# Patient Record
Sex: MALE | Race: WHITE | Employment: OTHER | URBAN - METROPOLITAN AREA
[De-identification: names, ages, dates, MRNs, and addresses within clinical notes are randomized per-mention and may not be internally consistent; named-entity substitution may affect disease eponyms.]

---

## 2017-01-16 ENCOUNTER — CLINICAL SUPPORT (OUTPATIENT)
Dept: CARDIOLOGY CLINIC | Age: 82
End: 2017-01-16

## 2017-01-16 DIAGNOSIS — Z95.0 CARDIAC PACEMAKER IN SITU: Primary | ICD-10-CM

## 2017-01-25 ENCOUNTER — OFFICE VISIT (OUTPATIENT)
Dept: CARDIOLOGY CLINIC | Age: 82
End: 2017-01-25

## 2017-01-25 VITALS
DIASTOLIC BLOOD PRESSURE: 70 MMHG | RESPIRATION RATE: 20 BRPM | OXYGEN SATURATION: 96 % | HEIGHT: 68 IN | SYSTOLIC BLOOD PRESSURE: 136 MMHG | WEIGHT: 192 LBS | HEART RATE: 64 BPM | BODY MASS INDEX: 29.1 KG/M2

## 2017-01-25 DIAGNOSIS — I48.0 PAROXYSMAL ATRIAL FIBRILLATION (HCC): Primary | ICD-10-CM

## 2017-01-25 RX ORDER — METOPROLOL TARTRATE 25 MG/1
25 TABLET, FILM COATED ORAL 2 TIMES DAILY
Qty: 30 TAB | Refills: 6 | Status: SHIPPED | OUTPATIENT
Start: 2017-01-25 | End: 2017-01-25 | Stop reason: SDUPTHER

## 2017-01-25 RX ORDER — METOPROLOL TARTRATE 25 MG/1
25 TABLET, FILM COATED ORAL 2 TIMES DAILY
Qty: 60 TAB | Refills: 6 | Status: ON HOLD | OUTPATIENT
Start: 2017-01-25 | End: 2019-01-27

## 2017-01-25 NOTE — PROGRESS NOTES
HISTORY OF PRESENTING ILLNESS      Roni Moore is a 80 y.o. male established patient with PAF, HTN, PPM, COPD, dyslipidemia, pacemaker who was noted to have 40 mode switches that appeared to be consistent with AFL or AT. Of note, he is pacemaker dependent. Previously we discontinued Plavix and Eliquis and started aspirin, and his rate response was adjusted. He recently underwent EPS, which demonstrated findings consistent with a left AT. Empiric ablation of his CTI was performed. His current pacemaker interrogation is highly consistent with the left AT that was observed during the EPS. Last visit, we adjusted rate response, which he derived some clinical benefit however this has waned over time. Atrial flutter was discovered on recent pacemaker interrogation and he returns today for discussion regarding management. The patient denies chest pain/ shortness of breath, orthopnea, PND, LE edema, palpitations, syncope, presyncope or fatigue.       ACTIVE PROBLEM LIST     Patient Active Problem List    Diagnosis Date Noted    Atrial fibrillation ([facility] [address] 02/17/2016    Encounter to establish care 08/05/2015           PAST MEDICAL HISTORY     Past Medical History   Diagnosis Date    Atrial fibrillation ([address]     Chronic obstructive pulmonary disease ([address]     Essential hypertension     Hyperlipidemia     Long term (current) use of anticoagulants     Pacemaker     Rosacea 2016           PAST SURGICAL HISTORY     Past Surgical History   Procedure Laterality Date    Hx heart catheterization      Hx coronary stent placement      Hx pacemaker            ALLERGIES     No Known Allergies       FAMILY HISTORY     Family History   Problem Relation Age of Onset    Stroke Mother     Hypertension Mother     Kidney Disease Father     negative for cardiac disease       SOCIAL HISTORY     Social History     Social History    Marital status:      Spouse name: N/A    Number of children: N/A    Years of education: N/A     Social History Main Topics    Smoking status: Never Smoker    Smokeless tobacco: Never Used    Alcohol use No    Drug use: No    Sexual activity: Not on file     Other Topics Concern    Not on file     Social History Narrative         MEDICATIONS     Current Outpatient Prescriptions   Medication Sig    gabapentin (NEURONTIN) 600 mg tablet Take 600 mg by mouth daily.  OTHER nightly. Nidazole 0.75% cream for Rosecea.  ipratropium (ATROVENT) 0.03 % nasal spray 1 Spray three (3) times daily. Uses SL for drooling    VIT A/VIT C/VIT E/ZINC/COPPER (ICAPS AREDS PO) Take two capsules daily.  aspirin delayed-release 81 mg tablet Take  by mouth daily.  tamsulosin (FLOMAX) 0.4 mg capsule Take 0.4 mg by mouth daily.  amLODIPine (NORVASC) 2.5 mg tablet Take  by mouth daily.  hydrochlorothiazide (HYDRODIURIL) 25 mg tablet Take 12.5 mg by mouth daily.  isosorbide mononitrate ER (IMDUR) 30 mg tablet Take  by mouth daily.  levothyroxine (LEVOTHROID) 50 mcg tablet Take  by mouth Daily (before breakfast).  metoprolol (LOPRESSOR) 25 mg tablet Take 12.5 mg by mouth two (2) times a day.  omeprazole (PRILOSEC) 20 mg capsule Take 20 mg by mouth daily.  simvastatin (ZOCOR) 40 mg tablet Take 40 mg by mouth every Monday, Wednesday, Friday.  nitroglycerin (NITROSTAT) 0.4 mg SL tablet by SubLINGual route every five (5) minutes as needed for Chest Pain.  Multivits with Min-FA-Lycopene (MEN'S DAILY MULTIVIT-MINERAL) 0.4-600 mg-mcg tab Take  by mouth daily. No current facility-administered medications for this visit. I have reviewed the nurses notes, vitals, problem list, allergy list, medical history, family, social history and medications. REVIEW OF SYMPTOMS      General: Pt denies excessive weight gain or loss. Pt is able to conduct ADL's  HEENT: Denies blurred vision, headaches, hearing loss, epistaxis and difficulty swallowing.   Respiratory: Denies cough, congestion, shortness of breath, GARCIA, wheezing or stridor. Cardiovascular: Denies precordial pain, palpitations, edema or PND  Gastrointestinal: Denies poor appetite, indigestion, abdominal pain or blood in stool  Genitourinary: Denies hematuria, dysuria, increased urinary frequency  Musculoskeletal: Denies joint pain or swelling from muscles or joints  Neurologic: Denies tremor, paresthesias, headache, or sensory motor disturbance  Psychiatric: Denies confusion, insomnia, depression  Integumentray: Denies rash, itching or ulcers. Hematologic: Denies easy bruising, bleeding     PHYSICAL EXAMINATION      There were no vitals filed for this visit. General: Well developed, in no acute distress. HEENT: No jaundice, oral mucosa moist, no oral ulcers  Neck: Supple, no stiffness, no lymphadenopathy, supple  Heart:  irregular Normal S1/S2 negative S3 or S4.  no murmur, gallop or rub, no jugular venous distention  Respiratory: Clear bilaterally x 4, no wheezing or rales  Abdomen:   Soft, non-tender, bowel sounds are active.   Extremities:  No edema, normal cap refill, no cyanosis. Musculoskeletal: No clubbing, no deformities  Neuro: A&Ox3, speech clear, gait stable, cooperative, no focal neurologic deficits  Skin: Skin color is normal. No rashes or lesions.  Non diaphoretic, moist.  Vascular: 2+ pulses symmetric in all extremities       DIAGNOSTIC DATA      EKG:        LABORATORY DATA      Lab Results   Component Value Date/Time    WBC 6.6 03/03/2016 09:55 AM    HGB 13.4 03/03/2016 09:55 AM    HCT 41.6 03/03/2016 09:55 AM    PLATELET 605 39/69/7142 09:55 AM    MCV 94 03/03/2016 09:55 AM      Lab Results   Component Value Date/Time    Sodium 141 03/03/2016 09:55 AM    Potassium 4.2 03/03/2016 09:55 AM    Chloride 102 03/03/2016 09:55 AM    CO2 29 03/03/2016 09:55 AM    Glucose 83 03/03/2016 09:55 AM    BUN 23 03/03/2016 09:55 AM    Creatinine 1.57 03/03/2016 09:55 AM    BUN/Creatinine ratio 15 03/03/2016 09:55 AM    GFR est AA 43 03/03/2016 09:55 AM    GFR est non-AA 37 03/03/2016 09:55 AM    Calcium 8.9 03/03/2016 09:55 AM           ASSESSMENT      1. Atrial Fibrillation  2. Atrial Tachycardia   A. Left atrial  3. Hypertension  4. PPM  5. COPD  6. Dyslipidemia  7. Pacemaker    A. Medtronic       PLAN      Given his age and that he is asymptomatic, favor a rate control approach. Will plan to increase his metoprolol to 25mg BID and start Eliquis 2.5mg for stroke prevention. FOLLOW-UP     Follow up 1 month. Thank you,  Joselin Odell MD for involving me in the care of this extraordinarily pleasant male. Please do not hesitate to contact me for further questions/concerns.      BRIAN Alejandro MD  Cardiac Electrophysiology / Cardiology    [address]  [address] [place] [address]  [phone] / [phone] Fax   [phone] / [phone] Fax

## 2017-01-25 NOTE — PROGRESS NOTES
Visit Vitals    /70 (BP 1 Location: Left arm, BP Patient Position: Sitting)    Pulse 64    Resp 20    Ht 5' 8\" (1.727 m)    Wt 192 lb (87.1 kg)    SpO2 96%    BMI 29.19 kg/m2

## 2017-03-10 ENCOUNTER — OFFICE VISIT (OUTPATIENT)
Dept: CARDIOLOGY CLINIC | Age: 82
End: 2017-03-10

## 2017-03-10 VITALS
DIASTOLIC BLOOD PRESSURE: 82 MMHG | OXYGEN SATURATION: 96 % | HEART RATE: 72 BPM | WEIGHT: 196.2 LBS | BODY MASS INDEX: 29.73 KG/M2 | RESPIRATION RATE: 18 BRPM | HEIGHT: 68 IN | SYSTOLIC BLOOD PRESSURE: 120 MMHG

## 2017-03-10 DIAGNOSIS — I48.91 ATRIAL FIBRILLATION, UNSPECIFIED TYPE (HCC): Primary | ICD-10-CM

## 2017-03-10 NOTE — PROGRESS NOTES
Visit Vitals    /82 (BP 1 Location: Left arm, BP Patient Position: Sitting)    Pulse 72    Resp 18    Ht 5' 8\" (1.727 m)    Wt 196 lb 3.2 oz (89 kg)    SpO2 96%    BMI 29.83 kg/m2

## 2017-03-10 NOTE — MR AVS SNAPSHOT
Visit Information Date & Time Provider Department Dept. Phone Encounter #  
 3/10/2017 10:20 AM Nida Bernal MD [facility] [phone] [id number] Your Appointments 4/17/2017 10:45 AM  
PACEMAKER with PACEMAKERVIKRAM  
[facility] (JANNETH KHANNA) Appt Note: med/pm/stf/b 1-16-17  
 [address] [facility] [address] [facility]  
[phone]  
  
   
 [address] [address]  
  
    
 6/21/2017 11:20 AM  
ESTABLISHED PATIENT with Nida Bernal MD  
[facility] (Rosana Yo) Appt Note: 3 mo fu  
 [address] [facility] [address] [place] [address]  
[phone]  
  
   
 [address] [address]  
  
    
 12/8/2017 11:00 AM  
ESTABLISHED PATIENT with Nida Bernal MD  
[facility] (Rosana Bartlettitz) Appt Note: 1 yr follow up  
 [address] [facility] [address] [place] [address]  
[phone] Upcoming Health Maintenance Date Due ZOSTER VACCINE AGE 60> 7/31/1982 GLAUCOMA SCREENING Q2Y 7/31/1987 Pneumococcal 65+ Low/Medium Risk (1 of 2 - PCV13) 7/31/1987 MEDICARE YEARLY EXAM 7/31/1987 INFLUENZA AGE 9 TO ADULT 8/1/2016 DTaP/Tdap/Td series (2 - Td) 11/2/2025 Allergies as of 3/10/2017  Review Complete On: 3/10/2017 By: Nida Bernal MD  
 No Known Allergies Current Immunizations  Never Reviewed Name Date Tdap 11/2/2015  9:38 AM  
  
 Not reviewed this visit You Were Diagnosed With   
  
 Codes Comments Atrial fibrillation, unspecified type ([facility] [address]    -  Primary ICD-10-CM: I48.91 
ICD-9-CM: 427.31 Vitals BP Pulse Resp Height(growth percentile) Weight(growth percentile) SpO2  
 120/82 (BP 1 Location: Left arm, BP Patient Position: Sitting) 72 18 5' 8\" (1.727 m) 196 lb 3.2 oz (89 kg) 96% BMI Smoking Status 29.83 kg/m2 Never Smoker Vitals History BMI and BSA Data Body Mass Index Body Surface Area  
 29.83 kg/m 2 2.07 m 2 Preferred Pharmacy Pharmacy Name Phone [facility] [address], . [address] [phone] Your Updated Medication List  
  
   
This list is accurate as of: 3/10/17 11:17 AM.  Always use your most recent med list. amLODIPine 2.5 mg tablet Commonly known as:  Becky Bessy Take  by mouth daily. apixaban 2.5 mg tablet Commonly known as:  Sherrell Shield Take 1 Tab by mouth two (2) times a day. aspirin delayed-release 81 mg tablet Take  by mouth daily. gabapentin 600 mg tablet Commonly known as:  NEURONTIN Take 600 mg by mouth daily. hydroCHLOROthiazide 25 mg tablet Commonly known as:  HYDRODIURIL Take 12.5 mg by mouth daily. ICAPS AREDS PO Take two capsules daily. IMDUR 30 mg tablet Generic drug:  isosorbide mononitrate ER Take  by mouth daily. ipratropium 0.03 % nasal spray Commonly known as:  ATROVENT  
1 Spray three (3) times daily. Uses SL for drooling LEVOTHROID 50 mcg tablet Generic drug:  levothyroxine Take  by mouth Daily (before breakfast). MEN'S DAILY MULTIVIT-MINERAL 0.4-600 mg-mcg Tab Generic drug:  Multivits with Min-FA-Lycopene Take  by mouth daily. metoprolol tartrate 25 mg tablet Commonly known as:  LOPRESSOR Take 1 Tab by mouth two (2) times a day. nitroglycerin 0.4 mg SL tablet Commonly known as:  NITROSTAT  
by SubLINGual route every five (5) minutes as needed for Chest Pain. omeprazole 20 mg capsule Commonly known as:  PRILOSEC Take 20 mg by mouth daily. OTHER  
nightly. Nidazole 0.75% cream for Rosecea. simvastatin 40 mg tablet Commonly known as:  ZOCOR Take 20 mg by mouth every Monday, Wednesday, Friday. tamsulosin 0.4 mg capsule Commonly known as:  FLOMAX Take 0.4 mg by mouth daily. We Performed the Following AMB POC EKG ROUTINE W/ 12 LEADS, INTER & REP [69931 CPT(R)] Introducing [facility] & [facility] SERVICES! Dear Michael Restrepo: Thank you for requesting a [employer] account. Our records indicate that you already have an active [employer] account. You can access your account anytime at [email or web address] Did you know that you can access your hospital and ER discharge instructions at any time in [employer]? You can also review all of your test results from your hospital stay or ER visit. Additional Information If you have questions, please visit the Frequently Asked Questions section of the [employer] website at [email or web address] Remember, [employer] is NOT to be used for urgent needs. For medical emergencies, dial 911. Now available from your iPhone and Android! Please provide this summary of care documentation to your next provider. Your primary care clinician is listed as Vivian Gautam. If you have any questions after today's visit, please call [phone].

## 2017-03-10 NOTE — PROGRESS NOTES
HISTORY OF PRESENTING ILLNESS      Prosper Diaz is a 80 y.o. male established patient with PAF, HTN, PPM, COPD, dyslipidemia, pacemaker who was noted to have 40 mode switches that appeared to be consistent with AFL or AT. Of note, he is pacemaker dependent. He recently underwent EPS, which demonstrated findings consistent with a left AT. Empiric ablation of his CTI was performed. His current pacemaker interrogation is highly consistent with the left AT that was observed during the EPS. Last visit we increased his metoprolol and added eliquis. He is tolerating this regimen.         ACTIVE PROBLEM LIST     Patient Active Problem List    Diagnosis Date Noted    Atrial fibrillation ([facility] [address] 02/17/2016    Encounter to establish care 08/05/2015           PAST MEDICAL HISTORY     Past Medical History:   Diagnosis Date    Atrial fibrillation ([facility] [address]     Chronic obstructive pulmonary disease ([facility] [address]     Essential hypertension     Hyperlipidemia     Long term (current) use of anticoagulants     Pacemaker     Rosacea 2016           PAST SURGICAL HISTORY     Past Surgical History:   Procedure Laterality Date    HX CORONARY STENT PLACEMENT      HX HEART CATHETERIZATION      HX PACEMAKER            ALLERGIES     No Known Allergies       FAMILY HISTORY     Family History   Problem Relation Age of Onset    Stroke Mother     Hypertension Mother     Kidney Disease Father     negative for cardiac disease       SOCIAL HISTORY     Social History     Social History    Marital status:      Spouse name: N/A    Number of children: N/A    Years of education: N/A     Social History Main Topics    Smoking status: Never Smoker    Smokeless tobacco: Never Used    Alcohol use No    Drug use: No    Sexual activity: Not Asked     Other Topics Concern    None     Social History Narrative         MEDICATIONS     Current Outpatient Prescriptions   Medication Sig    metoprolol tartrate (LOPRESSOR) 25 mg tablet Take 1 Tab by mouth two (2) times a day.  apixaban (ELIQUIS) 2.5 mg tablet Take 1 Tab by mouth two (2) times a day.  gabapentin (NEURONTIN) 600 mg tablet Take 600 mg by mouth daily.  OTHER nightly. Nidazole 0.75% cream for Rosecea.  ipratropium (ATROVENT) 0.03 % nasal spray 1 Spray three (3) times daily. Uses SL for drooling    VIT A/VIT C/VIT E/ZINC/COPPER (ICAPS AREDS PO) Take two capsules daily.  tamsulosin (FLOMAX) 0.4 mg capsule Take 0.4 mg by mouth daily.  amLODIPine (NORVASC) 2.5 mg tablet Take  by mouth daily.  hydrochlorothiazide (HYDRODIURIL) 25 mg tablet Take 12.5 mg by mouth daily.  isosorbide mononitrate ER (IMDUR) 30 mg tablet Take  by mouth daily.  levothyroxine (LEVOTHROID) 50 mcg tablet Take  by mouth Daily (before breakfast).  omeprazole (PRILOSEC) 20 mg capsule Take 20 mg by mouth daily.  simvastatin (ZOCOR) 40 mg tablet Take 20 mg by mouth every Monday, Wednesday, Friday.  nitroglycerin (NITROSTAT) 0.4 mg SL tablet by SubLINGual route every five (5) minutes as needed for Chest Pain.  Multivits with Min-FA-Lycopene (MEN'S DAILY MULTIVIT-MINERAL) 0.4-600 mg-mcg tab Take  by mouth daily.  aspirin delayed-release 81 mg tablet Take  by mouth daily. No current facility-administered medications for this visit. I have reviewed the nurses notes, vitals, problem list, allergy list, medical history, family, social history and medications. REVIEW OF SYMPTOMS      General: Pt denies excessive weight gain or loss. Pt is able to conduct ADL's  HEENT: Denies blurred vision, headaches, hearing loss, epistaxis and difficulty swallowing. Respiratory: Denies cough, congestion, shortness of breath, GARCIA, wheezing or stridor.   Cardiovascular: Denies precordial pain, palpitations, edema or PND  Gastrointestinal: Denies poor appetite, indigestion, abdominal pain or blood in stool  Genitourinary: Denies hematuria, dysuria, increased urinary frequency  Musculoskeletal: Denies joint pain or swelling from muscles or joints  Neurologic: Denies tremor, paresthesias, headache, or sensory motor disturbance  Psychiatric: Denies confusion, insomnia, depression  Integumentray: Denies rash, itching or ulcers. Hematologic: Denies easy bruising, bleeding       PHYSICAL EXAMINATION      Vitals:    03/10/17 1017   BP: 120/82   Pulse: 72   Resp: 18   SpO2: 96%   Weight: 196 lb 3.2 oz (89 kg)   Height: 5' 8\" (1.727 m)     General: Well developed, in no acute distress. HEENT: No jaundice, oral mucosa moist, no oral ulcers  Neck: Supple, no stiffness, no lymphadenopathy, supple  Heart:  Normal S1/S2 negative S3 or S4. Regular, no murmur, gallop or rub, no jugular venous distention  Respiratory: Clear bilaterally x 4, no wheezing or rales  Abdomen:   Soft, non-tender, bowel sounds are active.   Extremities:  No edema, normal cap refill, no cyanosis. Musculoskeletal: No clubbing, no deformities  Neuro: A&Ox3, speech clear, gait stable, cooperative, no focal neurologic deficits  Skin: Skin color is normal. No rashes or lesions. Non diaphoretic, moist.  Vascular: 2+ pulses symmetric in all extremities       DIAGNOSTIC DATA      EKG:        LABORATORY DATA      Lab Results   Component Value Date/Time    WBC 6.6 03/03/2016 09:55 AM    HGB 13.4 03/03/2016 09:55 AM    HCT 41.6 03/03/2016 09:55 AM    PLATELET 062 92/30/8706 09:55 AM    MCV 94 03/03/2016 09:55 AM      Lab Results   Component Value Date/Time    Sodium 141 03/03/2016 09:55 AM    Potassium 4.2 03/03/2016 09:55 AM    Chloride 102 03/03/2016 09:55 AM    CO2 29 03/03/2016 09:55 AM    Glucose 83 03/03/2016 09:55 AM    BUN 23 03/03/2016 09:55 AM    Creatinine 1.57 03/03/2016 09:55 AM    BUN/Creatinine ratio 15 03/03/2016 09:55 AM    GFR est AA 43 03/03/2016 09:55 AM    GFR est non-AA 37 03/03/2016 09:55 AM    Calcium 8.9 03/03/2016 09:55 AM           ASSESSMENT      1. Atrial Fibrillation  2. Atrial Tachycardia   A. Left atrial  3. Hypertension  4. PPM  5. COPD  6. Dyslipidemia  7. Pacemaker    A. [employer]tr[employer]       PLAN     Continue current drug regimen. FOLLOW-UP     3 months      Thank you, Vikas Auguset MD for allowing me to participate in the care of this extraordinarily pleasant male. Please do not hesitate to contact me for further questions/concerns.          Nessa Benoit MD  Cardiac Electrophysiology / Cardiology    [facility]  [address] [facility], [facility], [address] [place] [address] Sumanth Jackson  [phone] / [phone] Fax   [phone] / [phone] Fax

## 2017-04-17 ENCOUNTER — CLINICAL SUPPORT (OUTPATIENT)
Dept: CARDIOLOGY CLINIC | Age: 82
End: 2017-04-17

## 2017-04-17 DIAGNOSIS — Z95.0 CARDIAC PACEMAKER IN SITU: Primary | ICD-10-CM

## 2017-06-14 ENCOUNTER — CLINICAL SUPPORT (OUTPATIENT)
Dept: CARDIOLOGY CLINIC | Age: 82
End: 2017-06-14

## 2017-06-14 ENCOUNTER — OFFICE VISIT (OUTPATIENT)
Dept: CARDIOLOGY CLINIC | Age: 82
End: 2017-06-14

## 2017-06-14 VITALS
HEART RATE: 68 BPM | OXYGEN SATURATION: 98 % | RESPIRATION RATE: 20 BRPM | BODY MASS INDEX: 29.58 KG/M2 | DIASTOLIC BLOOD PRESSURE: 82 MMHG | HEIGHT: 68 IN | WEIGHT: 195.2 LBS | SYSTOLIC BLOOD PRESSURE: 136 MMHG

## 2017-06-14 DIAGNOSIS — Z95.0 CARDIAC PACEMAKER IN SITU: Primary | ICD-10-CM

## 2017-06-14 DIAGNOSIS — R00.0 TACHYCARDIA: Primary | ICD-10-CM

## 2017-06-14 NOTE — PROGRESS NOTES
Visit Vitals    /82 (BP 1 Location: Left arm, BP Patient Position: Sitting)    Pulse 68    Resp 20    Ht 5' 8\" (1.727 m)    Wt 195 lb 3.2 oz (88.5 kg)    SpO2 98%    BMI 29.68 kg/m2

## 2017-06-14 NOTE — PROGRESS NOTES
HISTORY OF PRESENTING ILLNESS      Mejia Jara is a 80 y.o. male established patient with PAF, HTN, PPM, COPD, dyslipidemia, pacemaker who was noted to have 40 mode switches that appeared to be consistent with AFL or AT. Of note, he is pacemaker dependent. He recently underwent EPS, which demonstrated findings consistent with a left AT. Empiric ablation of his CTI was performed. We increased his metoprolol and added eliquis. He is tolerating this regimen. No further tachycardia recurrence. ACTIVE PROBLEM LIST     Patient Active Problem List    Diagnosis Date Noted    Atrial fibrillation ([facility] 02/17/2016    Encounter to establish care 08/05/2015           PAST MEDICAL HISTORY     Past Medical History:   Diagnosis Date    Atrial fibrillation ([facility] [facility]     Chronic obstructive pulmonary disease ([facility] [address] [facility]     Essential hypertension     Hyperlipidemia     Long term (current) use of anticoagulants     Pacemaker     Rosacea 2016           PAST SURGICAL HISTORY     Past Surgical History:   Procedure Laterality Date    HX CORONARY STENT PLACEMENT      HX HEART CATHETERIZATION      HX PACEMAKER            ALLERGIES     No Known Allergies       FAMILY HISTORY     Family History   Problem Relation Age of Onset    Stroke Mother     Hypertension Mother     Kidney Disease Father     negative for cardiac disease       SOCIAL HISTORY     Social History     Social History    Marital status:      Spouse name: N/A    Number of children: N/A    Years of education: N/A     Social History Main Topics    Smoking status: Never Smoker    Smokeless tobacco: Never Used    Alcohol use No    Drug use: No    Sexual activity: Not Asked     Other Topics Concern    None     Social History Narrative         MEDICATIONS     Current Outpatient Prescriptions   Medication Sig    metoprolol tartrate (LOPRESSOR) 25 mg tablet Take 1 Tab by mouth two (2) times a day.     apixaban (ELIQUIS) 2.5 mg tablet Take 1 Tab by mouth two (2) times a day.  gabapentin (NEURONTIN) 600 mg tablet Take 600 mg by mouth daily.  OTHER nightly. Nidazole 0.75% cream for Rosecea.  ipratropium (ATROVENT) 0.03 % nasal spray 1 Spray three (3) times daily. Uses SL for drooling    VIT A/VIT C/VIT E/ZINC/COPPER (ICAPS AREDS PO) Take two capsules daily.  tamsulosin (FLOMAX) 0.4 mg capsule Take 0.4 mg by mouth daily.  hydrochlorothiazide (HYDRODIURIL) 25 mg tablet Take 12.5 mg by mouth daily.  isosorbide mononitrate ER (IMDUR) 30 mg tablet Take  by mouth daily.  levothyroxine (LEVOTHROID) 50 mcg tablet Take  by mouth Daily (before breakfast).  omeprazole (PRILOSEC) 20 mg capsule Take 20 mg by mouth daily.  simvastatin (ZOCOR) 40 mg tablet Take 20 mg by mouth every Monday, Wednesday, Friday.  nitroglycerin (NITROSTAT) 0.4 mg SL tablet by SubLINGual route every five (5) minutes as needed for Chest Pain.  Multivits with Min-FA-Lycopene (MEN'S DAILY MULTIVIT-MINERAL) 0.4-600 mg-mcg tab Take  by mouth daily. No current facility-administered medications for this visit. I have reviewed the nurses notes, vitals, problem list, allergy list, medical history, family, social history and medications. REVIEW OF SYMPTOMS      General: Pt denies excessive weight gain or loss. Pt is able to conduct ADL's  HEENT: Denies blurred vision, headaches, hearing loss, epistaxis and difficulty swallowing. Respiratory: Denies cough, congestion, shortness of breath, GARCIA, wheezing or stridor.   Cardiovascular: Denies precordial pain, palpitations, edema or PND  Gastrointestinal: Denies poor appetite, indigestion, abdominal pain or blood in stool  Genitourinary: Denies hematuria, dysuria, increased urinary frequency  Musculoskeletal: Denies joint pain or swelling from muscles or joints  Neurologic: Denies tremor, paresthesias, headache, or sensory motor disturbance  Psychiatric: Denies confusion, insomnia, depression  Integumentray: Denies rash, itching or ulcers. Hematologic: Denies easy bruising, bleeding       PHYSICAL EXAMINATION      Vitals:    06/14/17 1110   BP: 136/82   Pulse: 68   Resp: 20   SpO2: 98%   Weight: 195 lb 3.2 oz (88.5 kg)   Height: 5' 8\" (1.727 m)     General: Well developed, in no acute distress. HEENT: No jaundice, oral mucosa moist, no oral ulcers  Neck: Supple, no stiffness, no lymphadenopathy, supple  Heart:  Normal S1/S2 negative S3 or S4. Regular, no murmur, gallop or rub, no jugular venous distention  Respiratory: Clear bilaterally x 4, no wheezing or rales  Abdomen:   Soft, non-tender, bowel sounds are active.   Extremities:  No edema, normal cap refill, no cyanosis. Musculoskeletal: No clubbing, no deformities  Neuro: A&Ox3, speech clear, gait stable, cooperative, no focal neurologic deficits  Skin: Skin color is normal. No rashes or lesions. Non diaphoretic, moist.  Vascular: 2+ pulses symmetric in all extremities       DIAGNOSTIC DATA      EKG:        LABORATORY DATA      Lab Results   Component Value Date/Time    WBC 6.6 03/03/2016 09:55 AM    HGB 13.4 03/03/2016 09:55 AM    HCT 41.6 03/03/2016 09:55 AM    PLATELET 568 64/46/7509 09:55 AM    MCV 94 03/03/2016 09:55 AM      Lab Results   Component Value Date/Time    Sodium 141 03/03/2016 09:55 AM    Potassium 4.2 03/03/2016 09:55 AM    Chloride 102 03/03/2016 09:55 AM    CO2 29 03/03/2016 09:55 AM    Glucose 83 03/03/2016 09:55 AM    BUN 23 03/03/2016 09:55 AM    Creatinine 1.57 03/03/2016 09:55 AM    BUN/Creatinine ratio 15 03/03/2016 09:55 AM    GFR est AA 43 03/03/2016 09:55 AM    GFR est non-AA 37 03/03/2016 09:55 AM    Calcium 8.9 03/03/2016 09:55 AM           ASSESSMENT      1. Atrial Flutter  2. Atrial Tachycardia   A. Left atrial  3. Hypertension  4. PPM  5. COPD  6. Dyslipidemia  7. Pacemaker    A. Medtronic       PLAN     No recurrent tachycardia at this time. Continue monitoring PPM for recurrence.         FOLLOW-UP     6 months      Thank you, Susy Suárez MD for allowing me to participate in the care of this extraordinarily pleasant male. Please do not hesitate to contact me for further questions/concerns.          Shaunna Leslie MD  Cardiac Electrophysiology / Cardiology    [address]  [address], [address] [facility], [address]  [facility]    [place]  [phone] / [phone] Fax   [phone] / [phone] Fax

## 2017-08-23 ENCOUNTER — HOSPITAL ENCOUNTER (OUTPATIENT)
Dept: ULTRASOUND IMAGING | Age: 82
Discharge: HOME | End: 2017-08-23
Payer: MEDICARE

## 2017-08-23 DIAGNOSIS — I71.40 ABDOMINAL AORTIC ANEURYSM: ICD-10-CM

## 2017-08-23 PROCEDURE — 76775 US EXAM ABDO BACK WALL LIM: CPT

## 2017-09-27 ENCOUNTER — CLINICAL SUPPORT (OUTPATIENT)
Dept: CARDIOLOGY CLINIC | Age: 82
End: 2017-09-27

## 2017-09-27 DIAGNOSIS — Z95.0 CARDIAC PACEMAKER IN SITU: Primary | ICD-10-CM

## 2017-12-08 ENCOUNTER — CLINICAL SUPPORT (OUTPATIENT)
Dept: CARDIOLOGY CLINIC | Age: 82
End: 2017-12-08

## 2017-12-08 ENCOUNTER — OFFICE VISIT (OUTPATIENT)
Dept: CARDIOLOGY CLINIC | Age: 82
End: 2017-12-08

## 2017-12-08 VITALS
DIASTOLIC BLOOD PRESSURE: 78 MMHG | RESPIRATION RATE: 19 BRPM | OXYGEN SATURATION: 99 % | SYSTOLIC BLOOD PRESSURE: 128 MMHG | HEIGHT: 68 IN | WEIGHT: 196 LBS | BODY MASS INDEX: 29.7 KG/M2 | HEART RATE: 67 BPM

## 2017-12-08 DIAGNOSIS — Z95.0 CARDIAC PACEMAKER IN SITU: Primary | ICD-10-CM

## 2017-12-08 DIAGNOSIS — I48.91 ATRIAL FIBRILLATION, UNSPECIFIED TYPE (HCC): Primary | ICD-10-CM

## 2017-12-08 NOTE — PROGRESS NOTES
HISTORY OF PRESENTING ILLNESS      Patient reports recent onset of fatigue. EKG shows atrial tachycardia with ventricular pacing. He has also experienced some lower extremity edema. He was started a diuretic for this with somewhat of an improvement. ACTIVE PROBLEM LIST     Patient Active Problem List    Diagnosis Date Noted    Atrial fibrillation ([address] 02/17/2016    Encounter to establish care 08/05/2015           PAST MEDICAL HISTORY     Past Medical History:   Diagnosis Date    Atrial fibrillation ([address]     Chronic obstructive pulmonary disease ([facility] [address]     Essential hypertension     Hyperlipidemia     Long term (current) use of anticoagulants     Pacemaker     Rosacea 2016           PAST SURGICAL HISTORY     Past Surgical History:   Procedure Laterality Date    HX CORONARY STENT PLACEMENT      HX HEART CATHETERIZATION      HX PACEMAKER            ALLERGIES     No Known Allergies       FAMILY HISTORY     Family History   Problem Relation Age of Onset    Stroke Mother     Hypertension Mother     Kidney Disease Father     negative for cardiac disease       SOCIAL HISTORY     Social History     Social History    Marital status:      Spouse name: N/A    Number of children: N/A    Years of education: N/A     Social History Main Topics    Smoking status: Never Smoker    Smokeless tobacco: Never Used    Alcohol use No    Drug use: No    Sexual activity: Not Asked     Other Topics Concern    None     Social History Narrative         MEDICATIONS     Current Outpatient Prescriptions   Medication Sig    metoprolol tartrate (LOPRESSOR) 25 mg tablet Take 1 Tab by mouth two (2) times a day.  apixaban (ELIQUIS) 2.5 mg tablet Take 1 Tab by mouth two (2) times a day.  gabapentin (NEURONTIN) 600 mg tablet Take 600 mg by mouth daily.  OTHER nightly. Nidazole 0.75% cream for Rosecea.  ipratropium (ATROVENT) 0.03 % nasal spray 1 Spray three (3) times daily.  Uses  for drooling    VIT A/VIT C/VIT E/ZINC/COPPER (ICAPS AREDS PO) Take two capsules daily.  tamsulosin (FLOMAX) 0.4 mg capsule Take 0.4 mg by mouth daily.  hydrochlorothiazide (HYDRODIURIL) 25 mg tablet Take 12.5 mg by mouth daily.  isosorbide mononitrate ER (IMDUR) 30 mg tablet Take  by mouth daily.  levothyroxine (LEVOTHROID) 50 mcg tablet Take  by mouth Daily (before breakfast).  omeprazole (PRILOSEC) 20 mg capsule Take 20 mg by mouth daily.  simvastatin (ZOCOR) 40 mg tablet Take 20 mg by mouth every Monday, Wednesday, Friday.  nitroglycerin (NITROSTAT) 0.4 mg SL tablet by SubLINGual route every five (5) minutes as needed for Chest Pain.  Multivits with Min-FA-Lycopene (MEN'S DAILY MULTIVIT-MINERAL) 0.4-600 mg-mcg tab Take  by mouth daily. No current facility-administered medications for this visit. I have reviewed the nurses notes, vitals, problem list, allergy list, medical history, family, social history and medications. REVIEW OF SYMPTOMS      General: Pt denies excessive weight gain or loss. Pt is able to conduct ADL's  HEENT: Denies blurred vision, headaches, hearing loss, epistaxis and difficulty swallowing. Respiratory: Denies cough, congestion, shortness of breath, GARCIA, wheezing or stridor. Cardiovascular: Denies precordial pain, palpitations, edema or PND  Gastrointestinal: Denies poor appetite, indigestion, abdominal pain or blood in stool  Genitourinary: Denies hematuria, dysuria, increased urinary frequency  Musculoskeletal: Denies joint pain or swelling from muscles or joints  Neurologic: Denies tremor, paresthesias, headache, or sensory motor disturbance  Psychiatric: Denies confusion, insomnia, depression  Integumentray: Denies rash, itching or ulcers.   Hematologic: Denies easy bruising, bleeding       PHYSICAL EXAMINATION      Vitals:    12/08/17 1049   BP: 128/78   Pulse: 67   Resp: 19   SpO2: 99%   Weight: 196 lb (88.9 kg)   Height: 5' 8\" (1.727 m) General: Well developed, in no acute distress. HEENT: No jaundice, oral mucosa moist, no oral ulcers  Neck: Supple, no stiffness, no lymphadenopathy, supple  Heart:  Normal S1/S2 negative S3 or S4. Regular, no murmur, gallop or rub, no jugular venous distention  Respiratory: Clear bilaterally x 4, no wheezing or rales  Abdomen:   Soft, non-tender, bowel sounds are active.   Extremities:  No edema, normal cap refill, no cyanosis. Musculoskeletal: No clubbing, no deformities  Neuro: A&Ox3, speech clear, gait stable, cooperative, no focal neurologic deficits  Skin: Skin color is normal. No rashes or lesions. Non diaphoretic, moist.  Vascular: 2+ pulses symmetric in all extremities       DIAGNOSTIC DATA      EKG: Vpacing       LABORATORY DATA      Lab Results   Component Value Date/Time    WBC 6.6 03/03/2016 09:55 AM    HGB 13.4 03/03/2016 09:55 AM    HCT 41.6 03/03/2016 09:55 AM    PLATELET 101 99/45/5107 09:55 AM    MCV 94 03/03/2016 09:55 AM      Lab Results   Component Value Date/Time    Sodium 141 03/03/2016 09:55 AM    Potassium 4.2 03/03/2016 09:55 AM    Chloride 102 03/03/2016 09:55 AM    CO2 29 03/03/2016 09:55 AM    Glucose 83 03/03/2016 09:55 AM    BUN 23 03/03/2016 09:55 AM    Creatinine 1.57 03/03/2016 09:55 AM    BUN/Creatinine ratio 15 03/03/2016 09:55 AM    GFR est AA 43 03/03/2016 09:55 AM    GFR est non-AA 37 03/03/2016 09:55 AM    Calcium 8.9 03/03/2016 09:55 AM           ASSESSMENT      1. Atrial Flutter  2. Atrial Tachycardia                        A. Left atrial  3. Hypertension  4. PPM  5. COPD  6. Dyslipidemia  7. Pacemaker                         A. Medtronic       PLAN     Given he is pacemaker dependent will plan for an echocardiogram to evaluate for LV dysfunction due to RV pacing. Recommended compression socks as well. Follow-up to be based on echocardiogram results. If he does have LV dysfunction that is revealed will consider upgrade of his device to CRT.        FOLLOW-UP TBD      Thank you, Stan Lacy MD for allowing me to participate in the care of this extraordinarily pleasant male. Please do not hesitate to contact me for further questions/concerns.          Melvina Alfonso MD  Cardiac Electrophysiology / Cardiology    [address] [place] [address], [facility], [address]  [phone] / [phone] Fax   [phone] / [phone] Fax

## 2017-12-08 NOTE — PROGRESS NOTES
Visit Vitals    /78 (BP 1 Location: Right arm, BP Patient Position: Sitting)    Pulse 67    Resp 19    Ht 5' 8\" (1.727 m)    Wt 196 lb (88.9 kg)    SpO2 99%    BMI 29.8 kg/m2

## 2017-12-08 NOTE — PATIENT INSTRUCTIONS
Treatment Plan:  Echocardiogram  Transthoracic Echocardiogram: About This Test  What is it? An echocardiogram (also called an echo) uses sound waves to make an image of your heart. A device called a transducer sends sound waves that echo off your heart and back to the transducer. These echoes are turned into moving pictures of your heart that can be seen on a video screen. In a transthoracic echocardiogram (TTE), the transducer is moved across your chest or belly. A TTE is the most common type of echocardiogram.  Why is this test done? This test is done to check your heart health. It's used for many reasons. Your doctor may do an echocardiogram to:  · Check a heart murmur. · Look for the cause of shortness of breath or unexplained chest pain or pressure. · Check how well your heart is pumping blood. · Check to see how well your heart valves are working. · Look for blood clots inside your heart. What happens during the test?  · You will remove your clothes above your waist. You may be given a cloth or paper covering to use during the test.  · You will lie on your back or on your left side on a bed or table. · You may receive medicine through a vein (intravenously, or IV). The IV can be used to give you a contrast material, which helps your doctor get good views of your heart. · Small pads or patches (electrodes) will be taped to your arms and legs to record your heart rate during the test.  · A small amount of gel will be rubbed on the side of your chest to help  the sound waves. · The transducer will be pressed firmly against your chest and moved slowly back and forth. It is usually moved to different areas on your chest or belly to get specific views of your heart. · You will be asked to do several things, such as hold very still, breathe in and out very slowly, hold your breath, or lie on your left side. This test usually takes 30 to 60 minutes.   What else should you know about the test?  · You will not have any pain from an echocardiogram. You may have a brief, sharp pain if an intravenous (IV) needle is placed in a vein in your arm. · No electricity passes through your body during the test. There is no danger of getting an electrical shock. · You do not receive any radiation. What happens after the test?  · You will probably be able to go home right away. · You can go back to your usual activities right away. Follow-up care is a key part of your treatment and safety. Be sure to make and go to all appointments, and call your doctor if you are having problems. It's also a good idea to keep a list of the medicines you take. Ask your doctor when you can expect to have your test results. Where can you learn more? Go to http://lakhwinder-demarco.info/. Enter E130 in the search box to learn more about \"Transthoracic Echocardiogram: About This Test.\"  Current as of: September 21, 2016  Content Version: 11.4  © 9989-0517 [employer]. Care instructions adapted under license by [employer] (which disclaims liability or warranty for this information). If you have questions about a medical condition or this instruction, always ask your healthcare professional. [address] any warranty or liability for your use of this information.

## 2017-12-15 ENCOUNTER — CLINICAL SUPPORT (OUTPATIENT)
Dept: CARDIOLOGY CLINIC | Age: 82
End: 2017-12-15

## 2017-12-15 DIAGNOSIS — I48.91 ATRIAL FIBRILLATION, UNSPECIFIED TYPE (HCC): Primary | ICD-10-CM

## 2017-12-15 DIAGNOSIS — I47.1 ATRIAL TACHYCARDIA (HCC): ICD-10-CM

## 2018-03-09 ENCOUNTER — CLINICAL SUPPORT (OUTPATIENT)
Dept: CARDIOLOGY CLINIC | Age: 83
End: 2018-03-09

## 2018-03-09 DIAGNOSIS — Z95.0 CARDIAC PACEMAKER IN SITU: Primary | ICD-10-CM

## 2018-06-13 ENCOUNTER — CLINICAL SUPPORT (OUTPATIENT)
Dept: CARDIOLOGY CLINIC | Age: 83
End: 2018-06-13

## 2018-06-13 DIAGNOSIS — Z95.0 CARDIAC PACEMAKER IN SITU: Primary | ICD-10-CM

## 2018-07-06 ENCOUNTER — HOSPITAL ENCOUNTER (OUTPATIENT)
Dept: CT IMAGING | Age: 83
Discharge: HOME | End: 2018-07-06
Attending: INTERNAL MEDICINE
Payer: MEDICARE

## 2018-07-06 DIAGNOSIS — R93.89 ABNORMAL CXR (CHEST X-RAY): ICD-10-CM

## 2018-07-06 PROCEDURE — 71250 CT THORAX DX C-: CPT

## 2018-09-17 ENCOUNTER — CLINICAL SUPPORT (OUTPATIENT)
Dept: CARDIOLOGY CLINIC | Age: 83
End: 2018-09-17

## 2018-09-17 DIAGNOSIS — Z95.0 CARDIAC PACEMAKER IN SITU: Primary | ICD-10-CM

## 2018-12-14 ENCOUNTER — HOSPITAL ENCOUNTER (OUTPATIENT)
Dept: CT IMAGING | Age: 83
Discharge: HOME | End: 2018-12-14
Attending: PHYSICIAN ASSISTANT
Payer: MEDICARE

## 2018-12-14 DIAGNOSIS — R91.1 SOLITARY PULMONARY NODULE: ICD-10-CM

## 2018-12-14 PROCEDURE — 71250 CT THORAX DX C-: CPT

## 2018-12-19 ENCOUNTER — CLINICAL SUPPORT (OUTPATIENT)
Dept: CARDIOLOGY CLINIC | Age: 83
End: 2018-12-19

## 2018-12-19 DIAGNOSIS — Z95.0 CARDIAC PACEMAKER IN SITU: Primary | ICD-10-CM

## 2019-01-27 ENCOUNTER — APPOINTMENT (OUTPATIENT)
Dept: GENERAL RADIOLOGY | Age: 84
DRG: 871 | End: 2019-01-27
Attending: EMERGENCY MEDICINE
Payer: MEDICARE

## 2019-01-27 ENCOUNTER — HOSPITAL ENCOUNTER (INPATIENT)
Age: 84
LOS: 2 days | Discharge: HOME HEALTH | DRG: 871 | End: 2019-01-29
Attending: EMERGENCY MEDICINE | Admitting: INTERNAL MEDICINE
Payer: MEDICARE

## 2019-01-27 ENCOUNTER — APPOINTMENT (OUTPATIENT)
Dept: CT IMAGING | Age: 84
DRG: 871 | End: 2019-01-27
Attending: INTERNAL MEDICINE
Payer: MEDICARE

## 2019-01-27 DIAGNOSIS — R50.9 ACUTE FEBRILE ILLNESS: ICD-10-CM

## 2019-01-27 DIAGNOSIS — J18.9 COMMUNITY ACQUIRED PNEUMONIA OF LEFT LOWER LOBE OF LUNG: Primary | ICD-10-CM

## 2019-01-27 PROBLEM — A41.9 SEPSIS (HCC): Status: ACTIVE | Noted: 2019-01-27

## 2019-01-27 PROBLEM — N18.30 CKD (CHRONIC KIDNEY DISEASE) STAGE 3, GFR 30-59 ML/MIN (HCC): Status: ACTIVE | Noted: 2019-01-27

## 2019-01-27 PROBLEM — J44.9 CHRONIC OBSTRUCTIVE PULMONARY DISEASE (HCC): Status: ACTIVE | Noted: 2019-01-27

## 2019-01-27 PROBLEM — I10 ESSENTIAL HYPERTENSION: Status: ACTIVE | Noted: 2019-01-27

## 2019-01-27 PROBLEM — E78.5 HYPERLIPIDEMIA: Status: ACTIVE | Noted: 2019-01-27

## 2019-01-27 PROBLEM — R05.9 COUGH: Status: ACTIVE | Noted: 2019-01-27

## 2019-01-27 PROBLEM — R06.00 DYSPNEA: Status: ACTIVE | Noted: 2019-01-27

## 2019-01-27 PROBLEM — D72.829 LEUKOCYTOSIS: Status: ACTIVE | Noted: 2019-01-27

## 2019-01-27 PROBLEM — Z95.0 PACEMAKER: Status: ACTIVE | Noted: 2019-01-27

## 2019-01-27 LAB
ALBUMIN SERPL-MCNC: 3.5 G/DL (ref 3.5–5)
ALBUMIN/GLOB SERPL: 1.1 {RATIO} (ref 1.1–2.2)
ALP SERPL-CCNC: 78 U/L (ref 45–117)
ALT SERPL-CCNC: 16 U/L (ref 12–78)
AMORPH CRY URNS QL MICRO: ABNORMAL
ANION GAP SERPL CALC-SCNC: 10 MMOL/L (ref 5–15)
APPEARANCE UR: ABNORMAL
AST SERPL-CCNC: 24 U/L (ref 15–37)
B PERT DNA SPEC QL NAA+PROBE: NOT DETECTED
BACTERIA URNS QL MICRO: NEGATIVE /HPF
BILIRUB SERPL-MCNC: 0.7 MG/DL (ref 0.2–1)
BILIRUB UR QL: NEGATIVE
BUN SERPL-MCNC: 30 MG/DL (ref 6–20)
BUN/CREAT SERPL: 18 (ref 12–20)
C PNEUM DNA SPEC QL NAA+PROBE: NOT DETECTED
CALCIUM SERPL-MCNC: 9 MG/DL (ref 8.5–10.1)
CHLORIDE SERPL-SCNC: 104 MMOL/L (ref 97–108)
CO2 SERPL-SCNC: 28 MMOL/L (ref 21–32)
COLOR UR: ABNORMAL
COMMENT, HOLDF: NORMAL
CREAT SERPL-MCNC: 1.64 MG/DL (ref 0.7–1.3)
EPITH CASTS URNS QL MICRO: ABNORMAL /LPF
ERYTHROCYTE [DISTWIDTH] IN BLOOD BY AUTOMATED COUNT: 14.1 % (ref 11.5–14.5)
FLUAV AG NPH QL IA: NEGATIVE
FLUAV H1 2009 PAND RNA SPEC QL NAA+PROBE: NOT DETECTED
FLUAV H1 RNA SPEC QL NAA+PROBE: NOT DETECTED
FLUAV H3 RNA SPEC QL NAA+PROBE: NOT DETECTED
FLUAV SUBTYP SPEC NAA+PROBE: NOT DETECTED
FLUBV AG NOSE QL IA: NEGATIVE
FLUBV RNA SPEC QL NAA+PROBE: NOT DETECTED
GLOBULIN SER CALC-MCNC: 3.2 G/DL (ref 2–4)
GLUCOSE SERPL-MCNC: 121 MG/DL (ref 65–100)
GLUCOSE UR STRIP.AUTO-MCNC: NEGATIVE MG/DL
HADV DNA SPEC QL NAA+PROBE: NOT DETECTED
HCOV 229E RNA SPEC QL NAA+PROBE: NOT DETECTED
HCOV HKU1 RNA SPEC QL NAA+PROBE: NOT DETECTED
HCOV NL63 RNA SPEC QL NAA+PROBE: NOT DETECTED
HCOV OC43 RNA SPEC QL NAA+PROBE: NOT DETECTED
HCT VFR BLD AUTO: 44.4 % (ref 36.6–50.3)
HGB BLD-MCNC: 14 G/DL (ref 12.1–17)
HGB UR QL STRIP: NEGATIVE
HMPV RNA SPEC QL NAA+PROBE: NOT DETECTED
HPIV1 RNA SPEC QL NAA+PROBE: NOT DETECTED
HPIV2 RNA SPEC QL NAA+PROBE: NOT DETECTED
HPIV3 RNA SPEC QL NAA+PROBE: NOT DETECTED
HPIV4 RNA SPEC QL NAA+PROBE: NOT DETECTED
KETONES UR QL STRIP.AUTO: NEGATIVE MG/DL
LACTATE SERPL-SCNC: 1.5 MMOL/L (ref 0.4–2)
LEUKOCYTE ESTERASE UR QL STRIP.AUTO: NEGATIVE
M PNEUMO DNA SPEC QL NAA+PROBE: NOT DETECTED
MCH RBC QN AUTO: 29.6 PG (ref 26–34)
MCHC RBC AUTO-ENTMCNC: 31.5 G/DL (ref 30–36.5)
MCV RBC AUTO: 93.9 FL (ref 80–99)
NITRITE UR QL STRIP.AUTO: NEGATIVE
NRBC # BLD: 0 K/UL (ref 0–0.01)
NRBC BLD-RTO: 0 PER 100 WBC
PH UR STRIP: 8 [PH] (ref 5–8)
PLATELET # BLD AUTO: 133 K/UL (ref 150–400)
PMV BLD AUTO: 10.4 FL (ref 8.9–12.9)
POTASSIUM SERPL-SCNC: 4.2 MMOL/L (ref 3.5–5.1)
PROT SERPL-MCNC: 6.7 G/DL (ref 6.4–8.2)
PROT UR STRIP-MCNC: 100 MG/DL
RBC # BLD AUTO: 4.73 M/UL (ref 4.1–5.7)
RBC #/AREA URNS HPF: ABNORMAL /HPF (ref 0–5)
RSV RNA SPEC QL NAA+PROBE: NOT DETECTED
RV+EV RNA SPEC QL NAA+PROBE: NOT DETECTED
SAMPLES BEING HELD,HOLD: NORMAL
SODIUM SERPL-SCNC: 142 MMOL/L (ref 136–145)
SP GR UR REFRACTOMETRY: 1.01 (ref 1–1.03)
TRI-PHOS CRY URNS QL MICRO: ABNORMAL
TSH SERPL DL<=0.05 MIU/L-ACNC: 3.74 UIU/ML (ref 0.36–3.74)
UR CULT HOLD, URHOLD: NORMAL
UROBILINOGEN UR QL STRIP.AUTO: 1 EU/DL (ref 0.2–1)
WBC # BLD AUTO: 14.9 K/UL (ref 4.1–11.1)
WBC URNS QL MICRO: ABNORMAL /HPF (ref 0–4)

## 2019-01-27 PROCEDURE — 99284 EMERGENCY DEPT VISIT MOD MDM: CPT

## 2019-01-27 PROCEDURE — 94640 AIRWAY INHALATION TREATMENT: CPT

## 2019-01-27 PROCEDURE — 74011250636 HC RX REV CODE- 250/636: Performed by: INTERNAL MEDICINE

## 2019-01-27 PROCEDURE — 77030011256 HC DRSG MEPILEX <16IN NO BORD MOLN -A

## 2019-01-27 PROCEDURE — 99285 EMERGENCY DEPT VISIT HI MDM: CPT

## 2019-01-27 PROCEDURE — 87449 NOS EACH ORGANISM AG IA: CPT

## 2019-01-27 PROCEDURE — 74011250636 HC RX REV CODE- 250/636: Performed by: EMERGENCY MEDICINE

## 2019-01-27 PROCEDURE — 87070 CULTURE OTHR SPECIMN AEROBIC: CPT

## 2019-01-27 PROCEDURE — 36415 COLL VENOUS BLD VENIPUNCTURE: CPT

## 2019-01-27 PROCEDURE — 74011000250 HC RX REV CODE- 250: Performed by: INTERNAL MEDICINE

## 2019-01-27 PROCEDURE — 85027 COMPLETE CBC AUTOMATED: CPT

## 2019-01-27 PROCEDURE — 94760 N-INVAS EAR/PLS OXIMETRY 1: CPT

## 2019-01-27 PROCEDURE — 84443 ASSAY THYROID STIM HORMONE: CPT

## 2019-01-27 PROCEDURE — 87804 INFLUENZA ASSAY W/OPTIC: CPT

## 2019-01-27 PROCEDURE — 87633 RESP VIRUS 12-25 TARGETS: CPT

## 2019-01-27 PROCEDURE — 81001 URINALYSIS AUTO W/SCOPE: CPT

## 2019-01-27 PROCEDURE — 71250 CT THORAX DX C-: CPT

## 2019-01-27 PROCEDURE — 71045 X-RAY EXAM CHEST 1 VIEW: CPT

## 2019-01-27 PROCEDURE — 87040 BLOOD CULTURE FOR BACTERIA: CPT

## 2019-01-27 PROCEDURE — 80053 COMPREHEN METABOLIC PANEL: CPT

## 2019-01-27 PROCEDURE — 74011250637 HC RX REV CODE- 250/637: Performed by: EMERGENCY MEDICINE

## 2019-01-27 PROCEDURE — 83605 ASSAY OF LACTIC ACID: CPT

## 2019-01-27 PROCEDURE — 74011250637 HC RX REV CODE- 250/637: Performed by: FAMILY MEDICINE

## 2019-01-27 PROCEDURE — 74011000250 HC RX REV CODE- 250: Performed by: EMERGENCY MEDICINE

## 2019-01-27 PROCEDURE — 74011250637 HC RX REV CODE- 250/637: Performed by: INTERNAL MEDICINE

## 2019-01-27 PROCEDURE — 65270000029 HC RM PRIVATE

## 2019-01-27 PROCEDURE — 77030013140 HC MSK NEB VYRM -A

## 2019-01-27 RX ORDER — ISOSORBIDE MONONITRATE 30 MG/1
30 TABLET, EXTENDED RELEASE ORAL DAILY
Status: DISCONTINUED | OUTPATIENT
Start: 2019-01-27 | End: 2019-01-29 | Stop reason: HOSPADM

## 2019-01-27 RX ORDER — ISOSORBIDE MONONITRATE 60 MG/1
30 TABLET, EXTENDED RELEASE ORAL DAILY
COMMUNITY
End: 2019-01-29

## 2019-01-27 RX ORDER — GABAPENTIN 300 MG/1
600 CAPSULE ORAL DAILY
Status: DISCONTINUED | OUTPATIENT
Start: 2019-01-27 | End: 2019-01-29 | Stop reason: HOSPADM

## 2019-01-27 RX ORDER — BISACODYL 5 MG
5 TABLET, DELAYED RELEASE (ENTERIC COATED) ORAL DAILY PRN
Status: DISCONTINUED | OUTPATIENT
Start: 2019-01-27 | End: 2019-01-29 | Stop reason: HOSPADM

## 2019-01-27 RX ORDER — TAMSULOSIN HYDROCHLORIDE 0.4 MG/1
0.4 CAPSULE ORAL DAILY
Status: DISCONTINUED | OUTPATIENT
Start: 2019-01-27 | End: 2019-01-29 | Stop reason: HOSPADM

## 2019-01-27 RX ORDER — ACETAMINOPHEN 325 MG/1
650 TABLET ORAL
Status: DISCONTINUED | OUTPATIENT
Start: 2019-01-27 | End: 2019-01-29 | Stop reason: HOSPADM

## 2019-01-27 RX ORDER — GABAPENTIN 600 MG/1
600 TABLET ORAL DAILY
COMMUNITY
End: 2019-01-30 | Stop reason: CLARIF

## 2019-01-27 RX ORDER — SODIUM CHLORIDE 0.9 % (FLUSH) 0.9 %
5-40 SYRINGE (ML) INJECTION EVERY 8 HOURS
Status: DISCONTINUED | OUTPATIENT
Start: 2019-01-27 | End: 2019-01-29 | Stop reason: HOSPADM

## 2019-01-27 RX ORDER — SODIUM CHLORIDE 9 MG/ML
75 INJECTION, SOLUTION INTRAVENOUS CONTINUOUS
Status: DISCONTINUED | OUTPATIENT
Start: 2019-01-27 | End: 2019-01-29 | Stop reason: HOSPADM

## 2019-01-27 RX ORDER — METRONIDAZOLE 7.5 MG/G
CREAM TOPICAL
COMMUNITY
End: 2021-06-17

## 2019-01-27 RX ORDER — ACETAMINOPHEN 500 MG
1000 TABLET ORAL
Status: COMPLETED | OUTPATIENT
Start: 2019-01-27 | End: 2019-01-27

## 2019-01-27 RX ORDER — PANTOPRAZOLE SODIUM 40 MG/1
40 TABLET, DELAYED RELEASE ORAL DAILY
Status: DISCONTINUED | OUTPATIENT
Start: 2019-01-27 | End: 2019-01-29 | Stop reason: HOSPADM

## 2019-01-27 RX ORDER — METOPROLOL TARTRATE 25 MG/1
12.5 TABLET, FILM COATED ORAL 2 TIMES DAILY
Status: DISCONTINUED | OUTPATIENT
Start: 2019-01-27 | End: 2019-01-29 | Stop reason: HOSPADM

## 2019-01-27 RX ORDER — HYDROCHLOROTHIAZIDE 25 MG/1
12.5 TABLET ORAL DAILY
COMMUNITY
End: 2019-07-05

## 2019-01-27 RX ORDER — METOPROLOL TARTRATE 25 MG/1
12.5 TABLET, FILM COATED ORAL 2 TIMES DAILY
COMMUNITY
End: 2019-01-29

## 2019-01-27 RX ORDER — IPRATROPIUM BROMIDE AND ALBUTEROL SULFATE 2.5; .5 MG/3ML; MG/3ML
3 SOLUTION RESPIRATORY (INHALATION)
Status: DISCONTINUED | OUTPATIENT
Start: 2019-01-27 | End: 2019-01-29 | Stop reason: HOSPADM

## 2019-01-27 RX ORDER — LEVOTHYROXINE SODIUM 50 UG/1
50 TABLET ORAL
Status: DISCONTINUED | OUTPATIENT
Start: 2019-01-27 | End: 2019-01-29 | Stop reason: HOSPADM

## 2019-01-27 RX ORDER — METOPROLOL TARTRATE 25 MG/1
25 TABLET, FILM COATED ORAL 2 TIMES DAILY
Status: DISCONTINUED | OUTPATIENT
Start: 2019-01-27 | End: 2019-01-27

## 2019-01-27 RX ORDER — AZITHROMYCIN 250 MG/1
500 TABLET, FILM COATED ORAL
Status: COMPLETED | OUTPATIENT
Start: 2019-01-27 | End: 2019-01-27

## 2019-01-27 RX ORDER — FUROSEMIDE 20 MG/1
20 TABLET ORAL DAILY
COMMUNITY
End: 2022-09-24 | Stop reason: SDUPTHER

## 2019-01-27 RX ORDER — GUAIFENESIN 600 MG/1
600 TABLET, EXTENDED RELEASE ORAL EVERY 12 HOURS
Status: DISCONTINUED | OUTPATIENT
Start: 2019-01-27 | End: 2019-01-29 | Stop reason: HOSPADM

## 2019-01-27 RX ORDER — SODIUM CHLORIDE 0.9 % (FLUSH) 0.9 %
5-40 SYRINGE (ML) INJECTION AS NEEDED
Status: DISCONTINUED | OUTPATIENT
Start: 2019-01-27 | End: 2019-01-29 | Stop reason: HOSPADM

## 2019-01-27 RX ADMIN — GABAPENTIN 600 MG: 300 CAPSULE ORAL at 09:37

## 2019-01-27 RX ADMIN — IPRATROPIUM BROMIDE AND ALBUTEROL SULFATE 3 ML: .5; 3 SOLUTION RESPIRATORY (INHALATION) at 13:36

## 2019-01-27 RX ADMIN — IPRATROPIUM BROMIDE AND ALBUTEROL SULFATE 3 ML: .5; 3 SOLUTION RESPIRATORY (INHALATION) at 04:18

## 2019-01-27 RX ADMIN — CEFTRIAXONE SODIUM 1 G: 1 INJECTION, POWDER, FOR SOLUTION INTRAMUSCULAR; INTRAVENOUS at 04:01

## 2019-01-27 RX ADMIN — AZITHROMYCIN 500 MG: 250 TABLET, FILM COATED ORAL at 04:01

## 2019-01-27 RX ADMIN — IPRATROPIUM BROMIDE AND ALBUTEROL SULFATE 3 ML: .5; 3 SOLUTION RESPIRATORY (INHALATION) at 07:38

## 2019-01-27 RX ADMIN — ACETAMINOPHEN 1000 MG: 500 TABLET ORAL at 02:01

## 2019-01-27 RX ADMIN — Medication 10 ML: at 14:00

## 2019-01-27 RX ADMIN — Medication 10 ML: at 20:15

## 2019-01-27 RX ADMIN — TAMSULOSIN HYDROCHLORIDE 0.4 MG: 0.4 CAPSULE ORAL at 09:39

## 2019-01-27 RX ADMIN — PANTOPRAZOLE SODIUM 40 MG: 40 TABLET, DELAYED RELEASE ORAL at 09:37

## 2019-01-27 RX ADMIN — Medication 10 ML: at 06:39

## 2019-01-27 RX ADMIN — METOPROLOL TARTRATE 12.5 MG: 25 TABLET ORAL at 18:30

## 2019-01-27 RX ADMIN — GUAIFENESIN 600 MG: 600 TABLET, EXTENDED RELEASE ORAL at 09:37

## 2019-01-27 RX ADMIN — SODIUM CHLORIDE 100 ML/HR: 900 INJECTION, SOLUTION INTRAVENOUS at 04:02

## 2019-01-27 RX ADMIN — LEVOTHYROXINE SODIUM 50 MCG: 50 TABLET ORAL at 06:39

## 2019-01-27 RX ADMIN — IPRATROPIUM BROMIDE AND ALBUTEROL SULFATE 3 ML: .5; 3 SOLUTION RESPIRATORY (INHALATION) at 19:11

## 2019-01-27 RX ADMIN — APIXABAN 2.5 MG: 2.5 TABLET, FILM COATED ORAL at 09:37

## 2019-01-27 RX ADMIN — GUAIFENESIN 600 MG: 600 TABLET, EXTENDED RELEASE ORAL at 20:14

## 2019-01-27 RX ADMIN — APIXABAN 2.5 MG: 2.5 TABLET, FILM COATED ORAL at 18:30

## 2019-01-27 NOTE — PROGRESS NOTES
[facility]: MED RECONCILIATION    Comments/Recommendations:   Patient is awake, alert, and knowledgeable about home medications   Daughter is a nurse practitioner and provides a detailed medication list.  The patient obtains most of his medications from the [place]. Medications that do not come from the [place] are filled at [facility]    Medications added:     · None    Medications removed:    · Atroven    Medications adjusted:    · Furosemide changed to one half of a 20 mg tablet daily  · Gabapentin added frequency  · Hydrochlorothiazide changed to one half of a 25 mg daily  · Isosorbide mononitrate changed to 60 mg one half tablet daily  · Levothyroxine added dose of 50 mcg  · Metoprolol tartrate changed to 25 mg tablet one half tablet BID  · ICaps changed to one capsule twice daily    Allergies: Patient has no known allergies. Prior to Admission Medications:   Prior to Admission Medications   Prescriptions Last Dose Informant Patient Reported? Taking? Multivits with Min-FA-Lycopene (MEN'S DAILY MULTIVIT-MINERAL) 0.4-600 mg-mcg tab 1/26/2019 at Unknown time Self Yes Yes   Sig: Take 1 Tab by mouth nightly. OTHER 1/26/2019 at Unknown time  Yes Yes   Sig: Unknown nasal spray - two sprays to both nostrils once daily   VIT A/VIT C/VIT E/ZINC/COPPER (ICAPS AREDS PO) 1/26/2019 at Unknown time Self Yes Yes   Sig: Take 1 Cap by mouth two (2) times a day. apixaban (ELIQUIS) 2.5 mg tablet 1/26/2019 at pm Self No Yes   Sig: Take 1 Tab by mouth two (2) times a day. furosemide (LASIX) 20 mg tablet 1/26/2019 at Unknown time Self Yes Yes   Sig: Take 10 mg by mouth daily. gabapentin (NEURONTIN) 600 mg tablet 1/26/2019 at Unknown time Self Yes Yes   Sig: Take 600 mg by mouth daily. hydroCHLOROthiazide (HYDRODIURIL) 25 mg tablet 1/26/2019 at Unknown time Self Yes Yes   Sig: Take 12.5 mg by mouth daily. isosorbide mononitrate ER (IMDUR) 60 mg CR tablet 1/26/2019 at Unknown time Self Yes Yes   Sig: Take 30 mg by mouth daily. levothyroxine (LEVOTHROID) 50 mcg tablet 2019 at Unknown time Self Yes Yes   Sig: Take 50 mcg by mouth Daily (before breakfast). metoprolol tartrate (LOPRESSOR) 25 mg tablet 2019 at Unknown time Self Yes Yes   Sig: Take 12.5 mg by mouth two (2) times a day. metroNIDAZOLE (METROCREAM) 0.75 % topical cream 2019 at Unknown time Self Yes Yes   Sig: Apply  to affected area nightly. Use a thin layer to affected areas after washing   nitroglycerin (NITROSTAT) 0.4 mg SL tablet  Self Yes Yes   Si.4 mg by SubLINGual route every five (5) minutes as needed for Chest Pain. omeprazole (PRILOSEC) 20 mg capsule 2019 at Unknown time Self Yes Yes   Sig: Take 20 mg by mouth daily. simvastatin (ZOCOR) 40 mg tablet 2019 at Unknown time Self Yes Yes   Sig: Take 20 mg by mouth every Monday, Wednesday, Friday. Patient takes at night   tamsulosin (FLOMAX) 0.4 mg capsule 2019 at Unknown time Self Yes Yes   Sig: Take 0.4 mg by mouth nightly.       Facility-Administered Medications: None      Thank you,      Silvia Euceda, DonD, BCPS

## 2019-01-27 NOTE — H&P
[facility]    Hospitalist Admission Note      NAME:  Kurtis Burton   :   1922   MRN:  [id number]     PCP:  Jenn Ross MD     Date/Time:  2019 4:22 AM          Subjective:     CHIEF COMPLAINT: Cough     HISTORY OF PRESENT ILLNESS:     Mr. Shannan Layton is a 80 y.o.  male with a hx of Afib, HTN, PPM who presented to the Emergency Department complaining of cough and fever. Patient reportedly fine a couple days ago but yesterday evening, developed a coarse cough with white phlegm and some SOB. This evening, developed a fever and some confusion, called family and brought to ED. In ED, noted to be febrile, elevated WBC. We will admit for further management      Past Medical History:   Diagnosis Date    Atrial fibrillation ([address]     Chronic obstructive pulmonary disease ([address]     Essential hypertension     Hyperlipidemia     Long term (current) use of anticoagulants     Pacemaker     Rosacea         Past Surgical History:   Procedure Laterality Date    HX CORONARY STENT PLACEMENT      HX HEART CATHETERIZATION      HX PACEMAKER         Social History     Tobacco Use    Smoking status: Never Smoker    Smokeless tobacco: Never Used   Substance Use Topics    Alcohol use: No     Alcohol/week: 0.0 oz        Family History   Problem Relation Age of Onset    Stroke Mother     Hypertension Mother     Kidney Disease Father       Family hx cannot be fully assessed, due to the admitting conditions    No Known Allergies     Prior to Admission medications    Medication Sig Start Date End Date Taking? Authorizing Provider   metoprolol tartrate (LOPRESSOR) 25 mg tablet Take 1 Tab by mouth two (2) times a day. 17   Davin Pa MD   apixaban (ELIQUIS) 2.5 mg tablet Take 1 Tab by mouth two (2) times a day. 17   Susie Escalona MD   gabapentin (NEURONTIN) 600 mg tablet Take 600 mg by mouth daily. Provider, Historical   OTHER nightly.  Nidazole 0.75% cream for Braden. Provider, Historical   ipratropium (ATROVENT) 0.03 % nasal spray 1 Spray three (3) times daily. Uses SL for drooling    Provider, Historical   VIT A/VIT C/VIT E/ZINC/COPPER (ICAPS AREDS PO) Take two capsules daily. Provider, Historical   tamsulosin (FLOMAX) 0.4 mg capsule Take 0.4 mg by mouth daily. Provider, Historical   hydrochlorothiazide (HYDRODIURIL) 25 mg tablet Take 12.5 mg by mouth daily. Provider, Historical   isosorbide mononitrate ER (IMDUR) 30 mg tablet Take  by mouth daily. Provider, Historical   levothyroxine (LEVOTHROID) 50 mcg tablet Take  by mouth Daily (before breakfast). Provider, Historical   omeprazole (PRILOSEC) 20 mg capsule Take 20 mg by mouth daily. Provider, Historical   simvastatin (ZOCOR) 40 mg tablet Take 20 mg by mouth every Monday, Wednesday, Friday. Provider, Historical   nitroglycerin (NITROSTAT) 0.4 mg SL tablet by SubLINGual route every five (5) minutes as needed for Chest Pain. Provider, Historical   Multivits with Min-FA-Lycopene (MEN'S DAILY MULTIVIT-MINERAL) 0.4-600 mg-mcg tab Take  by mouth daily.     Provider, Historical       Review of Systems:  (bold if positive, if negative)    Gen:  Eyes:  ENT:  CVS:  Pulm:  Cough, dyspnea, sputumGI:    :    MS:  Skin:  Psych:  Endo:    Hem:  Renal:    Neuro:        Objective:      VITALS:    Vital signs reviewed; most recent are:    Visit Vitals  /59   Pulse 61   Temp 99.3 °F (37.4 °C)   Resp 27   Ht 5' 8\" (1.727 m)   Wt 88.5 kg (195 lb)   SpO2 95%   BMI 29.65 kg/m²     SpO2 Readings from Last 6 Encounters:   01/27/19 95%   12/08/17 99%   06/14/17 98%   03/10/17 96%   01/25/17 96%   12/02/16 95%        No intake or output data in the 24 hours ending 01/27/19 0422     Exam:     Physical Exam:    Gen:  Well-developed, well-nourished, in no acute distress  HEENT:  Pink conjunctivae, PERRL, hearing intact to voice, dry mucous membranes  Neck:  Supple, without masses, thyroid non-tender  Resp:  No accessory muscle use, decreased breath sounds in base  Card:  No murmurs, normal S1, S2 without thrills, bruits or peripheral edema  Abd:  Soft, non-tender, non-distended, normoactive bowel sounds are present, no palpable organomegaly and no detectable hernias  Lymph:  No cervical or inguinal adenopathy  Musc:  No cyanosis or clubbing  Skin:  No rashes or ulcers, skin turgor is good  Neuro:  Cranial nerves are grossly intact, no focal motor weakness, follows commands appropriately  Psych:  Fair insight, oriented to person, place and time, alert     Labs:    Recent Labs     01/27/19 0138   WBC 14.9*   HGB 14.0   HCT 44.4   *     Recent Labs     01/27/19 0138      K 4.2      CO2 28   *   BUN 30*   CREA 1.64*   CA 9.0   ALB 3.5   TBILI 0.7   SGOT 24   ALT 16     No results found for: GLUCPOC  No results for input(s): PH, PCO2, PO2, HCO3, FIO2 in the last 72 hours. No results for input(s): INR in the last 72 hours. No lab exists for component: INREXT  All Micro Results     Procedure Component Value Units Date/Time    CULTURE, SPUTUM/BRONCH/OTH [[id number]]     Order Status:  Sent Specimen:  Sputum     RESPIRATORY PANEL,PCR,NASOPHARYNGEAL [[id number]]     Order Status:  Sent Specimen:  NASOPHARYNGEAL SWAB     URINE CULTURE HOLD SAMPLE [[id number]] Collected:  01/27/19 0222    Order Status:  Completed Specimen:  Urine from Serum Updated:  01/27/19 0245     Urine culture hold       URINE ON HOLD IN MICROBIOLOGY DEPT FOR 3 DAYS. IF UNPRESERVED URINE IS SUBMITTED, IT CANNOT BE USED FOR ADDITIONAL TESTING AFTER 24 HRS, RECOLLECTION WILL BE REQUIRED.           INFLUENZA A & B AG (RAPID TEST) [[id number]] Collected:  01/27/19 0141    Order Status:  Completed Specimen:  Nasopharyngeal from Nasal washing Updated:  01/27/19 0213     Influenza A Antigen NEGATIVE         Influenza B Antigen NEGATIVE        CULTURE, BLOOD [[id number]] Collected:  01/27/19 0139    Order Status:  Completed Specimen:  Blood Updated:  01/27/19 0153          I have reviewed previous records       Assessment and Plan:      Principal Problem:    Sepsis / Leukocytosis / Fever. POA. Not severe. Suspect a respiratory source (evolving PNA v. Viral URI). IV abx, IVF, BCx, sputum culture, viral resp panel. Tylenol for fever. Active Problems:    Cough / Dyspnea. Suspect respiratory process but CXR is underwhelming. Check CT chest to further characterize lower lobe process. IV abx to cover CAP. Checking VRP + sputum cx. Mucolytics. Duo-nebs. Montior POX and give O2 to keep sats greater than 90%,    Atrial fibrillation / Pacemaker. Follows with Dr. Neda Draper. In rate controlled afib with V-pacing. Continue eliquis, BB    Chronic obstructive pulmonary disease. I'm not sure he has COPD. No wheezing on exam. Not on meds. Hyperlipidemia (1/27/2019). On statin MWF    Essential hypertension (1/27/2019). BP okay. Continue home anti-hypertensives    CKD (chronic kidney disease) stage 3, GFR 30-59 ml/min. Suspect due to HTN. SCr stable from a couple years ago. Hydrate and monitor    Infected tooth. Has dental surgery planned for 2/5/19. Should be able to proceed with this.        Telemetry reviewed:   V-paced    Risk of deterioration: high      Total time spent with patient: 79 [phone] [facility] discussed with: Patient, Family and Nursing Staff    Discussed:  Care Plan and D/C Planning       ___________________________________________________    Attending Physician: Randa Sepulveda DO

## 2019-01-27 NOTE — PROGRESS NOTES
[facility]: MED RECONCILIATION    Comments/Recommendations:   Patient is sound asleep. Family member has been at the bedside but stepped out. Pharmacist notified rn and will return to complete the interview.     Thank you,      Corrinne Rooks, PharmD, BCPS

## 2019-01-27 NOTE — ED PROVIDER NOTES
80 y.o. adult with past medical history significant for COPD, CHF s/p stent placement, s/p heart cath, AFIB, s/p pacemaker, HTN, HLD who presents to the ED via EMS, with chief complaint of fever and cough. Pt reports that he woke up last night/early this morning with a fever, chills, and a productive cough that produces \"white phlegm\". He also states that he has felt fatigued, noting that he \"did not sleep well\" two night ago, and only slept \"2.5 hours\" last night. Reports that he also experienced lower abdominal \"cramping\" which has since resolved. Pt notes that there have been cases of flu at his residential community. He states that he has received his flu shot. Pt also complains of an \"infected\" tooth on the left side which is painful when he brushes his teeth. Denies being on any antibiotics, but notes that he has an appointment for surgery on 2/4/19. Pt states that he has h/o CHF and has a pacemaker. There are no other acute medical concerns at this time. Social hx: Negative for Tobacco use; Negative for EtOH use; Negative for Illicit Drug use  PCP: Gray Vitale MD  Cardiologist: Adilene Arteaga MD    Note written by Tj Flores, as dictated by Marietta Meléndez MD 1:48 AM      The history is provided by the patient and medical records. No  was used.         Past Medical History:   Diagnosis Date    Atrial fibrillation ([address]     Chronic obstructive pulmonary disease ([address]     Essential hypertension     Hyperlipidemia     Long term (current) use of anticoagulants     Pacemaker     Rosacea 2016       Past Surgical History:   Procedure Laterality Date    HX CORONARY STENT PLACEMENT      HX HEART CATHETERIZATION      HX PACEMAKER           Family History:   Problem Relation Age of Onset    Stroke Mother     Hypertension Mother     Kidney Disease Father        Social History     Socioeconomic History    Marital status:      Spouse name: Not on file    Number of children: Not on file    Years of education: Not on file    Highest education level: Not on file   Social Needs    Financial resource strain: Not on file    Food insecurity - worry: Not on file    Food insecurity - inability: Not on file    Transportation needs - medical: Not on file   [employer] needs - non-medical: Not on file   Occupational History    Not on file   Tobacco Use    Smoking status: Never Smoker    Smokeless tobacco: Never Used   Substance and Sexual Activity    Alcohol use: No     Alcohol/week: 0.0 oz    Drug use: No    Sexual activity: Not on file   Other Topics Concern    Not on file   Social History Narrative    Not on file         ALLERGIES: Patient has no known allergies. Review of Systems   Constitutional: Positive for chills, fatigue and fever. HENT: Negative for sore throat. Eyes: Negative for visual disturbance. Respiratory: Positive for cough. Cardiovascular: Negative for chest pain. Gastrointestinal: Negative for abdominal pain (resolved). Genitourinary: Negative for dysuria. Musculoskeletal: Negative for back pain. Skin: Negative for rash. Neurological: Negative for headaches. Vitals:    01/27/19 0123 01/27/19 0238 01/27/19 0332   BP: 166/77     Pulse: 70     Resp: 21     Temp: (!) 101.7 °F (38.7 °C) (!) 102.4 °F (39.1 °C) 100.1 °F (37.8 °C)   SpO2: 97%     Weight: 88.5 kg (195 lb)     Height: 5' 8\" (1.727 m)              Physical Exam   Constitutional: He appears well-developed and well-nourished. No distress. HENT:   Head: Normocephalic and atraumatic. Right Ear: Tympanic membrane normal.   Left Ear: Tympanic membrane normal.   Mouth/Throat: Oropharynx is clear and moist.   Eyes: Conjunctivae and EOM are normal.   Neck: Normal range of motion and phonation normal.   Cardiovascular: Normal rate. Pulmonary/Chest: Effort normal. No respiratory distress. He has no wheezes. He has no rales. Abdominal: Soft.  He exhibits no distension. There is no tenderness. Musculoskeletal: Normal range of motion. He exhibits no tenderness. Neurological: He is alert. He is not disoriented. He exhibits normal muscle tone. Skin: Skin is warm and dry. Capillary refill takes less than 2 seconds. Nursing note and vitals reviewed. MDM       Procedures    CONSULT NOTE:  3:42 AM Maribeth Fothergill, MD spoke with Dr. Hayley Moran DO, Consult for Hospitalist.  Discussed available diagnostic tests and clinical findings. Dr. Aretha Desai will evaluate the patient for admission to the hospital.    3:42 AM  Patient is being admitted to the hospital.  The results of their tests and reasons for their admission have been discussed with them and/or available family. They convey agreement and understanding for the need to be admitted and for their admission diagnosis. Consultation will be made now with the inpatient physician for hospitalization.

## 2019-01-27 NOTE — PROGRESS NOTES
Daily Progress Note: 1/27/2019  Brigitte Puri MD    Assessment/Plan:   Sepsis / Leukocytosis / Fever. POA. Not severe. Suspect a respiratory source (evolving PNA v. Viral URI). -IV abx  - IVF  - BCx, sputum culture, viral resp panel pending  - Tylenol for fever.     Cough / Dyspnea. Suspect respiratory process but CXR is underwhelming.  - Check CT chest  -Mucolytics  - Duo-nebs  - O2 to keep sats greater than 90%,     Atrial fibrillation / Pacemaker. Follows with Dr. Neda Draper. In rate controlled afib with V-pacing. -Continue eliquis, BB     Chronic obstructive pulmonary disease. unconfirmed. No wheezing on exam. Not on meds.     Hyperlipidemia (1/27/2019). -Cont statin MWF     Essential hypertension (1/27/2019). BP stable  -Continue home anti-hypertensives     CKD (chronic kidney disease) stage 3, GFR 30-59 ml/min. Suspect due to HTN. -Hydrate and monitor    Low urine output: bladder scan and tx as needed     Infected tooth. Has dental surgery planned for 2/5/19. Should be able to proceed with this.        Problem List:  Problem List as of 1/27/2019 Date Reviewed: 1/30/2018          Codes Class Noted - Resolved    * (Principal) Sepsis ([facility] ICD-10-CM: A41.9  ICD-9-CM: 038.9, 995.91  1/27/2019 - Present        Chronic obstructive pulmonary disease ([facility] ICD-10-CM: J44.9  ICD-9-CM: 995  1/27/2019 - Present        Hyperlipidemia ICD-10-CM: E78.5  ICD-9-CM: 272.4  1/27/2019 - Present        Essential hypertension ICD-10-CM: I10  ICD-9-CM: 401.9  1/27/2019 - Present        Pacemaker ICD-10-CM: Z95.0  ICD-9-CM: V45.01  1/27/2019 - Present        Fever ICD-10-CM: R50.9  ICD-9-CM: 780.60  1/27/2019 - Present        Leukocytosis ICD-10-CM: U88.111  ICD-9-CM: 288.60  1/27/2019 - Present        CKD (chronic kidney disease) stage 3, GFR 30-59 ml/min ([facility]) ICD-10-CM: N18.3  ICD-9-CM: 585.3  1/27/2019 - Present        Cough ICD-10-CM: R05  ICD-9-CM: 786.2  1/27/2019 - Present        Dyspnea ICD-10-CM: R06.00  ICD-9-CM: 786.09  2019 - Present        Atrial fibrillation [facility]) ICD-10-CM: I48.91  ICD-9-CM: 427.31  2016 - Present        Encounter to establish care ICD-10-CM: Z76.89  ICD-9-CM: V65.8  2015 - Present              Subjective:   Mr. Britney Esquivel is a 80 y.o.  male with a hx of Afib, HTN, PPM who presented to the Emergency Department complaining of cough and fever. Patient reportedly fine a couple days ago but yesterday evening, developed a coarse cough with white phlegm and some SOB. This evening, developed a fever and some confusion, called family and brought to ED. In ED, noted to be febrile, elevated WBC. We will admit for further management. [Dr Selena Anguiano    : Pt is currently afebrile and feeling a bit better. Still having low energy. Family is worried about low urine output and amount of coughing he is doing. Pt denies pain. +BM on Friday. Review of Systems:   A comprehensive review of systems was negative except for that written in the HPI. Objective:   Physical Exam:     Visit Vitals  /57 (BP 1 Location: Left arm, BP Patient Position: At rest)   Pulse 62   Temp 98.5 °F (36.9 °C)   Resp 22   Ht 5' 8\" (1.727 m)   Wt 88.5 kg (195 lb)   SpO2 96%   BMI 29.65 kg/m²    O2 Flow Rate (L/min): 2 l/min O2 Device: Nasal cannula    Temp (24hrs), Av.3 °F (37.9 °C), Min:98.5 °F (36.9 °C), Max:102.4 °F (39.1 °C)    No intake/output data recorded. No intake/output data recorded. General:  Alert, cooperative, no distress, appears younger than stated age   Head:  Normocephalic, without obvious abnormality, atraumatic. Eyes:  Conjunctivae/corneas clear. PERRL, EOMs intact. Nose: Nares normal. Septum midline. Mucosa normal. No drainage or sinus tenderness. Throat: Lips, mucosa, and tongue moist..   Neck: Supple, symmetrical, trachea midline, no adenopathy, thyroid: no enlargement/tenderness/nodules, no carotid bruit and no JVD.    Back:   Symmetric, no curvature. ROM normal. No CVA tenderness. Lungs:   Clear to auscultation bilaterally, diminished at bases   Chest wall:  No tenderness or deformity. Heart:  Regular rate and rhythm, S1, S2 normal, no murmur, click, rub or gallop. Abdomen:   Soft, non-tender. Bowel sounds normal. No masses,  No organomegaly. Extremities: Extremities normal, atraumatic, no cyanosis or edema. No calf tenderness or cords. Pulses: 2+ and symmetric all extremities. Skin: Skin color, texture, turgor normal. No rashes or lesions   Neurologic: CNII-XII intact. Alert and oriented X 3. Fine motor of hands and fingers normal.   equal.  Gait not tested at this time. Sensation grossly normal to touch. Gross motor of extremities normal.       Data Review:       Recent Days:  Recent Labs     01/27/19  0138   WBC 14.9*   HGB 14.0   HCT 44.4   *     Recent Labs     01/27/19  0138      K 4.2      CO2 28   *   BUN 30*   CREA 1.64*   CA 9.0   ALB 3.5   SGOT 24   ALT 16     No results for input(s): PH, PCO2, PO2, HCO3, FIO2 in the last 72 hours. 24 Hour Results:  Recent Results (from the past 24 hour(s))   SAMPLES BEING HELD    Collection Time: 01/27/19  1:38 AM   Result Value Ref Range    SAMPLES BEING HELD 1RED,1BLU     COMMENT        Add-on orders for these samples will be processed based on acceptable specimen integrity and analyte stability, which may vary by analyte.    CBC W/O DIFF    Collection Time: 01/27/19  1:38 AM   Result Value Ref Range    WBC 14.9 (H) 4.1 - 11.1 K/uL    RBC 4.73 4.10 - 5.70 M/uL    HGB 14.0 12.1 - 17.0 g/dL    HCT 44.4 36.6 - 50.3 %    MCV 93.9 80.0 - 99.0 FL    MCH 29.6 26.0 - 34.0 PG    MCHC 31.5 30.0 - 36.5 g/dL    RDW 14.1 11.5 - 14.5 %    PLATELET 042 (L) 703 - 400 K/uL    MPV 10.4 8.9 - 12.9 FL    NRBC 0.0 0  WBC    ABSOLUTE NRBC 0.00 0.00 - 4.91 K/uL   METABOLIC PANEL, COMPREHENSIVE    Collection Time: 01/27/19  1:38 AM   Result Value Ref Range    Sodium 142 136 - 145 mmol/L    Potassium 4.2 3.5 - 5.1 mmol/L    Chloride 104 97 - 108 mmol/L    CO2 28 21 - 32 mmol/L    Anion gap 10 5 - 15 mmol/L    Glucose 121 (H) 65 - 100 mg/dL    BUN 30 (H) 6 - 20 MG/DL    Creatinine 1.64 (H) 0.70 - 1.30 MG/DL    BUN/Creatinine ratio 18 12 - 20      GFR est AA 47 (L) >60 ml/min/1.73m2    GFR est non-AA 39 (L) >60 ml/min/1.73m2    Calcium 9.0 8.5 - 10.1 MG/DL    Bilirubin, total 0.7 0.2 - 1.0 MG/DL    ALT (SGPT) 16 12 - 78 U/L    AST (SGOT) 24 15 - 37 U/L    Alk.  phosphatase 78 45 - 117 U/L    Protein, total 6.7 6.4 - 8.2 g/dL    Albumin 3.5 3.5 - 5.0 g/dL    Globulin 3.2 2.0 - 4.0 g/dL    A-G Ratio 1.1 1.1 - 2.2     TSH 3RD GENERATION    Collection Time: 01/27/19  1:38 AM   Result Value Ref Range    TSH 3.74 0.36 - 3.74 uIU/mL   LACTIC ACID    Collection Time: 01/27/19  1:39 AM   Result Value Ref Range    Lactic acid 1.5 0.4 - 2.0 MMOL/L   CULTURE, BLOOD    Collection Time: 01/27/19  1:39 AM   Result Value Ref Range    Special Requests: NO SPECIAL REQUESTS      Culture result: NO GROWTH AFTER 4 HOURS     INFLUENZA A & B AG (RAPID TEST)    Collection Time: 01/27/19  1:41 AM   Result Value Ref Range    Influenza A Antigen NEGATIVE  NEG      Influenza B Antigen NEGATIVE  NEG     URINALYSIS W/MICROSCOPIC    Collection Time: 01/27/19  2:22 AM   Result Value Ref Range    Color YELLOW/STRAW      Appearance CLOUDY (A) CLEAR      Specific gravity 1.015 1.003 - 1.030      pH (UA) 8.0 5.0 - 8.0      Protein 100 (A) NEG mg/dL    Glucose NEGATIVE  NEG mg/dL    Ketone NEGATIVE  NEG mg/dL    Bilirubin NEGATIVE  NEG      Blood NEGATIVE  NEG      Urobilinogen 1.0 0.2 - 1.0 EU/dL    Nitrites NEGATIVE  NEG      Leukocyte Esterase NEGATIVE  NEG      WBC 0-4 0 - 4 /hpf    RBC 0-5 0 - 5 /hpf    Epithelial cells FEW FEW /lpf    Bacteria NEGATIVE  NEG /hpf    Amorphous Crystals FEW (A) NEG      Triple Phosphate crystals FEW (A) NEG     URINE CULTURE HOLD SAMPLE    Collection Time: 01/27/19  2:22 AM   Result Value Ref Range    Urine culture hold        URINE ON HOLD IN MICROBIOLOGY DEPT FOR 3 DAYS. IF UNPRESERVED URINE IS SUBMITTED, IT CANNOT BE USED FOR ADDITIONAL TESTING AFTER 24 HRS, RECOLLECTION WILL BE REQUIRED. Medications reviewed  Current Facility-Administered Medications   Medication Dose Route Frequency    sodium chloride (NS) flush 5-40 mL  5-40 mL IntraVENous Q8H    sodium chloride (NS) flush 5-40 mL  5-40 mL IntraVENous PRN    0.9% sodium chloride infusion  100 mL/hr IntraVENous CONTINUOUS    [START ON 1/28/2019] cefTRIAXone (ROCEPHIN) 1 g in 0.9% sodium chloride (MBP/ADV) 50 mL  1 g IntraVENous Q24H    [START ON 1/28/2019] azithromycin (ZITHROMAX) 500 mg in 0.9% sodium chloride (MBP/ADV) 250 mL  500 mg IntraVENous Q24H    acetaminophen (TYLENOL) tablet 650 mg  650 mg Oral Q4H PRN    guaiFENesin ER (MUCINEX) tablet 600 mg  600 mg Oral Q12H    albuterol-ipratropium (DUO-NEB) 2.5 MG-0.5 MG/3 ML  3 mL Nebulization Q6H RT    bisacodyl (DULCOLAX) tablet 5 mg  5 mg Oral DAILY PRN    apixaban (ELIQUIS) tablet 2.5 mg  2.5 mg Oral BID    gabapentin (NEURONTIN) capsule 600 mg  600 mg Oral DAILY    isosorbide mononitrate ER (IMDUR) tablet 30 mg  30 mg Oral DAILY    metoprolol tartrate (LOPRESSOR) tablet 25 mg  25 mg Oral BID    levothyroxine (SYNTHROID) tablet 50 mcg  50 mcg Oral ACB    tamsulosin (FLOMAX) capsule 0.4 mg  0.4 mg Oral DAILY    pantoprazole (PROTONIX) tablet 40 mg  40 mg Oral DAILY       Care Plan discussed with: Patient/Family and Nurse    Total time spent with patient: 30 minutes.     Oksana Lund MD

## 2019-01-27 NOTE — ED TRIAGE NOTES
Natalie Miller  in by EMS for flu like symptoms and fever. States has been coughing up white sputum for awhile.

## 2019-01-27 NOTE — ED NOTES
TRANSFER - OUT REPORT:    Verbal report given to [facility] RN(name) on Verito Alvarez  being transferred to [facility](unit) for routine progression of care       Report consisted of patients Situation, Background, Assessment and   Recommendations(SBAR). Information from the following report(s) SBAR, Kardex, [facility], Recent Results and Cardiac Rhythm Paced was reviewed with the receiving nurse. Lines:   Peripheral IV 01/27/19 Left Wrist (Active)   Site Assessment Clean, dry, & intact 1/27/2019  1:45 AM   Phlebitis Assessment 0 1/27/2019  1:45 AM   Infiltration Assessment 0 1/27/2019  1:45 AM   Dressing Status Clean, dry, & intact 1/27/2019  1:45 AM   Hub Color/Line Status Pink 1/27/2019  1:45 AM        Opportunity for questions and clarification was provided.       Patient transported with:   Registered Nurse

## 2019-01-27 NOTE — PROGRESS NOTES
TRANSFER - IN REPORT:    Verbal report received from Giovanny (name) on Jennifer Regalado  being received from ED (unit) for routine progression of care      Report consisted of patients Situation, Background, Assessment and   Recommendations(SBAR). Information from the following report(s) SBAR, Kardex, ED Summary, Intake/Output, MAR, Accordion, Recent Results and Med Rec Status was reviewed with the receiving nurse. Opportunity for questions and clarification was provided. Assessment completed upon patients arrival to unit and care assumed. Primary Nurse Eliane Juares and Alissa Lee RN performed a dual skin assessment on this patient Impairment noted: wound to sacrum, possible pressure wound, wound consult ordered.   Thanh score is 17

## 2019-01-27 NOTE — PROGRESS NOTES
Physical Therapy: Chart review reveals admission earlier this am ( 5 am), with sepsis, leukocytosis/fever/elevated WBC. PNA vs viral URI being evaluated at this time. Droplet precautions, and maintain SPO2 >90% at this time is recommended. Sleepy this am, with low energy. Will defer evaluation at this time, will follow up later this afternoon, or Monday am as appropriate.     Thank you, Mitra Edward, PT

## 2019-01-28 ENCOUNTER — HOME HEALTH ADMISSION (OUTPATIENT)
Dept: HOME HEALTH SERVICES | Facility: HOME HEALTH | Age: 84
End: 2019-01-28
Payer: MEDICARE

## 2019-01-28 LAB
ANION GAP SERPL CALC-SCNC: 8 MMOL/L (ref 5–15)
BASOPHILS # BLD: 0 K/UL (ref 0–0.1)
BASOPHILS NFR BLD: 0 % (ref 0–1)
BUN SERPL-MCNC: 28 MG/DL (ref 6–20)
BUN/CREAT SERPL: 20 (ref 12–20)
CALCIUM SERPL-MCNC: 8.3 MG/DL (ref 8.5–10.1)
CHLORIDE SERPL-SCNC: 107 MMOL/L (ref 97–108)
CO2 SERPL-SCNC: 26 MMOL/L (ref 21–32)
CREAT SERPL-MCNC: 1.43 MG/DL (ref 0.7–1.3)
DIFFERENTIAL METHOD BLD: ABNORMAL
EOSINOPHIL # BLD: 0.2 K/UL (ref 0–0.4)
EOSINOPHIL NFR BLD: 2 % (ref 0–7)
ERYTHROCYTE [DISTWIDTH] IN BLOOD BY AUTOMATED COUNT: 14.7 % (ref 11.5–14.5)
GLUCOSE SERPL-MCNC: 110 MG/DL (ref 65–100)
HCT VFR BLD AUTO: 39 % (ref 36.6–50.3)
HGB BLD-MCNC: 12.6 G/DL (ref 12.1–17)
IMM GRANULOCYTES # BLD AUTO: 0 K/UL (ref 0–0.04)
IMM GRANULOCYTES NFR BLD AUTO: 1 % (ref 0–0.5)
LYMPHOCYTES # BLD: 1.2 K/UL (ref 0.8–3.5)
LYMPHOCYTES NFR BLD: 13 % (ref 12–49)
MCH RBC QN AUTO: 30.7 PG (ref 26–34)
MCHC RBC AUTO-ENTMCNC: 32.3 G/DL (ref 30–36.5)
MCV RBC AUTO: 95.1 FL (ref 80–99)
MONOCYTES # BLD: 1 K/UL (ref 0–1)
MONOCYTES NFR BLD: 12 % (ref 5–13)
NEUTS SEG # BLD: 6.3 K/UL (ref 1.8–8)
NEUTS SEG NFR BLD: 73 % (ref 32–75)
NRBC # BLD: 0 K/UL (ref 0–0.01)
NRBC BLD-RTO: 0 PER 100 WBC
PLATELET # BLD AUTO: 118 K/UL (ref 150–400)
PMV BLD AUTO: 10 FL (ref 8.9–12.9)
POTASSIUM SERPL-SCNC: 4 MMOL/L (ref 3.5–5.1)
RBC # BLD AUTO: 4.1 M/UL (ref 4.1–5.7)
SODIUM SERPL-SCNC: 141 MMOL/L (ref 136–145)
WBC # BLD AUTO: 8.7 K/UL (ref 4.1–11.1)

## 2019-01-28 PROCEDURE — 74011000258 HC RX REV CODE- 258: Performed by: INTERNAL MEDICINE

## 2019-01-28 PROCEDURE — 94640 AIRWAY INHALATION TREATMENT: CPT

## 2019-01-28 PROCEDURE — 74011000250 HC RX REV CODE- 250: Performed by: INTERNAL MEDICINE

## 2019-01-28 PROCEDURE — 97161 PT EVAL LOW COMPLEX 20 MIN: CPT

## 2019-01-28 PROCEDURE — 97116 GAIT TRAINING THERAPY: CPT

## 2019-01-28 PROCEDURE — 74011250637 HC RX REV CODE- 250/637: Performed by: FAMILY MEDICINE

## 2019-01-28 PROCEDURE — 80048 BASIC METABOLIC PNL TOTAL CA: CPT

## 2019-01-28 PROCEDURE — 65270000029 HC RM PRIVATE

## 2019-01-28 PROCEDURE — 74011250636 HC RX REV CODE- 250/636: Performed by: INTERNAL MEDICINE

## 2019-01-28 PROCEDURE — 94760 N-INVAS EAR/PLS OXIMETRY 1: CPT

## 2019-01-28 PROCEDURE — 74011250637 HC RX REV CODE- 250/637: Performed by: INTERNAL MEDICINE

## 2019-01-28 PROCEDURE — 36415 COLL VENOUS BLD VENIPUNCTURE: CPT

## 2019-01-28 PROCEDURE — 85025 COMPLETE CBC W/AUTO DIFF WBC: CPT

## 2019-01-28 PROCEDURE — 74011250636 HC RX REV CODE- 250/636: Performed by: FAMILY MEDICINE

## 2019-01-28 RX ORDER — AZITHROMYCIN 250 MG/1
500 TABLET, FILM COATED ORAL EVERY 24 HOURS
Status: DISCONTINUED | OUTPATIENT
Start: 2019-01-28 | End: 2019-01-29 | Stop reason: HOSPADM

## 2019-01-28 RX ORDER — CEFDINIR 300 MG/1
300 CAPSULE ORAL EVERY 12 HOURS
Status: DISCONTINUED | OUTPATIENT
Start: 2019-01-28 | End: 2019-01-29 | Stop reason: HOSPADM

## 2019-01-28 RX ADMIN — Medication 10 ML: at 13:42

## 2019-01-28 RX ADMIN — SODIUM CHLORIDE 100 ML/HR: 900 INJECTION, SOLUTION INTRAVENOUS at 00:55

## 2019-01-28 RX ADMIN — LEVOTHYROXINE SODIUM 50 MCG: 50 TABLET ORAL at 05:46

## 2019-01-28 RX ADMIN — GUAIFENESIN 600 MG: 600 TABLET, EXTENDED RELEASE ORAL at 21:39

## 2019-01-28 RX ADMIN — IPRATROPIUM BROMIDE AND ALBUTEROL SULFATE 3 ML: .5; 3 SOLUTION RESPIRATORY (INHALATION) at 08:17

## 2019-01-28 RX ADMIN — METOPROLOL TARTRATE 12.5 MG: 25 TABLET ORAL at 18:06

## 2019-01-28 RX ADMIN — IPRATROPIUM BROMIDE AND ALBUTEROL SULFATE 3 ML: .5; 3 SOLUTION RESPIRATORY (INHALATION) at 14:17

## 2019-01-28 RX ADMIN — APIXABAN 2.5 MG: 2.5 TABLET, FILM COATED ORAL at 09:23

## 2019-01-28 RX ADMIN — AZITHROMYCIN MONOHYDRATE 500 MG: 500 INJECTION, POWDER, LYOPHILIZED, FOR SOLUTION INTRAVENOUS at 04:36

## 2019-01-28 RX ADMIN — METOPROLOL TARTRATE 12.5 MG: 25 TABLET ORAL at 03:34

## 2019-01-28 RX ADMIN — IPRATROPIUM BROMIDE AND ALBUTEROL SULFATE 3 ML: .5; 3 SOLUTION RESPIRATORY (INHALATION) at 19:05

## 2019-01-28 RX ADMIN — ISOSORBIDE MONONITRATE 30 MG: 30 TABLET, EXTENDED RELEASE ORAL at 09:23

## 2019-01-28 RX ADMIN — CEFTRIAXONE SODIUM 1 G: 1 INJECTION, POWDER, FOR SOLUTION INTRAMUSCULAR; INTRAVENOUS at 03:13

## 2019-01-28 RX ADMIN — PANTOPRAZOLE SODIUM 40 MG: 40 TABLET, DELAYED RELEASE ORAL at 09:23

## 2019-01-28 RX ADMIN — CEFDINIR 300 MG: 300 CAPSULE ORAL at 09:23

## 2019-01-28 RX ADMIN — AZITHROMYCIN 500 MG: 250 TABLET, FILM COATED ORAL at 13:38

## 2019-01-28 RX ADMIN — GUAIFENESIN 600 MG: 600 TABLET, EXTENDED RELEASE ORAL at 09:23

## 2019-01-28 RX ADMIN — SODIUM CHLORIDE 75 ML/HR: 900 INJECTION, SOLUTION INTRAVENOUS at 13:40

## 2019-01-28 RX ADMIN — Medication 10 ML: at 21:39

## 2019-01-28 RX ADMIN — CEFDINIR 300 MG: 300 CAPSULE ORAL at 21:39

## 2019-01-28 RX ADMIN — Medication 10 ML: at 04:37

## 2019-01-28 RX ADMIN — GABAPENTIN 600 MG: 300 CAPSULE ORAL at 09:23

## 2019-01-28 RX ADMIN — TAMSULOSIN HYDROCHLORIDE 0.4 MG: 0.4 CAPSULE ORAL at 21:39

## 2019-01-28 RX ADMIN — METOPROLOL TARTRATE 12.5 MG: 25 TABLET ORAL at 09:23

## 2019-01-28 RX ADMIN — APIXABAN 2.5 MG: 2.5 TABLET, FILM COATED ORAL at 18:06

## 2019-01-28 RX ADMIN — IPRATROPIUM BROMIDE AND ALBUTEROL SULFATE 3 ML: .5; 3 SOLUTION RESPIRATORY (INHALATION) at 03:19

## 2019-01-28 NOTE — PROGRESS NOTES
Patient visited by [facility] Partner Volunteer in Medical Surgical Unit on 1/28/2019. Rev.  Damon Garza, [address] paging service: [phone])

## 2019-01-28 NOTE — PROGRESS NOTES
Daily Progress Note: 1/28/2019  Anastacio Ayers MD    Assessment/Plan:   Sepsis / Leukocytosis / Fever. POA. Not severe. Suspect a respiratory source (evolving PNA v. Viral URI). -IV abx  - IVF  - BCx, sputum culture, viral resp panel neg so far  - Tylenol for fever.     Cough / Dyspnea. Suspect respiratory process but CXR is underwhelming.  - Check CT chest  -Mucolytics  - Duo-nebs  - O2 to keep sats greater than 90%,     Atrial fibrillation / Pacemaker. Follows with Dr. Riley Lindsey. In rate controlled afib with V-pacing. -Continue eliquis, BB     Chronic obstructive pulmonary disease. unconfirmed. No wheezing on exam. Not on meds.     Hyperlipidemia (1/27/2019). -Cont statin MWF     Essential hypertension (1/27/2019). BP stable  -Continue home anti-hypertensives     CKD (chronic kidney disease) stage 3, GFR 30-59 ml/min. Suspect due to HTN. -Hydrate and monitor    Low urine output: bladder scan and tx as needed     Infected tooth. Has dental surgery planned for 2/5/19. Should be able to proceed with this.        Problem List:  Problem List as of 1/28/2019 Date Reviewed: 1/30/2018          Codes Class Noted - Resolved    * (Principal) Sepsis ([facility] ICD-10-CM: A41.9  ICD-9-CM: 038.9, 995.91  1/27/2019 - Present        Chronic obstructive pulmonary disease ([facility] ICD-10-CM: J44.9  ICD-9-CM: 983  1/27/2019 - Present        Hyperlipidemia ICD-10-CM: E78.5  ICD-9-CM: 272.4  1/27/2019 - Present        Essential hypertension ICD-10-CM: I10  ICD-9-CM: 401.9  1/27/2019 - Present        Pacemaker ICD-10-CM: Z95.0  ICD-9-CM: V45.01  1/27/2019 - Present        Fever ICD-10-CM: R50.9  ICD-9-CM: 780.60  1/27/2019 - Present        Leukocytosis ICD-10-CM: E32.176  ICD-9-CM: 288.60  1/27/2019 - Present        CKD (chronic kidney disease) stage 3, GFR 30-59 ml/min (HCC) ICD-10-CM: N18.3  ICD-9-CM: 585.3  1/27/2019 - Present        Cough ICD-10-CM: R05  ICD-9-CM: 786.2  1/27/2019 - Present        Dyspnea ICD-10-CM: R06.00  ICD-9-CM: 786.09  2019 - Present        Atrial fibrillation [facility]) ICD-10-CM: I48.91  ICD-9-CM: 427.31  2016 - Present        Encounter to establish care ICD-10-CM: Z76.89  ICD-9-CM: V65.8  2015 - Present              Subjective:   Mr. Jaclyn Carrasquillo is a 80 y.o.  male with a hx of Afib, HTN, PPM who presented to the Emergency Department complaining of cough and fever. Patient reportedly fine a couple days ago but yesterday evening, developed a coarse cough with white phlegm and some SOB. This evening, developed a fever and some confusion, called family and brought to ED. In ED, noted to be febrile, elevated WBC. We will admit for further management. [Dr Saray Bernard    : Pt is currently afebrile and feeling a bit better. Still having low energy. Family is worried about low urine output and amount of coughing he is doing. Pt denies pain. +BM on Friday. :  Still c/o feeling very weak but does not feel as ill. CT chest yesterday basically ok (see under Data Review). Tmax 99.2. WBC back to normal range. Get him up and moving some today. Change Azith to po and change rocephin to po Omnicef - if no fever spike and feeling better, the hopefully home tomorrow. Review of Systems:   A comprehensive review of systems was negative except for that written in the HPI. Objective:   Physical Exam:     Visit Vitals  /86   Pulse 75   Temp 97.9 °F (36.6 °C)   Resp 20   Ht 5' 8\" (1.727 m)   Wt 88.5 kg (195 lb)   SpO2 97% Comment: Simultaneous filing. User may not have seen previous data.    BMI 29.65 kg/m²    O2 Flow Rate (L/min): 2 l/min O2 Device: Room air    Temp (24hrs), Av.3 °F (36.8 °C), Min:97.8 °F (36.6 °C), Max:99.2 °F (37.3 °C)     1901 -  0700  In: 1513.3 [I.V.:1513.3]  Out: 300 [Urine:300]   701 -  1900  In: -   Out: 350 [Urine:350]    General:  Alert, cooperative, no distress, appears younger than stated age   Head:  Normocephalic, without obvious abnormality, atraumatic. Eyes:  Conjunctivae/corneas clear. PERRL, EOMs intact. Nose: Nares normal. Septum midline. Mucosa normal. No drainage or sinus tenderness. Throat: Lips, mucosa, and tongue moist..   Neck: Supple, symmetrical, trachea midline, no adenopathy, thyroid: no enlargement/tenderness/nodules, no carotid bruit and no JVD. Back:   Symmetric, no curvature. ROM normal. No CVA tenderness. Lungs:   Clear to auscultation bilaterally, diminished at bases   Chest wall:  No tenderness or deformity. Heart:  Regular rate and rhythm, S1, S2 normal, no murmur, click, rub or gallop. Abdomen:   Soft, non-tender. Bowel sounds normal. No masses,  No organomegaly. Extremities: Extremities normal, atraumatic, no cyanosis or edema. No calf tenderness or cords. Pulses: 2+ and symmetric all extremities. Skin: Skin color, texture, turgor normal. No rashes or lesions   Neurologic: CNII-XII intact. Alert and oriented X 3. Fine motor of hands and fingers normal.   equal.  Gait not tested at this time. Sensation grossly normal to touch. Gross motor of extremities normal.       Data Review:    CT Chest 1/27/19   FINDINGS:  THYROID: Unremarkable. MEDIASTINUM/ALEXANDER: No mass or lymphadenopathy. HEART/PERICARDIUM: Mildly enlarged. Coronary atherosclerosis. Right subclavian  pacemaker. No significant pericardial effusion. LUNGS/PLEURA: Bibasilar scarring and areas of pleural thickening with small  areas of calcification similar to the prior study. There is respiratory motion  artifact. No definite change in 8mm nodule right upper lobe abutting the minor  fissure allowing for motion. No new airspace disease, pulmonary edema, enlarging  pulmonary nodule, or pleural effusion. INCIDENTALLY IMAGED UPPER ABDOMEN: No significant abnormality. BONES: No destructive bone lesion. ADDITIONAL COMMENTS:  N/A.   IMPRESSION:  Motion artifact with no definite change in bibasilar scarring and right upper  lobe nodule. No new airspace disease, pulmonary edema, or pleural effusion. Recent Days:  Recent Labs     01/28/19  0101 01/27/19  0138   WBC 8.7 14.9*   HGB 12.6 14.0   HCT 39.0 44.4   * 133*     Recent Labs     01/28/19  0101 01/27/19 0138    142   K 4.0 4.2    104   CO2 26 28   * 121*   BUN 28* 30*   CREA 1.43* 1.64*   CA 8.3* 9.0   ALB  --  3.5   SGOT  --  24   ALT  --  16     No results for input(s): PH, PCO2, PO2, HCO3, FIO2 in the last 72 hours. 24 Hour Results:  Recent Results (from the past 24 hour(s))   CULTURE, RESPIRATORY/SPUTUM/BRONCH W GRAM STAIN    Collection Time: 01/27/19  4:28 PM   Result Value Ref Range    Special Requests: NO SPECIAL REQUESTS      GRAM STAIN FEW WBCS SEEN      GRAM STAIN OCCASIONAL EPITHELIAL CELLS SEEN      GRAM STAIN 1+ GRAM POSITIVE COCCI IN PAIRS      GRAM STAIN 1+ GRAM POSITIVE RODS      GRAM STAIN OCCASIONAL GRAM POSITIVE COCCI IN CLUSTERS      Culture result: PENDING    CBC WITH AUTOMATED DIFF    Collection Time: 01/28/19  1:01 AM   Result Value Ref Range    WBC 8.7 4.1 - 11.1 K/uL    RBC 4.10 4. 10 - 5.70 M/uL    HGB 12.6 12.1 - 17.0 g/dL    HCT 39.0 36.6 - 50.3 %    MCV 95.1 80.0 - 99.0 FL    MCH 30.7 26.0 - 34.0 PG    MCHC 32.3 30.0 - 36.5 g/dL    RDW 14.7 (H) 11.5 - 14.5 %    PLATELET 484 (L) 764 - 400 K/uL    MPV 10.0 8.9 - 12.9 FL    NRBC 0.0 0  WBC    ABSOLUTE NRBC 0.00 0.00 - 0.01 K/uL    NEUTROPHILS 73 32 - 75 %    LYMPHOCYTES 13 12 - 49 %    MONOCYTES 12 5 - 13 %    EOSINOPHILS 2 0 - 7 %    BASOPHILS 0 0 - 1 %    IMMATURE GRANULOCYTES 1 (H) 0.0 - 0.5 %    ABS. NEUTROPHILS 6.3 1.8 - 8.0 K/UL    ABS. LYMPHOCYTES 1.2 0.8 - 3.5 K/UL    ABS. MONOCYTES 1.0 0.0 - 1.0 K/UL    ABS. EOSINOPHILS 0.2 0.0 - 0.4 K/UL    ABS. BASOPHILS 0.0 0.0 - 0.1 K/UL    ABS. IMM.  GRANS. 0.0 0.00 - 0.04 K/UL    DF AUTOMATED     METABOLIC PANEL, BASIC    Collection Time: 01/28/19  1:01 AM   Result Value Ref Range    Sodium 141 136 - 145 mmol/L Potassium 4.0 3.5 - 5.1 mmol/L    Chloride 107 97 - 108 mmol/L    CO2 26 21 - 32 mmol/L    Anion gap 8 5 - 15 mmol/L    Glucose 110 (H) 65 - 100 mg/dL    BUN 28 (H) 6 - 20 MG/DL    Creatinine 1.43 (H) 0.70 - 1.30 MG/DL    BUN/Creatinine ratio 20 12 - 20      GFR est AA 56 (L) >60 ml/min/1.73m2    GFR est non-AA 46 (L) >60 ml/min/1.73m2    Calcium 8.3 (L) 8.5 - 10.1 MG/DL       Medications reviewed  Current Facility-Administered Medications   Medication Dose Route Frequency    sodium chloride (NS) flush 5-40 mL  5-40 mL IntraVENous Q8H    sodium chloride (NS) flush 5-40 mL  5-40 mL IntraVENous PRN    0.9% sodium chloride infusion  100 mL/hr IntraVENous CONTINUOUS    cefTRIAXone (ROCEPHIN) 1 g in 0.9% sodium chloride (MBP/ADV) 50 mL  1 g IntraVENous Q24H    azithromycin (ZITHROMAX) 500 mg in 0.9% sodium chloride (MBP/ADV) 250 mL  500 mg IntraVENous Q24H    acetaminophen (TYLENOL) tablet 650 mg  650 mg Oral Q4H PRN    guaiFENesin ER (MUCINEX) tablet 600 mg  600 mg Oral Q12H    albuterol-ipratropium (DUO-NEB) 2.5 MG-0.5 MG/3 ML  3 mL Nebulization Q6H RT    bisacodyl (DULCOLAX) tablet 5 mg  5 mg Oral DAILY PRN    apixaban (ELIQUIS) tablet 2.5 mg  2.5 mg Oral BID    gabapentin (NEURONTIN) capsule 600 mg  600 mg Oral DAILY    isosorbide mononitrate ER (IMDUR) tablet 30 mg  30 mg Oral DAILY    levothyroxine (SYNTHROID) tablet 50 mcg  50 mcg Oral ACB    tamsulosin (FLOMAX) capsule 0.4 mg  0.4 mg Oral DAILY    pantoprazole (PROTONIX) tablet 40 mg  40 mg Oral DAILY    metoprolol tartrate (LOPRESSOR) tablet 12.5 mg  12.5 mg Oral BID       Care Plan discussed with: Patient/Family and Nurse  Total time spent with patient: 30 minutes.     Coty Sotomayor MD

## 2019-01-28 NOTE — PROGRESS NOTES
Bedside and Verbal shift change report given to Jimenez Williamson (oncoming nurse) by Andrey Harmon (offgoing nurse). Report included the following information SBAR, Kardex, Intake/Output, MAR, Accordion and Recent Results.

## 2019-01-28 NOTE — PROGRESS NOTES
NUTRITION    RECOMMENDATIONS:     1. Encourage a protein source at each meal for healing    ASSESSMENT:   1/28: Patient seen due to pressure injury referral.  Admitted with a cough, currently with Sepsis/Leukocytosis/Fever. Visited, daughter and granddaughter. Pt  provided history, states good appetite, denies significant weight changes. States his sore tooth does not affect his eating. Family has brought in some food that he has eaten. States at the assisted living he does not always like the food but usually eats 2 meals/day, breakfast is a good meal he states. I discussed with him nutrition for healing and age. We reviewed protein sources and encouraged to consume at each meal.  He was willing to try Ensure at pm snack. Encouraged pt and family to order from the menu, reviewed menu selections with them. Daughter is to assist.    Past Medical History:   Diagnosis Date    Atrial fibrillation ([address]     Chronic obstructive pulmonary disease ([facility]     Essential hypertension     Hyperlipidemia     Long term (current) use of anticoagulants     Pacemaker     Rosacea 2016       Diet: cardiac    Abd:    wdl        BM: 1/28    Skin Integrity: []Intact  [x]Other: per WOCN 1/28: Distal to coccyx- small 0.5 cm gluteal erosion  Edema: []None []Other    Nutritionally Significant Medications: [x] Reviewed    Labs:    Lab Results   Component Value Date/Time    Sodium 141 01/28/2019 01:01 AM    Potassium 4.0 01/28/2019 01:01 AM    Chloride 107 01/28/2019 01:01 AM    CO2 26 01/28/2019 01:01 AM    Anion gap 8 01/28/2019 01:01 AM    Glucose 110 (H) 01/28/2019 01:01 AM    BUN 28 (H) 01/28/2019 01:01 AM    Creatinine 1.43 (H) 01/28/2019 01:01 AM    Calcium 8.3 (L) 01/28/2019 01:01 AM    Albumin 3.5 01/27/2019 01:38 AM       Anthropometrics:   Weight Source: Patient stated(\"2 days before admission in my Birthday suit\")  Height: 5' 8\" (172.7 cm),    Body mass index is 29.65 kg/m².   IBW : 69.9 kg (154 lb), % IBW (Calculated): 126.62 %, Usual Body Weight: 88.5 kg (195 lb),    Wt Readings from Last 5 Encounters:   01/28/19 88.5 kg (195 lb)   12/08/17 88.9 kg (196 lb)   06/14/17 88.5 kg (195 lb 3.2 oz)   03/10/17 89 kg (196 lb 3.2 oz)   01/25/17 87.1 kg (192 lb)       Estimated Daily Nutrition Requirements:   Weight Used:  Other (comment)(stated weight 195 lbs/88.6 kgs)  Kcals: 2130 Kcals/day Based on:[place] Jeor(REE x 1.3 x 1.1)  Protein: 89 g(to 106 gms, 1.0-1.2 gms/kg)   Fluid: 2215 ml(25 ml/kg)      Education & Discharge Needs:   [] Pt discussed in ID rounds     Nutrition related discharge needs addressed:     [x] Supplements (on d/c instruction &/or coupons provided)    [] Tube Feedings     [x] Education DONE   []No nutrition related discharge needs at this time     Cultural, Rastafarian and ethnic food preferences identified    [x] None   [] Yes     NUTRITION DIAGNOSIS:     Increased nutrient needs related to wound healing  as evidenced by sacral wound                     Pt is at Nutrition Risk:  [x]    No Nutrition Risk Identified:  []    RD INTERVENTION / PT GOALS:     Food/Nutrient Delivery:   , Supplements: Commercial supplement(Ensure HIgh PRotein daily),  ,  ,    Nutrition Education:Initial/Brief Nutrition Education: Purpose of nutrition education(discussed nutrition and healing),    Nutrition Counseling:    Coordination of Care:      Goal: Pt will consume a protein food at each meal and pm snack to aid in healing prior to discharge     MONITORING & EVALUATION:   Food/Nutrient Intake Outcomes: Protein intake, Total energy intake          Previous Nutrition Goals:  Previous Goal Met: N/A  Previous Recommendations:      Previous Recommendations Implemented: N/A       Stephanie Mejia RD

## 2019-01-28 NOTE — WOUND CARE
80 y.o. male with PMH including Afib, COPD, HTN. In ED, noted to be febrile with elevated WBC. Admitted for further management. Wound care consulted for evaluation of sacral area skin injury present on admission. Assessment:  Patient is resting in bed with family at bedside. Turns and repositions self in bed. Distal to the coccyx is a small 0.5cm gluteal erosion. Skin injury is not over a bony prominence. Sacrum is red/erythemetous and blanching. Specialty surface bed provided for patient and bedside RN notified. Recommendation:  Daily with skin care apply lotion to extremities and bony prominences for protection from friction/shear. Apply zinc barrier cream to sacral/coccyx area and leave open to air. Float heels off bed and protect from lines/devices. Turn Q2h while in bed. When up in chair use a waffle cushion and reposition every 20 minutes.     Consult as needed  Elie ROBERTON RN [facility], [facility].

## 2019-01-28 NOTE — PROGRESS NOTES
Bedside and Verbal shift change report given to Neto Connell (oncoming nurse) by Rocio San (offgoing nurse). Report included the following information SBAR, Kardex and Recent Results.

## 2019-01-28 NOTE — PROGRESS NOTES
Problem: Mobility Impaired (Adult and Pediatric)  Goal: *Acute Goals and Plan of Care (Insert Text)  Physical Therapy Goals  Initiated 1/28/2019  1. Patient will move from supine to sit and sit to supine  in bed with independence within 7 day(s). 2.  Patient will transfer from bed to chair and chair to bed with modified independence using the least restrictive device within 7 day(s). 3.  Patient will perform sit to stand with modified independence within 7 day(s). 4.  Patient will ambulate with modified independence for 150 feet with the least restrictive device within 7 day(s). physical Therapy EVALUATION  Patient: Shanell Montes De Oca (38 y.o. male)  Date: 1/28/2019  Primary Diagnosis: Sepsis ([address]       Precautions:   Fall, Skin    ASSESSMENT :  Based on the objective data described below, the patient presents with generalized weakness, decreased dynamic balance and moderate risk for falls following admission for sepsis. Patient came to ER on 1/27/19 for flu like symptoms and complaints of SOB. CT of chest shows no acute changes. Patient was received in bed on room air, alert, oriented and willing to work with PT. His daughter and granddaughter were both present. Patient reported some discomfort to left tailbone area which he has had for last few years. He was off-loaded with pillow support and skin in tact. Patient stood and ambulated 75 feet with rolling walker and min assist. Vitals stable (see doc flow sheets). Patient lives in his own senior apartment and has been using a cane for last few months. No reported falls. Family very supportive and willing to help. PT recommends home with HHPT, use of his rolling walker rather than cane (already has RW) and close assistance at all times. Granddaughter reports they are working out a schedule to be with him at all times . Patient will benefit from skilled intervention to address the above impairments.   Patients rehabilitation potential is considered to be Good  Factors which may influence rehabilitation potential include:   [x]         None noted  []         Mental ability/status  []         Medical condition  []         Home/family situation and support systems  []         Safety awareness  []         Pain tolerance/management  []         Other:      PLAN :  Recommendations and Planned Interventions:  [x]           Bed Mobility Training             []    Neuromuscular Re-Education  [x]           Transfer Training                   []    Orthotic/Prosthetic Training  [x]           Gait Training                         []    Modalities  [x]           Therapeutic Exercises           []    Edema Management/Control  []           Therapeutic Activities            []    Patient and Family Training/Education  []           Other (comment):    Frequency/Duration: Patient will be followed by physical therapy  5 times a week to address goals. Discharge Recommendations: Home Health  Further Equipment Recommendations for Discharge: has all that he needs     SUBJECTIVE:   Patient stated I feel better than when I came in here.     OBJECTIVE DATA SUMMARY:   HISTORY:    Past Medical History:   Diagnosis Date    [facility] ([facility]     Chronic obstructive pulmonary disease ([facility]     Essential hypertension     Hyperlipidemia     Long term (current) use of anticoagulants     Pacemaker     Rosacea 2016     Past Surgical History:   Procedure Laterality Date    HX CORONARY STENT PLACEMENT      HX HEART CATHETERIZATION      HX PACEMAKER       Prior Level of Function/Home Situation: used a cane; lives alone in senior apartment  Personal factors and/or comorbidities impacting plan of care: moderate risk for falls due to balance deficits; recommend close assistance with gait and use of rolling walker    Home Situation  Home Environment: Apartment  # Steps to Enter: 0  One/Two Story Residence: One story  Living Alone: Yes  Support Systems: Family member(s)  Patient Expects to be Discharged to[de-identified] Apartment  Current DME Used/Available at Home: CPAP, Cane, straight, Walker, rolling    EXAMINATION/PRESENTATION/DECISION MAKING:   Critical Behavior:           Safety/Judgement: Insight into deficits, Decreased awareness of need for safety  Hearing: Auditory  Auditory Impairment: Hard of hearing, bilateral  Skin:  No breakdown noted to left tailbone area    Range Of Motion:  AROM: Generally decreased, functional                       Strength:    Strength: Generally decreased, functional                    Tone & Sensation:   Tone: Normal              Sensation: Intact                         Functional Mobility:  Bed Mobility:     Supine to Sit: Modified independent; Additional time     Scooting: Modified independent  Transfers:  Sit to Stand: Minimum assistance  Stand to Sit: Minimum assistance        Bed to Chair: Minimum assistance              Balance:   Sitting: Intact  Standing: Intact; With support  Ambulation/Gait Training:  Distance (ft): 75 Feet (ft)  Assistive Device: Gait belt;Walker, rolling  Ambulation - Level of Assistance: Minimal assistance        Gait Abnormalities: Decreased step clearance; Path deviations                                                      Functional Measure:  Tinetti test:    Sitting Balance: 1  Arises: 1  Attempts to Rise: 2  Immediate Standing Balance: 2  Standing Balance: 2  Nudged: 1  Eyes Closed: 1  Turn 360 Degrees - Continuous/Discontinuous: 1  Turn 360 Degrees - Steady/Unsteady: 0  Sitting Down: 1  Balance Score: 12  Indication of Gait: 1  R Step Length/Height: 1  L Step Length/Height: 1  R Foot Clearance: 1  L Foot Clearance: 1  Step Symmetry: 1  Step Continuity: 1  Path: 1  Trunk: 2  Walking Time: 1  Gait Score: 11  Total Score: 23         Tinetti Tool Score Risk of Falls  <19 = High Fall Risk  19-24 = Moderate Fall Risk  25-28 = Low Fall Risk  Tinetti ME. Performance-Oriented Assessment of Mobility Problems in Elderly Patients.  [place]; 20:299-305. (Scoring Description: PT Bulletin Feb. 10, 1993)    Older adults: Dahlia Hashimoto et al, 2009; n = 1000 [facility] elderly evaluated with ABC, IVÁN, ADL, and IADL)  · Mean IVÁN score for males aged 69-68 years = 26.21(3.40)  · Mean IVÁN score for females age 69-68 years = 25.16(4.30)  · Mean IVÁN score for males over 80 years = 23.29(6.02)  · Mean IVÁN score for females over 80 years = 17.20(8.32)          Physical Therapy Evaluation Charge Determination   History Examination Presentation Decision-Making   MEDIUM  Complexity : 1-2 comorbidities / personal factors will impact the outcome/ POC  LOW Complexity : 1-2 Standardized tests and measures addressing body structure, function, activity limitation and / or participation in recreation  LOW Complexity : Stable, uncomplicated  Other outcome measures Tinetti  MEDIUM      Based on the above components, the patient evaluation is determined to be of the following complexity level: LOW     Pain:  Pain Scale 1: Numeric (0 - 10)  Pain Intensity 1: 0              Activity Tolerance:   good  Please refer to the flowsheet for vital signs taken during this treatment. After treatment:   [x]         Patient left in no apparent distress sitting up in chair  []         Patient left in no apparent distress in bed  [x]         Call bell left within reach  [x]         Nursing notified  [x]         Caregiver present  []         Bed alarm activated    COMMUNICATION/EDUCATION:   The patients plan of care was discussed with: Registered Nurse. [x]         Fall prevention education was provided and the patient/caregiver indicated understanding. []         Patient/family have participated as able in goal setting and plan of care. [x]         Patient/family agree to work toward stated goals and plan of care. []         Patient understands intent and goals of therapy, but is neutral about his/her participation.   []         Patient is unable to participate in goal setting and plan of care.    Thank you for this referral.  Henrik Castle, PT   Time Calculation: 24 mins

## 2019-01-29 ENCOUNTER — APPOINTMENT (OUTPATIENT)
Dept: GENERAL RADIOLOGY | Age: 84
DRG: 871 | End: 2019-01-29
Attending: FAMILY MEDICINE
Payer: MEDICARE

## 2019-01-29 VITALS
RESPIRATION RATE: 18 BRPM | WEIGHT: 195 LBS | BODY MASS INDEX: 29.55 KG/M2 | TEMPERATURE: 98.2 F | HEART RATE: 71 BPM | HEIGHT: 68 IN | DIASTOLIC BLOOD PRESSURE: 76 MMHG | OXYGEN SATURATION: 97 % | SYSTOLIC BLOOD PRESSURE: 145 MMHG

## 2019-01-29 LAB
ALBUMIN SERPL-MCNC: 2.6 G/DL (ref 3.5–5)
ALBUMIN/GLOB SERPL: 0.8 {RATIO} (ref 1.1–2.2)
ALP SERPL-CCNC: 58 U/L (ref 45–117)
ALT SERPL-CCNC: 12 U/L (ref 12–78)
ANION GAP SERPL CALC-SCNC: 10 MMOL/L (ref 5–15)
AST SERPL-CCNC: 17 U/L (ref 15–37)
BACTERIA SPEC CULT: NORMAL
BASOPHILS # BLD: 0 K/UL (ref 0–0.1)
BASOPHILS NFR BLD: 0 % (ref 0–1)
BILIRUB SERPL-MCNC: 0.5 MG/DL (ref 0.2–1)
BUN SERPL-MCNC: 22 MG/DL (ref 6–20)
BUN/CREAT SERPL: 16 (ref 12–20)
CALCIUM SERPL-MCNC: 8.2 MG/DL (ref 8.5–10.1)
CHLORIDE SERPL-SCNC: 107 MMOL/L (ref 97–108)
CO2 SERPL-SCNC: 25 MMOL/L (ref 21–32)
CREAT SERPL-MCNC: 1.39 MG/DL (ref 0.7–1.3)
DIFFERENTIAL METHOD BLD: ABNORMAL
EOSINOPHIL # BLD: 0.4 K/UL (ref 0–0.4)
EOSINOPHIL NFR BLD: 6 % (ref 0–7)
ERYTHROCYTE [DISTWIDTH] IN BLOOD BY AUTOMATED COUNT: 14.8 % (ref 11.5–14.5)
GLOBULIN SER CALC-MCNC: 3.1 G/DL (ref 2–4)
GLUCOSE SERPL-MCNC: 107 MG/DL (ref 65–100)
GRAM STN SPEC: NORMAL
HCT VFR BLD AUTO: 38.1 % (ref 36.6–50.3)
HGB BLD-MCNC: 12.1 G/DL (ref 12.1–17)
IMM GRANULOCYTES # BLD AUTO: 0.1 K/UL (ref 0–0.04)
IMM GRANULOCYTES NFR BLD AUTO: 1 % (ref 0–0.5)
L PNEUMO1 AG UR QL IA: NEGATIVE
LYMPHOCYTES # BLD: 1.1 K/UL (ref 0.8–3.5)
LYMPHOCYTES NFR BLD: 14 % (ref 12–49)
MCH RBC QN AUTO: 30.6 PG (ref 26–34)
MCHC RBC AUTO-ENTMCNC: 31.8 G/DL (ref 30–36.5)
MCV RBC AUTO: 96.2 FL (ref 80–99)
MONOCYTES # BLD: 1 K/UL (ref 0–1)
MONOCYTES NFR BLD: 12 % (ref 5–13)
NEUTS SEG # BLD: 5.4 K/UL (ref 1.8–8)
NEUTS SEG NFR BLD: 68 % (ref 32–75)
NRBC # BLD: 0 K/UL (ref 0–0.01)
NRBC BLD-RTO: 0 PER 100 WBC
PLATELET # BLD AUTO: 121 K/UL (ref 150–400)
PMV BLD AUTO: 10.3 FL (ref 8.9–12.9)
POTASSIUM SERPL-SCNC: 4.3 MMOL/L (ref 3.5–5.1)
PROT SERPL-MCNC: 5.7 G/DL (ref 6.4–8.2)
RBC # BLD AUTO: 3.96 M/UL (ref 4.1–5.7)
SERVICE CMNT-IMP: NORMAL
SODIUM SERPL-SCNC: 142 MMOL/L (ref 136–145)
SPECIMEN SOURCE: NORMAL
WBC # BLD AUTO: 8 K/UL (ref 4.1–11.1)

## 2019-01-29 PROCEDURE — 74011250636 HC RX REV CODE- 250/636: Performed by: FAMILY MEDICINE

## 2019-01-29 PROCEDURE — 71046 X-RAY EXAM CHEST 2 VIEWS: CPT

## 2019-01-29 PROCEDURE — 74011000250 HC RX REV CODE- 250: Performed by: INTERNAL MEDICINE

## 2019-01-29 PROCEDURE — 97116 GAIT TRAINING THERAPY: CPT

## 2019-01-29 PROCEDURE — 94640 AIRWAY INHALATION TREATMENT: CPT

## 2019-01-29 PROCEDURE — 74011250637 HC RX REV CODE- 250/637: Performed by: INTERNAL MEDICINE

## 2019-01-29 PROCEDURE — 74011250637 HC RX REV CODE- 250/637: Performed by: FAMILY MEDICINE

## 2019-01-29 PROCEDURE — 85025 COMPLETE CBC W/AUTO DIFF WBC: CPT

## 2019-01-29 PROCEDURE — 80053 COMPREHEN METABOLIC PANEL: CPT

## 2019-01-29 PROCEDURE — 36415 COLL VENOUS BLD VENIPUNCTURE: CPT

## 2019-01-29 RX ORDER — METOPROLOL TARTRATE 25 MG/1
12.5 TABLET, FILM COATED ORAL 2 TIMES DAILY
Qty: 30 TAB | Refills: 0 | Status: SHIPPED | OUTPATIENT
Start: 2019-01-29 | End: 2019-07-05

## 2019-01-29 RX ORDER — CEFDINIR 300 MG/1
300 CAPSULE ORAL EVERY 12 HOURS
Qty: 6 CAP | Refills: 0 | Status: SHIPPED | OUTPATIENT
Start: 2019-01-29 | End: 2019-07-03

## 2019-01-29 RX ORDER — ISOSORBIDE MONONITRATE 30 MG/1
30 TABLET, EXTENDED RELEASE ORAL DAILY
Qty: 30 TAB | Refills: 0 | Status: SHIPPED | OUTPATIENT
Start: 2019-01-29 | End: 2019-07-03

## 2019-01-29 RX ORDER — ALBUTEROL SULFATE 0.83 MG/ML
2.5 SOLUTION RESPIRATORY (INHALATION)
Qty: 60 EACH | Refills: 0 | Status: SHIPPED | OUTPATIENT
Start: 2019-01-29 | End: 2019-07-03

## 2019-01-29 RX ORDER — AZITHROMYCIN 250 MG/1
TABLET, FILM COATED ORAL
Qty: 6 TAB | Refills: 0 | Status: SHIPPED | OUTPATIENT
Start: 2019-01-29 | End: 2019-02-03

## 2019-01-29 RX ADMIN — APIXABAN 2.5 MG: 2.5 TABLET, FILM COATED ORAL at 09:35

## 2019-01-29 RX ADMIN — GUAIFENESIN 600 MG: 600 TABLET, EXTENDED RELEASE ORAL at 09:35

## 2019-01-29 RX ADMIN — IPRATROPIUM BROMIDE AND ALBUTEROL SULFATE 3 ML: .5; 3 SOLUTION RESPIRATORY (INHALATION) at 07:50

## 2019-01-29 RX ADMIN — PANTOPRAZOLE SODIUM 40 MG: 40 TABLET, DELAYED RELEASE ORAL at 09:35

## 2019-01-29 RX ADMIN — Medication 10 ML: at 13:13

## 2019-01-29 RX ADMIN — CEFDINIR 300 MG: 300 CAPSULE ORAL at 09:35

## 2019-01-29 RX ADMIN — LEVOTHYROXINE SODIUM 50 MCG: 50 TABLET ORAL at 06:31

## 2019-01-29 RX ADMIN — IPRATROPIUM BROMIDE AND ALBUTEROL SULFATE 3 ML: .5; 3 SOLUTION RESPIRATORY (INHALATION) at 04:07

## 2019-01-29 RX ADMIN — ISOSORBIDE MONONITRATE 30 MG: 30 TABLET, EXTENDED RELEASE ORAL at 09:35

## 2019-01-29 RX ADMIN — AZITHROMYCIN 500 MG: 250 TABLET, FILM COATED ORAL at 11:41

## 2019-01-29 RX ADMIN — Medication 10 ML: at 06:31

## 2019-01-29 RX ADMIN — GABAPENTIN 600 MG: 300 CAPSULE ORAL at 09:35

## 2019-01-29 RX ADMIN — IPRATROPIUM BROMIDE AND ALBUTEROL SULFATE 3 ML: .5; 3 SOLUTION RESPIRATORY (INHALATION) at 14:32

## 2019-01-29 RX ADMIN — METOPROLOL TARTRATE 12.5 MG: 25 TABLET ORAL at 09:35

## 2019-01-29 RX ADMIN — SODIUM CHLORIDE 75 ML/HR: 900 INJECTION, SOLUTION INTRAVENOUS at 02:05

## 2019-01-29 NOTE — DISCHARGE INSTRUCTIONS
Patient Discharge Instructions    Elkin Sarah / [id number] : 1922    Admitted 2019 Discharged: 2019 3:29 PM     ACUTE DIAGNOSES:  Sepsis ([facility]    CHRONIC MEDICAL DIAGNOSES:  Problem List as of 2019 Date Reviewed: 2018          Codes Class Noted - Resolved    * (Principal) Sepsis ([place] [address] ICD-10-CM: A41.9  ICD-9-CM: 038.9, 995.91  2019 - Present        Chronic obstructive pulmonary disease ([facility] ICD-10-CM: J44.9  ICD-9-CM: 104  2019 - Present        Hyperlipidemia ICD-10-CM: E78.5  ICD-9-CM: 272.4  2019 - Present        Essential hypertension ICD-10-CM: I10  ICD-9-CM: 401.9  2019 - Present        Pacemaker ICD-10-CM: Z95.0  ICD-9-CM: V45.01  2019 - Present        Fever ICD-10-CM: R50.9  ICD-9-CM: 780.60  2019 - Present        Leukocytosis ICD-10-CM: D72.829  ICD-9-CM: 288.60  2019 - Present        CKD (chronic kidney disease) stage 3, GFR 30-59 ml/min ([facility]) ICD-10-CM: N18.3  ICD-9-CM: 585.3  2019 - Present        Cough ICD-10-CM: R05  ICD-9-CM: 786.2  2019 - Present        Dyspnea ICD-10-CM: R06.00  ICD-9-CM: 786.09  2019 - Present        Atrial fibrillation ([facility] ICD-10-CM: I48.91  ICD-9-CM: 427.31  2016 - Present        Encounter to establish care ICD-10-CM: Z76.89  ICD-9-CM: V65.8  2015 - Present              DISCHARGE MEDICATIONS:         · It is important that you take the medication exactly as they are prescribed. · Keep your medication in the bottles provided by the pharmacist and keep a list of the medication names, dosages, and times to be taken in your wallet. · Do not take other medications without consulting your doctor.        DIET:  Cardiac Diet    ACTIVITY: Activity as tolerated    ADDITIONAL INFORMATION: If you experience any of the following symptoms then please call your primary care physician or return to the emergency room if you cannot get hold of your doctor: Fever, chills, nausea, vomiting, diarrhea, change in mentation, falling, bleeding, shortness of breath. FOLLOW UP CARE:  Dr. Azucena Babinski, MD appointment this thursday. Follow-up with specialists at directed by them      Information obtained by :  I understand that if any problems occur once I am at home I am to contact my physician. I understand and acknowledge receipt of the instructions indicated above.                                                                                                                                            Physician's or R.N.'s Signature                                                                  Date/Time                                                                                                                                              Patient or Representative Signature                                                          Date/Time

## 2019-01-29 NOTE — PROGRESS NOTES
Daily Progress Note and Discharge Note: 1/29/2019  No Barragan MD    Assessment/Plan:   Sepsis / Leukocytosis / Fever. POA. Not severe. Suspect a respiratory source (evolving PNA v. Viral URI). -IV abx switched to po 1/28 and no fever spikes thereafter  - IVF initially  - BCx, sputum culture, viral resp panel neg so far  - Tylenol for fever.     Cough / Dyspnea. Suspect respiratory process but CXR is underwhelming.  - CT chest 1/27 as under Data Review  - home neb     Atrial fibrillation / Pacemaker. Follows with Dr. Lily Rodgers. In rate controlled afib with V-pacing. -Continue eliquis, BB     Chronic obstructive pulmonary disease. stable Not on meds.     Hyperlipidemia (1/27/2019). -Cont statin MWF     Essential hypertension (1/27/2019). BP stable  -Continue home anti-hypertensives     CKD (chronic kidney disease) stage 3, GFR 30-59 ml/min. Suspect due to HTN. -Hydrated/monitor outpt    Low urine output: resolved     Infected tooth. Has dental surgery planned for 2/5/19. Should be able to proceed with this.        Problem List:  Problem List as of 1/29/2019 Date Reviewed: 1/30/2018          Codes Class Noted - Resolved    * (Principal) Sepsis ([facility] ICD-10-CM: A41.9  ICD-9-CM: 038.9, 995.91  1/27/2019 - Present        Chronic obstructive pulmonary disease ([facility] ICD-10-CM: J44.9  ICD-9-CM: 732  1/27/2019 - Present        Hyperlipidemia ICD-10-CM: E78.5  ICD-9-CM: 272.4  1/27/2019 - Present        Essential hypertension ICD-10-CM: I10  ICD-9-CM: 401.9  1/27/2019 - Present        Pacemaker ICD-10-CM: Z95.0  ICD-9-CM: V45.01  1/27/2019 - Present        Fever ICD-10-CM: R50.9  ICD-9-CM: 780.60  1/27/2019 - Present        Leukocytosis ICD-10-CM: S16.312  ICD-9-CM: 288.60  1/27/2019 - Present        CKD (chronic kidney disease) stage 3, GFR 30-59 ml/min ([facility]) ICD-10-CM: N18.3  ICD-9-CM: 585.3  1/27/2019 - Present        Cough ICD-10-CM: R05  ICD-9-CM: 786.2  1/27/2019 - Present        Dyspnea ICD-10-CM: R06.00  ICD-9-CM: 786.09  1/27/2019 - Present        Atrial fibrillation [facility]) ICD-10-CM: I48.91  ICD-9-CM: 427.31  2/17/2016 - Present        Encounter to establish care ICD-10-CM: Z76.89  ICD-9-CM: V65.8  8/5/2015 - Present            Subjective:   Mr. Malick Orantes is a 80 y.o.  male with a hx of Afib, HTN, PPM who presented to the Emergency Department complaining of cough and fever. Patient reportedly fine a couple days ago but yesterday evening, developed a coarse cough with white phlegm and some SOB. This evening, developed a fever and some confusion, called family and brought to ED. In ED, noted to be febrile, elevated WBC. We will admit for further management. [Dr Tere Montes    1/27: Pt is currently afebrile and feeling a bit better. Still having low energy. Family is worried about low urine output and amount of coughing he is doing. Pt denies pain. +BM on Friday. 1/28:  Still c/o feeling very weak but does not feel as ill. CT chest yesterday basically ok (see under Data Review). Tmax 99.2. WBC back to normal range. Get him up and moving some today. Change Azith to po and change rocephin to po Omnicef - if no fever spike and feeling better, the hopefully home tomorrow. 1/29:  Continues to improve. He did fairly well with PT. Tmax 98. No fever spike off IV antibiotics. Still having chest congestion/SOB off and on, improved by Neb PRN - will try to arrange for home Neb since he has DX of COPD - discussed face to face with granddaughter and pt and face to face form filled out. Recheck CXR this AM and if he cont to do well then poss home later today. 330PM:  CXR ok. Has home neb and ready for discharge. Follow up with Dr Criselda Scott this Thursday. Review of Systems:   A comprehensive review of systems was negative except for that written in the HPI.     Objective:   Physical Exam:     Visit Vitals  /84 (BP 1 Location: Left arm, BP Patient Position: At rest)   Pulse 63 Temp 98.4 °F (36.9 °C)   Resp 16   Ht 5' 8\" (1.727 m)   Wt 88.5 kg (195 lb)   SpO2 96%   BMI 29.65 kg/m²    O2 Flow Rate (L/min): 2 l/min O2 Device: Room air    Temp (24hrs), Av °F (36.7 °C), Min:97.6 °F (36.4 °C), Max:98.4 °F (36.9 °C)    No intake/output data recorded. 701 - 1900  In: 1513.3 [I.V.:1513.3]  Out: 6324 [Urine:1375]    General:  Alert, cooperative, no distress, appears younger than stated age   Head:  Normocephalic, without obvious abnormality, atraumatic. Eyes:  Conjunctivae/corneas clear. PERRL, EOMs intact. Nose: Nares normal. Septum midline. Mucosa normal. No drainage or sinus tenderness. Throat: Lips, mucosa, and tongue moist..   Neck: Supple, symmetrical, trachea midline, no adenopathy, thyroid: no enlargement/tenderness/nodules, no carotid bruit and no JVD. Back:   Symmetric, no curvature. ROM normal. No CVA tenderness. Lungs:   Clear to auscultation bilaterally, diminished at bases   Chest wall:  No tenderness or deformity. Heart:  Regular rate and rhythm, S1, S2 normal, no murmur, click, rub or gallop. Abdomen:   Soft, non-tender. Bowel sounds normal. No masses,  No organomegaly. Extremities: Extremities normal, atraumatic, no cyanosis or edema. No calf tenderness or cords. Pulses: 2+ and symmetric all extremities. Skin: Skin color, texture, turgor normal. No rashes or lesions   Neurologic: CNII-XII intact. Alert and oriented X 3. Fine motor of hands and fingers normal.   equal.  Gait not tested at this time. Sensation grossly normal to touch. Gross motor of extremities normal.       Data Review:    CT Chest 19   FINDINGS:  THYROID: Unremarkable. MEDIASTINUM/ALEXANDER: No mass or lymphadenopathy. HEART/PERICARDIUM: Mildly enlarged. Coronary atherosclerosis. Right subclavian  pacemaker. No significant pericardial effusion.   LUNGS/PLEURA: Bibasilar scarring and areas of pleural thickening with small  areas of calcification similar to the prior study. There is respiratory motion  artifact. No definite change in 8mm nodule right upper lobe abutting the minor  fissure allowing for motion. No new airspace disease, pulmonary edema, enlarging  pulmonary nodule, or pleural effusion. INCIDENTALLY IMAGED UPPER ABDOMEN: No significant abnormality. BONES: No destructive bone lesion. ADDITIONAL COMMENTS:  N/A. IMPRESSION:  Motion artifact with no definite change in bibasilar scarring and right upper  lobe nodule. No new airspace disease, pulmonary edema, or pleural effusion. Recent Days:  Recent Labs     01/29/19  0350 01/28/19 0101 01/27/19 0138   WBC 8.0 8.7 14.9*   HGB 12.1 12.6 14.0   HCT 38.1 39.0 44.4   * 118* 133*     Recent Labs     01/29/19 0350 01/28/19 0101 01/27/19 0138    141 142   K 4.3 4.0 4.2    107 104   CO2 25 26 28   * 110* 121*   BUN 22* 28* 30*   CREA 1.39* 1.43* 1.64*   CA 8.2* 8.3* 9.0   ALB 2.6*  --  3.5   SGOT 17  --  24   ALT 12  --  16     No results for input(s): PH, PCO2, PO2, HCO3, FIO2 in the last 72 hours. 24 Hour Results:  Recent Results (from the past 24 hour(s))   CBC WITH AUTOMATED DIFF    Collection Time: 01/29/19  3:50 AM   Result Value Ref Range    WBC 8.0 4.1 - 11.1 K/uL    RBC 3.96 (L) 4.10 - 5.70 M/uL    HGB 12.1 12.1 - 17.0 g/dL    HCT 38.1 36.6 - 50.3 %    MCV 96.2 80.0 - 99.0 FL    MCH 30.6 26.0 - 34.0 PG    MCHC 31.8 30.0 - 36.5 g/dL    RDW 14.8 (H) 11.5 - 14.5 %    PLATELET 807 (L) 873 - 400 K/uL    MPV 10.3 8.9 - 12.9 FL    NRBC 0.0 0  WBC    ABSOLUTE NRBC 0.00 0.00 - 0.01 K/uL    NEUTROPHILS 68 32 - 75 %    LYMPHOCYTES 14 12 - 49 %    MONOCYTES 12 5 - 13 %    EOSINOPHILS 6 0 - 7 %    BASOPHILS 0 0 - 1 %    IMMATURE GRANULOCYTES 1 (H) 0.0 - 0.5 %    ABS. NEUTROPHILS 5.4 1.8 - 8.0 K/UL    ABS. LYMPHOCYTES 1.1 0.8 - 3.5 K/UL    ABS. MONOCYTES 1.0 0.0 - 1.0 K/UL    ABS. EOSINOPHILS 0.4 0.0 - 0.4 K/UL    ABS. BASOPHILS 0.0 0.0 - 0.1 K/UL    ABS. IMM.  GRANS. 0.1 (H) 0.00 - 0.04 K/UL    DF AUTOMATED     METABOLIC PANEL, COMPREHENSIVE    Collection Time: 01/29/19  3:50 AM   Result Value Ref Range    Sodium 142 136 - 145 mmol/L    Potassium 4.3 3.5 - 5.1 mmol/L    Chloride 107 97 - 108 mmol/L    CO2 25 21 - 32 mmol/L    Anion gap 10 5 - 15 mmol/L    Glucose 107 (H) 65 - 100 mg/dL    BUN 22 (H) 6 - 20 MG/DL    Creatinine 1.39 (H) 0.70 - 1.30 MG/DL    BUN/Creatinine ratio 16 12 - 20      GFR est AA 57 (L) >60 ml/min/1.73m2    GFR est non-AA 47 (L) >60 ml/min/1.73m2    Calcium 8.2 (L) 8.5 - 10.1 MG/DL    Bilirubin, total 0.5 0.2 - 1.0 MG/DL    ALT (SGPT) 12 12 - 78 U/L    AST (SGOT) 17 15 - 37 U/L    Alk.  phosphatase 58 45 - 117 U/L    Protein, total 5.7 (L) 6.4 - 8.2 g/dL    Albumin 2.6 (L) 3.5 - 5.0 g/dL    Globulin 3.1 2.0 - 4.0 g/dL    A-G Ratio 0.8 (L) 1.1 - 2.2         Medications reviewed  Current Facility-Administered Medications   Medication Dose Route Frequency    cefdinir (OMNICEF) capsule 300 mg  300 mg Oral Q12H    azithromycin (ZITHROMAX) tablet 500 mg  500 mg Oral Q24H    sodium chloride (NS) flush 5-40 mL  5-40 mL IntraVENous Q8H    sodium chloride (NS) flush 5-40 mL  5-40 mL IntraVENous PRN    0.9% sodium chloride infusion  75 mL/hr IntraVENous CONTINUOUS    acetaminophen (TYLENOL) tablet 650 mg  650 mg Oral Q4H PRN    guaiFENesin ER (MUCINEX) tablet 600 mg  600 mg Oral Q12H    albuterol-ipratropium (DUO-NEB) 2.5 MG-0.5 MG/3 ML  3 mL Nebulization Q6H RT    bisacodyl (DULCOLAX) tablet 5 mg  5 mg Oral DAILY PRN    apixaban (ELIQUIS) tablet 2.5 mg  2.5 mg Oral BID    gabapentin (NEURONTIN) capsule 600 mg  600 mg Oral DAILY    isosorbide mononitrate ER (IMDUR) tablet 30 mg  30 mg Oral DAILY    levothyroxine (SYNTHROID) tablet 50 mcg  50 mcg Oral ACB    tamsulosin (FLOMAX) capsule 0.4 mg  0.4 mg Oral DAILY    pantoprazole (PROTONIX) tablet 40 mg  40 mg Oral DAILY    metoprolol tartrate (LOPRESSOR) tablet 12.5 mg  12.5 mg Oral BID       Care Plan discussed with: Patient/Family and Nurse  Total time spent with patient: 30 minutes.     Claudio Cruz MD

## 2019-01-29 NOTE — PROGRESS NOTES
Problem: Mobility Impaired (Adult and Pediatric)  Goal: *Acute Goals and Plan of Care (Insert Text)  Physical Therapy Goals  Initiated 1/28/2019  1. Patient will move from supine to sit and sit to supine  in bed with independence within 7 day(s). 2.  Patient will transfer from bed to chair and chair to bed with modified independence using the least restrictive device within 7 day(s). 3.  Patient will perform sit to stand with modified independence within 7 day(s). 4.  Patient will ambulate with modified independence for 150 feet with the least restrictive device within 7 day(s). physical Therapy TREATMENT  Patient: Manjula Rubio (42 y.o. male)  Date: 1/29/2019  Diagnosis: Sepsis ([facility] Sepsis ([facility]      Precautions: Fall, Skin  Chart, physical therapy assessment, plan of care and goals were reviewed. ASSESSMENT:  Patient cleared for PT. Received in bed alert and eager to ambulate. Granddaughter present. Patient stood and ambulated 125 feet with rolling walker and CGA. O2 sats stable on room air ranging from 91% to 97% and HR 60-86. Educated patient regarding pursed lip breathing as he tends to mouth breathe. Left up in chair with call button in reach. Recommend home, using rolling walker and HHPT. Progression toward goals:  [x]    Improving appropriately and progressing toward goals  []    Improving slowly and progressing toward goals  []    Not making progress toward goals and plan of care will be adjusted     PLAN:  Patient continues to benefit from skilled intervention to address the above impairments. Continue treatment per established plan of care. Discharge Recommendations:  Home Health  Further Equipment Recommendations for Discharge:  none     SUBJECTIVE:   Patient stated I feel better today.     OBJECTIVE DATA SUMMARY:   Critical Behavior:           Safety/Judgement: Insight into deficits, Decreased awareness of need for safety  Functional Mobility Training:  Bed Mobility:     Supine to Sit: Modified independent; Additional time              Transfers:  Sit to Stand: Contact guard assistance  Stand to Sit: Contact guard assistance        Bed to Chair: Contact guard assistance                    Balance:  Sitting: Intact  Standing: Intact; With support  Ambulation/Gait Training:  Distance (ft): 125 Feet (ft)  Assistive Device: Walker, rolling;Gait belt  Ambulation - Level of Assistance: Contact guard assistance                                                           Therapeutic Exercises:   Pursed lip breathing. Pain:  Pain Scale 1: Numeric (0 - 10)  Pain Intensity 1: 0              Activity Tolerance:   good  Please refer to the flowsheet for vital signs taken during this treatment.   After treatment:   [x]    Patient left in no apparent distress sitting up in chair  []    Patient left in no apparent distress in bed  [x]    Call bell left within reach  [x]    Nursing notified  [x]    Caregiver present  []    Bed alarm activated    COMMUNICATION/COLLABORATION:   The patients plan of care was discussed with: Registered Nurse    Kami Greenberg, PT   Time Calculation: 20 mins

## 2019-01-29 NOTE — ROUTINE PROCESS
Bedside and Verbal shift change report given to Isabel (oncoming nurse) by Sil Hernandes (offgoing nurse). Report included the following information SBAR, Kardex, Intake/Output, MAR, Accordion and Recent Results.

## 2019-01-29 NOTE — PROGRESS NOTES
CM received order for nebulizer. CM sent referral to [facility]. Pt has been accepted by [facility].       8:25am: Per [facility]. A face-to-face is needed for nebulizer order due to Pt having Medicare. CM notified attending. 12;03pm: Pt accepted for Nebulizer. Nebulizer delivered to Pt at bedside. CM will continue to follow for discharge planning.      Pepe Metcalf, [address] [place] [address]

## 2019-01-29 NOTE — PROGRESS NOTES
6456- Bedside and Verbal shift change report given to [facility] (oncoming nurse) by Kana Dias (offgoing nurse). Report included the following information SBAR, Kardex, Intake/Output, MAR and Recent Results. Pt observed in bed, sleeping, on CPAP, family present at bedside, no sighs of any pain at this time, call bell within reach. Will assess. 0- Patient daughter at nurse's station, would like to know patient plans for the rest of today, if discharge would like to know to set up transport with family. Will call Dr. Bety Rodriguez. 1420- Call placed to Dr. Bety Rodriguez, stated since no changes in cxray and patient did receive his nebulizer for home treatments he would be able to be discharged home today. Anticipate new discharge orders. Will update patient and family. 4759- I have reviewed discharge instructions with the patient and daughter. The patient and daughter verbalized understanding. Pt in own clothing, PIV removed, denies any pain at this time. Pt to be discharged home with daughter. All questions answered to patient and daughter's satisfaction. Volunteer order for wheelchair assistance placed.

## 2019-01-29 NOTE — DISCHARGE SUMMARY
Physician Discharge Summary     Patient ID:    Angelica Escoto  [id number]  17 y.o.  7/31/1922  Azucena Babinski, MD    Admit date: 1/27/2019  Discharge date and time: 1/29/2019  Admission Diagnoses: Sepsis [facility])  Chronic Diagnoses:    Problem List as of 1/29/2019 Date Reviewed: 1/30/2018          Codes Class Noted - Resolved    * (Principal) Sepsis ([facility] ICD-10-CM: A41.9  ICD-9-CM: 038.9, 995.91  1/27/2019 - Present        Chronic obstructive pulmonary disease ([facility] ICD-10-CM: J44.9  ICD-9-CM: 691  1/27/2019 - Present        Hyperlipidemia ICD-10-CM: E78.5  ICD-9-CM: 272.4  1/27/2019 - Present        Essential hypertension ICD-10-CM: I10  ICD-9-CM: 401.9  1/27/2019 - Present        Pacemaker ICD-10-CM: Z95.0  ICD-9-CM: V45.01  1/27/2019 - Present        Fever ICD-10-CM: R50.9  ICD-9-CM: 780.60  1/27/2019 - Present        Leukocytosis ICD-10-CM: D72.829  ICD-9-CM: 288.60  1/27/2019 - Present        CKD (chronic kidney disease) stage 3, GFR 30-59 ml/min ([facility]) ICD-10-CM: N18.3  ICD-9-CM: 585.3  1/27/2019 - Present        Cough ICD-10-CM: R05  ICD-9-CM: 786.2  1/27/2019 - Present        Dyspnea ICD-10-CM: R06.00  ICD-9-CM: 786.09  1/27/2019 - Present        Atrial fibrillation ([facility] ICD-10-CM: I48.91  ICD-9-CM: 427.31  2/17/2016 - Present        Encounter to establish care ICD-10-CM: Z76.89  ICD-9-CM: V65.8  8/5/2015 - Present            Discharge Medications:   Current Discharge Medication List      START taking these medications    Details   azithromycin (ZITHROMAX) 250 mg tablet One daily  Qty: 6 Tab, Refills: 0      cefdinir (OMNICEF) 300 mg capsule Take 1 Cap by mouth every twelve (12) hours. Qty: 6 Cap, Refills: 0      albuterol (PROVENTIL VENTOLIN) 2.5 mg /3 mL (0.083 %) nebulizer solution 3 mL by Nebulization route every four (4) hours as needed for Wheezing.   Qty: 60 Each, Refills: 0         CONTINUE these medications which have CHANGED    Details   isosorbide mononitrate ER (IMDUR) 30 mg tablet Take 1 Tab by mouth daily. Qty: 30 Tab, Refills: 0      metoprolol tartrate (LOPRESSOR) 25 mg tablet Take 0.5 Tabs by mouth two (2) times a day. Qty: 30 Tab, Refills: 0         CONTINUE these medications which have NOT CHANGED    Details   furosemide (LASIX) 20 mg tablet Take 10 mg by mouth daily. gabapentin (NEURONTIN) 600 mg tablet Take 600 mg by mouth daily. hydroCHLOROthiazide (HYDRODIURIL) 25 mg tablet Take 12.5 mg by mouth daily. metroNIDAZOLE (METROCREAM) 0.75 % topical cream Apply  to affected area nightly. Use a thin layer to affected areas after washing      OTHER Unknown nasal spray - two sprays to both nostrils once daily      apixaban (ELIQUIS) 2.5 mg tablet Take 1 Tab by mouth two (2) times a day. Qty: 60 Tab, Refills: 6    Associated Diagnoses: Paroxysmal atrial fibrillation (HCC)      VIT A/VIT C/VIT E/ZINC/COPPER (ICAPS AREDS PO) Take 1 Cap by mouth two (2) times a day. tamsulosin (FLOMAX) 0.4 mg capsule Take 0.4 mg by mouth nightly. levothyroxine (LEVOTHROID) 50 mcg tablet Take 50 mcg by mouth Daily (before breakfast). omeprazole (PRILOSEC) 20 mg capsule Take 20 mg by mouth daily. simvastatin (ZOCOR) 40 mg tablet Take 20 mg by mouth every Monday, Wednesday, Friday. Patient takes at night      nitroglycerin (NITROSTAT) 0.4 mg SL tablet 0.4 mg by SubLINGual route every five (5) minutes as needed for Chest Pain. Multivits with Min-FA-Lycopene (MEN'S DAILY MULTIVIT-MINERAL) 0.4-600 mg-mcg tab Take 1 Tab by mouth nightly. Follow up Care:    1. Noah Jimenez MD with in 1 weeks  2.  specialists as directed. Diet:  Cardiac Diet    Disposition:  Home.   Advanced Directive:  Discharge Exam:  [See today's progress note.]  CONSULTATIONS: Cardiology    Significant Diagnostic Studies:   Recent Labs     01/29/19  0350 01/28/19  0101   WBC 8.0 8.7   HGB 12.1 12.6   HCT 38.1 39.0   * 118*     Recent Labs     01/29/19  0350 01/28/19  0101 01/27/19  0138    141 142   K 4.3 4.0 4.2    107 104   CO2 25 26 28   BUN 22* 28* 30*   CREA 1.39* 1.43* 1.64*   * 110* 121*   CA 8.2* 8.3* 9.0     Recent Labs     01/29/19  0350 01/27/19  0138   SGOT 17 24   ALT 12 16   AP 58 78   TBILI 0.5 0.7   TP 5.7* 6.7   ALB 2.6* 3.5   GLOB 3.1 3.2     Lab Results   Component Value Date/Time    TSH 3.74 01/27/2019 01:38 AM     HOSPITAL COURSE & TREATMENT RENDERED:   1. See today's progress note:  Daily Progress Note and Discharge Note: 1/29/2019  Troy Goins MD         Assessment/Plan:   Sepsis / Leukocytosis / Fever. POA. Not severe. Suspect a respiratory source (evolving PNA v. Viral URI). -IV abx switched to po 1/28 and no fever spikes thereafter  - IVF initially  - BCx, sputum culture, viral resp panel neg so far  - Tylenol for fever.     Cough / Dyspnea. Suspect respiratory process but CXR is underwhelming.  - CT chest 1/27 as under Data Review  - home neb     Atrial fibrillation / Pacemaker. Follows with Dr. Amy Uribe. In rate controlled afib with V-pacing. -Continue eliquis, BB     Chronic obstructive pulmonary disease. stable Not on meds.     Hyperlipidemia (1/27/2019). -Cont statin MWF     Essential hypertension (1/27/2019). BP stable  -Continue home anti-hypertensives     CKD (chronic kidney disease) stage 3, GFR 30-59 ml/min. Suspect due to HTN. -Hydrated/monitor outpt     Low urine output: resolved     Infected tooth. Has dental surgery planned for 2/5/19. Should be able to proceed with this.         Subjective:   Mr. King is a 80 y.o.   male with a hx of Afib, HTN, PPM who presented to the Emergency Department complaining of cough and fever. Patient reportedly fine a couple days ago but yesterday evening, developed a coarse cough with white phlegm and some SOB. This evening, developed a fever and some confusion, called family and brought to ED. In ED, noted to be febrile, elevated WBC. We will admit for further management.   [ Kady]     : Pt is currently afebrile and feeling a bit better. Still having low energy. Family is worried about low urine output and amount of coughing he is doing. Pt denies pain. +BM on Friday.     :  Still c/o feeling very weak but does not feel as ill. CT chest yesterday basically ok (see under Data Review). Tmax 99.2. WBC back to normal range. Get him up and moving some today. Change Azith to po and change rocephin to po Omnicef - if no fever spike and feeling better, the hopefully home tomorrow.      :  Continues to improve. He did fairly well with PT. Tmax 98. No fever spike off IV antibiotics. Still having chest congestion/SOB off and on, improved by Neb PRN - will try to arrange for home Neb since he has DX of COPD - discussed face to face with granddaughter and pt and face to face form filled out. Recheck CXR this AM and if he cont to do well then poss home later today. 330PM:  CXR ok. Has home neb and ready for discharge. Follow up with Dr Zane Bui this Thursday.       Review of Systems:   A comprehensive review of systems was negative except for that written in the HPI.     Objective:   Physical Exam:   Visit Vitals  /84 (BP 1 Location: Left arm, BP Patient Position: At rest)   Pulse 63   Temp 98.4 °F (36.9 °C)   Resp 16   Ht 5' 8\" (1.727 m)   Wt 88.5 kg (195 lb)   SpO2 96%   BMI 29.65 kg/m²    O2 Flow Rate (L/min): 2 l/min O2 Device: Room air  Temp (24hrs), Av °F (36.7 °C), Min:97.6 °F (36.4 °C), Max:98.4 °F (36.9 °C)    No intake/output data recorded.  07 -  1900  In: 1513.3 [I.V.:1513.3]  Out: 7113 [Urine:1375]  General:  Alert, cooperative, no distress, appears younger than stated age   Head:  Normocephalic, without obvious abnormality, atraumatic. Eyes:  Conjunctivae/corneas clear. PERRL, EOMs intact. Nose: Nares normal. Septum midline. Mucosa normal. No drainage or sinus tenderness.    Throat: Lips, mucosa, and tongue moist..   Neck: Supple, symmetrical, trachea midline, no adenopathy, thyroid: no enlargement/tenderness/nodules, no carotid bruit and no JVD. Back:   Symmetric, no curvature. ROM normal. No CVA tenderness. Lungs:   Clear to auscultation bilaterally, diminished at bases   Chest wall:  No tenderness or deformity. Heart:  Regular rate and rhythm, S1, S2 normal, no murmur, click, rub or gallop. Abdomen:   Soft, non-tender. Bowel sounds normal. No masses,  No organomegaly. Extremities: Extremities normal, atraumatic, no cyanosis or edema. No calf tenderness or cords. Pulses: 2+ and symmetric all extremities. Skin: Skin color, texture, turgor normal. No rashes or lesions   Neurologic: CNII-XII intact. Alert and oriented X 3. Fine motor of hands and fingers normal.   equal.  Gait not tested at this time. Sensation grossly normal to touch. Gross motor of extremities normal.        Data Review:    CT Chest 1/27/19   FINDINGS:  THYROID: Unremarkable. MEDIASTINUM/ALEXANDER: No mass or lymphadenopathy. HEART/PERICARDIUM: Mildly enlarged. Coronary atherosclerosis. Right subclavian  pacemaker. No significant pericardial effusion. LUNGS/PLEURA: Bibasilar scarring and areas of pleural thickening with small  areas of calcification similar to the prior study. There is respiratory motion  artifact. No definite change in 8mm nodule right upper lobe abutting the minor  fissure allowing for motion. No new airspace disease, pulmonary edema, enlarging  pulmonary nodule, or pleural effusion. INCIDENTALLY IMAGED UPPER ABDOMEN: No significant abnormality. BONES: No destructive bone lesion. ADDITIONAL COMMENTS:  N/A. IMPRESSION:  Motion artifact with no definite change in bibasilar scarring and right upper  lobe nodule. No new airspace disease, pulmonary edema, or pleural effusion.     Signed:  Chelita Stout MD  1/29/2019  3:30 PM

## 2019-01-29 NOTE — CDMP QUERY
Please clarify if this patient is (was) being treated/managed for:  
 
=> Evolving Pneumonia ruled in 
=> Evolving Pneumonia ruled out 
=> Other explanation of clinical findings  
=> Clinically Undetermined (no explanation for clinical findings) The medical record reflects the following clinical findings, treatment, and risk factors. Risk Factors:  Sepsis POA Clinical Indicators:   
1/29 Progress note: Sepsis / Leukocytosis / Fever. POA. Not severe. Suspect a respiratory source (evolving PNA v. Viral URI). 1/27 CXR:  There is a small left pleural effusion and bilateral lower lobe atelectasis. 1/29 CXR:  No acute abnormality and no change; The lungs are clear of mass, nodule, airspace disease or edema. There is atelectasis at the lung bases. Treatment: IVABs (Azith/rocephin) 1/29 PO ABX Please clarify and document your clinical opinion in the progress notes and discharge summary including the definitive and/or presumptive diagnosis, (suspected or probable), related to the above clinical findings. Please include clinical findings supporting your diagnosis. Thank you, 
Ema Chan, MSN, [facility], [address] [facility] [place], [place]

## 2019-01-29 NOTE — PROGRESS NOTES
Bedside and Verbal shift change report given to HARISH Tolentino (oncoming nurse) by Adam Guthrie RN (offgoing nurse). Report included the following information SBAR, Kardex, Intake/Output, MAR, Recent Results and Med Rec Status.

## 2019-01-29 NOTE — PROGRESS NOTES
Problem: Falls - Risk of  Goal: *Absence of Falls  Document Jaqui Fall Risk and appropriate interventions in the flowsheet.   Outcome: Progressing Towards Goal  Fall Risk Interventions:  Mobility Interventions: Bed/chair exit alarm, Patient to call before getting OOB, Utilize walker, cane, or other assistive device, Utilize gait belt for transfers/ambulation         Medication Interventions: Bed/chair exit alarm, Patient to call before getting OOB, Teach patient to arise slowly, Utilize gait belt for transfers/ambulation    Elimination Interventions: Call light in reach, Bed/chair exit alarm, Toileting schedule/hourly rounds, Urinal in reach

## 2019-01-30 ENCOUNTER — HOME CARE VISIT (OUTPATIENT)
Dept: SCHEDULING | Facility: HOME HEALTH | Age: 84
End: 2019-01-30
Payer: MEDICARE

## 2019-01-30 VITALS
HEART RATE: 67 BPM | TEMPERATURE: 98 F | DIASTOLIC BLOOD PRESSURE: 74 MMHG | OXYGEN SATURATION: 97 % | RESPIRATION RATE: 16 BRPM | SYSTOLIC BLOOD PRESSURE: 134 MMHG

## 2019-01-30 PROCEDURE — 3331090002 HH PPS REVENUE DEBIT

## 2019-01-30 PROCEDURE — G0151 HHCP-SERV OF PT,EA 15 MIN: HCPCS

## 2019-01-30 PROCEDURE — 400013 HH SOC

## 2019-01-30 PROCEDURE — 3331090001 HH PPS REVENUE CREDIT

## 2019-01-31 PROCEDURE — 3331090001 HH PPS REVENUE CREDIT

## 2019-01-31 PROCEDURE — 3331090002 HH PPS REVENUE DEBIT

## 2019-02-01 ENCOUNTER — HOME CARE VISIT (OUTPATIENT)
Dept: SCHEDULING | Facility: HOME HEALTH | Age: 84
End: 2019-02-01
Payer: MEDICARE

## 2019-02-01 PROCEDURE — G0151 HHCP-SERV OF PT,EA 15 MIN: HCPCS

## 2019-02-01 PROCEDURE — 3331090002 HH PPS REVENUE DEBIT

## 2019-02-01 PROCEDURE — 3331090001 HH PPS REVENUE CREDIT

## 2019-02-02 VITALS
OXYGEN SATURATION: 95 % | RESPIRATION RATE: 16 BRPM | DIASTOLIC BLOOD PRESSURE: 80 MMHG | TEMPERATURE: 98 F | SYSTOLIC BLOOD PRESSURE: 140 MMHG | HEART RATE: 95 BPM

## 2019-02-02 LAB
BACTERIA SPEC CULT: NORMAL
SERVICE CMNT-IMP: NORMAL

## 2019-02-02 PROCEDURE — 3331090002 HH PPS REVENUE DEBIT

## 2019-02-02 PROCEDURE — 3331090001 HH PPS REVENUE CREDIT

## 2019-02-03 PROCEDURE — 3331090002 HH PPS REVENUE DEBIT

## 2019-02-03 PROCEDURE — 3331090001 HH PPS REVENUE CREDIT

## 2019-02-04 PROCEDURE — 3331090002 HH PPS REVENUE DEBIT

## 2019-02-04 PROCEDURE — 3331090001 HH PPS REVENUE CREDIT

## 2019-02-05 PROCEDURE — 3331090002 HH PPS REVENUE DEBIT

## 2019-02-05 PROCEDURE — 3331090001 HH PPS REVENUE CREDIT

## 2019-02-06 ENCOUNTER — HOME CARE VISIT (OUTPATIENT)
Dept: SCHEDULING | Facility: HOME HEALTH | Age: 84
End: 2019-02-06
Payer: MEDICARE

## 2019-02-06 PROCEDURE — 3331090002 HH PPS REVENUE DEBIT

## 2019-02-06 PROCEDURE — G0151 HHCP-SERV OF PT,EA 15 MIN: HCPCS

## 2019-02-06 PROCEDURE — 3331090001 HH PPS REVENUE CREDIT

## 2019-02-07 VITALS
SYSTOLIC BLOOD PRESSURE: 130 MMHG | RESPIRATION RATE: 16 BRPM | OXYGEN SATURATION: 97 % | DIASTOLIC BLOOD PRESSURE: 70 MMHG | HEART RATE: 66 BPM | TEMPERATURE: 97.8 F

## 2019-02-07 PROCEDURE — 3331090002 HH PPS REVENUE DEBIT

## 2019-02-07 PROCEDURE — 3331090001 HH PPS REVENUE CREDIT

## 2019-02-08 ENCOUNTER — HOME CARE VISIT (OUTPATIENT)
Dept: SCHEDULING | Facility: HOME HEALTH | Age: 84
End: 2019-02-08
Payer: MEDICARE

## 2019-02-08 VITALS
TEMPERATURE: 98.4 F | DIASTOLIC BLOOD PRESSURE: 75 MMHG | OXYGEN SATURATION: 97 % | SYSTOLIC BLOOD PRESSURE: 130 MMHG | HEART RATE: 70 BPM | RESPIRATION RATE: 16 BRPM

## 2019-02-08 PROCEDURE — G0151 HHCP-SERV OF PT,EA 15 MIN: HCPCS

## 2019-02-08 PROCEDURE — 3331090001 HH PPS REVENUE CREDIT

## 2019-02-08 PROCEDURE — 3331090002 HH PPS REVENUE DEBIT

## 2019-02-09 PROCEDURE — 3331090002 HH PPS REVENUE DEBIT

## 2019-02-09 PROCEDURE — 3331090001 HH PPS REVENUE CREDIT

## 2019-02-10 PROCEDURE — 3331090002 HH PPS REVENUE DEBIT

## 2019-02-10 PROCEDURE — 3331090001 HH PPS REVENUE CREDIT

## 2019-02-11 ENCOUNTER — HOME CARE VISIT (OUTPATIENT)
Dept: SCHEDULING | Facility: HOME HEALTH | Age: 84
End: 2019-02-11
Payer: MEDICARE

## 2019-02-11 PROCEDURE — 3331090001 HH PPS REVENUE CREDIT

## 2019-02-11 PROCEDURE — G0151 HHCP-SERV OF PT,EA 15 MIN: HCPCS

## 2019-02-11 PROCEDURE — 3331090002 HH PPS REVENUE DEBIT

## 2019-02-12 VITALS
SYSTOLIC BLOOD PRESSURE: 135 MMHG | HEART RATE: 63 BPM | OXYGEN SATURATION: 98 % | TEMPERATURE: 97.8 F | DIASTOLIC BLOOD PRESSURE: 80 MMHG | RESPIRATION RATE: 16 BRPM

## 2019-02-12 PROCEDURE — 3331090001 HH PPS REVENUE CREDIT

## 2019-02-12 PROCEDURE — 3331090002 HH PPS REVENUE DEBIT

## 2019-02-13 ENCOUNTER — HOME CARE VISIT (OUTPATIENT)
Dept: SCHEDULING | Facility: HOME HEALTH | Age: 84
End: 2019-02-13
Payer: MEDICARE

## 2019-02-13 VITALS
SYSTOLIC BLOOD PRESSURE: 140 MMHG | TEMPERATURE: 98 F | RESPIRATION RATE: 16 BRPM | OXYGEN SATURATION: 97 % | HEART RATE: 70 BPM | DIASTOLIC BLOOD PRESSURE: 80 MMHG

## 2019-02-13 PROCEDURE — 3331090001 HH PPS REVENUE CREDIT

## 2019-02-13 PROCEDURE — 3331090002 HH PPS REVENUE DEBIT

## 2019-02-13 PROCEDURE — G0151 HHCP-SERV OF PT,EA 15 MIN: HCPCS

## 2019-02-14 PROCEDURE — 3331090001 HH PPS REVENUE CREDIT

## 2019-02-14 PROCEDURE — 3331090002 HH PPS REVENUE DEBIT

## 2019-02-15 PROCEDURE — 3331090002 HH PPS REVENUE DEBIT

## 2019-02-15 PROCEDURE — 3331090001 HH PPS REVENUE CREDIT

## 2019-02-16 PROCEDURE — 3331090001 HH PPS REVENUE CREDIT

## 2019-02-16 PROCEDURE — 3331090002 HH PPS REVENUE DEBIT

## 2019-02-17 PROCEDURE — 3331090001 HH PPS REVENUE CREDIT

## 2019-02-17 PROCEDURE — 3331090002 HH PPS REVENUE DEBIT

## 2019-02-18 ENCOUNTER — HOME CARE VISIT (OUTPATIENT)
Dept: SCHEDULING | Facility: HOME HEALTH | Age: 84
End: 2019-02-18
Payer: MEDICARE

## 2019-02-18 VITALS
RESPIRATION RATE: 16 BRPM | TEMPERATURE: 98 F | OXYGEN SATURATION: 95 % | HEART RATE: 80 BPM | SYSTOLIC BLOOD PRESSURE: 130 MMHG | DIASTOLIC BLOOD PRESSURE: 80 MMHG

## 2019-02-18 PROCEDURE — G0151 HHCP-SERV OF PT,EA 15 MIN: HCPCS

## 2019-02-18 PROCEDURE — 3331090002 HH PPS REVENUE DEBIT

## 2019-02-18 PROCEDURE — 3331090001 HH PPS REVENUE CREDIT

## 2019-02-19 PROCEDURE — 3331090001 HH PPS REVENUE CREDIT

## 2019-02-19 PROCEDURE — 3331090002 HH PPS REVENUE DEBIT

## 2019-02-20 ENCOUNTER — HOME CARE VISIT (OUTPATIENT)
Dept: SCHEDULING | Facility: HOME HEALTH | Age: 84
End: 2019-02-20
Payer: MEDICARE

## 2019-02-20 PROCEDURE — 3331090001 HH PPS REVENUE CREDIT

## 2019-02-20 PROCEDURE — 3331090002 HH PPS REVENUE DEBIT

## 2019-02-20 PROCEDURE — G0151 HHCP-SERV OF PT,EA 15 MIN: HCPCS

## 2019-02-21 VITALS
TEMPERATURE: 98 F | RESPIRATION RATE: 16 BRPM | OXYGEN SATURATION: 97 % | DIASTOLIC BLOOD PRESSURE: 70 MMHG | SYSTOLIC BLOOD PRESSURE: 130 MMHG | HEART RATE: 70 BPM

## 2019-02-21 PROCEDURE — 3331090003 HH PPS REVENUE ADJ

## 2019-02-21 PROCEDURE — 3331090002 HH PPS REVENUE DEBIT

## 2019-02-21 PROCEDURE — 3331090001 HH PPS REVENUE CREDIT

## 2019-02-22 PROCEDURE — 3331090001 HH PPS REVENUE CREDIT

## 2019-02-22 PROCEDURE — 3331090002 HH PPS REVENUE DEBIT

## 2019-02-23 PROCEDURE — 3331090002 HH PPS REVENUE DEBIT

## 2019-02-23 PROCEDURE — 3331090001 HH PPS REVENUE CREDIT

## 2019-02-24 PROCEDURE — 3331090002 HH PPS REVENUE DEBIT

## 2019-02-24 PROCEDURE — 3331090001 HH PPS REVENUE CREDIT

## 2019-03-20 ENCOUNTER — CLINICAL SUPPORT (OUTPATIENT)
Dept: CARDIOLOGY CLINIC | Age: 84
End: 2019-03-20

## 2019-03-20 DIAGNOSIS — Z95.0 CARDIAC PACEMAKER IN SITU: Primary | ICD-10-CM

## 2019-06-26 ENCOUNTER — CLINICAL SUPPORT (OUTPATIENT)
Dept: CARDIOLOGY CLINIC | Age: 84
End: 2019-06-26

## 2019-06-26 DIAGNOSIS — Z95.0 CARDIAC PACEMAKER IN SITU: Primary | ICD-10-CM

## 2019-07-03 ENCOUNTER — HOSPITAL ENCOUNTER (OUTPATIENT)
Age: 84
Setting detail: OBSERVATION
Discharge: HOME | End: 2019-07-05
Attending: EMERGENCY MEDICINE | Admitting: INTERNAL MEDICINE
Payer: MEDICARE

## 2019-07-03 ENCOUNTER — APPOINTMENT (OUTPATIENT)
Dept: GENERAL RADIOLOGY | Age: 84
End: 2019-07-03
Attending: EMERGENCY MEDICINE
Payer: MEDICARE

## 2019-07-03 DIAGNOSIS — R07.9 CHEST PAIN, UNSPECIFIED TYPE: Primary | ICD-10-CM

## 2019-07-03 LAB
ALBUMIN SERPL-MCNC: 3.6 G/DL (ref 3.5–5)
ALBUMIN/GLOB SERPL: 1.2 {RATIO} (ref 1.1–2.2)
ALP SERPL-CCNC: 89 U/L (ref 45–117)
ALT SERPL-CCNC: 24 U/L (ref 12–78)
ANION GAP SERPL CALC-SCNC: 7 MMOL/L (ref 5–15)
APPEARANCE UR: CLEAR
APTT PPP: 29.1 SEC (ref 22.1–32)
AST SERPL-CCNC: 29 U/L (ref 15–37)
BASOPHILS # BLD: 0 K/UL (ref 0–0.1)
BASOPHILS NFR BLD: 0 % (ref 0–1)
BILIRUB SERPL-MCNC: 0.8 MG/DL (ref 0.2–1)
BILIRUB UR QL: NEGATIVE
BNP SERPL-MCNC: 2291 PG/ML
BUN SERPL-MCNC: 26 MG/DL (ref 6–20)
BUN/CREAT SERPL: 16 (ref 12–20)
CALCIUM SERPL-MCNC: 8.7 MG/DL (ref 8.5–10.1)
CHLORIDE SERPL-SCNC: 102 MMOL/L (ref 97–108)
CO2 SERPL-SCNC: 29 MMOL/L (ref 21–32)
COLOR UR: NORMAL
COMMENT, HOLDF: NORMAL
COMMENT, HOLDF: NORMAL
CREAT SERPL-MCNC: 1.64 MG/DL (ref 0.7–1.3)
D DIMER PPP FEU-MCNC: 0.87 MG/L FEU (ref 0–0.65)
DIFFERENTIAL METHOD BLD: ABNORMAL
EOSINOPHIL # BLD: 0.2 K/UL (ref 0–0.4)
EOSINOPHIL NFR BLD: 2 % (ref 0–7)
ERYTHROCYTE [DISTWIDTH] IN BLOOD BY AUTOMATED COUNT: 13.7 % (ref 11.5–14.5)
GLOBULIN SER CALC-MCNC: 3.1 G/DL (ref 2–4)
GLUCOSE SERPL-MCNC: 107 MG/DL (ref 65–100)
GLUCOSE UR STRIP.AUTO-MCNC: NEGATIVE MG/DL
HCT VFR BLD AUTO: 42 % (ref 36.6–50.3)
HGB BLD-MCNC: 13.4 G/DL (ref 12.1–17)
HGB UR QL STRIP: NEGATIVE
IMM GRANULOCYTES # BLD AUTO: 0.1 K/UL (ref 0–0.04)
IMM GRANULOCYTES NFR BLD AUTO: 1 % (ref 0–0.5)
INR PPP: 1.1 (ref 0.9–1.1)
KETONES UR QL STRIP.AUTO: NEGATIVE MG/DL
LEUKOCYTE ESTERASE UR QL STRIP.AUTO: NEGATIVE
LIPASE SERPL-CCNC: 140 U/L (ref 73–393)
LYMPHOCYTES # BLD: 0.8 K/UL (ref 0.8–3.5)
LYMPHOCYTES NFR BLD: 10 % (ref 12–49)
MCH RBC QN AUTO: 30.7 PG (ref 26–34)
MCHC RBC AUTO-ENTMCNC: 31.9 G/DL (ref 30–36.5)
MCV RBC AUTO: 96.3 FL (ref 80–99)
MONOCYTES # BLD: 0.7 K/UL (ref 0–1)
MONOCYTES NFR BLD: 8 % (ref 5–13)
NEUTS SEG # BLD: 6.2 K/UL (ref 1.8–8)
NEUTS SEG NFR BLD: 78 % (ref 32–75)
NITRITE UR QL STRIP.AUTO: NEGATIVE
NRBC # BLD: 0 K/UL (ref 0–0.01)
NRBC BLD-RTO: 0 PER 100 WBC
PH UR STRIP: 6 [PH] (ref 5–8)
PLATELET # BLD AUTO: 135 K/UL (ref 150–400)
PMV BLD AUTO: 10.4 FL (ref 8.9–12.9)
POTASSIUM SERPL-SCNC: 4.1 MMOL/L (ref 3.5–5.1)
PROT SERPL-MCNC: 6.7 G/DL (ref 6.4–8.2)
PROT UR STRIP-MCNC: NEGATIVE MG/DL
PROTHROMBIN TIME: 10.8 SEC (ref 9–11.1)
RBC # BLD AUTO: 4.36 M/UL (ref 4.1–5.7)
SAMPLES BEING HELD,HOLD: NORMAL
SAMPLES BEING HELD,HOLD: NORMAL
SODIUM SERPL-SCNC: 138 MMOL/L (ref 136–145)
SP GR UR REFRACTOMETRY: 1.01 (ref 1–1.03)
THERAPEUTIC RANGE,PTTT: NORMAL SECS (ref 58–77)
TROPONIN I SERPL-MCNC: 0.12 NG/ML
TROPONIN I SERPL-MCNC: <0.05 NG/ML
UROBILINOGEN UR QL STRIP.AUTO: 1 EU/DL (ref 0.2–1)
WBC # BLD AUTO: 7.9 K/UL (ref 4.1–11.1)

## 2019-07-03 PROCEDURE — 74011250637 HC RX REV CODE- 250/637: Performed by: INTERNAL MEDICINE

## 2019-07-03 PROCEDURE — 83690 ASSAY OF LIPASE: CPT

## 2019-07-03 PROCEDURE — 85610 PROTHROMBIN TIME: CPT

## 2019-07-03 PROCEDURE — 36415 COLL VENOUS BLD VENIPUNCTURE: CPT

## 2019-07-03 PROCEDURE — 81003 URINALYSIS AUTO W/O SCOPE: CPT

## 2019-07-03 PROCEDURE — 93005 ELECTROCARDIOGRAM TRACING: CPT

## 2019-07-03 PROCEDURE — 83880 ASSAY OF NATRIURETIC PEPTIDE: CPT

## 2019-07-03 PROCEDURE — 94761 N-INVAS EAR/PLS OXIMETRY MLT: CPT

## 2019-07-03 PROCEDURE — 71045 X-RAY EXAM CHEST 1 VIEW: CPT

## 2019-07-03 PROCEDURE — 85025 COMPLETE CBC W/AUTO DIFF WBC: CPT

## 2019-07-03 PROCEDURE — 74011250637 HC RX REV CODE- 250/637: Performed by: STUDENT IN AN ORGANIZED HEALTH CARE EDUCATION/TRAINING PROGRAM

## 2019-07-03 PROCEDURE — 80053 COMPREHEN METABOLIC PANEL: CPT

## 2019-07-03 PROCEDURE — 96374 THER/PROPH/DIAG INJ IV PUSH: CPT

## 2019-07-03 PROCEDURE — 84484 ASSAY OF TROPONIN QUANT: CPT

## 2019-07-03 PROCEDURE — 99218 HC RM OBSERVATION: CPT

## 2019-07-03 PROCEDURE — 85379 FIBRIN DEGRADATION QUANT: CPT

## 2019-07-03 PROCEDURE — 99285 EMERGENCY DEPT VISIT HI MDM: CPT

## 2019-07-03 PROCEDURE — 85730 THROMBOPLASTIN TIME PARTIAL: CPT

## 2019-07-03 PROCEDURE — 74011250636 HC RX REV CODE- 250/636: Performed by: FAMILY MEDICINE

## 2019-07-03 RX ORDER — ACETAMINOPHEN 325 MG/1
650 TABLET ORAL
Status: DISCONTINUED | OUTPATIENT
Start: 2019-07-03 | End: 2019-07-05 | Stop reason: HOSPADM

## 2019-07-03 RX ORDER — TAMSULOSIN HYDROCHLORIDE 0.4 MG/1
0.4 CAPSULE ORAL
Status: DISCONTINUED | OUTPATIENT
Start: 2019-07-03 | End: 2019-07-05 | Stop reason: HOSPADM

## 2019-07-03 RX ORDER — MOMETASONE FUROATE 50 UG/1
2 SPRAY, METERED NASAL DAILY
COMMUNITY

## 2019-07-03 RX ORDER — SODIUM CHLORIDE 0.9 % (FLUSH) 0.9 %
5-40 SYRINGE (ML) INJECTION AS NEEDED
Status: DISCONTINUED | OUTPATIENT
Start: 2019-07-03 | End: 2019-07-05 | Stop reason: HOSPADM

## 2019-07-03 RX ORDER — GUAIFENESIN 100 MG/5ML
81 LIQUID (ML) ORAL DAILY
Status: DISCONTINUED | OUTPATIENT
Start: 2019-07-04 | End: 2019-07-05

## 2019-07-03 RX ORDER — ISOSORBIDE MONONITRATE 60 MG/1
60 TABLET, EXTENDED RELEASE ORAL
COMMUNITY
End: 2019-07-17 | Stop reason: ALTCHOICE

## 2019-07-03 RX ORDER — ISOSORBIDE MONONITRATE 30 MG/1
120 TABLET, EXTENDED RELEASE ORAL
Status: DISCONTINUED | OUTPATIENT
Start: 2019-07-04 | End: 2019-07-03

## 2019-07-03 RX ORDER — MORPHINE SULFATE 4 MG/ML
2 INJECTION INTRAVENOUS
Status: DISCONTINUED | OUTPATIENT
Start: 2019-07-03 | End: 2019-07-05 | Stop reason: HOSPADM

## 2019-07-03 RX ORDER — METOPROLOL TARTRATE 25 MG/1
25 TABLET, FILM COATED ORAL 2 TIMES DAILY
Status: DISCONTINUED | OUTPATIENT
Start: 2019-07-03 | End: 2019-07-03

## 2019-07-03 RX ORDER — PANTOPRAZOLE SODIUM 40 MG/1
40 TABLET, DELAYED RELEASE ORAL
Status: DISCONTINUED | OUTPATIENT
Start: 2019-07-04 | End: 2019-07-05 | Stop reason: HOSPADM

## 2019-07-03 RX ORDER — ONDANSETRON 2 MG/ML
4 INJECTION INTRAMUSCULAR; INTRAVENOUS
Status: DISCONTINUED | OUTPATIENT
Start: 2019-07-03 | End: 2019-07-05 | Stop reason: HOSPADM

## 2019-07-03 RX ORDER — LEVOTHYROXINE SODIUM 50 UG/1
50 TABLET ORAL
Status: DISCONTINUED | OUTPATIENT
Start: 2019-07-04 | End: 2019-07-05 | Stop reason: HOSPADM

## 2019-07-03 RX ORDER — HYDRALAZINE HYDROCHLORIDE 20 MG/ML
10 INJECTION INTRAMUSCULAR; INTRAVENOUS
Status: DISCONTINUED | OUTPATIENT
Start: 2019-07-03 | End: 2019-07-05 | Stop reason: HOSPADM

## 2019-07-03 RX ORDER — LEVALBUTEROL INHALATION SOLUTION 1.25 MG/3ML
1.25 SOLUTION RESPIRATORY (INHALATION)
Status: DISCONTINUED | OUTPATIENT
Start: 2019-07-03 | End: 2019-07-05 | Stop reason: HOSPADM

## 2019-07-03 RX ORDER — AMLODIPINE BESYLATE 5 MG/1
5 TABLET ORAL DAILY
Status: DISCONTINUED | OUTPATIENT
Start: 2019-07-04 | End: 2019-07-03

## 2019-07-03 RX ORDER — SIMVASTATIN 20 MG/1
20 TABLET, FILM COATED ORAL
Status: DISCONTINUED | OUTPATIENT
Start: 2019-07-03 | End: 2019-07-05 | Stop reason: HOSPADM

## 2019-07-03 RX ORDER — IPRATROPIUM BROMIDE 21 UG/1
2 SPRAY, METERED NASAL
COMMUNITY

## 2019-07-03 RX ORDER — FUROSEMIDE 20 MG/1
10 TABLET ORAL DAILY
Status: DISCONTINUED | OUTPATIENT
Start: 2019-07-04 | End: 2019-07-05 | Stop reason: HOSPADM

## 2019-07-03 RX ORDER — SODIUM CHLORIDE 0.9 % (FLUSH) 0.9 %
5-40 SYRINGE (ML) INJECTION EVERY 8 HOURS
Status: DISCONTINUED | OUTPATIENT
Start: 2019-07-03 | End: 2019-07-05 | Stop reason: HOSPADM

## 2019-07-03 RX ORDER — METOPROLOL TARTRATE 50 MG/1
50 TABLET ORAL 2 TIMES DAILY
Status: DISCONTINUED | OUTPATIENT
Start: 2019-07-03 | End: 2019-07-05 | Stop reason: HOSPADM

## 2019-07-03 RX ORDER — ISOSORBIDE MONONITRATE 30 MG/1
60 TABLET, EXTENDED RELEASE ORAL
Status: DISCONTINUED | OUTPATIENT
Start: 2019-07-04 | End: 2019-07-05 | Stop reason: HOSPADM

## 2019-07-03 RX ORDER — ISOSORBIDE MONONITRATE 30 MG/1
60 TABLET, EXTENDED RELEASE ORAL
Status: DISCONTINUED | OUTPATIENT
Start: 2019-07-04 | End: 2019-07-03

## 2019-07-03 RX ORDER — CELECOXIB 200 MG/1
200 CAPSULE ORAL DAILY
COMMUNITY
End: 2019-07-05

## 2019-07-03 RX ADMIN — TAMSULOSIN HYDROCHLORIDE 0.4 MG: 0.4 CAPSULE ORAL at 21:48

## 2019-07-03 RX ADMIN — APIXABAN 2.5 MG: 2.5 TABLET, FILM COATED ORAL at 21:48

## 2019-07-03 RX ADMIN — Medication 10 ML: at 19:54

## 2019-07-03 RX ADMIN — SIMVASTATIN 20 MG: 20 TABLET, FILM COATED ORAL at 21:48

## 2019-07-03 RX ADMIN — HYDRALAZINE HYDROCHLORIDE 10 MG: 20 INJECTION INTRAMUSCULAR; INTRAVENOUS at 19:54

## 2019-07-03 RX ADMIN — METOPROLOL TARTRATE 50 MG: 50 TABLET ORAL at 21:48

## 2019-07-03 NOTE — PROGRESS NOTES
7/3/2019  4:17 PM  Case management note    Reason for Admission:   Chest pain    Patient was sitting at home and had chest pain. EMS was called He lives independently at [facility] in the [address] [place] [address]. He uses a cane for ambulation as well. Patient uses a walker and still drives. He is currently going to [facility] for balance issues and to increase strength. Patient also goes to [facility]                    RRAT Score:     21             Do you (patient/family) have any concerns for transition/discharge? Has supportive family, at bedside. Granddaughter Deepika Julian is a nurse at [facility] ED  . [phone]     Observation educated, letter signed and placed in chart. Plan for utilizing home health:   Is currently going to Out patient at [address] [place] [address] Avenue:  DNR, Advance directive on file            Transition of Care Plan:      1  1. Back to independent living at [facility]  2. Continue with [facility]  3. Follow up with PCP  4. Follow up at [place]  5.  CM to continue to follow until discharge    Care Management Interventions  PCP Verified by CM: Yes(dr vaughn no nn)  Mode of Transport at Discharge: Self  Transition of Care Consult (CM Consult): Discharge Planning  Current Support Network: Assisted Living  Confirm Follow Up Transport: Family  Plan discussed with Pt/Family/Caregiver: Yes  Discharge Location  Discharge Placement: Home with family assistance  Katerine Bond

## 2019-07-03 NOTE — ED NOTES
Patient Throughput:  Charge nurse on [facility] made aware of patient's room assignment, room [address] [facility], Critical access hospital[facility] Resource Nurse  Emergency Department

## 2019-07-03 NOTE — ED PROVIDER NOTES
'was sitting there in the [facility] home, typing an email/ developed acute onset 'pressure' like CP/ lasted 30 min/ I told the staff/ they called 911'    pt denies HA, vison changes, numbness/ tinglingdiff swallowing, CP, SOB, Abd pain, F/Ch, N/V, D/Cons or other current systemic complaints    I took an asa 325mg and 1 nitro - no pain now'; The history is provided by the patient, the EMS personnel and a relative. Chest Pain    This is a new problem.         Past Medical History:   Diagnosis Date    [facility] ([facility]     Chronic obstructive pulmonary disease ([facility]     Essential hypertension     Hyperlipidemia     Long term (current) use of anticoagulants     Pacemaker     Rosacea 2016       Past Surgical History:   Procedure Laterality Date    HX CORONARY STENT PLACEMENT      HX HEART CATHETERIZATION      HX PACEMAKER           Family History:   Problem Relation Age of Onset    Stroke Mother     Hypertension Mother     Kidney Disease Father        Social History     Socioeconomic History    Marital status:      Spouse name: Not on file    Number of children: Not on file    Years of education: Not on file    Highest education level: Not on file   Occupational History    Not on file   Social Needs    Financial resource strain: Not on file    Food insecurity:     Worry: Not on file     Inability: Not on file    Transportation needs:     Medical: Not on file     Non-medical: Not on file   Tobacco Use    Smoking status: Never Smoker    Smokeless tobacco: Never Used   Substance and Sexual Activity    Alcohol use: No     Alcohol/week: 0.0 oz    Drug use: No    Sexual activity: Not on file   Lifestyle    Physical activity:     Days per week: Not on file     Minutes per session: Not on file    Stress: Not on file   Relationships    Social connections:     Talks on phone: Not on file     Gets together: Not on file     Attends Zoroastrian service: Not on file     Active member of club or organization: Not on file     Attends meetings of clubs or organizations: Not on file     Relationship status: Not on file    Intimate partner violence:     Fear of current or ex partner: Not on file     Emotionally abused: Not on file     Physically abused: Not on file     Forced sexual activity: Not on file   Other Topics Concern    Not on file   Social History Narrative    Not on file         ALLERGIES: Patient has no known allergies. Review of Systems   Cardiovascular: Positive for chest pain. Vitals:    07/03/19 1317 07/03/19 1330 07/03/19 1345 07/03/19 1430   BP:  179/89 (!) 181/95 (!) 181/94   Pulse: (!) 49 (!) 56 61 (!) 56   Resp: 18 22 20 21   Temp:       SpO2: 100% 95% 96% 100%   Weight:       Height:                Physical Exam   Constitutional: He is oriented to person, place, and time. He appears well-developed and well-nourished. No distress. NAD, AxOx4, speaking in complete sentences  gcs = 15   HENT:   Head: Normocephalic and atraumatic. Mouth/Throat: Oropharynx is clear and moist. No oropharyngeal exudate. Cn intact   Eyes: Pupils are equal, round, and reactive to light. Conjunctivae and EOM are normal. Right eye exhibits no discharge. Left eye exhibits no discharge. Neck: Normal range of motion. Neck supple. Cardiovascular: Normal rate, regular rhythm and intact distal pulses. Exam reveals no gallop and no friction rub. No murmur heard. L ant CW = noted pacer/ nttp      Pulmonary/Chest: Effort normal and breath sounds normal. No stridor. No respiratory distress. He has no wheezes. He has no rales. He exhibits no tenderness. Abdominal: Soft. Bowel sounds are normal. He exhibits no distension and no mass. There is no tenderness. There is no rebound and no guarding. Genitourinary:   Genitourinary Comments: Pt denies urinary/ Testicular/ scrotal or penile  complaints   Musculoskeletal: Normal range of motion. He exhibits no edema, tenderness or deformity. Lymphadenopathy:     He has no cervical adenopathy. Neurological: He is alert and oriented to person, place, and time. He displays normal reflexes. No cranial nerve deficit or sensory deficit. He exhibits normal muscle tone. Coordination normal.   pt has motor/ CV/ Sensation grossly intact to all extremities, R = L in strength;   Skin: Skin is warm and dry. Capillary refill takes less than 2 seconds. No rash noted. No erythema. Psychiatric: He has a normal mood and affect. Nursing note and vitals reviewed. MDM       Procedures    No chief complaint on file. 12:54 PM  The patients presenting problems have been discussed, and they are in agreement with the care plan formulated and outlined with them. I have encouraged them to ask questions as they arise throughout their visit. MEDICATIONS GIVEN:  Medications - No data to display    LABS REVIEWED:  Labs Reviewed - No data to display    RADIOLOGY RESULTS:  The following have been ordered and reviewed:  _____________________________________________________________________  _____________________________________________________________________    EKG interpretation:   Rhythm: pacedrhythm; and regular . Rate (approx.): 62; Axis: normal; P wave: normal; QRS interval: normal ; ST/T wave: normal; Negative acute significant segmental elevations/ compared to study dated 12/08/2017    PROCEDURES:        CONSULTATIONS:       PROGRESS NOTES:      DIAGNOSIS:    1. Chest pain, unspecified type        PLAN:  1- admit      ED COURSE: The patients hospital course has been uncomplicated. 1:46 PM  'doing fine'; awaiting results;      2:47 PM  Pt/ family updated/ they agree w/ evaluation for admission/ awaiting evaluation by Dr Eraline Baldwin; Hospitalist TigerText for Admission  2:48 PM    ED Room Number: ER09/09  Patient Name and age:  Emeli Gómez 80 y.o.  male  Working Diagnosis:   1.  Chest pain, unspecified type      Readmission: no  Isolation Requirements: no  Recommended Level of Care:  telemetry  Code Status:  Full  Other:  Chest pain/ resolved w/ nitro/ asa/ Rodney Dec to see/ Pt of Dr Terri Rose  2:56 PM  Tee Duncan MD spoke with Dr Rodney Gold. Specialty: Cardiology  Discussed pt's hx, disposition, and available diagnostic and imaging results. Reviewed care plans.  Consulting physician agrees with plans as outlined / agrees w/ hospitalist admission/ 'will see the Pt';   Written by Beryle Saba, MD, ED Scribe as dictated by Tee Duncan MD.

## 2019-07-03 NOTE — PROGRESS NOTES
TRANSFER - IN REPORT:    Verbal report received from Katty Arias RN(name) on Brad Rodrigues  being received from ED(unit) for routine progression of care      Report consisted of patients Situation, Background, Assessment and   Recommendations(SBAR). Information from the following report(s) SBAR, Kardex, Intake/Output, Accordion, Recent Results and Cardiac Rhythm Paced was reviewed with the receiving nurse. Opportunity for questions and clarification was provided. Assessment completed upon patients arrival to unit and care assumed. 1847 pt received, vitals taken, /96 right arm    1854 200/93 repositioned cuff right arm    1924 193/110. Smaller cuff and left arm   Dr Babita Silveira contacted, orders received. See MAR. Report given to oncoming nurse, she will medicate pt.        1930 Bedside and Verbal shift change report given to Lavenia Councilman (oncoming nurse) by [address] (offgoing nurse). Report included the following information SBAR, Kardex, Intake/Output, MAR, Recent Results and Cardiac Rhythm Paced. Made aware from oncoming nurse that there are outstanding troponin orders.

## 2019-07-03 NOTE — ED TRIAGE NOTES
Pt arrive via EMS with c/o chest pain that began while typing an email to granddaughter pta. Pt took own nitro and 4 baby asa.

## 2019-07-03 NOTE — CONSULTS
Andrzej Patrick DO  [facility] [address] [place] [address], [address] [facility], [address] [place] [address] Nw                                       Office [phone],IIB [phone]  Office [phone],QUQ [phone]      Date of  Admission: 7/3/2019 12:53 PM  PCP- Sarah Ryan MD    Radha Rosas is a 80 y.o. male admitted for Chest pain [R07.9]. Consult requested by Rachell Whittington MD    Assessment/Plan      Unstable angina  Hx of atrial dysrhythmias, s/p PPM  COPD  HLD  CKD    - recommend to trend biomarkers  -escalate antianginal therapy to metoprolol 50mg twice daily and cont ISMN 60mg daily. Would not titrate nitrate therapy further due to age and risk of orthostasis with nitrates. Earnest Pies starting amlodipine if he continues to have hypertension with current regimen  - discussed with family conservative mgmt as long as troponin remains unremarkable. If markers elevate, npo tomorrow night for lhc on Friday. - not on asa, recommend asa 81mg daily with apixaban. If any concern for high bleeding risk, will hold asa 81mg with doac use. - cont statin    If cardiac biomarkers remain negative overnight, safe to d/c from CV standpoint. Will arrange f/u in the office. I appreciate the opportunity to be involved in [place] [id number]. See below note for details. Please do not hesitate to contact us with questions or concerns. Ivone Solis DO      Subjective:  Radha Rosas is a 80 y.o. male presents for evaluation of chest pain. He has hx of CAD s/p stenting of LAD several years ago in [place]. He is followed by Leonor De Leon MD for atrial fibrillation, left sided atrial tachycardia, s/p ppm.  He was working on computer earlier today when he developed substernal chest discomfort that last for ~30 mins. Symptoms resolved after taking aspirin and  sublingual nitroglycerin. He remain CP free in the ED.   ECG in ED with evidence of inferior and anterior q waves with lateral t wave inversions. Cardiac biomarkers are negative. Still lives in independent living within [facility]. Still drives and is active. Retired .       Past Medical History:   Diagnosis Date    [facility] fibrillation ([facility]     Chronic obstructive pulmonary disease ([facility]     Essential hypertension     Hyperlipidemia     Long term (current) use of anticoagulants     Pacemaker     Rosacea 2016      Past Surgical History:   Procedure Laterality Date    HX CORONARY STENT PLACEMENT      HX HEART CATHETERIZATION      HX PACEMAKER       No Known Allergies  Family History   Problem Relation Age of Onset    Stroke Mother     Hypertension Mother     Kidney Disease Father       Social History     Tobacco Use    Smoking status: Never Smoker    Smokeless tobacco: Never Used   Substance Use Topics    Alcohol use: No     Alcohol/week: 0.0 oz    Drug use: No          Medications:    (Not in a hospital admission)  Current Facility-Administered Medications   Medication Dose Route Frequency    sodium chloride (NS) flush 5-40 mL  5-40 mL IntraVENous Q8H    sodium chloride (NS) flush 5-40 mL  5-40 mL IntraVENous PRN    acetaminophen (TYLENOL) tablet 650 mg  650 mg Oral Q4H PRN    morphine injection 2 mg  2 mg IntraVENous Q4H PRN    apixaban (ELIQUIS) tablet 2.5 mg  2.5 mg Oral BID    [START ON 7/4/2019] furosemide (LASIX) tablet 10 mg  10 mg Oral DAILY    [START ON 7/4/2019] levothyroxine (SYNTHROID) tablet 50 mcg  50 mcg Oral ACB    [START ON 7/4/2019] pantoprazole (PROTONIX) tablet 40 mg  40 mg Oral ACB    simvastatin (ZOCOR) tablet 20 mg  20 mg Oral Q MON, WED & FRI    tamsulosin (FLOMAX) capsule 0.4 mg  0.4 mg Oral QHS    ondansetron (ZOFRAN) injection 4 mg  4 mg IntraVENous Q4H PRN    [START ON 7/4/2019] isosorbide mononitrate ER (IMDUR) tablet 120 mg  120 mg Oral 7am    metoprolol tartrate (LOPRESSOR) tablet 50 mg  50 mg Oral BID    [START ON 7/4/2019] aspirin chewable tablet 81 mg  81 mg Oral DAILY     Current Outpatient Medications   Medication Sig    celecoxib (CELEBREX) 200 mg capsule Take 200 mg by mouth daily.  isosorbide mononitrate ER (IMDUR) 60 mg CR tablet Take 60 mg by mouth every morning.  antiox #8/om3/dha/epa/lut/zeax (PRESERVISION AREDS 2, OMEGA-3, PO) Take 1 Cap by mouth two (2) times a day.  ipratropium (ATROVENT) 0.03 % nasal spray 2 Sprays by Both Nostrils route two (2) times daily as needed for Rhinitis.  mometasone (NASONEX) 50 mcg/actuation nasal spray 2 Sprays daily.  metoprolol tartrate (LOPRESSOR) 25 mg tablet Take 0.5 Tabs by mouth two (2) times a day.  furosemide (LASIX) 20 mg tablet Take 10 mg by mouth daily.  hydroCHLOROthiazide (HYDRODIURIL) 25 mg tablet Take 12.5 mg by mouth daily.  metroNIDAZOLE (METROCREAM) 0.75 % topical cream Apply  to affected area nightly. Use a thin layer to affected areas after washing    apixaban (ELIQUIS) 2.5 mg tablet Take 1 Tab by mouth two (2) times a day.  tamsulosin (FLOMAX) 0.4 mg capsule Take 0.4 mg by mouth nightly.  levothyroxine (LEVOTHROID) 50 mcg tablet Take 50 mcg by mouth Daily (before breakfast).  omeprazole (PRILOSEC) 20 mg capsule Take 20 mg by mouth daily.  simvastatin (ZOCOR) 40 mg tablet Take 20 mg by mouth every Monday, Wednesday, Friday. Patient takes at night    nitroglycerin (NITROSTAT) 0.4 mg SL tablet 0.4 mg by SubLINGual route every five (5) minutes as needed for Chest Pain.  Multivits with Min-FA-Lycopene (MEN'S DAILY MULTIVIT-MINERAL) 0.4-600 mg-mcg tab Take 1 Tab by mouth daily.          Review of Systems:  Pertinent Positive: chest pain  Pertinent Negative: no sob, orthopnea, pnd, syncope  All Other systems reviewed and are negative for a Comprehensive ROS (10+)        Physical Exam:  Visit Vitals  BP (!) 176/94   Pulse 62   Temp 98.6 °F (37 °C)   Resp 23   Ht 5' 8\" (1.727 m)   Wt 198 lb (89.8 kg)   SpO2 98%   BMI 30.11 kg/m² Gen: Well-developed, well-nourished, in no acute distress  HEENT:  Pink conjunctivae, hearing intact to voice, moist mucous membranes  Neck: Supple,No JVD, No Carotid Bruit  Resp: No accessory muscle use, Clear breath sounds, No rales or rhonchi  Card: Normal Rate,Regular Rythm,Normal S1, S2, 2/6 YONI  Abd:  Soft, non-tender, non-distended, normoactive bowel sounds are present   MSK: No cyanosis or clubbing  Skin: No rashes or ulcers  Neuro:  Cranial nerves are grossly intact, moving all four extremities, no focal deficit, follows commands appropriately  Psych:  Good insight, oriented to person, place and time, alert, Nml Affect  LE: No edema  Vascular:Distal Pulses 2+ and symmetric          Cardiac Testing/ Procedures:    Echo 12/2017  Normal lv function, moderate lvh, mild AI, mild MR, dilated LA    LABS:    Lab Results   Component Value Date/Time    WBC 7.9 07/03/2019 01:39 PM    HGB 13.4 07/03/2019 01:39 PM    HCT 42.0 07/03/2019 01:39 PM    PLATELET 537 (L) 01/16/6477 01:39 PM    MCV 96.3 07/03/2019 01:39 PM     Lab Results   Component Value Date/Time    Sodium 138 07/03/2019 01:39 PM    Potassium 4.1 07/03/2019 01:39 PM    Chloride 102 07/03/2019 01:39 PM    CO2 29 07/03/2019 01:39 PM    Anion gap 7 07/03/2019 01:39 PM    Glucose 107 (H) 07/03/2019 01:39 PM    BUN 26 (H) 07/03/2019 01:39 PM    Creatinine 1.64 (H) 07/03/2019 01:39 PM    BUN/Creatinine ratio 16 07/03/2019 01:39 PM    GFR est AA 47 (L) 07/03/2019 01:39 PM    GFR est non-AA 39 (L) 07/03/2019 01:39 PM    Calcium 8.7 07/03/2019 01:39 PM     Lab Results   Component Value Date/Time    Troponin-I, Qt. <0.05 07/03/2019 01:39 PM     Lab Results   Component Value Date/Time    aPTT 29.1 07/03/2019 01:39 PM     Lab Results   Component Value Date/Time    INR 1.1 07/03/2019 01:39 PM    INR 1.0 03/03/2016 09:55 AM    Prothrombin time 10.8 07/03/2019 01:39 PM    Prothrombin time 10.8 03/03/2016 09:55 AM           Jennifer Garcia DO

## 2019-07-03 NOTE — PROGRESS NOTES
[facility]: MED RECONCILIATION    Comments/Recommendations:   Patient provided an updated medication list from home, confirmed that he has NKDA and took all of his AM meds today  Reviewed from previous notes that patient fills his medications @ the [address] obtained from: Patient and family at bedside, Patient provided home medication list    Allergies: Patient has no known allergies. Prior to Admission Medications:     Medication Documentation Review Audit       Reviewed by Carla Mccray PHARMD (Pharmacist) on 07/03/19 at [phone]      Medication Sig Documenting Provider Last Dose Status Taking?   antiox #8/om3/dha/epa/lut/zeax (PRESERVISION AREDS 2, OMEGA-3, PO) Take 1 Cap by mouth two (2) times a day. Provider, Historical 7/3/2019 Active Yes   apixaban (ELIQUIS) 2.5 mg tablet Take 1 Tab by mouth two (2) times a day. Maurilio Yancey MD 7/3/2019 Active Yes   celecoxib (CELEBREX) 200 mg capsule Take 200 mg by mouth daily. Provider, Historical 7/3/2019 Active Yes   furosemide (LASIX) 20 mg tablet Take 10 mg by mouth daily. Di Vera MD 7/3/2019 Active Yes   hydroCHLOROthiazide (HYDRODIURIL) 25 mg tablet Take 12.5 mg by mouth daily. Di Vera MD 7/3/2019 Active Yes   ipratropium (ATROVENT) 0.03 % nasal spray 2 Sprays by Both Nostrils route two (2) times daily as needed for Rhinitis. Provider, Historical  Active Yes   isosorbide mononitrate ER (IMDUR) 60 mg CR tablet Take 60 mg by mouth every morning. Provider, Historical 7/3/2019 Active Yes   levothyroxine (LEVOTHROID) 50 mcg tablet Take 50 mcg by mouth Daily (before breakfast). Provider, Historical 7/3/2019 Active Yes   metoprolol tartrate (LOPRESSOR) 25 mg tablet Take 0.5 Tabs by mouth two (2) times a day. Kriss Hanson MD 7/3/2019 Active Yes   metroNIDAZOLE (METROCREAM) 0.75 % topical cream Apply  to affected area nightly.  Use a thin layer to affected areas after washing Provider, Historical 7/2/2019 Unknown time Active Yes   mometasone (NASONEX) 50 mcg/actuation nasal spray 2 Sprays daily. Provider, Historical 7/3/2019 Active Yes   Multivits with Min-FA-Lycopene (MEN'S DAILY MULTIVIT-MINERAL) 0.4-600 mg-mcg tab Take 1 Tab by mouth daily. Provider, Historical 7/3/2019 Active Yes   nitroglycerin (NITROSTAT) 0.4 mg SL tablet 0.4 mg by SubLINGual route every five (5) minutes as needed for Chest Pain. Provider, Historical  Active Yes   omeprazole (PRILOSEC) 20 mg capsule Take 20 mg by mouth daily. Provider, Historical 7/3/2019 Active Yes   simvastatin (ZOCOR) 40 mg tablet Take 20 mg by mouth every Monday, Wednesday, Friday. Patient takes at night Provider, Historical 7/1/2019 Active Yes   tamsulosin (FLOMAX) 0.4 mg capsule Take 0.4 mg by mouth nightly.  Provider, Historical 7/2/2019 Active Yes                  Agatha,  Eamon Dodson, PHARMD   Contact: [phone]

## 2019-07-03 NOTE — ED NOTES
Bedside and Verbal shift change report given to HARISH Nails (oncoming nurse) by Maia Encarnacion (offgoing nurse). Report included the following information SBAR, Kardex, MAR and Cardiac Rhythm paced, w/ failure to capture/few escape beats.

## 2019-07-03 NOTE — PROGRESS NOTES
1926  Report received at bedside pt's blood pressure is elevated attempted several repeat blood pressures using a different cuff size and different arm blood pressure remained elevated. Off going nurse stopped report to contact MD on call     [facility] going nurse received a order for hydralazine 10 mg IV every 6 hours PRN for SBP> 160 primary nurse administered dose will repeat BP in 15 minutes. After reviewing the chart noted Trop wasn't drawn will draw     2015  Repeat blood pressure 129/72 troponin drawn     2124  Received Troponin results called Dr Collin Westbrook no new orders received at this time      [place] with Dr Collin Westbrook regarding pt's elevated troponin orders received for a repeat troponin with AM labs     0140  Rounds made pt resting quietly with eyes closed daughter at bedside     0300  Pt resting quietly no distress noted call bell within reach       0410  Labs drawn vitals obtained urinal emptied     0617  Pt given meds without difficulty    7640  Spoke with lab regarding troponin not resulting re-entered order    0720  Bedside and Verbal shift change report given to Roberto (oncoming nurse) by Jorge Aguilar (offgoing nurse). Report included the following information SBAR, Kardex, ED Summary, Procedure Summary, Intake/Output, MAR, Recent Results and Cardiac Rhythm Vpaced .

## 2019-07-03 NOTE — ED NOTES
TRANSFER - OUT REPORT:    Verbal report given to byron RN(name) on Concepcion Nose  being transferred to pcc(unit) for routine progression of care       Report consisted of patients Situation, Background, Assessment and   Recommendations(SBAR). Information from the following report(s) SBAR, ED Summary, [facility] and Recent Results was reviewed with the receiving nurse. Lines:   Peripheral IV 07/03/19 Left Antecubital (Active)   Site Assessment Clean, dry, & intact 7/3/2019  1:25 PM   Infiltration Assessment 0 7/3/2019  1:25 PM   Dressing Status Clean, dry, & intact 7/3/2019  1:25 PM   Hub Color/Line Status Pink 7/3/2019  1:25 PM   Alcohol Cap Used No 7/3/2019  1:25 PM        Opportunity for questions and clarification was provided.       Patient transported with:   Monitor  Registered Nurse

## 2019-07-03 NOTE — H&P
Admission History and Physical      NAME:  Carisa Pope   :   1922   MRN:  [id number]     PCP:  Jhoana Devi MD     Date/Time:  7/3/2019           Assessment/Plan:       Chest pain (7/3/2019): No hx of similar chest pain in the past.  No hx of MI. Constellation of chest pain, diaphoresis that improved with ASA and NTG could represent cardiac ischemia. EKG also with new compared to 2017 TWI. First troponin in ED negative. No recurrence of symptoms since initial.  Has mild epigastric ttp and does take omeprazole for occasional GERD, so possibly symptoms could be GI as well. Given age discussed conservative observation overnight to which patient and family agree. -- continue ASA daily  -- await cardiology evaluation  -- trend troponin  -- increase metoprolol  -- morphine iv prn pain  -- continue PPI  -- check lipase    [facility] (2016):  Prior hx of ablation and pacemaker. -- continue metoprolol but increase  -- continue apixaban    Essential hypertension (2019):  BP elevated. Patient reports this has been elevated at [facility] as well. -- continue imdur, lasix  -- increase metoprolol  -- HCTZ not likely contributing to BP control with CKD 3    CKD (chronic kidney disease) stage 3, GFR 30-59 ml/min ([facility]) (2019):  Creatinine similar to prior. -- stop celebrex  -- monitor creatinine    Chronic obstructive pulmonary disease ([facility] (2019): Not wheezing. Reports chronic mild dyspnea unchanged from baseline. -- nebs prn    Hyperlipidemia (2019):  -- continue simvastatin    Signed out to Dr. Chana Schrader. Subjective:     CHIEF COMPLAINT:  Chest pain    HISTORY OF PRESENT ILLNESS:     Mr. Fran Mello is a 80 y.o.  male who is admitted with Chest pain. Mr. Fran Mello presented to the Emergency Department today complaining of chest pain. Started at about 11 am.  Was sitting in chair typing email. Felt central chest pressure w/o radiation. Associated with mild diaphoresis. No n/v or sob. Reports chronic mild SOB that is unchanged from baseline. No fever, chills. Notified staff at facility and family. Took ASA and sl ntg with relief soon afterwards. No prior similar symptoms. No hx of MI, stroke, PAD, or DM. Past Medical History:   Diagnosis Date    Atrial fibrillation (HCC)     [facility] ([facility]     Essential hypertension     Hyperlipidemia     Long term (current) use of anticoagulants     Pacemaker     Rosacea 2016        Past Surgical History:   Procedure Laterality Date    HX CORONARY STENT PLACEMENT      HX HEART CATHETERIZATION      HX PACEMAKER         Social History     Tobacco Use    Smoking status: Never Smoker    Smokeless tobacco: Never Used   Substance Use Topics    Alcohol use: No     Alcohol/week: 0.0 oz        Family History   Problem Relation Age of Onset    Stroke Mother     Hypertension Mother     Kidney Disease Father         No Known Allergies     Prior to Admission medications    Medication Sig Start Date End Date Taking? Authorizing Provider   celecoxib (CELEBREX) 200 mg capsule Take 200 mg by mouth daily. Yes Provider, Historical   isosorbide mononitrate ER (IMDUR) 60 mg CR tablet Take 60 mg by mouth every morning. Yes Provider, Historical   antiox #8/om3/dha/epa/lut/zeax (PRESERVISION AREDS 2, OMEGA-3, PO) Take 1 Cap by mouth two (2) times a day. Yes Provider, Historical   ipratropium (ATROVENT) 0.03 % nasal spray 2 Sprays by Both Nostrils route two (2) times daily as needed for Rhinitis. Yes Provider, Historical   mometasone (NASONEX) 50 mcg/actuation nasal spray 2 Sprays daily. Yes Provider, Historical   metoprolol tartrate (LOPRESSOR) 25 mg tablet Take 0.5 Tabs by mouth two (2) times a day. 1/29/19  Yes Marcie Fisher MD   furosemide (LASIX) 20 mg tablet Take 10 mg by mouth daily.    Yes Other, MD Di   hydroCHLOROthiazide (HYDRODIURIL) 25 mg tablet Take 12.5 mg by mouth daily. Yes Other, MD Di   metroNIDAZOLE (METROCREAM) 0.75 % topical cream Apply  to affected area nightly. Use a thin layer to affected areas after washing   Yes Provider, Historical   apixaban (ELIQUIS) 2.5 mg tablet Take 1 Tab by mouth two (2) times a day. 1/25/17  Yes Maurilio Yancey MD   tamsulosin [facility]) 0.4 mg capsule Take 0.4 mg by mouth nightly. Yes Provider, Historical   levothyroxine (LEVOTHROID) 50 mcg tablet Take 50 mcg by mouth Daily (before breakfast). Yes Provider, Historical   omeprazole (PRILOSEC) 20 mg capsule Take 20 mg by mouth daily. Yes Provider, Historical   simvastatin (ZOCOR) 40 mg tablet Take 20 mg by mouth every Monday, Wednesday, Friday. Patient takes at night   Yes Provider, Historical   nitroglycerin (NITROSTAT) 0.4 mg SL tablet 0.4 mg by SubLINGual route every five (5) minutes as needed for Chest Pain. Yes Provider, Historical   Multivits with Min-FA-Lycopene (MEN'S DAILY MULTIVIT-MINERAL) 0.4-600 mg-mcg tab Take 1 Tab by mouth daily.    Yes Provider, Historical         Review of Systems:  (bold if positive, if negative)    Gen:  Eyes:  ENT:  CVS:  chest painPulm:  dyspneaGI:    :    MS:  Skin:  Endo:    Hem:  Renal:    Neuro:            Objective:      VITALS:    Vital signs reviewed; most recent are:    Visit Vitals  BP (!) 176/94   Pulse 62   Temp 98.6 °F (37 °C)   Resp 23   Ht 5' 8\" (1.727 m)   Wt 89.8 kg (198 lb)   SpO2 98%   BMI 30.11 kg/m²     SpO2 Readings from Last 6 Encounters:   07/03/19 98%   02/20/19 97%   02/18/19 95%   02/13/19 97%   02/11/19 98%   02/08/19 97%        No intake or output data in the 24 hours ending 07/03/19 1538         Exam:     Physical Exam:    Gen:  Well-developed, well-nourished, in no acute distress  HEENT:  Pink conjunctivae, PERRL, hearing intact to voice, moist mucous membranes  Neck:  Supple  Resp:  No accessory muscle use, clear breath sounds without wheezes rales or rhonchi  Card:  No murmurs, normal S1, S2 without thrills or peripheral edema, radial pulses 2+ b/l  Abd:  Soft, slight epigastric ttp w/o rebound or guarding, non-distended, normoactive bowel sounds are present  Musc:  No cyanosis, cap refill < 2 sec  Skin:  No rashes or ulcers, skin turgor is good  Neuro:  Cranial nerves 3-12 are grossly intact,  strength is 5/5 bilaterally, dorsi / plantarflexion strength is 5/5 bilaterally, follows commands appropriately  Psych:  Alert with good insight. Oriented to person, place, and time       Labs:    Recent Labs     07/03/19  1339   WBC 7.9   HGB 13.4   HCT 42.0   *     Recent Labs     07/03/19  1339      K 4.1      CO2 29   *   BUN 26*   CREA 1.64*   CA 8.7   ALB 3.6   TBILI 0.8   SGOT 29   ALT 24     No results found for: GLUCPOC  No results for input(s): PH, PCO2, PO2, HCO3, FIO2 in the last 72 hours. Recent Labs     07/03/19  1339   INR 1.1     Chest Xray:  Cardiomegaly. EKG reviewed:   Paced, inferolateral TWI. Medical records reviewed in preparation for this admission: Old medical records.     Surrogate decision maker:  daughter    Total time spent in care of this patient: 48 [address] discussed with: Patient and Family    Discussed:  Care Plan    Prophylaxis:  apixaban    Probable Disposition:  SNF/LTC           ___________________________________________________    Attending Physician: Lorenzo Flores MD

## 2019-07-04 LAB
ALBUMIN SERPL-MCNC: 3.5 G/DL (ref 3.5–5)
ALBUMIN/GLOB SERPL: 1.1 {RATIO} (ref 1.1–2.2)
ALP SERPL-CCNC: 95 U/L (ref 45–117)
ALT SERPL-CCNC: 23 U/L (ref 12–78)
ANION GAP SERPL CALC-SCNC: 7 MMOL/L (ref 5–15)
AST SERPL-CCNC: 30 U/L (ref 15–37)
ATRIAL RATE: 241 BPM
BASOPHILS # BLD: 0 K/UL (ref 0–0.1)
BASOPHILS NFR BLD: 0 % (ref 0–1)
BILIRUB SERPL-MCNC: 0.9 MG/DL (ref 0.2–1)
BUN SERPL-MCNC: 26 MG/DL (ref 6–20)
BUN/CREAT SERPL: 17 (ref 12–20)
CALCIUM SERPL-MCNC: 8.8 MG/DL (ref 8.5–10.1)
CALCULATED R AXIS, ECG10: -29 DEGREES
CALCULATED T AXIS, ECG11: -56 DEGREES
CHLORIDE SERPL-SCNC: 105 MMOL/L (ref 97–108)
CHOLEST SERPL-MCNC: 151 MG/DL
CO2 SERPL-SCNC: 27 MMOL/L (ref 21–32)
COMMENT, HOLDF: NORMAL
CREAT SERPL-MCNC: 1.54 MG/DL (ref 0.7–1.3)
DIAGNOSIS, 93000: NORMAL
DIFFERENTIAL METHOD BLD: ABNORMAL
EOSINOPHIL # BLD: 0.3 K/UL (ref 0–0.4)
EOSINOPHIL NFR BLD: 3 % (ref 0–7)
ERYTHROCYTE [DISTWIDTH] IN BLOOD BY AUTOMATED COUNT: 13.8 % (ref 11.5–14.5)
GLOBULIN SER CALC-MCNC: 3.1 G/DL (ref 2–4)
GLUCOSE SERPL-MCNC: 97 MG/DL (ref 65–100)
HCT VFR BLD AUTO: 41.6 % (ref 36.6–50.3)
HDLC SERPL-MCNC: 53 MG/DL
HDLC SERPL: 2.8 {RATIO} (ref 0–5)
HGB BLD-MCNC: 13.6 G/DL (ref 12.1–17)
IMM GRANULOCYTES # BLD AUTO: 0.1 K/UL (ref 0–0.04)
IMM GRANULOCYTES NFR BLD AUTO: 1 % (ref 0–0.5)
LDLC SERPL CALC-MCNC: 75.6 MG/DL (ref 0–100)
LIPID PROFILE,FLP: NORMAL
LYMPHOCYTES # BLD: 1.5 K/UL (ref 0.8–3.5)
LYMPHOCYTES NFR BLD: 18 % (ref 12–49)
MAGNESIUM SERPL-MCNC: 2.4 MG/DL (ref 1.6–2.4)
MCH RBC QN AUTO: 30.6 PG (ref 26–34)
MCHC RBC AUTO-ENTMCNC: 32.7 G/DL (ref 30–36.5)
MCV RBC AUTO: 93.7 FL (ref 80–99)
MONOCYTES # BLD: 0.9 K/UL (ref 0–1)
MONOCYTES NFR BLD: 11 % (ref 5–13)
NEUTS SEG # BLD: 5.4 K/UL (ref 1.8–8)
NEUTS SEG NFR BLD: 66 % (ref 32–75)
NRBC # BLD: 0 K/UL (ref 0–0.01)
NRBC BLD-RTO: 0 PER 100 WBC
PLATELET # BLD AUTO: 148 K/UL (ref 150–400)
PMV BLD AUTO: 10.1 FL (ref 8.9–12.9)
POTASSIUM SERPL-SCNC: 4 MMOL/L (ref 3.5–5.1)
PROT SERPL-MCNC: 6.6 G/DL (ref 6.4–8.2)
Q-T INTERVAL, ECG07: 416 MS
QRS DURATION, ECG06: 86 MS
QTC CALCULATION (BEZET), ECG08: 422 MS
RBC # BLD AUTO: 4.44 M/UL (ref 4.1–5.7)
SAMPLES BEING HELD,HOLD: NORMAL
SODIUM SERPL-SCNC: 139 MMOL/L (ref 136–145)
TRIGL SERPL-MCNC: 112 MG/DL (ref ?–150)
TROPONIN I SERPL-MCNC: 0.2 NG/ML
TROPONIN I SERPL-MCNC: 0.24 NG/ML
TROPONIN I SERPL-MCNC: 0.25 NG/ML
TROPONIN I SERPL-MCNC: 0.36 NG/ML
TROPONIN I SERPL-MCNC: 0.39 NG/ML
VENTRICULAR RATE, ECG03: 62 BPM
VLDLC SERPL CALC-MCNC: 22.4 MG/DL
WBC # BLD AUTO: 8.2 K/UL (ref 4.1–11.1)

## 2019-07-04 PROCEDURE — 85025 COMPLETE CBC W/AUTO DIFF WBC: CPT

## 2019-07-04 PROCEDURE — 36415 COLL VENOUS BLD VENIPUNCTURE: CPT

## 2019-07-04 PROCEDURE — 84484 ASSAY OF TROPONIN QUANT: CPT

## 2019-07-04 PROCEDURE — 74011250637 HC RX REV CODE- 250/637: Performed by: INTERNAL MEDICINE

## 2019-07-04 PROCEDURE — 74011250637 HC RX REV CODE- 250/637: Performed by: STUDENT IN AN ORGANIZED HEALTH CARE EDUCATION/TRAINING PROGRAM

## 2019-07-04 PROCEDURE — 80061 LIPID PANEL: CPT

## 2019-07-04 PROCEDURE — 83735 ASSAY OF MAGNESIUM: CPT

## 2019-07-04 PROCEDURE — 99218 HC RM OBSERVATION: CPT

## 2019-07-04 PROCEDURE — 74011250637 HC RX REV CODE- 250/637: Performed by: NURSE PRACTITIONER

## 2019-07-04 PROCEDURE — 80053 COMPREHEN METABOLIC PANEL: CPT

## 2019-07-04 RX ORDER — DOCUSATE SODIUM 100 MG/1
100 CAPSULE, LIQUID FILLED ORAL
Status: DISCONTINUED | OUTPATIENT
Start: 2019-07-04 | End: 2019-07-05 | Stop reason: HOSPADM

## 2019-07-04 RX ADMIN — TAMSULOSIN HYDROCHLORIDE 0.4 MG: 0.4 CAPSULE ORAL at 21:01

## 2019-07-04 RX ADMIN — APIXABAN 2.5 MG: 2.5 TABLET, FILM COATED ORAL at 21:02

## 2019-07-04 RX ADMIN — ISOSORBIDE MONONITRATE 60 MG: 30 TABLET, EXTENDED RELEASE ORAL at 06:17

## 2019-07-04 RX ADMIN — Medication 10 ML: at 06:16

## 2019-07-04 RX ADMIN — LEVOTHYROXINE SODIUM 50 MCG: 50 TABLET ORAL at 06:17

## 2019-07-04 RX ADMIN — FUROSEMIDE 10 MG: 20 TABLET ORAL at 09:14

## 2019-07-04 RX ADMIN — APIXABAN 2.5 MG: 2.5 TABLET, FILM COATED ORAL at 09:14

## 2019-07-04 RX ADMIN — METOPROLOL TARTRATE 50 MG: 50 TABLET ORAL at 09:14

## 2019-07-04 RX ADMIN — PANTOPRAZOLE SODIUM 40 MG: 40 TABLET, DELAYED RELEASE ORAL at 06:16

## 2019-07-04 RX ADMIN — ASPIRIN 81 MG 81 MG: 81 TABLET ORAL at 09:15

## 2019-07-04 RX ADMIN — METOPROLOL TARTRATE 50 MG: 50 TABLET ORAL at 21:01

## 2019-07-04 RX ADMIN — Medication 10 ML: at 21:00

## 2019-07-04 NOTE — PROGRESS NOTES
Shift Summary    Bedside and Verbal shift change report given to Nely Piedra RN (oncoming nurse) by Pranav Yang RN (offgoing nurse). Report included the following information SBAR, Kardex, MAR, Recent Results and Cardiac Rhythm  . No chest pain at this time. Troponin run late and resulting at 0746. Results elevated at 0.39. Cardiology notified. Plan to continue to trend cardiac enzymes and assess for recurrent chest pain. Medical management at this time. Diet order placed    Repeat troponin 0.36    Patient BP low. Recent changes to metoprolol. Cardiology notified. Repeat troponin 0.25    Bedside and Verbal shift change report given to Monty Winters RN (oncoming nurse) by Scott Mckeon. Carolyn Morrow RN (offgoing nurse). Report included the following information SBAR, Kardex, MAR, Recent Results and Cardiac Rhythm  .

## 2019-07-04 NOTE — PROGRESS NOTES
Cardiology Progress Note                             [address] [facility]. [address], [address]                                 Phone [phone]; Fax [phone]        2019 8:39 AM     Admit Date:           7/3/2019  Admit Diagnosis:  Chest pain [R07.9]  :          1922   MRN:          [id number]   ASSESSMENT/RECOMMENDATION:   Unstable angina: no further chest pain. Troponin minimally elevated, but slowly trending up- 0.39 continue to trend troponin until it peaks. -with minimally elevated troponin recommend medical management unless significant rise in troponin or further CP  -asa/imdur/ BB/ statin  -p EF per echo 2017, repeat echo    CAD: meds as above     Hx of AF, s/p PPM: v pacing on telemetry  -continue eliquis     COPD    HLD    CKD: creatinine trending down     Reviewed plan with the patient and his granddaughter     CARDIOLOGY ATTENDING  Patient personally seen and examined. All the elements of history and examination were personally performed. Assessment and plan was discussed and agree as written above    He is feeling well. Walking without chest pain. Will plan for medical management. For uncontrolled symptoms or rising troponin, can consider a cath.       Nellie Esparza MD, [facility]                    07 -  1900  In: -   Out: 200 [Urine:200]    Last 3 Recorded Weights in this Encounter    19 1259   Weight: 198 lb (89.8 kg)         1901 -  0700  In: -   Out: 500 [Urine:500]    SUBJECTIVE               Constantine Jones denies palpitations, irregular heart beat, SOB  On and off LE edema- improves w elevating the legs  No further CP overnight or this AM        Current Facility-Administered Medications   Medication Dose Route Frequency    sodium chloride (NS) flush 5-40 mL  5-40 mL IntraVENous Q8H    sodium chloride (NS) flush 5-40 mL  5-40 mL IntraVENous PRN    acetaminophen (TYLENOL) tablet 650 mg  650 mg Oral Q4H PRN    morphine injection 2 mg  2 mg IntraVENous Q4H PRN    apixaban (ELIQUIS) tablet 2.5 mg  2.5 mg Oral BID    furosemide (LASIX) tablet 10 mg  10 mg Oral DAILY    levothyroxine (SYNTHROID) tablet 50 mcg  50 mcg Oral ACB    pantoprazole (PROTONIX) tablet 40 mg  40 mg Oral ACB    simvastatin (ZOCOR) tablet 20 mg  20 mg Oral Q MON, WED & FRI    tamsulosin (FLOMAX) capsule 0.4 mg  0.4 mg Oral QHS    ondansetron (ZOFRAN) injection 4 mg  4 mg IntraVENous Q4H PRN    metoprolol tartrate (LOPRESSOR) tablet 50 mg  50 mg Oral BID    aspirin chewable tablet 81 mg  81 mg Oral DAILY    isosorbide mononitrate ER (IMDUR) tablet 60 mg  60 mg Oral 7am    levalbuterol (XOPENEX) nebulizer soln 1.25 mg/3 mL  1.25 mg Nebulization Q4H PRN    hydrALAZINE (APRESOLINE) 20 mg/mL injection 10 mg  10 mg IntraVENous Q6H PRN      OBJECTIVE               Intake/Output Summary (Last 24 hours) at 7/4/2019 0839  Last data filed at 7/4/2019 0738  Gross per 24 hour   Intake --   Output 700 ml   Net -700 ml       Review of Systems - History obtained from the patient AS PER  HPI    Telemetry     PHYSICAL EXAM        Visit Vitals  /76 (BP 1 Location: Left arm, BP Patient Position: At rest)   Pulse 60   Temp 97.4 °F (36.3 °C)   Resp 18   Ht 5' 8\" (1.727 m)   Wt 198 lb (89.8 kg)   SpO2 98%   BMI 30.11 kg/m²       Gen: Well-developed, elderly, in no acute distress  alert and oriented x 3  HEENT:  Pink conjunctivae, Hearing grossly normal.No scleral icterus or conjunctival, moist mucous membranes  Neck: Supple,No JVD  Resp: No accessory muscle use, Clear breath sounds, No rales or rhonchi  Card: Regular Rate,Rythm, 2/6 murmurs, no rubs or gallop. No thrills. GI:          soft, non-tender   MSK: No cyanosis or clubbing, good capillary refill  Skin: No rashes or ulcers, no bruising.  Right sided ppm unremarkable   Neuro:  Cranial nerves are grossly intact, moving all four extremities, no focal deficit, follows commands appropriately  Psych:  Good insight, oriented to person, place and time, alert, Nml Affect  LE: trace edema       DATA REVIEW            Laboratory and Imaging have been reviewed by me and are notable for  Recent Labs     07/04/19 0410 07/03/19  2325 07/03/19 2000   TROIQ 0.39* 0.24* 0.12*     Recent Labs     07/04/19  0410 07/03/19  1339    138   K 4.0 4.1   CO2 27 29   BUN 26* 26*   CREA 1.54* 1.64*   GLU 97 107*   MG 2.4  --    WBC 8.2 7.9   HGB 13.6 13.4   HCT 41.6 42.0   * 135*             Vannesa Sheets, NP

## 2019-07-04 NOTE — PROGRESS NOTES
Daily Progress Note: 7/4/2019  Page Dance, MD    Assessment/Plan:   Chest pain (7/3/2019): No hx of similar chest pain in the past.  No hx of MI. Constellation of chest pain, diaphoresis that improved with ASA and NTG could represent cardiac ischemia. EKG also with new compared to 2017 TWI. No further CP.  -- continue ASA daily  --Hx afib - continue eliquis  -- troponin elevated --> for Cath 7/5  --ECHO pending  --cards following     Chronic atrial fibrillation ([facility] (2/17/2016):  Prior hx of ablation and pacemaker. -- continue metoprolol but increase  -- continue apixaban     Essential hypertension (1/27/2019):  BP improved. -- continue imdur, lasix  -- increased metoprolol  -- off HCTZ     CKD (chronic kidney disease) stage 3, GFR 30-59 ml/min ([facility]) (1/27/2019):  Creatinine similar to prior. -- stop celebrex  -- Cr improved a bit today at 1.54     Chronic obstructive pulmonary disease:  Not wheezing. Reports chronic mild dyspnea unchanged from baseline.   -- nebs prn     Hyperlipidemia (1/27/2019):  -- continue simvastatin       Problem List:  Problem List as of 7/4/2019 Date Reviewed: 7/3/2019          Codes Class Noted - Resolved    * (Principal) Chest pain ICD-10-CM: R07.9  ICD-9-CM: 786.50  7/3/2019 - Present        Sepsis ([facility] ICD-10-CM: A41.9  ICD-9-CM: 038.9, 995.91  1/27/2019 - Present        Chronic obstructive pulmonary disease ([facility] ICD-10-CM: J44.9  ICD-9-CM: 063  1/27/2019 - Present        Hyperlipidemia ICD-10-CM: E78.5  ICD-9-CM: 272.4  1/27/2019 - Present        Essential hypertension ICD-10-CM: I10  ICD-9-CM: 401.9  1/27/2019 - Present        Pacemaker ICD-10-CM: Z95.0  ICD-9-CM: V45.01  1/27/2019 - Present        Fever ICD-10-CM: R50.9  ICD-9-CM: 780.60  1/27/2019 - Present        Leukocytosis ICD-10-CM: K60.168  ICD-9-CM: 288.60  1/27/2019 - Present        CKD (chronic kidney disease) stage 3, GFR 30-59 ml/min ([facility]) ICD-10-CM: N18.3  ICD-9-CM: 585.3 2019 - Present        Cough ICD-10-CM: R05  ICD-9-CM: 786.2  2019 - Present        Dyspnea ICD-10-CM: R06.00  ICD-9-CM: 786.09  2019 - Present        Atrial fibrillation ([facility] ICD-10-CM: I48.91  ICD-9-CM: 427.31  2016 - Present        Encounter to establish care ICD-10-CM: Z76.89  ICD-9-CM: V65.8  2015 - Present              Subjective:   : He feels well. No complaints. No further CP. Chronic SOB, unchanged and not at rest.  No palpitations. Wants to eat. Review of Systems:   A comprehensive review of systems was negative except for that written in the HPI. Objective:   Physical Exam:     Visit Vitals  /76 (BP 1 Location: Left arm, BP Patient Position: At rest)   Pulse 60   Temp 97.4 °F (36.3 °C)   Resp 18   Ht 5' 8\" (1.727 m)   Wt 89.8 kg (198 lb)   SpO2 98%   BMI 30.11 kg/m²      O2 Device: Room air    Temp (24hrs), Av °F (36.7 °C), Min:97.3 °F (36.3 °C), Max:98.7 °F (37.1 °C)    701 - 1900  In: -   Out: 200 [Urine:200]   1901 -  07  In: -   Out: 500 [Urine:500]    General:  Alert, cooperative, no distress, appears stated age. Head:  Normocephalic, without obvious abnormality, atraumatic. Eyes:  Conjunctivae/corneas clear. PERRL, EOMs intact. Nose: Nares normal. Septum midline. Mucosa normal. No drainage or sinus tenderness. Throat: Lips, mucosa, and tongue moist..   Neck: Supple, symmetrical, trachea midline, no adenopathy, thyroid: no enlargement/tenderness/nodules, no carotid bruit and no JVD. Lungs:   Clear to auscultation bilaterally. Chest wall:  No tenderness or deformity. Heart:  Regular rate and rhythm, S1, S2 normal, no murmur, click, rub or gallop. Abdomen:   Soft, non-tender. Bowel sounds normal. No masses,  No organomegaly. Extremities: Extremities normal, atraumatic, no cyanosis or edema. No calf tenderness or cords. Pulses: 2+ and symmetric all extremities.    Skin: Skin color, texture, turgor normal. No rashes or lesions   Neurologic: CNII-XII intact. Alert and oriented X 3. Data Review:       Recent Days:  Recent Labs     07/04/19  0410 07/03/19  1339   WBC 8.2 7.9   HGB 13.6 13.4   HCT 41.6 42.0   * 135*     Recent Labs     07/04/19  0410 07/03/19  1339    138   K 4.0 4.1    102   CO2 27 29   GLU 97 107*   BUN 26* 26*   CREA 1.54* 1.64*   CA 8.8 8.7   MG 2.4  --    ALB 3.5 3.6   SGOT 30 29   ALT 23 24   INR  --  1.1     No results for input(s): PH, PCO2, PO2, HCO3, FIO2 in the last 72 hours. 24 Hour Results:  Recent Results (from the past 24 hour(s))   SAMPLES BEING HELD    Collection Time: 07/03/19  1:39 PM   Result Value Ref Range    SAMPLES BEING HELD 1pst,1sst,1lav,1blu     COMMENT        Add-on orders for these samples will be processed based on acceptable specimen integrity and analyte stability, which may vary by analyte. D DIMER    Collection Time: 07/03/19  1:39 PM   Result Value Ref Range    D-dimer 0.87 (H) 0.00 - 0.65 mg/L FEU   CBC WITH AUTOMATED DIFF    Collection Time: 07/03/19  1:39 PM   Result Value Ref Range    WBC 7.9 4.1 - 11.1 K/uL    RBC 4.36 4.10 - 5.70 M/uL    HGB 13.4 12.1 - 17.0 g/dL    HCT 42.0 36.6 - 50.3 %    MCV 96.3 80.0 - 99.0 FL    MCH 30.7 26.0 - 34.0 PG    MCHC 31.9 30.0 - 36.5 g/dL    RDW 13.7 11.5 - 14.5 %    PLATELET 183 (L) 475 - 400 K/uL    MPV 10.4 8.9 - 12.9 FL    NRBC 0.0 0  WBC    ABSOLUTE NRBC 0.00 0.00 - 0.01 K/uL    NEUTROPHILS 78 (H) 32 - 75 %    LYMPHOCYTES 10 (L) 12 - 49 %    MONOCYTES 8 5 - 13 %    EOSINOPHILS 2 0 - 7 %    BASOPHILS 0 0 - 1 %    IMMATURE GRANULOCYTES 1 (H) 0.0 - 0.5 %    ABS. NEUTROPHILS 6.2 1.8 - 8.0 K/UL    ABS. LYMPHOCYTES 0.8 0.8 - 3.5 K/UL    ABS. MONOCYTES 0.7 0.0 - 1.0 K/UL    ABS. EOSINOPHILS 0.2 0.0 - 0.4 K/UL    ABS. BASOPHILS 0.0 0.0 - 0.1 K/UL    ABS. IMM.  GRANS. 0.1 (H) 0.00 - 0.04 K/UL    DF AUTOMATED     LIPASE    Collection Time: 07/03/19  1:39 PM   Result Value Ref Range    Lipase 140 73 - 435 U/L   METABOLIC PANEL, COMPREHENSIVE    Collection Time: 07/03/19  1:39 PM   Result Value Ref Range    Sodium 138 136 - 145 mmol/L    Potassium 4.1 3.5 - 5.1 mmol/L    Chloride 102 97 - 108 mmol/L    CO2 29 21 - 32 mmol/L    Anion gap 7 5 - 15 mmol/L    Glucose 107 (H) 65 - 100 mg/dL    BUN 26 (H) 6 - 20 MG/DL    Creatinine 1.64 (H) 0.70 - 1.30 MG/DL    BUN/Creatinine ratio 16 12 - 20      GFR est AA 47 (L) >60 ml/min/1.73m2    GFR est non-AA 39 (L) >60 ml/min/1.73m2    Calcium 8.7 8.5 - 10.1 MG/DL    Bilirubin, total 0.8 0.2 - 1.0 MG/DL    ALT (SGPT) 24 12 - 78 U/L    AST (SGOT) 29 15 - 37 U/L    Alk. phosphatase 89 45 - 117 U/L    Protein, total 6.7 6.4 - 8.2 g/dL    Albumin 3.6 3.5 - 5.0 g/dL    Globulin 3.1 2.0 - 4.0 g/dL    A-G Ratio 1.2 1.1 - 2.2     PROTHROMBIN TIME + INR    Collection Time: 07/03/19  1:39 PM   Result Value Ref Range    INR 1.1 0.9 - 1.1      Prothrombin time 10.8 9.0 - 11.1 sec   PTT    Collection Time: 07/03/19  1:39 PM   Result Value Ref Range    aPTT 29.1 22.1 - 32.0 sec    aPTT, therapeutic range     58.0 - 77.0 SECS   TROPONIN I    Collection Time: 07/03/19  1:39 PM   Result Value Ref Range    Troponin-I, Qt. <0.05 <0.05 ng/mL   URINALYSIS W/ RFLX MICROSCOPIC    Collection Time: 07/03/19  2:12 PM   Result Value Ref Range    Color YELLOW/STRAW      Appearance CLEAR CLEAR      Specific gravity 1.011 1.003 - 1.030      pH (UA) 6.0 5.0 - 8.0      Protein NEGATIVE  NEG mg/dL    Glucose NEGATIVE  NEG mg/dL    Ketone NEGATIVE  NEG mg/dL    Bilirubin NEGATIVE  NEG      Blood NEGATIVE  NEG      Urobilinogen 1.0 0.2 - 1.0 EU/dL    Nitrites NEGATIVE  NEG      Leukocyte Esterase NEGATIVE  NEG     SAMPLES BEING HELD    Collection Time: 07/03/19  2:12 PM   Result Value Ref Range    SAMPLES BEING HELD 1pst,1lav,1blu     COMMENT        Add-on orders for these samples will be processed based on acceptable specimen integrity and analyte stability, which may vary by analyte.    NT-PRO BNP    Collection Time: 07/03/19  2:12 PM   Result Value Ref Range    NT pro-BNP 2,291 (H) <450 PG/ML   TROPONIN I    Collection Time: 07/03/19  8:00 PM   Result Value Ref Range    Troponin-I, Qt. 0.12 (H) <0.05 ng/mL   TROPONIN I    Collection Time: 07/03/19 11:25 PM   Result Value Ref Range    Troponin-I, Qt. 0.24 (H) <0.01 ng/mL   METABOLIC PANEL, COMPREHENSIVE    Collection Time: 07/04/19  4:10 AM   Result Value Ref Range    Sodium 139 136 - 145 mmol/L    Potassium 4.0 3.5 - 5.1 mmol/L    Chloride 105 97 - 108 mmol/L    CO2 27 21 - 32 mmol/L    Anion gap 7 5 - 15 mmol/L    Glucose 97 65 - 100 mg/dL    BUN 26 (H) 6 - 20 MG/DL    Creatinine 1.54 (H) 0.70 - 1.30 MG/DL    BUN/Creatinine ratio 17 12 - 20      GFR est AA 51 (L) >60 ml/min/1.73m2    GFR est non-AA 42 (L) >60 ml/min/1.73m2    Calcium 8.8 8.5 - 10.1 MG/DL    Bilirubin, total 0.9 0.2 - 1.0 MG/DL    ALT (SGPT) 23 12 - 78 U/L    AST (SGOT) 30 15 - 37 U/L    Alk. phosphatase 95 45 - 117 U/L    Protein, total 6.6 6.4 - 8.2 g/dL    Albumin 3.5 3.5 - 5.0 g/dL    Globulin 3.1 2.0 - 4.0 g/dL    A-G Ratio 1.1 1.1 - 2.2     MAGNESIUM    Collection Time: 07/04/19  4:10 AM   Result Value Ref Range    Magnesium 2.4 1.6 - 2.4 mg/dL   CBC WITH AUTOMATED DIFF    Collection Time: 07/04/19  4:10 AM   Result Value Ref Range    WBC 8.2 4.1 - 11.1 K/uL    RBC 4.44 4.10 - 5.70 M/uL    HGB 13.6 12.1 - 17.0 g/dL    HCT 41.6 36.6 - 50.3 %    MCV 93.7 80.0 - 99.0 FL    MCH 30.6 26.0 - 34.0 PG    MCHC 32.7 30.0 - 36.5 g/dL    RDW 13.8 11.5 - 14.5 %    PLATELET 843 (L) 327 - 400 K/uL    MPV 10.1 8.9 - 12.9 FL    NRBC 0.0 0  WBC    ABSOLUTE NRBC 0.00 0.00 - 0.01 K/uL    NEUTROPHILS 66 32 - 75 %    LYMPHOCYTES 18 12 - 49 %    MONOCYTES 11 5 - 13 %    EOSINOPHILS 3 0 - 7 %    BASOPHILS 0 0 - 1 %    IMMATURE GRANULOCYTES 1 (H) 0.0 - 0.5 %    ABS. NEUTROPHILS 5.4 1.8 - 8.0 K/UL    ABS. LYMPHOCYTES 1.5 0.8 - 3.5 K/UL    ABS. MONOCYTES 0.9 0.0 - 1.0 K/UL    ABS. EOSINOPHILS 0.3 0.0 - 0.4 K/UL    ABS. BASOPHILS 0.0 0.0 - 0.1 K/UL    ABS. IMM. GRANS. 0.1 (H) 0.00 - 0.04 K/UL    DF AUTOMATED     SAMPLES BEING HELD    Collection Time: 07/04/19  4:10 AM   Result Value Ref Range    SAMPLES BEING HELD 1sst     COMMENT        Add-on orders for these samples will be processed based on acceptable specimen integrity and analyte stability, which may vary by analyte. TROPONIN I    Collection Time: 07/04/19  4:10 AM   Result Value Ref Range    Troponin-I, Qt. 0.39 (H) <0.05 ng/mL       Medications reviewed  Current Facility-Administered Medications   Medication Dose Route Frequency    sodium chloride (NS) flush 5-40 mL  5-40 mL IntraVENous Q8H    sodium chloride (NS) flush 5-40 mL  5-40 mL IntraVENous PRN    acetaminophen (TYLENOL) tablet 650 mg  650 mg Oral Q4H PRN    morphine injection 2 mg  2 mg IntraVENous Q4H PRN    apixaban (ELIQUIS) tablet 2.5 mg  2.5 mg Oral BID    furosemide (LASIX) tablet 10 mg  10 mg Oral DAILY    levothyroxine (SYNTHROID) tablet 50 mcg  50 mcg Oral ACB    pantoprazole (PROTONIX) tablet 40 mg  40 mg Oral ACB    simvastatin (ZOCOR) tablet 20 mg  20 mg Oral Q MON, WED & FRI    tamsulosin (FLOMAX) capsule 0.4 mg  0.4 mg Oral QHS    ondansetron (ZOFRAN) injection 4 mg  4 mg IntraVENous Q4H PRN    metoprolol tartrate (LOPRESSOR) tablet 50 mg  50 mg Oral BID    aspirin chewable tablet 81 mg  81 mg Oral DAILY    isosorbide mononitrate ER (IMDUR) tablet 60 mg  60 mg Oral 7am    levalbuterol (XOPENEX) nebulizer soln 1.25 mg/3 mL  1.25 mg Nebulization Q4H PRN    hydrALAZINE (APRESOLINE) 20 mg/mL injection 10 mg  10 mg IntraVENous Q6H PRN       Care Plan discussed with: Patient/Family/Cards    Total time spent with patient: 30 minutes.     Gennaro Lu MD

## 2019-07-05 ENCOUNTER — APPOINTMENT (OUTPATIENT)
Dept: NON INVASIVE DIAGNOSTICS | Age: 84
End: 2019-07-05
Attending: NURSE PRACTITIONER
Payer: MEDICARE

## 2019-07-05 VITALS
HEART RATE: 65 BPM | SYSTOLIC BLOOD PRESSURE: 145 MMHG | RESPIRATION RATE: 18 BRPM | TEMPERATURE: 98 F | BODY MASS INDEX: 28.79 KG/M2 | OXYGEN SATURATION: 96 % | HEIGHT: 68 IN | WEIGHT: 190 LBS | DIASTOLIC BLOOD PRESSURE: 78 MMHG

## 2019-07-05 LAB
ALBUMIN SERPL-MCNC: 3.1 G/DL (ref 3.5–5)
ALBUMIN/GLOB SERPL: 1.1 {RATIO} (ref 1.1–2.2)
ALP SERPL-CCNC: 83 U/L (ref 45–117)
ALT SERPL-CCNC: 22 U/L (ref 12–78)
ANION GAP SERPL CALC-SCNC: 6 MMOL/L (ref 5–15)
AST SERPL-CCNC: 27 U/L (ref 15–37)
BASOPHILS # BLD: 0 K/UL (ref 0–0.1)
BASOPHILS NFR BLD: 0 % (ref 0–1)
BILIRUB SERPL-MCNC: 0.7 MG/DL (ref 0.2–1)
BUN SERPL-MCNC: 28 MG/DL (ref 6–20)
BUN/CREAT SERPL: 17 (ref 12–20)
CALCIUM SERPL-MCNC: 8.4 MG/DL (ref 8.5–10.1)
CHLORIDE SERPL-SCNC: 106 MMOL/L (ref 97–108)
CO2 SERPL-SCNC: 27 MMOL/L (ref 21–32)
CREAT SERPL-MCNC: 1.61 MG/DL (ref 0.7–1.3)
DIFFERENTIAL METHOD BLD: ABNORMAL
ECHO AO ROOT DIAM: 3.66 CM
ECHO AV AREA PEAK VELOCITY: 2.3 CM2
ECHO AV PEAK GRADIENT: 4 MMHG
ECHO AV PEAK VELOCITY: 100.09 CM/S
ECHO AV REGURGITANT PHT: 1051.8 CM
ECHO LA MAJOR AXIS: 3.46 CM
ECHO LA TO AORTIC ROOT RATIO: 0.95
ECHO LA VOL 2C: 56.94 ML (ref 18–58)
ECHO LA VOL 4C: 73.72 ML (ref 18–58)
ECHO LA VOL BP: 71.79 ML (ref 18–58)
ECHO LA VOL/BSA BIPLANE: 35.9 ML/M2 (ref 16–28)
ECHO LA VOLUME INDEX A2C: 28.47 ML/M2 (ref 16–28)
ECHO LA VOLUME INDEX A4C: 36.86 ML/M2 (ref 16–28)
ECHO LV INTERNAL DIMENSION DIASTOLIC: 4.07 CM (ref 4.2–5.9)
ECHO LV INTERNAL DIMENSION SYSTOLIC: 3.03 CM
ECHO LV IVSD: 1.06 CM (ref 0.6–1)
ECHO LV MASS 2D: 135.7 G (ref 88–224)
ECHO LV MASS INDEX 2D: 67.9 G/M2 (ref 49–115)
ECHO LV POSTERIOR WALL DIASTOLIC: 0.83 CM (ref 0.6–1)
ECHO LVOT DIAM: 2.01 CM
ECHO LVOT PEAK GRADIENT: 2.1 MMHG
ECHO LVOT PEAK VELOCITY: 72.41 CM/S
ECHO MV A VELOCITY: 27.95 CM/S
ECHO MV E DECELERATION TIME (DT): 163.8 MS
ECHO MV E VELOCITY: 106.33 CM/S
ECHO MV E/A RATIO: 3.8
ECHO MV REGURGITANT PEAK GRADIENT: 63.4 MMHG
ECHO MV REGURGITANT PEAK VELOCITY: 398.27 CM/S
ECHO PV MAX VELOCITY: 46.73 CM/S
ECHO PV PEAK GRADIENT: 0.9 MMHG
ECHO RV INTERNAL DIMENSION: 4.1 CM
ECHO TV REGURGITANT MAX VELOCITY: 274.12 CM/S
ECHO TV REGURGITANT PEAK GRADIENT: 30.1 MMHG
EOSINOPHIL # BLD: 0.3 K/UL (ref 0–0.4)
EOSINOPHIL NFR BLD: 4 % (ref 0–7)
ERYTHROCYTE [DISTWIDTH] IN BLOOD BY AUTOMATED COUNT: 13.7 % (ref 11.5–14.5)
GLOBULIN SER CALC-MCNC: 2.7 G/DL (ref 2–4)
GLUCOSE SERPL-MCNC: 104 MG/DL (ref 65–100)
HCT VFR BLD AUTO: 38.5 % (ref 36.6–50.3)
HGB BLD-MCNC: 12.5 G/DL (ref 12.1–17)
IMM GRANULOCYTES # BLD AUTO: 0 K/UL (ref 0–0.04)
IMM GRANULOCYTES NFR BLD AUTO: 0 % (ref 0–0.5)
LYMPHOCYTES # BLD: 1.3 K/UL (ref 0.8–3.5)
LYMPHOCYTES NFR BLD: 17 % (ref 12–49)
MAGNESIUM SERPL-MCNC: 2.1 MG/DL (ref 1.6–2.4)
MCH RBC QN AUTO: 30.2 PG (ref 26–34)
MCHC RBC AUTO-ENTMCNC: 32.5 G/DL (ref 30–36.5)
MCV RBC AUTO: 93 FL (ref 80–99)
MONOCYTES # BLD: 0.8 K/UL (ref 0–1)
MONOCYTES NFR BLD: 11 % (ref 5–13)
NEUTS SEG # BLD: 5.2 K/UL (ref 1.8–8)
NEUTS SEG NFR BLD: 68 % (ref 32–75)
NRBC # BLD: 0 K/UL (ref 0–0.01)
NRBC BLD-RTO: 0 PER 100 WBC
PISA AR MAX VEL: 338.65 CM/S
PLATELET # BLD AUTO: 133 K/UL (ref 150–400)
PMV BLD AUTO: 10.1 FL (ref 8.9–12.9)
POTASSIUM SERPL-SCNC: 4 MMOL/L (ref 3.5–5.1)
PROT SERPL-MCNC: 5.8 G/DL (ref 6.4–8.2)
RBC # BLD AUTO: 4.14 M/UL (ref 4.1–5.7)
SODIUM SERPL-SCNC: 139 MMOL/L (ref 136–145)
WBC # BLD AUTO: 7.7 K/UL (ref 4.1–11.1)

## 2019-07-05 PROCEDURE — 85025 COMPLETE CBC W/AUTO DIFF WBC: CPT

## 2019-07-05 PROCEDURE — 74011250637 HC RX REV CODE- 250/637: Performed by: STUDENT IN AN ORGANIZED HEALTH CARE EDUCATION/TRAINING PROGRAM

## 2019-07-05 PROCEDURE — C8929 TTE W OR WO FOL WCON,DOPPLER: HCPCS

## 2019-07-05 PROCEDURE — 99218 HC RM OBSERVATION: CPT

## 2019-07-05 PROCEDURE — 94760 N-INVAS EAR/PLS OXIMETRY 1: CPT

## 2019-07-05 PROCEDURE — 83735 ASSAY OF MAGNESIUM: CPT

## 2019-07-05 PROCEDURE — 74011250637 HC RX REV CODE- 250/637: Performed by: NURSE PRACTITIONER

## 2019-07-05 PROCEDURE — 36415 COLL VENOUS BLD VENIPUNCTURE: CPT

## 2019-07-05 PROCEDURE — 80053 COMPREHEN METABOLIC PANEL: CPT

## 2019-07-05 PROCEDURE — 74011250636 HC RX REV CODE- 250/636: Performed by: FAMILY MEDICINE

## 2019-07-05 PROCEDURE — 74011250637 HC RX REV CODE- 250/637: Performed by: INTERNAL MEDICINE

## 2019-07-05 RX ORDER — METOPROLOL TARTRATE 50 MG/1
50 TABLET ORAL 2 TIMES DAILY
Qty: 60 TAB | Refills: 0 | Status: SHIPPED | OUTPATIENT
Start: 2019-07-05 | End: 2019-07-17 | Stop reason: SDUPTHER

## 2019-07-05 RX ORDER — NITROGLYCERIN 0.4 MG/1
0.4 TABLET SUBLINGUAL
Qty: 1 BOTTLE | Refills: 1 | Status: SHIPPED | OUTPATIENT
Start: 2019-07-05 | End: 2021-03-23

## 2019-07-05 RX ADMIN — FUROSEMIDE 10 MG: 20 TABLET ORAL at 08:51

## 2019-07-05 RX ADMIN — LEVOTHYROXINE SODIUM 50 MCG: 50 TABLET ORAL at 06:39

## 2019-07-05 RX ADMIN — Medication 10 ML: at 06:42

## 2019-07-05 RX ADMIN — APIXABAN 2.5 MG: 2.5 TABLET, FILM COATED ORAL at 08:51

## 2019-07-05 RX ADMIN — PERFLUTREN 2 ML: 6.52 INJECTION, SUSPENSION INTRAVENOUS at 08:54

## 2019-07-05 RX ADMIN — METOPROLOL TARTRATE 50 MG: 50 TABLET ORAL at 08:51

## 2019-07-05 RX ADMIN — ISOSORBIDE MONONITRATE 60 MG: 30 TABLET, EXTENDED RELEASE ORAL at 06:39

## 2019-07-05 RX ADMIN — PANTOPRAZOLE SODIUM 40 MG: 40 TABLET, DELAYED RELEASE ORAL at 06:39

## 2019-07-05 NOTE — PROGRESS NOTES
0730Bedside and Verbal shift change report given to [address] (oncoming nurse) by Geovanna Bell (offgoing nurse). Report included the following information SBAR, Kardex, [facility], Recent Results and Cardiac Rhythm Paced.

## 2019-07-05 NOTE — PROGRESS NOTES
Bedside and Verbal shift change report given to Iftikhar Jarrett RN (oncoming nurse) by Rubi Hurd (offgoing nurse). Report included the following information SBAR, Kardex, ED Summary, Intake/Output, Recent Results, Med Rec Status and Cardiac Rhythm V-Paced. 1945: Recommend pt to move to room 319 for ease of access to bathroom with walker and safety precautions. Pt willing to move to another room. Notified family members of pt move to room 319. Bedside and Verbal shift change report given to Chaya Chritsensen RN (oncoming nurse) by Kaleigh Beasley (offgoing nurse). Report included the following information SBAR, Kardex, ED Summary, Intake/Output, Recent Results, Med Rec Status and Cardiac Rhythm V-Paced.

## 2019-07-05 NOTE — CARDIO/PULMONARY
Cardiac Rehab: 81 yo male admitted with Chest Pain (7/3). Per Dr Laxmi Mckeon, dx includes: NSTEMI, with peak troponin 0.39. LVEF 56-60% by echo(7/5/19) with grade 3 DD. Cardiac hx includes: AFlutter, CTI ablation, and PPM. Plan for med mgmt due to advanced age & underlying CKD. Electrophysiologist is Dr Rai Mark. Pt also receives medical care through [facility], at [facility]. Met with Beti Bobby prior to his discharge from [facility]. Pt is hard of hearing. His daughter Eula Guadarrama) was also present. Pt reported he resides alone at [facility] senior apartment [place] & is a retired . He still drives. However, his drivers license is up for renewal, and he was unsure if his license would be renewed. Discussed pt's understanding of his cardiac condition and tx plan. Pt was aware he had a heart attack & medical mgmt has been recommended. Reported hx of stents about about 20 years ago. Provided MI education folder. Reviewed benefits of outpatient cardiac rehab program. Explained closest program would be at [facility], near [facility]. Pt reported he has been participating in outpatient [employer] therapy twice a week, to improve his balance & strength. Stated, \"I'm not sure how much it's helping me. \" Pt ambulates with a rolling walker or cane. Has been riding an exercise bike for 10 min at [facility] program. Upper extremity tremors were observed. 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 Pt was unsure if he would be able to participate in cardiac rehab. Encouraged pt to discuss cardiac rehab with Dr Laxmi Mckeon during f/u appt on 7/17/19. Pt & dgt verbalized understanding of info provided. Recommend reinforcement with pt due to his hearing impairment. All questions were answered.

## 2019-07-05 NOTE — DISCHARGE INSTRUCTIONS
Patient Discharge Instructions    Jarett Slider / [id number] : 1922    Admitted 7/3/2019 Discharged: 2019 10:08 AM     ACUTE DIAGNOSES:  Chest pain [R07.9]    CHRONIC MEDICAL DIAGNOSES:  Problem List as of 2019 Date Reviewed: 7/3/2019          Codes Class Noted - Resolved    * (Principal) Chest pain ICD-10-CM: R07.9  ICD-9-CM: 786.50  7/3/2019 - Present        Sepsis ([facility] ICD-10-CM: A41.9  ICD-9-CM: 038.9, 995.91  2019 - Present        Chronic obstructive pulmonary disease ([facility] ICD-10-CM: J44.9  ICD-9-CM: 684  2019 - Present        Hyperlipidemia ICD-10-CM: E78.5  ICD-9-CM: 272.4  2019 - Present        Essential hypertension ICD-10-CM: I10  ICD-9-CM: 401.9  2019 - Present        Pacemaker ICD-10-CM: Z95.0  ICD-9-CM: V45.01  2019 - Present        Fever ICD-10-CM: R50.9  ICD-9-CM: 780.60  2019 - Present        Leukocytosis ICD-10-CM: D72.829  ICD-9-CM: 288.60  2019 - Present        CKD (chronic kidney disease) stage 3, GFR 30-59 ml/min ([facility]) ICD-10-CM: N18.3  ICD-9-CM: 585.3  2019 - Present        Cough ICD-10-CM: R05  ICD-9-CM: 786.2  2019 - Present        Dyspnea ICD-10-CM: R06.00  ICD-9-CM: 786.09  2019 - Present        Atrial fibrillation ([facility] ICD-10-CM: I48.91  ICD-9-CM: 427.31  2016 - Present        Encounter to establish care ICD-10-CM: Z76.89  ICD-9-CM: V65.8  2015 - Present              DISCHARGE MEDICATIONS:         · It is important that you take the medication exactly as they are prescribed. · Keep your medication in the bottles provided by the pharmacist and keep a list of the medication names, dosages, and times to be taken in your wallet. · Do not take other medications without consulting your doctor.        DIET:  Low fat, Low cholesterol    ACTIVITY: Activity as tolerated    ADDITIONAL INFORMATION: If you experience any of the following symptoms then please call your primary care physician or return to the emergency room if you cannot get hold of your doctor: Fever, chills, nausea, vomiting, diarrhea, change in mentation, falling, bleeding, shortness of breath. FOLLOW UP CARE:  Dr. Arik Tejada MD  you are to call and set up an appointment to see them with in 1 week. Follow-up with specialists at directed by them    Future Appointments   Date Time Provider Luann Liv   7/17/2019  9:20 AM Felicity Antonio T, DO [facility] SCH[facility]   9/25/2019 11:45 AM PACEMAKER, STFRANCES [facility]             Information obtained by :  I understand that if any problems occur once I am at home I am to contact my physician. I understand and acknowledge receipt of the instructions indicated above. [de-identified] or R.N.'s Signature                                                                  Date/Time                                                                                                                                              Patient or Representative Signature                                                          Date/Time        Patient Education        Heart Attack: Care Instructions  Your Care Instructions    A heart attack (myocardial infarction, or MI) occurs when one or more of the coronary arteries, which supply the heart with oxygen-rich blood, is blocked. A blockage usually occurs when plaque inside the artery breaks open and a blood clot forms in the artery. After a heart attack, you may be worried about your future. Over the next several weeks, your heart will start to heal. Though it can be hard to break old habits, you can prevent another heart attack by making some lifestyle changes and by taking medicines. You may use this information for ideas about what to do at home to speed your recovery. Follow-up care is a key part of your treatment and safety.  Be sure to make and go to all appointments, and call your doctor if you are having problems. It's also a good idea to know your test results and keep a list of the medicines you take. How can you care for yourself at home? Activity    · Until your doctor says it is okay, do not do strenuous exercise. And do not lift, pull, or push anything heavy. Ask your doctor what types of activities are safe for you.     · If your doctor has not set you up with a cardiac rehabilitation (rehab) program, talk to him or her about whether that is right for you. Cardiac rehab includes supervised exercise. It also includes help with diet and lifestyle changes and emotional support. It may reduce your risk of future heart problems.     · Increase your activities slowly. Take short rest breaks when you get tired.     · If your doctor recommends it, get more exercise. Walking is a good choice. Bit by bit, increase the amount you walk every day. Try for at least 30 minutes on most days of the week. You also may want to swim, bike, or do other activities. Talk with your doctor before you start an exercise program to make sure it is safe for you.     · Ask your doctor when you can drive, go back to work, and do other daily activities again.     · You can have sex as soon as you feel ready for it. Often this means when you can easily walk around or climb stairs. Talk with your doctor if you have any concerns. If you are taking nitroglycerin, do not take erection-enhancing medicine such as sildenafil (Viagra), tadalafil (Cialis), or vardenafil (Levitra) .    Lifestyle changes    · Do not smoke. Smoking increases your risk of another heart attack. If you need help quitting, talk to your doctor about stop-smoking programs and medicines. These can increase your chances of quitting for good.     · Eat a heart-healthy diet that is low in saturated fat and salt, and is full of fruits, vegetables and whole-grains.  Eat at least two servings of fish each week. You may get more details about how to eat healthy. But these tips can help you get started.     · Stay at a healthy weight, or lose weight if you need to. Medicines    · Be safe with medicines. Take your medicines exactly as prescribed. Call your doctor if you think you are having a problem with your medicine. You will get more details on the specific medicines your doctor prescribes. Do not stop taking your medicine unless your doctor tells you to. Not taking your medicine might raise your risk of having another heart attack.     · You may need several medicines to help lower your risk of another heart attack. These include:  ? Blood pressure medicines such as angiotensin-converting enzyme (ACE) inhibitors, ARBs (angiotensin II receptor blockers), and beta-blockers. ? Cholesterol medicine called statins. ? Aspirin and other blood thinners. These prevent blood clots that can cause a heart attack.     · If your doctor has given you nitroglycerin, keep it with you at all times. If you have angina symptoms, such as chest pain or pressure, sit down and rest. Take the first dose of nitroglycerin as directed. If symptoms get worse or are not getting better within 5 minutes, call 911 right away. Stay on the phone. The emergency  will tell you what to do.     · Do not take any over-the-counter medicines, vitamins, or herbal products without talking to your doctor first.    Staying healthy    · Manage other health conditions such as high blood pressure and diabetes.     · Avoid colds and flu. Get a pneumococcal vaccine shot. If you have had one before, ask your doctor whether you need another dose. Get the flu vaccine every year. If you must be around people with colds or flu, wash your hands often.     · Be sure to tell your doctor about any angina symptoms you have had, even if they went away. Pay attention to your angina symptoms. Know what is typical for you and learn how to control it.  Know when to call for help.     · Talk to your family, friends, or a counselor about your feelings. It is normal to feel frightened, angry, hopeless, helpless, and even guilty. Talking openly about bad feelings can help you cope. If you have symptoms of depression, talk to your doctor. When should you call for help? Call 911 anytime you think you may need emergency care. For example, call if:    · You have symptoms of a heart attack. These may include:  ? Chest pain or pressure, or a strange feeling in the chest.  ? Sweating. ? Shortness of breath. ? Nausea or vomiting. ? Pain, pressure, or a strange feeling in the back, neck, jaw, or upper belly or in one or both shoulders or arms. ? Lightheadedness or sudden weakness. ? A fast or irregular heartbeat. After you call 911, the  may tell you to chew 1 adult-strength or 2 to 4 low-dose aspirin. Wait for an ambulance. Do not try to drive yourself.     · You have angina symptoms (such as chest pain or pressure) that do not go away with rest or are not getting better within 5 minutes after you take a dose of nitroglycerin.     · You passed out (lost consciousness).     · You feel like you are having another heart attack.    Call your doctor now or seek immediate medical care if:    · You are having angina symptoms, such as chest pain or pressure, more often than usual, or the symptoms are different or worse than usual.     · You have new or increased shortness of breath.     · You are dizzy or lightheaded, or you feel like you may faint.    Watch closely for changes in your health, and be sure to contact your doctor if you have any problems. Where can you learn more? Go to http://lakhwinder-demarco.info/. Enter [id number] in the search box to learn more about \"Heart Attack: Care Instructions. \"  Current as of: July 22, 2018  Content Version: 11.9  © 0951-5497 [employer].  Care instructions adapted under license by [employer] (which disclaims liability or warranty for this information). If you have questions about a medical condition or this instruction, always ask your healthcare professional. [address] any warranty or liability for your use of this information.

## 2019-07-05 NOTE — PROGRESS NOTES
Discharge order written. All Care Plans and Education are complete. Dr. Louann Be has provided RX for metoprolol to [address] [place] [address]. Provided a Medication and Side Effects Guide for patient education.

## 2019-07-05 NOTE — PROGRESS NOTES
Pharmacist Discharge Medication Reconciliation    Discharge Provider:  Dr. Ashley Rivas       Discharge Medications:      My Medications        CHANGE how you take these medications        Instructions Each Dose to Equal Morning Noon Evening Bedtime   metoprolol tartrate 50 mg tablet  Commonly known as:  LOPRESSOR  What changed:    medication strength  how much to take    Your last dose was: Your next dose is: Take 1 Tab by mouth two (2) times a day. 50 mg                        CONTINUE taking these medications        Instructions Each Dose to Equal Morning Noon Evening Bedtime   furosemide 20 mg tablet  Commonly known as:  LASIX    Your last dose was: Your next dose is: Take 10 mg by mouth daily. 10 mg                 ipratropium 0.03 % nasal spray  Commonly known as:  ATROVENT    Your last dose was: Your next dose is: 2 Sprays by Both Nostrils route two (2) times daily as needed for Rhinitis. 2 Spray                 isosorbide mononitrate ER 60 mg CR tablet  Commonly known as:  IMDUR    Your last dose was: Your next dose is: Take 60 mg by mouth every morning. 60 mg                 LEVOTHROID 50 mcg tablet  Generic drug:  levothyroxine    Your last dose was: Your next dose is: Take 50 mcg by mouth Daily (before breakfast). 50 mcg                 MEN'S DAILY MULTIVIT-MINERAL 0.4-600 mg-mcg Tab  Generic drug:  Multivits with Min-FA-Lycopene    Your last dose was: Your next dose is: Take 1 Tab by mouth daily. 1 Tab                 metroNIDAZOLE 0.75 % topical cream  Commonly known as:  METROCREAM    Your last dose was: Your next dose is:          Apply  to affected area nightly. Use a thin layer to affected areas after washing                  mometasone 50 mcg/actuation nasal spray  Commonly known as:  NASONEX    Your last dose was: Your next dose is: 2 Sprays daily.    2 Spray                 nitroglycerin 0.4 mg SL tablet  Commonly known as:  NITROSTAT    Your last dose was: Your next dose is:          0.4 mg by SubLINGual route every five (5) minutes as needed for Chest Pain. 0.4 mg                 omeprazole 20 mg capsule  Commonly known as:  PRILOSEC    Your last dose was: Your next dose is: Take 20 mg by mouth daily. 20 mg                 PRESERVISION AREDS 2 (OMEGA-3) PO    Your last dose was: Your next dose is: Take 1 Cap by mouth two (2) times a day. 1 Cap                 simvastatin 40 mg tablet  Commonly known as:  ZOCOR    Your last dose was: Your next dose is: Take 20 mg by mouth every Monday, Wednesday, Friday. Patient takes at night   20 mg                 tamsulosin 0.4 mg capsule  Commonly known as:  FLOMAX    Your last dose was: Your next dose is: Take 0.4 mg by mouth nightly. 0.4 mg                        STOP taking these medications      celecoxib 200 mg capsule  Commonly known as:  CELEBREX        hydroCHLOROthiazide 25 mg tablet  Commonly known as:  HYDRODIURIL               ASK your doctor about these medications        Instructions Each Dose to Equal Morning Noon Evening Bedtime   apixaban 2.5 mg tablet  Commonly known as:  ELIQUIS    Your last dose was: Your next dose is: Take 1 Tab by mouth two (2) times a day.    2.5 mg                           Where to Get Your Medications        These medications were sent to [address] [place] [address], [facility]      Phone:  [phone]   metoprolol tartrate 50 mg tablet        The patient's chart, MAR, and AVS were reviewed by   Salinas Zuniga, [address]: [phone]

## 2019-07-05 NOTE — PROGRESS NOTES
Daily Progress Note and Discharge Note: 7/5/2019  Gloria Guan MD    Assessment/Plan:   NSTEMI/Chest pain (7/3/2019)  -- continue ASA daily  --Hx afib - continue eliquis  -- troponin elevated --> Cards has decided on medical management  --ECHO pending  --cards has seen and adjusted meds     Chronic atrial fibrillation ([address] (2/17/2016):  Prior hx of ablation and pacemaker. -- continue apixaban     Essential hypertension (1/27/2019):  BP improved. -- continue imdur, lasix  -- off HCTZ     CKD (chronic kidney disease) stage 3, GFR 30-59 ml/min ([facility]) (1/27/2019):  Creatinine similar to prior. -- stop celebrex for now - decide on cont outpt  -- monitor Cr outpt     Chronic obstructive pulmonary disease:  Not wheezing. Reports chronic mild dyspnea unchanged from baseline. -- outpt tx     Hyperlipidemia (1/27/2019):  -- continue simvastatin       Subjective:   80 y.o.  male who is admitted with Chest pain. Mr. Stan Her presented to the Emergency Department today complaining of chest pain. Started at about 11 am.  Was sitting in chair typing email. Felt central chest pressure w/o radiation. Associated with mild diaphoresis. No n/v or sob. Reports chronic mild SOB that is unchanged from baseline. No fever, chills. Notified staff at facility and family. Took ASA and sl ntg with relief soon afterwards. No prior similar symptoms. No hx of MI, stroke, PAD, or DM. (Dr Ronnell Mandel)     7/4: He feels well. No complaints. No further CP. Chronic SOB, unchanged and not at rest.  No palpitations. Wants to eat. 7/5:  No further CP and wants to go home. He has been up in room without difficulty. Cards has decided no cath for now and medical management. Cards has cleared for DC today and has adjusted/changed his meds - stopped ASA/lasix instead of HCTZ. Stable for DC. F/U with Dr Gary Collins later this wk and with Abraham as they direct.       Review of Systems:   A comprehensive review of systems was negative except for that written in the HPI. Objective:   Physical Exam:   Visit Vitals  /81   Pulse 68   Temp 97.5 °F (36.4 °C)   Resp 18   Ht 5' 8\" (1.727 m)   Wt 86.2 kg (190 lb)   SpO2 95%   BMI 28.89 kg/m²      O2 Device: Room air  Temp (24hrs), Av.6 °F (36.4 °C), Min:97.2 °F (36.2 °C), Max:98 °F (36.7 °C)    No intake/output data recorded. 1901 -  07  In: -   Out: 0 [Urine:2150]    General:  Alert, cooperative, no distress, appears stated age. Head:  Normocephalic, without obvious abnormality, atraumatic. Eyes:  Conjunctivae/corneas clear. PERRL, EOMs intact. Nose: Nares normal. Septum midline. Mucosa normal. No drainage or sinus tenderness. Throat: Lips, mucosa, and tongue moist..   Neck: Supple, symmetrical, trachea midline, no adenopathy, thyroid: no enlargement/tenderness/nodules, no carotid bruit and no JVD. Lungs:   Clear to auscultation bilaterally. Chest wall:  No tenderness or deformity. Heart:  Regular rate and rhythm. 2/6 murmur. No click, rub or gallop. Abdomen:   Soft, non-tender. Bowel sounds normal. No masses,  No organomegaly. Extremities: Extremities normal, atraumatic, no cyanosis. Trace LE edema. No calf tenderness or cords. Pulses: 2+ and symmetric all extremities. Skin:  turgor normal.    Neurologic: CNII-XII intact. Alert and oriented X 3. Data Review:       Recent Days:  Recent Labs     19  0110 19  0410 19  1339   WBC 7.7 8.2 7.9   HGB 12.5 13.6 13.4   HCT 38.5 41.6 42.0   * 148* 135*     Recent Labs     19  0110 19  0410 19  1339    139 138   K 4.0 4.0 4.1    105 102   CO2 27 27 29   * 97 107*   BUN 28* 26* 26*   CREA 1.61* 1.54* 1.64*   CA 8.4* 8.8 8.7   MG 2.1 2.4  --    ALB 3.1* 3.5 3.6   SGOT 27 30 29   ALT 22 23 24   INR  --   --  1.1     No results for input(s): PH, PCO2, PO2, HCO3, FIO2 in the last 72 hours.     24 Hour Results:  Recent Results (from the past 24 hour(s))   TROPONIN I    Collection Time: 07/04/19 10:09 AM   Result Value Ref Range    Troponin-I, Qt. 0.36 (H) <0.05 ng/mL   TROPONIN I    Collection Time: 07/04/19  3:46 PM   Result Value Ref Range    Troponin-I, Qt. 0.25 (H) <0.05 ng/mL   TROPONIN I    Collection Time: 07/04/19 10:12 PM   Result Value Ref Range    Troponin-I, Qt. 0.20 (H) <0.05 ng/mL   CBC WITH AUTOMATED DIFF    Collection Time: 07/05/19  1:10 AM   Result Value Ref Range    WBC 7.7 4.1 - 11.1 K/uL    RBC 4.14 4.10 - 5.70 M/uL    HGB 12.5 12.1 - 17.0 g/dL    HCT 38.5 36.6 - 50.3 %    MCV 93.0 80.0 - 99.0 FL    MCH 30.2 26.0 - 34.0 PG    MCHC 32.5 30.0 - 36.5 g/dL    RDW 13.7 11.5 - 14.5 %    PLATELET 876 (L) 611 - 400 K/uL    MPV 10.1 8.9 - 12.9 FL    NRBC 0.0 0  WBC    ABSOLUTE NRBC 0.00 0.00 - 0.01 K/uL    NEUTROPHILS 68 32 - 75 %    LYMPHOCYTES 17 12 - 49 %    MONOCYTES 11 5 - 13 %    EOSINOPHILS 4 0 - 7 %    BASOPHILS 0 0 - 1 %    IMMATURE GRANULOCYTES 0 0.0 - 0.5 %    ABS. NEUTROPHILS 5.2 1.8 - 8.0 K/UL    ABS. LYMPHOCYTES 1.3 0.8 - 3.5 K/UL    ABS. MONOCYTES 0.8 0.0 - 1.0 K/UL    ABS. EOSINOPHILS 0.3 0.0 - 0.4 K/UL    ABS. BASOPHILS 0.0 0.0 - 0.1 K/UL    ABS. IMM. GRANS. 0.0 0.00 - 0.04 K/UL    DF AUTOMATED     METABOLIC PANEL, COMPREHENSIVE    Collection Time: 07/05/19  1:10 AM   Result Value Ref Range    Sodium 139 136 - 145 mmol/L    Potassium 4.0 3.5 - 5.1 mmol/L    Chloride 106 97 - 108 mmol/L    CO2 27 21 - 32 mmol/L    Anion gap 6 5 - 15 mmol/L    Glucose 104 (H) 65 - 100 mg/dL    BUN 28 (H) 6 - 20 MG/DL    Creatinine 1.61 (H) 0.70 - 1.30 MG/DL    BUN/Creatinine ratio 17 12 - 20      GFR est AA 48 (L) >60 ml/min/1.73m2    GFR est non-AA 40 (L) >60 ml/min/1.73m2    Calcium 8.4 (L) 8.5 - 10.1 MG/DL    Bilirubin, total 0.7 0.2 - 1.0 MG/DL    ALT (SGPT) 22 12 - 78 U/L    AST (SGOT) 27 15 - 37 U/L    Alk.  phosphatase 83 45 - 117 U/L    Protein, total 5.8 (L) 6.4 - 8.2 g/dL    Albumin 3.1 (L) 3.5 - 5.0 g/dL Globulin 2.7 2.0 - 4.0 g/dL    A-G Ratio 1.1 1.1 - 2.2     MAGNESIUM    Collection Time: 07/05/19  1:10 AM   Result Value Ref Range    Magnesium 2.1 1.6 - 2.4 mg/dL       Medications reviewed  Current Facility-Administered Medications   Medication Dose Route Frequency    apixaban (ELIQUIS) tablet 2.5 mg  2.5 mg Oral BID    docusate sodium (COLACE) capsule 100 mg  100 mg Oral BID PRN    sodium chloride (NS) flush 5-40 mL  5-40 mL IntraVENous Q8H    sodium chloride (NS) flush 5-40 mL  5-40 mL IntraVENous PRN    acetaminophen (TYLENOL) tablet 650 mg  650 mg Oral Q4H PRN    morphine injection 2 mg  2 mg IntraVENous Q4H PRN    furosemide (LASIX) tablet 10 mg  10 mg Oral DAILY    levothyroxine (SYNTHROID) tablet 50 mcg  50 mcg Oral ACB    pantoprazole (PROTONIX) tablet 40 mg  40 mg Oral ACB    simvastatin (ZOCOR) tablet 20 mg  20 mg Oral Q MON, WED & FRI    tamsulosin (FLOMAX) capsule 0.4 mg  0.4 mg Oral QHS    ondansetron (ZOFRAN) injection 4 mg  4 mg IntraVENous Q4H PRN    metoprolol tartrate (LOPRESSOR) tablet 50 mg  50 mg Oral BID    isosorbide mononitrate ER (IMDUR) tablet 60 mg  60 mg Oral 7am    levalbuterol (XOPENEX) nebulizer soln 1.25 mg/3 mL  1.25 mg Nebulization Q4H PRN    hydrALAZINE (APRESOLINE) 20 mg/mL injection 10 mg  10 mg IntraVENous Q6H PRN       Care Plan discussed with: Patient/Family/Cards  Total time spent with patient: 30 minutes.   Milagros Mojica MD

## 2019-07-05 NOTE — DISCHARGE SUMMARY
Physician Discharge Summary     Patient ID:    Mg Luis  [id number]  77 y.o.  7/31/1922  Nancy Heller MD    Admit date: 7/3/2019  Discharge date and time: 7/5/2019  Admission Diagnoses: Chest pain [R07.9]    Chronic Diagnoses:    Problem List as of 7/5/2019 Date Reviewed: 7/3/2019          Codes Class Noted - Resolved    * (Principal) Chest pain ICD-10-CM: R07.9  ICD-9-CM: 786.50  7/3/2019 - Present        Sepsis ([facility] ICD-10-CM: A41.9  ICD-9-CM: 038.9, 995.91  1/27/2019 - Present        Chronic obstructive pulmonary disease ([facility] ICD-10-CM: J44.9  ICD-9-CM: 822  1/27/2019 - Present        Hyperlipidemia ICD-10-CM: E78.5  ICD-9-CM: 272.4  1/27/2019 - Present        Essential hypertension ICD-10-CM: I10  ICD-9-CM: 401.9  1/27/2019 - Present        Pacemaker ICD-10-CM: Z95.0  ICD-9-CM: V45.01  1/27/2019 - Present        Fever ICD-10-CM: R50.9  ICD-9-CM: 780.60  1/27/2019 - Present        Leukocytosis ICD-10-CM: D72.829  ICD-9-CM: 288.60  1/27/2019 - Present        CKD (chronic kidney disease) stage 3, GFR 30-59 ml/min ([facility]) ICD-10-CM: N18.3  ICD-9-CM: 585.3  1/27/2019 - Present        Cough ICD-10-CM: R05  ICD-9-CM: 786.2  1/27/2019 - Present        Dyspnea ICD-10-CM: R06.00  ICD-9-CM: 786.09  1/27/2019 - Present        Atrial fibrillation ([facility] ICD-10-CM: I48.91  ICD-9-CM: 427.31  2/17/2016 - Present        Encounter to establish care ICD-10-CM: Z76.89  ICD-9-CM: V65.8  8/5/2015 - Present            Discharge Medications:   Current Discharge Medication List      CONTINUE these medications which have CHANGED    Details   metoprolol tartrate (LOPRESSOR) 50 mg tablet Take 1 Tab by mouth two (2) times a day. Qty: 60 Tab, Refills: 0         CONTINUE these medications which have NOT CHANGED    Details   isosorbide mononitrate ER (IMDUR) 60 mg CR tablet Take 60 mg by mouth every morning. antiox #8/om3/dha/epa/lut/zeax (PRESERVISION AREDS 2, OMEGA-3, PO) Take 1 Cap by mouth two (2) times a day. ipratropium (ATROVENT) 0.03 % nasal spray 2 Sprays by Both Nostrils route two (2) times daily as needed for Rhinitis. mometasone (NASONEX) 50 mcg/actuation nasal spray 2 Sprays daily. furosemide (LASIX) 20 mg tablet Take 10 mg by mouth daily. metroNIDAZOLE (METROCREAM) 0.75 % topical cream Apply  to affected area nightly. Use a thin layer to affected areas after washing      apixaban (ELIQUIS) 2.5 mg tablet Take 1 Tab by mouth two (2) times a day. Qty: 60 Tab, Refills: 6    Associated Diagnoses: Paroxysmal atrial fibrillation (HCC)      tamsulosin (FLOMAX) 0.4 mg capsule Take 0.4 mg by mouth nightly. levothyroxine (LEVOTHROID) 50 mcg tablet Take 50 mcg by mouth Daily (before breakfast). omeprazole (PRILOSEC) 20 mg capsule Take 20 mg by mouth daily. simvastatin (ZOCOR) 40 mg tablet Take 20 mg by mouth every Monday, Wednesday, Friday. Patient takes at night      nitroglycerin (NITROSTAT) 0.4 mg SL tablet 0.4 mg by SubLINGual route every five (5) minutes as needed for Chest Pain. Multivits with Min-FA-Lycopene (MEN'S DAILY MULTIVIT-MINERAL) 0.4-600 mg-mcg tab Take 1 Tab by mouth daily. STOP taking these medications       celecoxib (CELEBREX) 200 mg capsule Comments:   Reason for Stopping:         hydroCHLOROthiazide (HYDRODIURIL) 25 mg tablet Comments:   Reason for Stopping: Follow up Care:    1. Nkechi Nails MD with in 1 weeks  2.  specialists as directed. Diet:  Low fat, Low cholesterol  Disposition:  Home.   Advanced Directive:  Discharge Exam:  [See today's progress note.]  CONSULTATIONS: Cardiology    Significant Diagnostic Studies:   Recent Labs     07/05/19 0110 07/04/19  0410   WBC 7.7 8.2   HGB 12.5 13.6   HCT 38.5 41.6   * 148*     Recent Labs     07/05/19  0110 07/04/19  0410 07/03/19  1339    139 138   K 4.0 4.0 4.1    105 102   CO2 27 27 29   BUN 28* 26* 26*   CREA 1.61* 1.54* 1.64*   * 97 107*   CA 8.4* 8.8 8.7   MG 2.1 2.4  --      Recent Labs     07/05/19  0110 07/04/19  0410 07/03/19  1339   SGOT 27 30 29   ALT 22 23 24   AP 83 95 89   TBILI 0.7 0.9 0.8   TP 5.8* 6.6 6.7   ALB 3.1* 3.5 3.6   GLOB 2.7 3.1 3.1   LPSE  --   --  140     Recent Labs     07/03/19  1339   INR 1.1   PTP 10.8   APTT 29.1      Lab Results   Component Value Date/Time    TSH 3.74 01/27/2019 01:38 AM     Lab Results   Component Value Date/Time    Troponin-I, Qt. 0.20 (H) 07/04/2019 10:12 PM       HOSPITAL COURSE & TREATMENT RENDERED:   1. See today's progress note:    Daily Progress Note and Discharge Note: 7/5/2019  Gloria Lowry MD         Assessment/Plan:   NSTEMI/Chest pain (7/3/2019)  -- continue ASA daily  --Hx afib - continue eliquis  -- troponin elevated --> Cards has decided on medical management  --ECHO pending  --cards has seen and adjusted meds     Chronic atrial fibrillation ([facility] (2/17/2016):  Prior hx of ablation and pacemaker. -- continue apixaban     Essential hypertension (1/27/2019):  BP improved. -- continue imdur, lasix  -- off HCTZ     CKD (chronic kidney disease) stage 3, GFR 30-59 ml/min ([facility]) (1/27/2019):  Creatinine similar to prior. -- stop celebrex for now - decide on cont outpt  -- monitor Cr outpt     Chronic obstructive pulmonary disease:  Not wheezing.  Reports chronic mild dyspnea unchanged from baseline.   -- outpt tx     Hyperlipidemia (1/27/2019):  -- continue simvastatin         Subjective:   89 y.o.   male who is admitted with Chest pain.  Mr. King presented to the Emergency Department today complaining of chest pain.  Started at about 11 am.  Was sitting in chair typing email.  Felt central chest pressure w/o radiation.  Associated with mild diaphoresis.  No n/v or sob.  Reports chronic mild SOB that is unchanged from baseline.  No fever, chills.  Notified staff at facility and family. Nila Youngea ASA and sl ntg with relief soon afterwards.  No prior similar symptoms.  No hx of MI, stroke, PAD, or DM. (Dr Erica Kim)     : He feels well. No complaints. No further CP. Chronic SOB, unchanged and not at rest.  No palpitations. Wants to eat.     :  No further CP and wants to go home. He has been up in room without difficulty. Cards has decided no cath for now and medical management. Cards has cleared for DC today and has adjusted/changed his meds - stopped ASA/lasix instead of HCTZ. Stable for DC. F/U with Dr Tamala Gowers later this wk and with Cards as they direct.       Review of Systems:   A comprehensive review of systems was negative except for that written in the HPI.     Objective:   Physical Exam:   Visit Vitals  /81   Pulse 68   Temp 97.5 °F (36.4 °C)   Resp 18   Ht 5' 8\" (1.727 m)   Wt 86.2 kg (190 lb)   SpO2 95%   BMI 28.89 kg/m²      O2 Device: Room air  Temp (24hrs), Av.6 °F (36.4 °C), Min:97.2 °F (36.2 °C), Max:98 °F (36.7 °C)    No intake/output data recorded.  1901 -  0700  In: -   Out: 2150 [Urine:2150]     General:  Alert, cooperative, no distress, appears stated age. Head:  Normocephalic, without obvious abnormality, atraumatic. Eyes:  Conjunctivae/corneas clear. PERRL, EOMs intact. Nose: Nares normal. Septum midline. Mucosa normal. No drainage or sinus tenderness. Throat: Lips, mucosa, and tongue moist..   Neck: Supple, symmetrical, trachea midline, no adenopathy, thyroid: no enlargement/tenderness/nodules, no carotid bruit and no JVD. Lungs:   Clear to auscultation bilaterally. Chest wall:  No tenderness or deformity. Heart:  Regular rate and rhythm. 2/6 murmur. No click, rub or gallop. Abdomen:   Soft, non-tender. Bowel sounds normal. No masses,  No organomegaly. Extremities: Extremities normal, atraumatic, no cyanosis. Trace LE edema. No calf tenderness or cords. Pulses: 2+ and symmetric all extremities. Skin:  turgor normal.    Neurologic: CNII-XII intact. Alert and oriented X 3.          Signed:  Martín Caldwell, MD  7/5/2019  10:08 AM

## 2019-07-05 NOTE — PROGRESS NOTES
Cardiology Progress Note                             [address] [place] [address]. [address], [place], [address] [facility]                                 Phone [phone]; Fax [phone]        2019 8:39 AM     Admit Date:           7/3/2019  Admit Diagnosis:  Chest pain [R07.9]  :          1922   MRN:          [id number]   ASSESSMENT/RECOMMENDATION:   Nstemi: no further chest pain. Troponin minimally elevated at peak- 0.39   -with minimally elevated troponin recommend medical management   - would stop asa on d/c. Currently on DOAC for atiral dysrhythmias, in the setting of advanced age with underlying CKD, I feel that bleeding risk is too high for doac and asa.  -imdur/ BB/ statin  -p EF per echo , repeat echo pending. Supine hypertension with low daytime BP:  With tremor ? Early parkinsonian features. Would not treat supine bp that will result in daytime hypotension and increase probability of falls    CAD: meds as above     Hx of AF, s/p PPM: v pacing on telemetry  -continue eliquis     COPD    HLD    CKD:   Reviewed plan with the patient and his granddaughter Lyle Hernandez via telephone    Safe for d/c from CV standpoint, do not hold d/c for echo                        Last 3 Recorded Weights in this Encounter    19 1259 19 0455   Weight: 198 lb (89.8 kg) 190 lb 11.2 oz (86.5 kg)          1901 -  0700  In: -   Out: 2150 [Urine:2150]    SUBJECTIVE             No further CP, nocturnal hypertension noted.           Current Facility-Administered Medications   Medication Dose Route Frequency    apixaban (ELIQUIS) tablet 2.5 mg  2.5 mg Oral BID    docusate sodium (COLACE) capsule 100 mg  100 mg Oral BID PRN    sodium chloride (NS) flush 5-40 mL  5-40 mL IntraVENous Q8H    sodium chloride (NS) flush 5-40 mL  5-40 mL IntraVENous PRN    acetaminophen (TYLENOL) tablet 650 mg  650 mg Oral Q4H PRN    morphine injection 2 mg  2 mg IntraVENous Q4H PRN    furosemide (LASIX) tablet 10 mg  10 mg Oral DAILY    levothyroxine (SYNTHROID) tablet 50 mcg  50 mcg Oral ACB    pantoprazole (PROTONIX) tablet 40 mg  40 mg Oral ACB    simvastatin (ZOCOR) tablet 20 mg  20 mg Oral Q MON, WED & FRI    tamsulosin (FLOMAX) capsule 0.4 mg  0.4 mg Oral QHS    ondansetron (ZOFRAN) injection 4 mg  4 mg IntraVENous Q4H PRN    metoprolol tartrate (LOPRESSOR) tablet 50 mg  50 mg Oral BID    isosorbide mononitrate ER (IMDUR) tablet 60 mg  60 mg Oral 7am    levalbuterol (XOPENEX) nebulizer soln 1.25 mg/3 mL  1.25 mg Nebulization Q4H PRN    hydrALAZINE (APRESOLINE) 20 mg/mL injection 10 mg  10 mg IntraVENous Q6H PRN      OBJECTIVE               Intake/Output Summary (Last 24 hours) at 7/5/2019 0812  Last data filed at 7/5/2019 0456  Gross per 24 hour   Intake --   Output 1450 ml   Net -1450 ml       Review of Systems - History obtained from the patient AS PER  HPI    Telemetry     PHYSICAL EXAM        Visit Vitals  /81 (BP 1 Location: Right arm, BP Patient Position: At rest)   Pulse 68   Temp 97.5 °F (36.4 °C)   Resp 18   Ht 5' 8\" (1.727 m)   Wt 190 lb 11.2 oz (86.5 kg)   SpO2 95%   BMI 29.00 kg/m²       Gen: Well-developed, elderly, in no acute distress  alert and oriented x 3  HEENT:  Pink conjunctivae, Hearing grossly normal.No scleral icterus or conjunctival, moist mucous membranes  Neck: Supple,No JVD  Resp: No accessory muscle use, Clear breath sounds, No rales or rhonchi  Card: Regular Rate,Rythm, 2/6 murmurs, no rubs or gallop. No thrills. GI:          soft, non-tender   MSK: No cyanosis or clubbing, good capillary refill  Skin: No rashes or ulcers, no bruising.  Right sided ppm unremarkable   Neuro:  Cranial nerves are grossly intact, moving all four extremities, no focal deficit, follows commands appropriately  Psych:  Good insight, oriented to person, place and time, alert, Nml Affect  LE: trace edema       DATA REVIEW Laboratory and Imaging have been reviewed by me and are notable for  Recent Labs     07/04/19  2212 07/04/19  1546 07/04/19  1009   TROIQ 0.20* 0.25* 0.36*     Recent Labs     07/05/19  0110 07/04/19  0410 07/03/19  1339    139 138   K 4.0 4.0 4.1   CO2 27 27 29   BUN 28* 26* 26*   CREA 1.61* 1.54* 1.64*   * 97 107*   MG 2.1 2.4  --    WBC 7.7 8.2 7.9   HGB 12.5 13.6 13.4   HCT 38.5 41.6 42.0   * 148* 135*             Nicki Najera, DO

## 2019-07-05 NOTE — PROGRESS NOTES
PCP LAITH appt scheduled with Dr Mars GREENE on 7/10/2019 at 9:15a,m. name on date at time.  Appt added to AVS. LYNNETTE Kelly CM Specialist

## 2019-07-17 ENCOUNTER — TELEPHONE (OUTPATIENT)
Dept: CARDIOLOGY CLINIC | Age: 84
End: 2019-07-17

## 2019-07-17 ENCOUNTER — OFFICE VISIT (OUTPATIENT)
Dept: CARDIOLOGY CLINIC | Age: 84
End: 2019-07-17

## 2019-07-17 VITALS
HEIGHT: 68 IN | HEART RATE: 64 BPM | BODY MASS INDEX: 29.7 KG/M2 | SYSTOLIC BLOOD PRESSURE: 150 MMHG | DIASTOLIC BLOOD PRESSURE: 85 MMHG | WEIGHT: 196 LBS

## 2019-07-17 DIAGNOSIS — I10 ESSENTIAL HYPERTENSION: Primary | ICD-10-CM

## 2019-07-17 DIAGNOSIS — I48.91 ATRIAL FIBRILLATION, UNSPECIFIED TYPE (HCC): ICD-10-CM

## 2019-07-17 DIAGNOSIS — I21.4 NSTEMI (NON-ST ELEVATED MYOCARDIAL INFARCTION) (HCC): ICD-10-CM

## 2019-07-17 DIAGNOSIS — R53.83 FATIGUE, UNSPECIFIED TYPE: ICD-10-CM

## 2019-07-17 RX ORDER — AMLODIPINE BESYLATE 5 MG/1
5 TABLET ORAL DAILY
Qty: 30 TAB | Refills: 11 | Status: SHIPPED | OUTPATIENT
Start: 2019-07-17 | End: 2020-02-20 | Stop reason: SDUPTHER

## 2019-07-17 RX ORDER — AMLODIPINE BESYLATE 2.5 MG/1
5 TABLET ORAL DAILY
Qty: 30 TAB | Refills: 11 | Status: SHIPPED | OUTPATIENT
Start: 2019-07-17 | End: 2019-07-17 | Stop reason: SDUPTHER

## 2019-07-17 RX ORDER — METOPROLOL TARTRATE 50 MG/1
25 TABLET ORAL 2 TIMES DAILY
Qty: 60 TAB | Refills: 0 | Status: SHIPPED | OUTPATIENT
Start: 2019-07-17 | End: 2021-01-04

## 2019-07-17 NOTE — TELEPHONE ENCOUNTER
Returned call and spoke with Will at [facility] she wanted clarification that the Metoprolol had been decreased and Amlodipine 5 mg daily.

## 2019-07-17 NOTE — PATIENT INSTRUCTIONS
Decrease metoprolol to 25mg twice daily    Stop isosorbide mononitrate     Start amlodipine 5mg daily

## 2019-07-17 NOTE — LETTER
7/17/19 Patient: Bobo Thompson YOB: 1922 Date of Visit: 7/17/2019 Norm Melchor MD 
[address] [address] VIA Facsimile: [phone] Dear Norm Melchor MD, Thank you for referring Mr. Mellisa Astudillo to [address] for evaluation. My notes for this consultation are attached. If you have questions, please do not hesitate to call me. I look forward to following your patient along with you.  
 
 
Sincerely, 
 
Ileana Webb, DO

## 2019-07-17 NOTE — PROGRESS NOTES
[facility] [address], [address] [facility], [address] [place] [address]    Office [phone],[facility] [phone]           Bobo Thompson is a 80 y.o. male presents for hospital follow-up for recent NSTEMI      Assessment/Recommendations:    Coronary artery disease:-Remote history of percutaneous coronary intervention approximately 10 years ago while he was still living in [place]. Recently with NSTEMI troponin 0 0.39 peak that was medically managed.    -No aspirin with his DOAC chronic kidney disease with advanced age, bleeding risk is too high  -Discontinue isosorbide mononitrate, concern given advanced age and use of walker that nitrate therapy can lead to falls. - Decrease metoprolol from 50 mg to 25 mg twice daily due to fatigue  -Add amlodipine 5 mg daily    Hypertension-elevated in the office today. While hospitalized recently had supine hypertension with lower blood pressures during the day. With his tremor concerned that he has underlying parkinsonian features with the above blood pressure findings.  -As per above add amlodipine discontinue nitrate therapy and lower metoprolol to 25 mg daily  -Follow-up with nurse practitioner 2 weeks for evaluation of above changes    Atrial fibrillation  -Continues to follow with Dr. Tiffanie Mcginnis continue DOAC and beta-blocker therapy     Tremor-states essential tremor, features with blood pressure concern from autonomic dysfunction which could be related to parkinsonian features    Hyperlipidemia- statin therapy        Primary Care Physician- Reji Galvan MD    Follow-up 2 weeks NP    Subjective:  80 y.o. with a history of coronary artery disease, atrial fibrillation status post ablation and prior pacemaker placement, hypertension and tremors presents for hospital follow-up. He presented to the hospital recently with typical angina symptoms ruled in for myocardial infarction with a troponin of 0.39.   He had normal LV function on his echocardiogram.  Decision was made to medically manage it given his limited symptoms and advanced age. Since hospital discharge he has had no further chest pain or shortness of breath. His main complaint is severe fatigue with the increase in metoprolol dosing 25 to 50 mg daily. While he was hospitalized he had evidence of nocturnal supine hypertension with lower blood pressures during the day. Blood pressure mildly elevated in the office today. Past Medical History:   Diagnosis Date    [facility] ([facility]     Chronic obstructive pulmonary disease ([facility]     Essential hypertension     Hyperlipidemia     Long term (current) use of anticoagulants     Pacemaker     Rosacea 2016        Past Surgical History:   Procedure Laterality Date    HX CORONARY STENT PLACEMENT      HX HEART CATHETERIZATION      HX PACEMAKER           Current Outpatient Medications:     metoprolol tartrate (LOPRESSOR) 50 mg tablet, Take 0.5 Tabs by mouth two (2) times a day., Disp: 60 Tab, Rfl: 0    amLODIPine (NORVASC) 5 mg tablet, Take 1 Tab by mouth daily. , Disp: 30 Tab, Rfl: 11    nitroglycerin (NITROSTAT) 0.4 mg SL tablet, 1 Tab by SubLINGual route every five (5) minutes as needed for Chest Pain., Disp: 1 Bottle, Rfl: 1    antiox #8/om3/dha/epa/lut/zeax (PRESERVISION AREDS 2, OMEGA-3, PO), Take 1 Cap by mouth two (2) times a day., Disp: , Rfl:     ipratropium (ATROVENT) 0.03 % nasal spray, 2 Sprays by Both Nostrils route two (2) times daily as needed for Rhinitis., Disp: , Rfl:     mometasone (NASONEX) 50 mcg/actuation nasal spray, 2 Sprays daily. , Disp: , Rfl:     furosemide (LASIX) 20 mg tablet, Take 10 mg by mouth daily. , Disp: , Rfl:     metroNIDAZOLE (METROCREAM) 0.75 % topical cream, Apply  to affected area nightly.  Use a thin layer to affected areas after washing, Disp: , Rfl:     apixaban (ELIQUIS) 2.5 mg tablet, Take 1 Tab by mouth two (2) times a day., Disp: 60 Tab, Rfl: 6    tamsulosin (FLOMAX) 0.4 mg capsule, Take 0.4 mg by mouth nightly., Disp: , Rfl:     levothyroxine (LEVOTHROID) 50 mcg tablet, Take 50 mcg by mouth Daily (before breakfast). , Disp: , Rfl:     omeprazole (PRILOSEC) 20 mg capsule, Take 20 mg by mouth daily. , Disp: , Rfl:     simvastatin (ZOCOR) 40 mg tablet, Take 20 mg by mouth every Monday, Wednesday, Friday. Patient takes at night, Disp: , Rfl:     Multivits with Min-FA-Lycopene (MEN'S DAILY MULTIVIT-MINERAL) 0.4-600 mg-mcg tab, Take 1 Tab by mouth daily. , Disp: , Rfl:     No Known Allergies     Family History   Problem Relation Age of Onset   [facility] Stroke Mother     Hypertension Mother     Kidney Disease Father        Social History     Tobacco Use    Smoking status: Never Smoker    Smokeless tobacco: Never Used   Substance Use Topics    Alcohol use: No     Alcohol/week: 0.0 standard drinks    Drug use: No       Review of Symptoms:  Pertinent Positive: fatigue  Pertinent Negative: No chest pain, dyspnea on exertion, shortness of breath, orthopnea, PND    All Other systems reviewed and are negative for a Comprehensive ROS (10+)    Physical Exam    Blood pressure 150/85, pulse 64, height 5' 8\" (1.727 m), weight 196 lb (88.9 kg). Constitutional:  well-developed and well-nourished. No distress. HENT: Normocephalic. Eyes: No scleral icterus. Neck:  Neck supple. No JVD present. Pulmonary/Chest: Effort normal and breath sounds normal. No respiratory distress, wheezes or rales. Cardiovascular: Normal rate, regular rhythm, S1 S2 . Exam reveals no gallop and no friction rub. No murmur heard. No edema. Extremities:  Normal muscle tone  Abdominal:   No abnormal distension. Neurological:  Moving all extremities, cranial nerves appear grossly intact. Skin: Skin is not cold. Not diaphoretic. No erythema. Psychiatric:  Grossly normal mood and affect. Intact insight.     Objective Data:    07/03/19   ECHO ADULT COMPLETE 07/05/2019 7/5/2019    Narrative · Left Ventricle: Normal cavity size, wall thickness and systolic function   (ejection fraction normal). Estimated left ventricular ejection fraction   is 56 - 60%. Suboptimal endocardial visualization limits wall motion   analysis Severe (grade 3) left ventricular diastolic dysfunction. · Right Ventricle: Not well visualized. Reduced systolic function. · Left Atrium: Mildly dilated left atrium. · Aortic Valve: Aortic valve sclerosis. Mild aortic valve regurgitation is   present.         Signed by: Rasheed Carolina, [id number], DO

## 2019-08-01 ENCOUNTER — OFFICE VISIT (OUTPATIENT)
Dept: CARDIOLOGY CLINIC | Age: 84
End: 2019-08-01

## 2019-08-01 VITALS
HEIGHT: 68 IN | BODY MASS INDEX: 30.13 KG/M2 | DIASTOLIC BLOOD PRESSURE: 78 MMHG | WEIGHT: 198.8 LBS | HEART RATE: 68 BPM | OXYGEN SATURATION: 98 % | SYSTOLIC BLOOD PRESSURE: 110 MMHG

## 2019-08-01 DIAGNOSIS — I10 ESSENTIAL HYPERTENSION: Primary | ICD-10-CM

## 2019-08-01 DIAGNOSIS — I48.91 ATRIAL FIBRILLATION, UNSPECIFIED TYPE (HCC): ICD-10-CM

## 2019-08-01 DIAGNOSIS — Z95.0 CARDIAC PACEMAKER IN SITU: ICD-10-CM

## 2019-08-01 DIAGNOSIS — R53.83 FATIGUE, UNSPECIFIED TYPE: ICD-10-CM

## 2019-08-01 DIAGNOSIS — I21.4 NSTEMI (NON-ST ELEVATED MYOCARDIAL INFARCTION) (HCC): ICD-10-CM

## 2019-08-01 NOTE — PROGRESS NOTES
Vania Youngblood, [address]  [id number]  [place], [address] [facility]    Office [phone]  Fax [phone]         Remedios Mandel is a 80 y.o. male presents for follow-up of BP; follows outpatient with Dr. Jenise Leslie. Assessment/Recommendations:    Hypertension-improved on current meds, no changes today    NSTEMI 7/2019  - no anginal c/o today  - medically managed. - No aspirin with his DOAC  - on BB, CCB, and statin therapy    Fatigue / [place] Sep  - offered PT/rehab - pt not interested for now but will call if he changes his mind  - Echo with nml EF; G3DD - vol appears stable; weight stable but with considerable range 190-198 on review - watch closely at f/u   - cont lasix 10mg/d for now  - denies abn bleeding; Hgb stable 7/5/19  - last TSH 1/2019; may benefit for recheck; discuss with PCP    Atrial fibrillation  -Continues on apixapan and beta-blocker therapy;    Tremor-states essential tremor, features with blood pressure concern from possible autonomic dysfunction    Hyperlipidemia- statin therapy (LDL OK - 75 on 7/2019)    F/u in 6 months with MD or PRN if symptoms ongoing. Subjective:  80 y.o. with a history of coronary artery disease, atrial fibrillation status post ablation and prior pacemaker placement, hypertension and tremors presents for follow-up of BP post recent NSTEMI. He presented to the hospital recently with typical angina symptoms ruled in for myocardial infarction with a troponin of 0.39. He had normal LV function on his echocardiogram.  Decision was made to medically manage it given his limited symptoms and advanced age. Since hospital discharge he has had no further chest pain. His main complaint is severe fatigue; has some GARCIA with certain activities. Patient denies any exertional chest pain, palpitations, syncope, orthopnea, or paroxysmal nocturnal dyspnea. Some LE swelling that resolves overnight.       Past Medical History:   Diagnosis Date    Atrial fibrillation (HCC)     Chronic obstructive pulmonary disease (HCC)     Essential hypertension     Hyperlipidemia     Long term (current) use of anticoagulants     Pacemaker     Rosacea 2016        Past Surgical History:   Procedure Laterality Date    HX CORONARY STENT PLACEMENT      HX HEART CATHETERIZATION      HX PACEMAKER           Current Outpatient Medications:     metoprolol tartrate (LOPRESSOR) 50 mg tablet, Take 0.5 Tabs by mouth two (2) times a day., Disp: 60 Tab, Rfl: 0    amLODIPine (NORVASC) 5 mg tablet, Take 1 Tab by mouth daily. , Disp: 30 Tab, Rfl: 11    nitroglycerin (NITROSTAT) 0.4 mg SL tablet, 1 Tab by SubLINGual route every five (5) minutes as needed for Chest Pain., Disp: 1 Bottle, Rfl: 1    antiox #8/om3/dha/epa/lut/zeax (PRESERVISION AREDS 2, OMEGA-3, PO), Take 1 Cap by mouth two (2) times a day., Disp: , Rfl:     ipratropium (ATROVENT) 0.03 % nasal spray, 2 Sprays by Both Nostrils route two (2) times daily as needed for Rhinitis., Disp: , Rfl:     mometasone (NASONEX) 50 mcg/actuation nasal spray, 2 Sprays daily. , Disp: , Rfl:     furosemide (LASIX) 20 mg tablet, Take 10 mg by mouth daily. , Disp: , Rfl:     metroNIDAZOLE (METROCREAM) 0.75 % topical cream, Apply  to affected area nightly. Use a thin layer to affected areas after washing, Disp: , Rfl:     apixaban (ELIQUIS) 2.5 mg tablet, Take 1 Tab by mouth two (2) times a day., Disp: 60 Tab, Rfl: 6    tamsulosin (FLOMAX) 0.4 mg capsule, Take 0.4 mg by mouth nightly., Disp: , Rfl:     levothyroxine (LEVOTHROID) 50 mcg tablet, Take 50 mcg by mouth Daily (before breakfast). , Disp: , Rfl:     omeprazole (PRILOSEC) 20 mg capsule, Take 20 mg by mouth daily. , Disp: , Rfl:     simvastatin (ZOCOR) 40 mg tablet, Take 20 mg by mouth every Monday, Wednesday, Friday. Patient takes at night, Disp: , Rfl:     Multivits with Min-FA-Lycopene (MEN'S DAILY MULTIVIT-MINERAL) 0.4-600 mg-mcg tab, Take 1 Tab by mouth daily. , Disp: , Rfl:     No Known Allergies     Family History   Problem Relation Age of Onset   Abad Sanchez Stroke Mother     Hypertension Mother     Kidney Disease Father        Social History     Tobacco Use    Smoking status: Never Smoker    Smokeless tobacco: Never Used   Substance Use Topics    Alcohol use: No     Alcohol/week: 0.0 standard drinks    Drug use: No       Review of Symptoms:  Constitutional: Negative for fever, chills, and diaphoresis. Respiratory: Negative for cough, hemoptysis, sputum production,and wheezing. Cardiovascular: Negative for chest pain, palpitations, orthopnea, claudication, leg swelling and PND. Gastrointestinal: Negative for heartburn, nausea, vomiting, blood in stool and melena. Genitourinary: Negative for dysuria and flank pain. Neurological: Negative for focal weakness, seizures, loss of consciousness, and headaches. Endo/Heme/Allergies: Negative for abnormal bleeding. Psychiatric/Behavioral: Negative for memory loss. Physical Exam    Blood pressure 110/78, pulse 68, height 5' 8\" (1.727 m), weight 198 lb 12.8 oz (90.2 kg), SpO2 98 %. General - well developed well nourished  Neck - no JVD  Cardiac - normal S1, S2 RRR, no murmurs, rubs or gallops. No clicks  Vascular - radials pulses equal bilateral  Lungs - clear to auscultation bilaterals, no rales, wheezing or rhonchi  Abd - soft nontender, non-distended, +BS  Extremities - trace pedal edema, warm, well perfused  Neuro - nonfocal  Psych - normal mood and affect      Objective Data:    07/03/19   ECHO ADULT COMPLETE 07/05/2019 7/5/2019    Narrative · Left Ventricle: Normal cavity size, wall thickness and systolic function   (ejection fraction normal). Estimated left ventricular ejection fraction   is 56 - 60%. Suboptimal endocardial visualization limits wall motion   analysis Severe (grade 3) left ventricular diastolic dysfunction. · Right Ventricle: Not well visualized. Reduced systolic function.   · Left Atrium: Mildly dilated left atrium. · Aortic Valve: Aortic valve sclerosis. Mild aortic valve regurgitation is   present. Signed by: Jules Johnson DO     Lab Results   Component Value Date/Time    Sodium 139 07/05/2019 01:10 AM    Potassium 4.0 07/05/2019 01:10 AM    Chloride 106 07/05/2019 01:10 AM    CO2 27 07/05/2019 01:10 AM    Anion gap 6 07/05/2019 01:10 AM    Glucose 104 (H) 07/05/2019 01:10 AM    BUN 28 (H) 07/05/2019 01:10 AM    Creatinine 1.61 (H) 07/05/2019 01:10 AM    BUN/Creatinine ratio 17 07/05/2019 01:10 AM    GFR est AA 48 (L) 07/05/2019 01:10 AM    GFR est non-AA 40 (L) 07/05/2019 01:10 AM    Calcium 8.4 (L) 07/05/2019 01:10 AM     Lab Results   Component Value Date/Time    WBC 7.7 07/05/2019 01:10 AM    HGB 12.5 07/05/2019 01:10 AM    HCT 38.5 07/05/2019 01:10 AM    PLATELET 230 (L) 01/33/1487 01:10 AM    MCV 93.0 07/05/2019 01:10 AM     Lab Results   Component Value Date/Time    Cholesterol, total 151 07/04/2019 04:10 AM    HDL Cholesterol 53 07/04/2019 04:10 AM    LDL, calculated 75.6 07/04/2019 04:10 AM    VLDL, calculated 22.4 07/04/2019 04:10 AM    Triglyceride 112 07/04/2019 04:10 AM    CHOL/HDL Ratio 2.8 07/04/2019 04:10 AM     Results for Jana Jacinto (MRN [id number]) as of 8/14/2019 16:40   Ref. Range 7/3/2019 14:12   NT pro-BNP Latest Ref Range: <450 PG/ML 2,291 (H)     Results for Jana Jacinto (MRN [id number]) as of 8/14/2019 16:40   Ref. Range 7/3/2019 23:25 7/4/2019 04:10 7/4/2019 10:09 7/4/2019 15:46 7/4/2019 22:12   Troponin-I, Qt.  Latest Ref Range: <0.05 ng/mL 0.24 (H) 0.39 (H) 0.36 (H) 0.25 (H) 0.20 (H)     Feng Burgos, ANP

## 2019-08-01 NOTE — LETTER
8/14/19 Patient: Lynne Rosario YOB: 1922 Date of Visit: 8/1/2019 Radha Scales MD 
[address] [address] VIA Facsimile: [phone] Dear Radha Scales MD, Thank you for referring Mr. Anita Wright to [address]  for evaluation. My notes for this consultation are attached. If you have questions, please do not hesitate to call me. I look forward to following your patient along with you.  
 
 
Sincerely, 
 
Nicole Gill NP

## 2019-08-01 NOTE — PROGRESS NOTES
Patient says that he has shortness of breath and he's been fatigue   Some swelling in lower extremities and goes down at night.   Visit Vitals  /78 (BP 1 Location: Left arm, BP Patient Position: Sitting)   Pulse 68   Ht 5' 8\" (1.727 m)   Wt 198 lb 12.8 oz (90.2 kg)   SpO2 98%   BMI 30.23 kg/m²

## 2019-08-01 NOTE — PATIENT INSTRUCTIONS
Follow-up with Dr. Yaneth Rubi in 6 months. Continue current meds; no changes today. IF you would like to do cardiac rehab or physical therapy; call me and I will scheduled.

## 2019-09-25 ENCOUNTER — CLINICAL SUPPORT (OUTPATIENT)
Dept: CARDIOLOGY CLINIC | Age: 84
End: 2019-09-25

## 2019-09-25 DIAGNOSIS — Z95.0 CARDIAC PACEMAKER IN SITU: Primary | ICD-10-CM

## 2019-11-05 ENCOUNTER — TELEPHONE (OUTPATIENT)
Dept: CARDIOLOGY CLINIC | Age: 84
End: 2019-11-05

## 2019-11-05 NOTE — TELEPHONE ENCOUNTER
Returned patient's call he was seen at the [place] last week and they discontinued his Amlodipine 5 mg and restarted him back on Isosorbide Mononitrate. He is not sure what strength he was prescribed but he wanted to see what your opinion was. 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 \"    Please advise.

## 2019-11-05 NOTE — TELEPHONE ENCOUNTER
Pt needs to speak with you regarding medications he is taking. [facility] . Please Advise.  Phone: [phone]  [facility]

## 2019-11-06 NOTE — TELEPHONE ENCOUNTER
Patient called he was prescribed   ISMN 60 mg a half tablet every morning. 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

## 2019-11-06 NOTE — TELEPHONE ENCOUNTER
Patient would like to let you know that he is on isosorbide mononitrate 60 mg half by mouth every morning.      Phone: [phone]

## 2020-01-08 ENCOUNTER — CLINICAL SUPPORT (OUTPATIENT)
Dept: CARDIOLOGY CLINIC | Age: 85
End: 2020-01-08

## 2020-01-08 DIAGNOSIS — Z95.0 CARDIAC PACEMAKER IN SITU: Primary | ICD-10-CM

## 2020-01-24 ENCOUNTER — OFFICE VISIT (OUTPATIENT)
Dept: CARDIOLOGY CLINIC | Age: 85
End: 2020-01-24

## 2020-01-24 VITALS
WEIGHT: 200.4 LBS | RESPIRATION RATE: 20 BRPM | DIASTOLIC BLOOD PRESSURE: 82 MMHG | HEIGHT: 68 IN | BODY MASS INDEX: 30.37 KG/M2 | SYSTOLIC BLOOD PRESSURE: 134 MMHG | HEART RATE: 68 BPM | OXYGEN SATURATION: 92 %

## 2020-01-24 DIAGNOSIS — Z45.010 PACEMAKER AT END OF BATTERY LIFE: ICD-10-CM

## 2020-01-24 DIAGNOSIS — R60.9 EDEMA, UNSPECIFIED TYPE: ICD-10-CM

## 2020-01-24 DIAGNOSIS — Z95.0 CARDIAC PACEMAKER IN SITU: Primary | ICD-10-CM

## 2020-01-24 DIAGNOSIS — I48.91 ATRIAL FIBRILLATION, UNSPECIFIED TYPE (HCC): ICD-10-CM

## 2020-01-24 NOTE — PROGRESS NOTES
HISTORY OF PRESENTING ILLNESS      Naty Brower is a 80 y.o. male with PAF, HTN, PPM, COPD, dyslipidemia, pacemaker who was noted to have 40 mode switches that appeared to be consistent with AFL or AT. Of note, he is pacemaker dependent. He recently underwent EPS, which demonstrated findings consistent with a left AT. Empiric ablation of his CTI was performed. We increased his metoprolol and added eliquis. He is tolerating this regimen. No further tachycardia recurrence. His PPM is now at [facility].         ACTIVE PROBLEM LIST     Patient Active Problem List    Diagnosis Date Noted    NSTEM[facility] (non-ST elevated myocardial infarction) ([facility] 07/17/2019    Chest pain 07/03/2019    Sepsis ([facility] 01/27/2019    Chronic obstructive pulmonary disease ([facility] 01/27/2019    Hyperlipidemia 01/27/2019    Essential hypertension 01/27/2019    Pacemaker 01/27/2019    Fever 01/27/2019    Leukocytosis 01/27/2019    CKD (chronic kidney disease) stage 3, GFR 30-59 ml/min ([facility]) 01/27/2019    Cough 01/27/2019    Dyspnea 01/27/2019    Atrial fibrillation ([facility] 02/17/2016    Encounter to establish care 08/05/2015           PAST MEDICAL HISTORY     Past Medical History:   Diagnosis Date    Atrial fibrillation (HCC)     Chronic obstructive pulmonary disease (HCC)     Essential hypertension     Hyperlipidemia     Long term (current) use of anticoagulants     Pacemaker     Rosacea 2016           PAST SURGICAL HISTORY     Past Surgical History:   Procedure Laterality Date    HX CORONARY STENT PLACEMENT      HX HEART CATHETERIZATION      HX PACEMAKER            ALLERGIES     No Known Allergies       FAMILY HISTORY     Family History   Problem Relation Age of Onset    Stroke Mother     Hypertension Mother     Kidney Disease Father     negative for cardiac disease       SOCIAL HISTORY     Social History     Socioeconomic History    Marital status:      Spouse name: Not on file    Number of children: Not on file  Years of education: Not on file    Highest education level: Not on file   Tobacco Use    Smoking status: Never Smoker    Smokeless tobacco: Never Used   Substance and Sexual Activity    Alcohol use: No     Alcohol/week: 0.0 standard drinks    Drug use: No         MEDICATIONS     Current Outpatient Medications   Medication Sig    metoprolol tartrate (LOPRESSOR) 50 mg tablet Take 0.5 Tabs by mouth two (2) times a day.  amLODIPine (NORVASC) 5 mg tablet Take 1 Tab by mouth daily.  nitroglycerin (NITROSTAT) 0.4 mg SL tablet 1 Tab by SubLINGual route every five (5) minutes as needed for Chest Pain.  antiox #8/om3/dha/epa/lut/zeax (PRESERVISION AREDS 2, OMEGA-3, PO) Take 1 Cap by mouth two (2) times a day.  ipratropium (ATROVENT) 0.03 % nasal spray 2 Sprays by Both Nostrils route two (2) times daily as needed for Rhinitis.  mometasone (NASONEX) 50 mcg/actuation nasal spray 2 Sprays daily.  furosemide (LASIX) 20 mg tablet Take 10 mg by mouth daily.  metroNIDAZOLE (METROCREAM) 0.75 % topical cream Apply  to affected area nightly. Use a thin layer to affected areas after washing    apixaban (ELIQUIS) 2.5 mg tablet Take 1 Tab by mouth two (2) times a day.  tamsulosin (FLOMAX) 0.4 mg capsule Take 0.4 mg by mouth nightly.  levothyroxine (LEVOTHROID) 50 mcg tablet Take 50 mcg by mouth Daily (before breakfast).  omeprazole (PRILOSEC) 20 mg capsule Take 20 mg by mouth daily.  simvastatin (ZOCOR) 40 mg tablet Take 20 mg by mouth every Monday, Wednesday, Friday. Patient takes at night    Multivits with Min-FA-Lycopene (MEN'S DAILY MULTIVIT-MINERAL) 0.4-600 mg-mcg tab Take 1 Tab by mouth daily. No current facility-administered medications for this visit. I have reviewed the nurses notes, vitals, problem list, allergy list, medical history, family, social history and medications. REVIEW OF SYMPTOMS      General: Pt denies excessive weight gain or loss.  Pt is able to conduct ADL's  HEENT: Denies blurred vision, headaches, hearing loss, epistaxis and difficulty swallowing. Respiratory: Denies cough, congestion, shortness of breath, GARCIA, wheezing or stridor. Cardiovascular: Denies precordial pain, palpitations, edema or PND  Gastrointestinal: Denies poor appetite, indigestion, abdominal pain or blood in stool  Genitourinary: Denies hematuria, dysuria, increased urinary frequency  Musculoskeletal: Denies joint pain or swelling from muscles or joints  Neurologic: Denies tremor, paresthesias, headache, or sensory motor disturbance  Psychiatric: Denies confusion, insomnia, depression  Integumentray: Denies rash, itching or ulcers. Hematologic: Denies easy bruising, bleeding       PHYSICAL EXAMINATION      There were no vitals filed for this visit. General: Well developed, in no acute distress. HEENT: No jaundice, oral mucosa moist, no oral ulcers  Neck: Supple, no stiffness, no lymphadenopathy, supple  Heart:  Normal S1/S2 negative S3 or S4. Regular, no murmur, gallop or rub, no jugular venous distention  Respiratory: Clear bilaterally x 4, no wheezing or rales  Abdomen:   Soft, non-tender, bowel sounds are active.   Extremities:  No edema, normal cap refill, no cyanosis. Musculoskeletal: No clubbing, no deformities  Neuro: A&Ox3, speech clear, gait stable, cooperative, no focal neurologic deficits  Skin: Skin color is normal. No rashes or lesions.  Non diaphoretic, moist.  Vascular: 2+ pulses symmetric in all extremities       DIAGNOSTIC DATA      EKG:        LABORATORY DATA      Lab Results   Component Value Date/Time    WBC 7.7 07/05/2019 01:10 AM    HGB 12.5 07/05/2019 01:10 AM    HCT 38.5 07/05/2019 01:10 AM    PLATELET 422 (L) 13/67/1201 01:10 AM    MCV 93.0 07/05/2019 01:10 AM      Lab Results   Component Value Date/Time    Sodium 139 07/05/2019 01:10 AM    Potassium 4.0 07/05/2019 01:10 AM    Chloride 106 07/05/2019 01:10 AM    CO2 27 07/05/2019 01:10 AM    Anion gap 6 07/05/2019 01:10 AM    Glucose 104 (H) 07/05/2019 01:10 AM    BUN 28 (H) 07/05/2019 01:10 AM    Creatinine 1.61 (H) 07/05/2019 01:10 AM    BUN/Creatinine ratio 17 07/05/2019 01:10 AM    GFR est AA 48 (L) 07/05/2019 01:10 AM    GFR est non-AA 40 (L) 07/05/2019 01:10 AM    Calcium 8.4 (L) 07/05/2019 01:10 AM    Bilirubin, total 0.7 07/05/2019 01:10 AM    AST (SGOT) 27 07/05/2019 01:10 AM    Alk. phosphatase 83 07/05/2019 01:10 AM    Protein, total 5.8 (L) 07/05/2019 01:10 AM    Albumin 3.1 (L) 07/05/2019 01:10 AM    Globulin 2.7 07/05/2019 01:10 AM    A-G Ratio 1.1 07/05/2019 01:10 AM    ALT (SGPT) 22 07/05/2019 01:10 AM           ASSESSMENT      1. Atrial Flutter  2. Atrial Tachycardia              A. Left atrial  3. Hypertension  4. Pacemaker  5. COPD  6. Dyslipidemia  7. Pacemaker               A. Medtronic   B. Dependent  8. Fatigue with exertion       PLAN     Obtain echocardiogram to evaluate LV function. If preserved EF, plan for pacemaker generator change with Medtronic and evaluate whether device programming. If found to have a depressed LV function will consider addition of LV lead. FOLLOW-UP     Post procedure        Thank you, Scott Wallace MD and Dr. Lisa Garcia for allowing me to participate in the care of this extraordinarily pleasant male. Please do not hesitate to contact me for further questions/concerns.          Arnulfo Aranda MD  Cardiac Electrophysiology / Cardiology    [address]  [address] [place] [address], [facility], [address] [place], [place] [address] [facility]  [phone] / [phone] Fax   [phone] / [phone] Fax

## 2020-01-24 NOTE — PROGRESS NOTES
ROOM # 2  Fatigued, SOB,lightheaded at times  Visit Vitals  /82 (BP 1 Location: Left arm, BP Patient Position: Sitting)   Pulse 68   Resp 20   Ht 5' 8\" (1.727 m)   Wt 200 lb 6.4 oz (90.9 kg)   SpO2 92%   BMI 30.47 kg/m²

## 2020-01-28 DIAGNOSIS — I48.91 ATRIAL FIBRILLATION, UNSPECIFIED TYPE (HCC): ICD-10-CM

## 2020-01-28 DIAGNOSIS — Z45.010 PACEMAKER AT END OF BATTERY LIFE: ICD-10-CM

## 2020-01-28 DIAGNOSIS — Z95.0 CARDIAC PACEMAKER IN SITU: ICD-10-CM

## 2020-02-03 ENCOUNTER — DOCUMENTATION ONLY (OUTPATIENT)
Dept: CARDIOLOGY CLINIC | Age: 85
End: 2020-02-03

## 2020-02-06 ENCOUNTER — HOSPITAL ENCOUNTER (OUTPATIENT)
Age: 85
Setting detail: OUTPATIENT SURGERY
Discharge: HOME | DRG: 940 | End: 2020-02-06
Attending: INTERNAL MEDICINE | Admitting: INTERNAL MEDICINE
Payer: MEDICARE

## 2020-02-06 VITALS
RESPIRATION RATE: 16 BRPM | HEIGHT: 68 IN | HEART RATE: 60 BPM | SYSTOLIC BLOOD PRESSURE: 111 MMHG | WEIGHT: 198.4 LBS | BODY MASS INDEX: 30.07 KG/M2 | OXYGEN SATURATION: 99 % | TEMPERATURE: 98.4 F | DIASTOLIC BLOOD PRESSURE: 66 MMHG

## 2020-02-06 DIAGNOSIS — I48.91 ATRIAL FIBRILLATION, UNSPECIFIED TYPE (HCC): ICD-10-CM

## 2020-02-06 LAB
ANION GAP SERPL CALC-SCNC: 5 MMOL/L (ref 5–15)
APTT PPP: 27.3 SEC (ref 22.1–32)
BUN SERPL-MCNC: 19 MG/DL (ref 6–20)
BUN/CREAT SERPL: 12 (ref 12–20)
CALCIUM SERPL-MCNC: 8.7 MG/DL (ref 8.5–10.1)
CHLORIDE SERPL-SCNC: 106 MMOL/L (ref 97–108)
CO2 SERPL-SCNC: 28 MMOL/L (ref 21–32)
CREAT SERPL-MCNC: 1.6 MG/DL (ref 0.7–1.3)
ERYTHROCYTE [DISTWIDTH] IN BLOOD BY AUTOMATED COUNT: 14.4 % (ref 11.5–14.5)
GLUCOSE SERPL-MCNC: 109 MG/DL (ref 65–100)
HCT VFR BLD AUTO: 40.6 % (ref 36.6–50.3)
HGB BLD-MCNC: 12.7 G/DL (ref 12.1–17)
INR PPP: 1.1 (ref 0.9–1.1)
MCH RBC QN AUTO: 30 PG (ref 26–34)
MCHC RBC AUTO-ENTMCNC: 31.3 G/DL (ref 30–36.5)
MCV RBC AUTO: 95.8 FL (ref 80–99)
NRBC # BLD: 0 K/UL (ref 0–0.01)
NRBC BLD-RTO: 0 PER 100 WBC
PLATELET # BLD AUTO: 136 K/UL (ref 150–400)
PMV BLD AUTO: 10.5 FL (ref 8.9–12.9)
POTASSIUM SERPL-SCNC: 4.1 MMOL/L (ref 3.5–5.1)
PROTHROMBIN TIME: 10.8 SEC (ref 9–11.1)
RBC # BLD AUTO: 4.24 M/UL (ref 4.1–5.7)
SODIUM SERPL-SCNC: 139 MMOL/L (ref 136–145)
THERAPEUTIC RANGE,PTTT: NORMAL SECS (ref 58–77)
WBC # BLD AUTO: 7.3 K/UL (ref 4.1–11.1)

## 2020-02-06 PROCEDURE — 77030033138 HC SUT PGA STRATFX J&J -B: Performed by: INTERNAL MEDICINE

## 2020-02-06 PROCEDURE — 74011250636 HC RX REV CODE- 250/636: Performed by: INTERNAL MEDICINE

## 2020-02-06 PROCEDURE — C1785 PMKR, DUAL, RATE-RESP: HCPCS | Performed by: INTERNAL MEDICINE

## 2020-02-06 PROCEDURE — 33228 REMV&REPLC PM GEN DUAL LEAD: CPT | Performed by: INTERNAL MEDICINE

## 2020-02-06 PROCEDURE — 74011000272 HC RX REV CODE- 272: Performed by: INTERNAL MEDICINE

## 2020-02-06 PROCEDURE — 0JH606Z INSERTION OF PACEMAKER, DUAL CHAMBER INTO CHEST SUBCUTANEOUS TISSUE AND FASCIA, OPEN APPROACH: ICD-10-PCS | Performed by: INTERNAL MEDICINE

## 2020-02-06 PROCEDURE — 85730 THROMBOPLASTIN TIME PARTIAL: CPT

## 2020-02-06 PROCEDURE — 85610 PROTHROMBIN TIME: CPT

## 2020-02-06 PROCEDURE — 80048 BASIC METABOLIC PNL TOTAL CA: CPT

## 2020-02-06 PROCEDURE — 77030018673: Performed by: INTERNAL MEDICINE

## 2020-02-06 PROCEDURE — 77030028698 HC BLD TISS PLSM MEDT -D: Performed by: INTERNAL MEDICINE

## 2020-02-06 PROCEDURE — 77030037713 HC CLOSR DEV INCIS ZIP STRY -B: Performed by: INTERNAL MEDICINE

## 2020-02-06 PROCEDURE — 36415 COLL VENOUS BLD VENIPUNCTURE: CPT

## 2020-02-06 PROCEDURE — 77030019580 HC CBL PACE MEDT -B: Performed by: INTERNAL MEDICINE

## 2020-02-06 PROCEDURE — 77030038613 HC SUT PDS STRATA SPIRL J&J -B: Performed by: INTERNAL MEDICINE

## 2020-02-06 PROCEDURE — 85027 COMPLETE CBC AUTOMATED: CPT

## 2020-02-06 PROCEDURE — 99152 MOD SED SAME PHYS/QHP 5/>YRS: CPT | Performed by: INTERNAL MEDICINE

## 2020-02-06 PROCEDURE — 0JPT0PZ REMOVAL OF CARDIAC RHYTHM RELATED DEVICE FROM TRUNK SUBCUTANEOUS TISSUE AND FASCIA, OPEN APPROACH: ICD-10-PCS | Performed by: INTERNAL MEDICINE

## 2020-02-06 PROCEDURE — 74011000250 HC RX REV CODE- 250: Performed by: INTERNAL MEDICINE

## 2020-02-06 PROCEDURE — 77030040375: Performed by: INTERNAL MEDICINE

## 2020-02-06 PROCEDURE — 77030039046 HC PAD DEFIB RADIOTRNSPNT CNMD -B: Performed by: INTERNAL MEDICINE

## 2020-02-06 DEVICE — IPG W1DR01 AZURE XT DR MRI WL USA
Type: IMPLANTABLE DEVICE | Status: FUNCTIONAL
Brand: AZURE™ XT DR MRI SURESCAN™

## 2020-02-06 RX ORDER — GENTAMICIN SULFATE 80 MG/100ML
80 INJECTION, SOLUTION INTRAVENOUS
Status: COMPLETED | OUTPATIENT
Start: 2020-02-06 | End: 2020-02-06

## 2020-02-06 RX ORDER — CEFAZOLIN SODIUM/WATER 2 G/20 ML
2 SYRINGE (ML) INTRAVENOUS ONCE
Status: COMPLETED | OUTPATIENT
Start: 2020-02-06 | End: 2020-02-06

## 2020-02-06 RX ORDER — LIDOCAINE HYDROCHLORIDE AND EPINEPHRINE 10; 10 MG/ML; UG/ML
INJECTION, SOLUTION INFILTRATION; PERINEURAL AS NEEDED
Status: DISCONTINUED | OUTPATIENT
Start: 2020-02-06 | End: 2020-02-06 | Stop reason: HOSPADM

## 2020-02-06 RX ORDER — MIDAZOLAM HYDROCHLORIDE 1 MG/ML
INJECTION, SOLUTION INTRAMUSCULAR; INTRAVENOUS AS NEEDED
Status: DISCONTINUED | OUTPATIENT
Start: 2020-02-06 | End: 2020-02-06 | Stop reason: HOSPADM

## 2020-02-06 RX ORDER — CEPHALEXIN 500 MG/1
500 CAPSULE ORAL 3 TIMES DAILY
Qty: 15 CAP | Refills: 0 | Status: SHIPPED | OUTPATIENT
Start: 2020-02-06 | End: 2020-02-11

## 2020-02-06 RX ORDER — FENTANYL CITRATE 50 UG/ML
INJECTION, SOLUTION INTRAMUSCULAR; INTRAVENOUS AS NEEDED
Status: DISCONTINUED | OUTPATIENT
Start: 2020-02-06 | End: 2020-02-06 | Stop reason: HOSPADM

## 2020-02-06 RX ORDER — BUPIVACAINE HYDROCHLORIDE 5 MG/ML
1-20 INJECTION, SOLUTION EPIDURAL; INTRACAUDAL ONCE
Status: COMPLETED | OUTPATIENT
Start: 2020-02-06 | End: 2020-02-06

## 2020-02-06 NOTE — PROGRESS NOTES
TRANSFER - IN REPORT:    Verbal report received from AGATA Peacock on Edwar Goff, Procedure Pacemaker Implant , from the Cardiac Cath lab, for routine progression of care. Report consisted of patients Situation, Background, Assessment and Recommendations(SBAR). Information from the following report(s) Procedure Summary, MAR and Recent Results was reviewed with the receiving clinician. Opportunity for questions and clarification was provided. Assessment completed upon patients arrival to [address] [place] [address] and care assumed. Cardiac Cath Lab Recovery Arrival Note:     Edwar Goff arrived to [facility] recovery area. Patient procedure= Pacemaker Implant. Patient on cardiac monitor, non-invasive blood pressure, Patient status doing well without problems. Patient is A&Ox 4. Patient reports no pain, no chest pain, no n/v. Procedure site without any bleeding and no hematoma.

## 2020-02-06 NOTE — DISCHARGE INSTRUCTIONS
Pacemaker  Discharge Instructions    Please make sure you have received your Temporary Pacemaker identification card with your discharge instructions      MEDICATIONS         Take only the medications prescribed to you at discharge.  You are prescribed an antibiotic to take for 5 days. Please do not miss doses of this prescription. ACTIVITY         Return to your normal activity, except as noted below. o Avoid tight clothes or unnecessary pressure over your incision (such as bra straps or seat belts). If it is tender or sensitive to clothing, cover the incision with a soft dressing or pad.  o Questions about driving are individualized and should be discussed with one of the EP Physicians prior to discharge. SHOWERING         Leave the bandage over your incision for 7 days after the Pacemaker implant. You bandage will be removed in clinic in 7 days.  It is important to keep the bandaged area clean and dry. You may shower around the site until the bandage is removed in clinic. Thereafter, you may shower after the bandage is removed, washing it gently with soap and water. Do not apply any lotions, powders, or perfumes to the incision line.  Avoid submerging your incision in water (tub baths, hot tubs, or swimming) for four weeks.  Underneath the dressing. o If you have white steri-strips over your incision (underneath the gauze dressing), they will curl up at the end and fall off, usually within 10 days. Do not pull them off.  - OR -   o You may have a different type of closure for the incision. If Dermabond Adhesive was used to close your incision, you will receive a separate instruction sheet. DISCHARGE PRECAUTIONS         Record your temperature every day, at the same time, for 3 weeks after your implant. A temperature of 100.5 F, or higher, can be the first sign of infection. This should be reported to your Doctor immediately.  You can have an MRI after 6 weeks. You must be aware that any strong magnet or magnetic field can affect your Pacemaker. In general, be careful of metal detectors, heavy machinery, and any area where arc-welding is performed. Avoid metal detectors such as the ones in security checkpoints at [facility] or [address] [place] [address]. When approaching a security checkpoint show your Pacemaker ID Card to security personnel and ask to be hand searched.  Always tell your doctor or dentist that you have a Pacemaker. Antibiotics may be prescribed before certain procedures.  If you use a cell phone, hold it on the opposite side from where your Pacemaker is implanted.  Your temporary identification will be given to you with these instructions. Keep your Pacemaker card in your wallet or on your person at all times. You should receive your permanent card in 8 weeks. If you do not receive your permanent card, please call the office at [phone]. TAKING YOUR PULSE         Take your pulse the same time every day, preferably in the morning.  Sit down and rest for 5 minutes prior to taking your pulse.  Take your pulse for 1 full minute, use a clock or stop watch with a second hand.  To feel your pulse, use the first two fingers of one hand; place them on the thumb side of the wrist of the opposite hand. The pulse will be steady, regular and throbbing.  Call the EP Lab Doctors if your pulse is less than 40 beats per minute. SYMPTOMS THAT NEED TO BE REPORTED IMMEDIATELY         Temperature more than 100.4 F     Redness or warmth at the incision site, or pain for longer than the first 5 days after the implant.  Drainage from the incision site.  Swelling around the incision site.  Shortness of breath.  Rapid heart rate or palpitations.  Dizziness, lightheadedness, fainting.  Slow pulse below 40 beats per minute.      REMEMBER: If you feel something is an emergency or cannot be handled over the phone, call 911 or go to the closest emergency room.           Sandra Byrd MD  Cardiac Electrophysiology / Cardiology    [facility]  [facility] [facility], [address] [facility], [address]  [address]  [phone] / [phone] Fax       [phone] / [phone] Fax

## 2020-02-06 NOTE — Clinical Note
Patient transported with a Registered Nurse and [address] [place] [address] / [facility].

## 2020-02-06 NOTE — PROGRESS NOTES
Kip Ba ambulated @ 8715 (time) approximately 100 feet. Patient tolerated ambulation without adverse advents. Right subclavian (right/left, groin/arm)  without bleeding or hematoma noted.

## 2020-02-06 NOTE — PROGRESS NOTES
Cardiac Cath Lab Recovery Arrival Note:      Kip Ba arrived to Cardiac Cath Lab, Recovery Area. Staff introduced to patient. Patient identifiers verified with NAME and DATE OF BIRTH. Procedure verified with patient. Consent forms reviewed and signed by patient or authorized representative and verified. Allergies verified. Patient informed of procedure and plan of care. Questions answered with review. Patient prepped for procedure, per orders from physician, prior to arrival.    Patient on cardiac monitor, non-invasive blood pressure, SPO2 monitor. Patient is A&Ox 4. Patient reports no pain, no chest pain, no n/v. Patient in stretcher, in low position, with side rails up, call bell within reach, patient instructed to call of assistance as needed. Patient prep in: [facility] Recovery Area, Bed# 4. Family in: Daughter.    Prep by: Ishan Farias RN

## 2020-02-06 NOTE — PROCEDURES
Cardiac Electrophysiology Report      PATIENT INFORMATION      Patient Name: Timothy Frye            MRN: [id number]             Study Date: 2020    YOB: 1922   Age: 80 y.o. Gender: male      Procedure:  Pacemaker Generator Change    Referring Physician:  Maryjane Fitzpatrick MD and Dr. Mary Lara     Duty Name   Electrophysiologist Vanessa Xie MD   Monitor Ryann Maddox RN; Wes Marr RN   Circulator Marilee Hwang RN; Ahmet Justin ROMMEL       PATIENT HISTORY     Timothy Frye is a 80 y.o. male with PAF, HTN, PPM, COPD, dyslipidemia, pacemaker who was noted to have 40 mode switches that appeared to be consistent with AFL or AT. Of note, he is pacemaker dependent. He recently underwent EPS, which demonstrated findings consistent with a left AT. Empiric ablation of his CTI was performed. We increased his metoprolol and added eliquis. He is tolerating this regimen. No further tachycardia recurrence. His PPM is now at [facility]. PROCEDURE     The patient was brought to the Cardiac Electrophysiology laboratory in a post-absorptive, fasting state. Informed consent was obtained. A peripheral IV was in place. Continuous electrocardiographic, blood pressure, O2 saturation and  CO2 monitoring was initiated. Pre-operative antibiotics were administered pre-operatively. Self-adhesive cardioversion patches were positioned on the chest.  Conscious sedation was effectuated according to protocol. The patient was then prepped and draped in the usual sterile fashion. A 50/50 mixture of lidocaine (1%) with epinephrine and bupivicaine (0.5%) was utilized for local anesthesia. An incision was performed over the chronic pulse generator. Sharp and blunt dissection was carried down to the level of the generator. Hemostasis was maintained with electrocautery. The generator and leads were carefully freed from the adhesive scar capsule.  The leads appeared to be intact upon visual inspection. The pacemaker generator was disconnected from the leads. A new generator was then connected to the chronic leads. The pocket was irrigated copiously with antibiotic solution. The generator was then placed into the pocket. The pocket was then closed in three layers using 2-O PDS, 3-O monocryl absorbable suture material and a zipline The skin was closed using a sub-cuticular technique. A bio-occlusive dressing were applied to the skin. The patient remained hemodynamically stable, tolerated the procedure well and was transferred in stable condition. There were no immediate complications encountered during the procedure. LEAD & GENERATOR DATA       Model # Serial #   Explanted Generator Medtronic [id number]   Implanted Generator Medtronic [id number]   Chronic Atrial Lead Medtronic 4076 [id number]   Chronic Ventricular Lead Medronic 4076 [id number]       PACE/SENSE DATA      Sensed Wave (mV) Threshold (V) Impedance (Ohms)   Atrium 3.1    Ventricle 4.8 1.0 342       FINAL PROGRAMMING     Mode Lower Rate (ppm) Upper Rate (ppm)   DDD 60 120       MEDICATION SUMMARY     Medication Route Unit Total   Gentamycin IV mg 80   Ancef IV grams 2   Fentanyl IV micrograms 50   Versed IV grams 2         CONCLUSIONS     1. Successful pacemaker generator change. 2. Keflex 500 mg po tid x 5 days. 3. Wound check in EP clinic in 14 days. 4. Follow up in EP clinic in 3 month or earlier if necessary. 5. Follow up with  Clista Klinefelter, MD and Dr. Mer العلي as scheduled. Thank you, Clista Klinefelter, MD and Dr. Mer العلي for involving me in the care of this extraordinarily pleasant male.         Parul Stone MD  Cardiac Electrophysiology / Cardiology    [address]  [address] [address], [address] [id number]  [phone] / [phone] Fax [phone] / [phone] Fax

## 2020-02-06 NOTE — PROGRESS NOTES
I have reviewed discharge instructions with the patient and daughter. The patient and daughter verbalized understanding. Copy of discharge instructions given to patient.

## 2020-02-06 NOTE — Clinical Note
Patient transported with a Cardiac Cath Tech / Patient Care Tech. Patient transported to: Holding area.    Report to be given at bedside in the holding area

## 2020-02-09 ENCOUNTER — HOSPITAL ENCOUNTER (INPATIENT)
Age: 85
LOS: 2 days | Discharge: HOME HEALTH | DRG: 940 | End: 2020-02-11
Attending: EMERGENCY MEDICINE | Admitting: INTERNAL MEDICINE
Payer: MEDICARE

## 2020-02-09 ENCOUNTER — APPOINTMENT (OUTPATIENT)
Dept: CT IMAGING | Age: 85
DRG: 940 | End: 2020-02-09
Attending: EMERGENCY MEDICINE
Payer: MEDICARE

## 2020-02-09 ENCOUNTER — APPOINTMENT (OUTPATIENT)
Dept: GENERAL RADIOLOGY | Age: 85
DRG: 940 | End: 2020-02-09
Attending: EMERGENCY MEDICINE
Payer: MEDICARE

## 2020-02-09 DIAGNOSIS — K85.90 ACUTE PANCREATITIS, UNSPECIFIED COMPLICATION STATUS, UNSPECIFIED PANCREATITIS TYPE: Primary | ICD-10-CM

## 2020-02-09 PROBLEM — R74.8 ELEVATED LIPASE: Status: ACTIVE | Noted: 2020-02-09

## 2020-02-09 LAB
ALBUMIN SERPL-MCNC: 3.5 G/DL (ref 3.5–5)
ALBUMIN/GLOB SERPL: 1 {RATIO} (ref 1.1–2.2)
ALP SERPL-CCNC: 106 U/L (ref 45–117)
ALT SERPL-CCNC: 18 U/L (ref 12–78)
ANION GAP SERPL CALC-SCNC: 8 MMOL/L (ref 5–15)
APPEARANCE UR: CLEAR
AST SERPL-CCNC: 41 U/L (ref 15–37)
ATRIAL RATE: 277 BPM
BACTERIA URNS QL MICRO: NEGATIVE /HPF
BASOPHILS # BLD: 0 K/UL (ref 0–0.1)
BASOPHILS NFR BLD: 0 % (ref 0–1)
BILIRUB SERPL-MCNC: 1 MG/DL (ref 0.2–1)
BILIRUB UR QL: NEGATIVE
BUN SERPL-MCNC: 22 MG/DL (ref 6–20)
BUN/CREAT SERPL: 16 (ref 12–20)
CALCIUM SERPL-MCNC: 8.9 MG/DL (ref 8.5–10.1)
CALCULATED R AXIS, ECG10: -77 DEGREES
CALCULATED T AXIS, ECG11: 87 DEGREES
CHLORIDE SERPL-SCNC: 102 MMOL/L (ref 97–108)
CO2 SERPL-SCNC: 27 MMOL/L (ref 21–32)
COLOR UR: ABNORMAL
COMMENT, HOLDF: NORMAL
CREAT SERPL-MCNC: 1.38 MG/DL (ref 0.7–1.3)
DIAGNOSIS, 93000: NORMAL
DIFFERENTIAL METHOD BLD: ABNORMAL
EOSINOPHIL # BLD: 0.3 K/UL (ref 0–0.4)
EOSINOPHIL NFR BLD: 3 % (ref 0–7)
EPITH CASTS URNS QL MICRO: ABNORMAL /LPF
ERYTHROCYTE [DISTWIDTH] IN BLOOD BY AUTOMATED COUNT: 14.6 % (ref 11.5–14.5)
GLOBULIN SER CALC-MCNC: 3.6 G/DL (ref 2–4)
GLUCOSE BLD STRIP.AUTO-MCNC: 110 MG/DL (ref 65–100)
GLUCOSE SERPL-MCNC: 121 MG/DL (ref 65–100)
GLUCOSE UR STRIP.AUTO-MCNC: NEGATIVE MG/DL
HCT VFR BLD AUTO: 43.3 % (ref 36.6–50.3)
HGB BLD-MCNC: 13.9 G/DL (ref 12.1–17)
HGB UR QL STRIP: NEGATIVE
HYALINE CASTS URNS QL MICRO: ABNORMAL /LPF (ref 0–5)
IMM GRANULOCYTES # BLD AUTO: 0 K/UL (ref 0–0.04)
IMM GRANULOCYTES NFR BLD AUTO: 0 % (ref 0–0.5)
KETONES UR QL STRIP.AUTO: NEGATIVE MG/DL
LACTATE SERPL-SCNC: 1.4 MMOL/L (ref 0.4–2)
LEUKOCYTE ESTERASE UR QL STRIP.AUTO: NEGATIVE
LIPASE SERPL-CCNC: 895 U/L (ref 73–393)
LYMPHOCYTES # BLD: 0.5 K/UL (ref 0.8–3.5)
LYMPHOCYTES NFR BLD: 6 % (ref 12–49)
MCH RBC QN AUTO: 30.4 PG (ref 26–34)
MCHC RBC AUTO-ENTMCNC: 32.1 G/DL (ref 30–36.5)
MCV RBC AUTO: 94.7 FL (ref 80–99)
MONOCYTES # BLD: 0.6 K/UL (ref 0–1)
MONOCYTES NFR BLD: 7 % (ref 5–13)
MUCOUS THREADS URNS QL MICRO: ABNORMAL /LPF
NEUTS SEG # BLD: 7.7 K/UL (ref 1.8–8)
NEUTS SEG NFR BLD: 84 % (ref 32–75)
NITRITE UR QL STRIP.AUTO: NEGATIVE
NRBC # BLD: 0 K/UL (ref 0–0.01)
NRBC BLD-RTO: 0 PER 100 WBC
PH UR STRIP: 6 [PH] (ref 5–8)
PLATELET # BLD AUTO: 139 K/UL (ref 150–400)
PMV BLD AUTO: 10.7 FL (ref 8.9–12.9)
POTASSIUM SERPL-SCNC: 4.6 MMOL/L (ref 3.5–5.1)
POTASSIUM SERPL-SCNC: 5.6 MMOL/L (ref 3.5–5.1)
PROT SERPL-MCNC: 7.1 G/DL (ref 6.4–8.2)
PROT UR STRIP-MCNC: ABNORMAL MG/DL
Q-T INTERVAL, ECG07: 468 MS
QRS DURATION, ECG06: 170 MS
QTC CALCULATION (BEZET), ECG08: 482 MS
RBC # BLD AUTO: 4.57 M/UL (ref 4.1–5.7)
RBC #/AREA URNS HPF: ABNORMAL /HPF (ref 0–5)
RBC MORPH BLD: ABNORMAL
SAMPLES BEING HELD,HOLD: NORMAL
SERVICE CMNT-IMP: ABNORMAL
SODIUM SERPL-SCNC: 137 MMOL/L (ref 136–145)
SP GR UR REFRACTOMETRY: 1.02 (ref 1–1.03)
TROPONIN I SERPL-MCNC: <0.05 NG/ML
UR CULT HOLD, URHOLD: NORMAL
UROBILINOGEN UR QL STRIP.AUTO: 0.2 EU/DL (ref 0.2–1)
VENTRICULAR RATE, ECG03: 64 BPM
WBC # BLD AUTO: 9.1 K/UL (ref 4.1–11.1)
WBC URNS QL MICRO: ABNORMAL /HPF (ref 0–4)

## 2020-02-09 PROCEDURE — 74177 CT ABD & PELVIS W/CONTRAST: CPT

## 2020-02-09 PROCEDURE — 83690 ASSAY OF LIPASE: CPT

## 2020-02-09 PROCEDURE — 74011636320 HC RX REV CODE- 636/320: Performed by: EMERGENCY MEDICINE

## 2020-02-09 PROCEDURE — 80053 COMPREHEN METABOLIC PANEL: CPT

## 2020-02-09 PROCEDURE — 81001 URINALYSIS AUTO W/SCOPE: CPT

## 2020-02-09 PROCEDURE — 83605 ASSAY OF LACTIC ACID: CPT

## 2020-02-09 PROCEDURE — 99218 HC RM OBSERVATION: CPT

## 2020-02-09 PROCEDURE — 36415 COLL VENOUS BLD VENIPUNCTURE: CPT

## 2020-02-09 PROCEDURE — 85025 COMPLETE CBC W/AUTO DIFF WBC: CPT

## 2020-02-09 PROCEDURE — 74011250636 HC RX REV CODE- 250/636: Performed by: STUDENT IN AN ORGANIZED HEALTH CARE EDUCATION/TRAINING PROGRAM

## 2020-02-09 PROCEDURE — 74011250637 HC RX REV CODE- 250/637: Performed by: STUDENT IN AN ORGANIZED HEALTH CARE EDUCATION/TRAINING PROGRAM

## 2020-02-09 PROCEDURE — 84132 ASSAY OF SERUM POTASSIUM: CPT

## 2020-02-09 PROCEDURE — 82962 GLUCOSE BLOOD TEST: CPT

## 2020-02-09 PROCEDURE — 96374 THER/PROPH/DIAG INJ IV PUSH: CPT

## 2020-02-09 PROCEDURE — 84484 ASSAY OF TROPONIN QUANT: CPT

## 2020-02-09 PROCEDURE — 94762 N-INVAS EAR/PLS OXIMTRY CONT: CPT

## 2020-02-09 PROCEDURE — 94760 N-INVAS EAR/PLS OXIMETRY 1: CPT

## 2020-02-09 PROCEDURE — 74011250636 HC RX REV CODE- 250/636: Performed by: EMERGENCY MEDICINE

## 2020-02-09 PROCEDURE — 93005 ELECTROCARDIOGRAM TRACING: CPT

## 2020-02-09 PROCEDURE — 99285 EMERGENCY DEPT VISIT HI MDM: CPT

## 2020-02-09 PROCEDURE — 65660000000 HC RM CCU STEPDOWN

## 2020-02-09 PROCEDURE — 71046 X-RAY EXAM CHEST 2 VIEWS: CPT

## 2020-02-09 RX ORDER — SODIUM CHLORIDE 0.9 % (FLUSH) 0.9 %
5-40 SYRINGE (ML) INJECTION EVERY 8 HOURS
Status: DISCONTINUED | OUTPATIENT
Start: 2020-02-09 | End: 2020-02-11 | Stop reason: HOSPADM

## 2020-02-09 RX ORDER — NITROGLYCERIN 0.4 MG/1
0.4 TABLET SUBLINGUAL
Status: DISCONTINUED | OUTPATIENT
Start: 2020-02-09 | End: 2020-02-11 | Stop reason: HOSPADM

## 2020-02-09 RX ORDER — FUROSEMIDE 20 MG/1
10 TABLET ORAL DAILY
Status: DISCONTINUED | OUTPATIENT
Start: 2020-02-10 | End: 2020-02-11 | Stop reason: HOSPADM

## 2020-02-09 RX ORDER — METRONIDAZOLE 7.5 MG/G
GEL TOPICAL
Status: DISCONTINUED | OUTPATIENT
Start: 2020-02-09 | End: 2020-02-11 | Stop reason: HOSPADM

## 2020-02-09 RX ORDER — MORPHINE SULFATE 2 MG/ML
1 INJECTION, SOLUTION INTRAMUSCULAR; INTRAVENOUS
Status: DISCONTINUED | OUTPATIENT
Start: 2020-02-09 | End: 2020-02-11 | Stop reason: HOSPADM

## 2020-02-09 RX ORDER — ACETAMINOPHEN 325 MG/1
650 TABLET ORAL
Status: DISCONTINUED | OUTPATIENT
Start: 2020-02-09 | End: 2020-02-11 | Stop reason: HOSPADM

## 2020-02-09 RX ORDER — IPRATROPIUM BROMIDE 21 UG/1
2 SPRAY, METERED NASAL
Status: DISCONTINUED | OUTPATIENT
Start: 2020-02-10 | End: 2020-02-11 | Stop reason: HOSPADM

## 2020-02-09 RX ORDER — SODIUM CHLORIDE 0.9 % (FLUSH) 0.9 %
5-40 SYRINGE (ML) INJECTION AS NEEDED
Status: DISCONTINUED | OUTPATIENT
Start: 2020-02-09 | End: 2020-02-11 | Stop reason: HOSPADM

## 2020-02-09 RX ORDER — FLUTICASONE PROPIONATE 50 MCG
2 SPRAY, SUSPENSION (ML) NASAL DAILY
Status: DISCONTINUED | OUTPATIENT
Start: 2020-02-10 | End: 2020-02-11 | Stop reason: HOSPADM

## 2020-02-09 RX ORDER — ONDANSETRON 2 MG/ML
4 INJECTION INTRAMUSCULAR; INTRAVENOUS
Status: COMPLETED | OUTPATIENT
Start: 2020-02-09 | End: 2020-02-09

## 2020-02-09 RX ORDER — IPRATROPIUM BROMIDE AND ALBUTEROL SULFATE 2.5; .5 MG/3ML; MG/3ML
3 SOLUTION RESPIRATORY (INHALATION)
Status: DISCONTINUED | OUTPATIENT
Start: 2020-02-09 | End: 2020-02-11 | Stop reason: HOSPADM

## 2020-02-09 RX ORDER — SODIUM CHLORIDE 9 MG/ML
100 INJECTION, SOLUTION INTRAVENOUS CONTINUOUS
Status: DISCONTINUED | OUTPATIENT
Start: 2020-02-09 | End: 2020-02-10

## 2020-02-09 RX ORDER — TAMSULOSIN HYDROCHLORIDE 0.4 MG/1
0.4 CAPSULE ORAL
Status: DISCONTINUED | OUTPATIENT
Start: 2020-02-09 | End: 2020-02-11 | Stop reason: HOSPADM

## 2020-02-09 RX ORDER — CEPHALEXIN 250 MG/1
500 CAPSULE ORAL 3 TIMES DAILY
Status: DISCONTINUED | OUTPATIENT
Start: 2020-02-09 | End: 2020-02-11 | Stop reason: HOSPADM

## 2020-02-09 RX ORDER — METOPROLOL TARTRATE 25 MG/1
25 TABLET, FILM COATED ORAL 2 TIMES DAILY
Status: DISCONTINUED | OUTPATIENT
Start: 2020-02-09 | End: 2020-02-11 | Stop reason: HOSPADM

## 2020-02-09 RX ORDER — MORPHINE SULFATE 2 MG/ML
1 INJECTION, SOLUTION INTRAMUSCULAR; INTRAVENOUS
Status: DISPENSED | OUTPATIENT
Start: 2020-02-09 | End: 2020-02-10

## 2020-02-09 RX ORDER — SIMVASTATIN 10 MG/1
20 TABLET, FILM COATED ORAL
Status: DISCONTINUED | OUTPATIENT
Start: 2020-02-10 | End: 2020-02-11 | Stop reason: HOSPADM

## 2020-02-09 RX ORDER — AMLODIPINE BESYLATE 5 MG/1
5 TABLET ORAL DAILY
Status: DISCONTINUED | OUTPATIENT
Start: 2020-02-10 | End: 2020-02-11 | Stop reason: HOSPADM

## 2020-02-09 RX ORDER — LEVOTHYROXINE SODIUM 50 UG/1
50 TABLET ORAL
Status: DISCONTINUED | OUTPATIENT
Start: 2020-02-10 | End: 2020-02-11 | Stop reason: HOSPADM

## 2020-02-09 RX ORDER — ONDANSETRON 2 MG/ML
4 INJECTION INTRAMUSCULAR; INTRAVENOUS
Status: DISCONTINUED | OUTPATIENT
Start: 2020-02-09 | End: 2020-02-11 | Stop reason: HOSPADM

## 2020-02-09 RX ORDER — SODIUM CHLORIDE 9 MG/ML
50 INJECTION, SOLUTION INTRAVENOUS
Status: COMPLETED | OUTPATIENT
Start: 2020-02-09 | End: 2020-02-09

## 2020-02-09 RX ORDER — SODIUM CHLORIDE 0.9 % (FLUSH) 0.9 %
10 SYRINGE (ML) INJECTION
Status: COMPLETED | OUTPATIENT
Start: 2020-02-09 | End: 2020-02-09

## 2020-02-09 RX ADMIN — METOPROLOL TARTRATE 25 MG: 25 TABLET ORAL at 21:49

## 2020-02-09 RX ADMIN — SODIUM CHLORIDE 50 ML/HR: 900 INJECTION, SOLUTION INTRAVENOUS at 15:12

## 2020-02-09 RX ADMIN — TAMSULOSIN HYDROCHLORIDE 0.4 MG: 0.4 CAPSULE ORAL at 21:49

## 2020-02-09 RX ADMIN — Medication 10 ML: at 15:11

## 2020-02-09 RX ADMIN — SODIUM CHLORIDE 100 ML/HR: 900 INJECTION, SOLUTION INTRAVENOUS at 21:50

## 2020-02-09 RX ADMIN — ONDANSETRON 4 MG: 2 INJECTION INTRAMUSCULAR; INTRAVENOUS at 17:40

## 2020-02-09 RX ADMIN — IOPAMIDOL 100 ML: 755 INJECTION, SOLUTION INTRAVENOUS at 15:12

## 2020-02-09 RX ADMIN — APIXABAN 2.5 MG: 2.5 TABLET, FILM COATED ORAL at 21:49

## 2020-02-09 RX ADMIN — Medication 10 ML: at 21:51

## 2020-02-09 RX ADMIN — ONDANSETRON 4 MG: 2 INJECTION INTRAMUSCULAR; INTRAVENOUS at 13:48

## 2020-02-09 RX ADMIN — METRONIDAZOLE: 7.5 GEL TOPICAL at 21:49

## 2020-02-09 RX ADMIN — CEPHALEXIN 500 MG: 250 CAPSULE ORAL at 21:49

## 2020-02-09 RX ADMIN — SODIUM CHLORIDE 500 ML: 900 INJECTION, SOLUTION INTRAVENOUS at 14:38

## 2020-02-09 RX ADMIN — ACETAMINOPHEN 650 MG: 325 TABLET ORAL at 21:49

## 2020-02-09 NOTE — ED TRIAGE NOTES
Patient arriving with c/o fever, nausea and increased lethargy. Patient arriving via EMS, who reports fever of 100 Tympanic. Patient had battery replacement of pacemaker on the 6. Arriving with bandage in place. AOx4.

## 2020-02-09 NOTE — ED PROVIDER NOTES
The history is provided by the patient and the EMS personnel. No  was used. Nausea    This is a new problem. The current episode started 6 to 12 hours ago. The problem has not changed since onset. Patient reports a subjective fever - was not measured. The fever has been present for less than 1 day. Associated symptoms include a fever, abdominal pain and cough. Pertinent negatives include no chills, no sweats, no diarrhea, no headaches, no arthralgias, no myalgias, no URI and no headaches.         Past Medical History:   Diagnosis Date    Atrial fibrillation ([address]     Chronic obstructive pulmonary disease [facility]     Essential hypertension     Hyperlipidemia     Long term (current) use of anticoagulants     Pacemaker     Rosacea 2016       Past Surgical History:   Procedure Laterality Date    HX CORONARY STENT PLACEMENT      HX HEART CATHETERIZATION      HX PACEMAKER      MD REMVL PERM PM PLS GEN W/REPL PLSE GEN 2 LEAD SYS N/A 2/6/2020    REMOVE & REPLACE PPM GEN DUAL LEAD performed by Bee Love MD at [address] CATH LAB         Family History:   Problem Relation Age of Onset    Stroke Mother     Hypertension Mother     Kidney Disease Father        Social History     Socioeconomic History    Marital status:      Spouse name: Not on file    Number of children: Not on file    Years of education: Not on file    Highest education level: Not on file   Occupational History    Not on file   Social Needs    Financial resource strain: Not on file    Food insecurity:     Worry: Not on file     Inability: Not on file    Transportation needs:     Medical: Not on file     Non-medical: Not on file   Tobacco Use    Smoking status: Never Smoker    Smokeless tobacco: Never Used   Substance and Sexual Activity    Alcohol use: No     Alcohol/week: 0.0 standard drinks    Drug use: No    Sexual activity: Not on file   Lifestyle    Physical activity:     Days per week: Not on file Minutes per session: Not on file    Stress: Not on file   Relationships    Social connections:     Talks on phone: Not on file     Gets together: Not on file     Attends [employer] service: Not on file     Active member of club or organization: Not on file     Attends meetings of clubs or organizations: Not on file     Relationship status: Not on file    Intimate partner violence:     Fear of current or ex partner: Not on file     Emotionally abused: Not on file     Physically abused: Not on file     Forced sexual activity: Not on file   Other Topics Concern    Not on file   Social History Narrative    Not on file         ALLERGIES: Patient has no known allergies. Review of Systems   Constitutional: Positive for fever. Negative for activity change and chills. HENT: Negative for nosebleeds, sore throat, trouble swallowing and voice change. Eyes: Negative for visual disturbance. Respiratory: Positive for cough. Negative for shortness of breath. Cardiovascular: Negative for chest pain and palpitations. Gastrointestinal: Positive for abdominal pain and nausea. Negative for constipation and diarrhea. Genitourinary: Negative for difficulty urinating, dysuria, hematuria and urgency. Musculoskeletal: Negative for arthralgias, back pain, myalgias, neck pain and neck stiffness. Skin: Negative for color change. Allergic/Immunologic: Negative for immunocompromised state. Neurological: Negative for dizziness, seizures, syncope, weakness, light-headedness, numbness and headaches. Psychiatric/Behavioral: Negative for behavioral problems, confusion, hallucinations, self-injury and suicidal ideas. Vitals:    02/09/20 1318   BP: 149/81   Pulse: 60   Resp: 20   Temp: 99.1 °F (37.3 °C)   SpO2: 99%   Weight: 93.8 kg (206 lb 12.7 oz)   Height: 5' 8\" (1.727 m)            Physical Exam  Vitals signs and nursing note reviewed. Constitutional:       General: He is not in acute distress. Appearance: He is well-developed. He is not diaphoretic. HENT:      Head: Normocephalic and atraumatic. Eyes:      Pupils: Pupils are equal, round, and reactive to light. Neck:      Musculoskeletal: Normal range of motion and neck supple. Cardiovascular:      Rate and Rhythm: Normal rate and regular rhythm. Heart sounds: Normal heart sounds. No murmur. No friction rub. No gallop. Pulmonary:      Effort: Pulmonary effort is normal. No respiratory distress. Breath sounds: Decreased breath sounds present. No wheezing. Chest:       Abdominal:      General: Bowel sounds are normal. There is no distension. Palpations: Abdomen is soft. Tenderness: There is no abdominal tenderness. There is no guarding or rebound. Musculoskeletal: Normal range of motion. Skin:     General: Skin is warm. Findings: No rash. Neurological:      Mental Status: He is alert and oriented to person, place, and time. Psychiatric:         Behavior: Behavior normal.         Thought Content: Thought content normal.         Judgment: Judgment normal.          MDM     This is a 26-year-old male with past medical history, review of systems, physical exam as above, presenting with complaints of nausea. Patient states he woke this morning with nausea, was told upon his recent discharge from the hospital the nausea was a sign associated with heart attack and he called EMS to be evaluated. He also endorses subjective fevers, abdominal pain, cough. He denies vomiting, states chest pain secondary to recent pacemaker placement, and that shortness of breath is at baseline in the setting of a history of COPD. Physical exam is remarkable for a well-appearing elderly male, in no acute distress, noted to be afebrile, without tachycardia or hypotension, satting well on room air.   He has mild generalized abdominal tenderness to palpation, without rebound or guarding, diminished breath sounds throughout, without consolidation or wheezing, right upper chest ecchymosis, and surgical scar secondary to pacemaker placement, without erythema or increased warmth. Patient denies dysuria, frequency, urgency, or hematuria. Differential is extensive, including ACS, surgical site infection, pneumonia, upper respiratory infection. Plan to obtain CMP, CBC, EKG, chest x-ray, cardiac enzymes, lactic acid, UA, lipase. Will provide antiemetics, reassess, and make a disposition. Procedures    Hospitalist Perfect Serve for Admission  4:14 PM    ED Room Number: SER08/08  Patient Name and age:  Abbi Stokes 80 y.o.  male  Working Diagnosis:   1.  Acute pancreatitis, unspecified complication status, unspecified pancreatitis type      Readmission: no  Isolation Requirements:  no  Recommended Level of Care:  med/surg  Code Status:  Full Code  Department:[facility] Adult ED - 21   Other:  D/w Dr. Janae Rosenberg

## 2020-02-09 NOTE — ED NOTES
TRANSFER - OUT REPORT:    Verbal report given to [facility] RN(name) on Cary Calles  being transferred to [facility]) for routine progression of care       Report consisted of patients Situation, Background, Assessment and   Recommendations(SBAR). Information from the following report(s) ED Summary, Intake/Output and MAR was reviewed with the receiving nurse. Lines:   Peripheral IV 02/09/20 Right Arm (Active)   Site Assessment Clean, dry, & intact 2/9/2020  1:55 PM   Phlebitis Assessment 0 2/9/2020  1:55 PM   Infiltration Assessment 0 2/9/2020  1:55 PM   Dressing Status Clean, dry, & intact 2/9/2020  1:55 PM   Dressing Type Transparent 2/9/2020  1:55 PM   Hub Color/Line Status Pink 2/9/2020  1:55 PM        Opportunity for questions and clarification was provided.       Patient transported with:   Monitor

## 2020-02-10 ENCOUNTER — APPOINTMENT (OUTPATIENT)
Dept: ULTRASOUND IMAGING | Age: 85
DRG: 940 | End: 2020-02-10
Attending: INTERNAL MEDICINE
Payer: MEDICARE

## 2020-02-10 LAB
ANION GAP SERPL CALC-SCNC: 6 MMOL/L (ref 5–15)
BUN SERPL-MCNC: 23 MG/DL (ref 6–20)
BUN/CREAT SERPL: 17 (ref 12–20)
CALCIUM SERPL-MCNC: 8 MG/DL (ref 8.5–10.1)
CHLORIDE SERPL-SCNC: 109 MMOL/L (ref 97–108)
CHOLEST SERPL-MCNC: 111 MG/DL
CO2 SERPL-SCNC: 24 MMOL/L (ref 21–32)
CREAT SERPL-MCNC: 1.36 MG/DL (ref 0.7–1.3)
ERYTHROCYTE [DISTWIDTH] IN BLOOD BY AUTOMATED COUNT: 14.5 % (ref 11.5–14.5)
GLUCOSE BLD STRIP.AUTO-MCNC: 121 MG/DL (ref 65–100)
GLUCOSE BLD STRIP.AUTO-MCNC: 135 MG/DL (ref 65–100)
GLUCOSE BLD STRIP.AUTO-MCNC: 91 MG/DL (ref 65–100)
GLUCOSE BLD STRIP.AUTO-MCNC: 93 MG/DL (ref 65–100)
GLUCOSE SERPL-MCNC: 114 MG/DL (ref 65–100)
HCT VFR BLD AUTO: 37.4 % (ref 36.6–50.3)
HDLC SERPL-MCNC: 41 MG/DL
HDLC SERPL: 2.7 {RATIO} (ref 0–5)
HGB BLD-MCNC: 11.8 G/DL (ref 12.1–17)
LDLC SERPL CALC-MCNC: 52.4 MG/DL (ref 0–100)
LIPID PROFILE,FLP: NORMAL
MCH RBC QN AUTO: 29.9 PG (ref 26–34)
MCHC RBC AUTO-ENTMCNC: 31.6 G/DL (ref 30–36.5)
MCV RBC AUTO: 94.9 FL (ref 80–99)
NRBC # BLD: 0 K/UL (ref 0–0.01)
NRBC BLD-RTO: 0 PER 100 WBC
PLATELET # BLD AUTO: 122 K/UL (ref 150–400)
PMV BLD AUTO: 10 FL (ref 8.9–12.9)
POTASSIUM SERPL-SCNC: 4 MMOL/L (ref 3.5–5.1)
RBC # BLD AUTO: 3.94 M/UL (ref 4.1–5.7)
SERVICE CMNT-IMP: ABNORMAL
SERVICE CMNT-IMP: ABNORMAL
SERVICE CMNT-IMP: NORMAL
SERVICE CMNT-IMP: NORMAL
SODIUM SERPL-SCNC: 139 MMOL/L (ref 136–145)
TRIGL SERPL-MCNC: 88 MG/DL (ref ?–150)
VLDLC SERPL CALC-MCNC: 17.6 MG/DL
WBC # BLD AUTO: 6.1 K/UL (ref 4.1–11.1)

## 2020-02-10 PROCEDURE — 85027 COMPLETE CBC AUTOMATED: CPT

## 2020-02-10 PROCEDURE — 74011250636 HC RX REV CODE- 250/636: Performed by: STUDENT IN AN ORGANIZED HEALTH CARE EDUCATION/TRAINING PROGRAM

## 2020-02-10 PROCEDURE — 82962 GLUCOSE BLOOD TEST: CPT

## 2020-02-10 PROCEDURE — 94760 N-INVAS EAR/PLS OXIMETRY 1: CPT

## 2020-02-10 PROCEDURE — 65660000000 HC RM CCU STEPDOWN

## 2020-02-10 PROCEDURE — 74011250637 HC RX REV CODE- 250/637: Performed by: STUDENT IN AN ORGANIZED HEALTH CARE EDUCATION/TRAINING PROGRAM

## 2020-02-10 PROCEDURE — 36415 COLL VENOUS BLD VENIPUNCTURE: CPT

## 2020-02-10 PROCEDURE — 80061 LIPID PANEL: CPT

## 2020-02-10 PROCEDURE — 80048 BASIC METABOLIC PNL TOTAL CA: CPT

## 2020-02-10 PROCEDURE — 99218 HC RM OBSERVATION: CPT

## 2020-02-10 PROCEDURE — 76700 US EXAM ABDOM COMPLETE: CPT

## 2020-02-10 RX ADMIN — CEPHALEXIN 500 MG: 250 CAPSULE ORAL at 22:26

## 2020-02-10 RX ADMIN — APIXABAN 2.5 MG: 2.5 TABLET, FILM COATED ORAL at 18:07

## 2020-02-10 RX ADMIN — METOPROLOL TARTRATE 25 MG: 25 TABLET ORAL at 10:33

## 2020-02-10 RX ADMIN — APIXABAN 2.5 MG: 2.5 TABLET, FILM COATED ORAL at 10:33

## 2020-02-10 RX ADMIN — Medication 10 ML: at 14:52

## 2020-02-10 RX ADMIN — ONDANSETRON 4 MG: 2 INJECTION INTRAMUSCULAR; INTRAVENOUS at 09:33

## 2020-02-10 RX ADMIN — SIMVASTATIN 20 MG: 20 TABLET, FILM COATED ORAL at 22:26

## 2020-02-10 RX ADMIN — TAMSULOSIN HYDROCHLORIDE 0.4 MG: 0.4 CAPSULE ORAL at 22:26

## 2020-02-10 RX ADMIN — Medication 10 ML: at 06:37

## 2020-02-10 RX ADMIN — FLUTICASONE PROPIONATE 2 SPRAY: 50 SPRAY, METERED NASAL at 10:35

## 2020-02-10 RX ADMIN — METRONIDAZOLE: 7.5 GEL TOPICAL at 22:29

## 2020-02-10 RX ADMIN — CEPHALEXIN 500 MG: 250 CAPSULE ORAL at 10:33

## 2020-02-10 RX ADMIN — AMLODIPINE BESYLATE 5 MG: 5 TABLET ORAL at 10:33

## 2020-02-10 RX ADMIN — Medication 10 ML: at 22:23

## 2020-02-10 RX ADMIN — LEVOTHYROXINE SODIUM 50 MCG: 0.05 TABLET ORAL at 06:37

## 2020-02-10 RX ADMIN — MULTIPLE VITAMINS W/ MINERALS TAB 1 TABLET: TAB at 10:33

## 2020-02-10 RX ADMIN — METOPROLOL TARTRATE 25 MG: 25 TABLET ORAL at 18:07

## 2020-02-10 RX ADMIN — FUROSEMIDE 10 MG: 20 TABLET ORAL at 10:33

## 2020-02-10 RX ADMIN — CEPHALEXIN 500 MG: 250 CAPSULE ORAL at 15:50

## 2020-02-10 NOTE — PROGRESS NOTES
TRANSFER - OUT REPORT:    Verbal report given to [facility] RN(name) on Gagan Valle  being transferred to [facility](unit) for routine progression of care       Report consisted of patients Situation, Background, Assessment and   Recommendations(SBAR). Information from the following report(s) SBAR was reviewed with the receiving nurse. Lines:   Peripheral IV 02/09/20 Right Arm (Active)   Site Assessment Clean, dry, & intact 2/9/2020  1:55 PM   Phlebitis Assessment 0 2/9/2020  1:55 PM   Infiltration Assessment 0 2/9/2020  1:55 PM   Dressing Status Clean, dry, & intact 2/9/2020  1:55 PM   Dressing Type Transparent 2/9/2020  1:55 PM   Hub Color/Line Status Pink 2/9/2020  1:55 PM        Opportunity for questions and clarification was provided.       Patient transported with:   Registered Nurse

## 2020-02-10 NOTE — WOUND CARE
WOCN Note:  
 
New consult placed for assessment of gluteal cleft. Chart reviewed. Admitted DX:  Acute pancreatitis Elevated lipase Assessment:  
Patient is alert, communicative, continent and mobile independently in bed. Bed: advance care Patient reports no pain. Heels intact without erythema and offloaded on pillows. 1. POA gluteal cleft, MASD: 2 x 0.5 x 0.1 cm  100% red. Scant serosanguinous exudate. Periwound has blanching pink erythema. Recommendations:   
Gluteal cleft:  Weekly and as needed cleanse with saline; apply Hydrocolloid. Skin Care & Pressure Prevention: 
Minimize layers of linen/pads under patient to optimize support surface. Turn/reposition approximately every 2 hours and offload heels. Manage incontinence / promote continence Discussed above plan with patient and Rosario Montejo RN. Transition of Care: Plan to follow as needed while admitted to hospital. 
 
PARDEEP LeesN RN [facility] Wound Care Office [phone] Pager [phone]

## 2020-02-10 NOTE — PROGRESS NOTES
TRANSFER - IN REPORT:    Verbal report received from annette (name) on Ashkan Taylor  being received from [facility](unit) for routine progression of care      Report consisted of patients Situation, Background, Assessment and   Recommendations(SBAR). Information from the following report(s) SBAR, Kardex, [facility] and Recent Results was reviewed with the receiving nurse. Opportunity for questions and clarification was provided. Assessment completed upon patients arrival to unit and care assumed.

## 2020-02-10 NOTE — CONSULTS
[facility] at [address] Consultation      Admit Date: 2/9/2020  Reason for Consultation: umbilical hernia    CC:  Jasiel Arvizu is a 80 y.o. male whom we are asked to see in consultation for umbilical hernia. HPI:  He states hernia has been present for about 5 years. Only issue is that \"it changes my belt line. \"  Denies pain, no issues with vomiting. States his BMs have been more firm, but takes a stool softener which alleviates the issue. States his hernia is checked regularly by his PCP, who told him that since the hernia is not causing issues, he did not need surgical repair. He was admitted yesterday with fever 100.6, nausea, pancreatitis. He is s/p pacemaker placement 2/6/20. CT A/P 2/9/20:  GALLBLADDER: Numerous gallstones in the dependent portion of the gallbladder. PANCREAS: No mass or ductal dilatation. ADRENALS: No mass. KIDNEYS: Cortical thinning of both kidneys. No hydronephrosis. GI TRACT: Diverticulosis of the colon with no diverticulitis. Stomach is  distended with air and fluid. No bowel obstruction. Umbilical hernia contains a  small portion of small bowel and mesenteric fat without inflammation or  obstruction. RETROPERITONEUM: Atherosclerosis of the abdominal aorta, which is aneurysmal in  the infrarenal segment of the 3.2 cm. 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 IMPRESSION:  1. Nonobstructing umbilical hernia containing a short segment of small bowel. No  associated inflammatory stranding. Hernia may be incarcerated, correlate clinically. 2. Colonic diverticulosis without diverticulitis. 3. Aneurysmal dilatation of the abdominal aorta, common iliac arteries and most  significantly the left internal iliac artery as above.   4. 8 mm right upper lobe pulmonary nodule, unchanged. At present he is feeling much better and hoping for discharge today. Patient Active Problem List    Diagnosis Date Noted    Acute pancreatitis 02/09/2020    Elevated lipase 02/09/2020    NSTEMI (non-ST elevated myocardial infarction) ([facility] 07/17/2019    Chest pain 07/03/2019    Sepsis [facility] 01/27/2019    Chronic obstructive pulmonary disease ([facility] 01/27/2019    Hyperlipidemia 01/27/2019    Essential hypertension 01/27/2019    Pacemaker 01/27/2019    Fever 01/27/2019    Leukocytosis 01/27/2019    CKD (chronic kidney disease) stage 3, GFR 30-59 ml/min ([facility]) 01/27/2019    Cough 01/27/2019    Dyspnea 01/27/2019    Atrial fibrillation ([facility] 02/17/2016    Encounter to establish care 08/05/2015     Past Medical History:   Diagnosis Date    Atrial fibrillation (HCC)     Chronic obstructive pulmonary disease (HCC)     Essential hypertension     Hyperlipidemia     Long term (current) use of anticoagulants     Pacemaker     Rosacea 2016      Past Surgical History:   Procedure Laterality Date    HX CORONARY STENT PLACEMENT      HX HEART CATHETERIZATION      HX PACEMAKER      GA REMVL PERM PM PLS GEN W/REPL PLSE GEN 2 LEAD SYS N/A 2/6/2020    REMOVE & REPLACE PPM GEN DUAL LEAD performed [place] Genesis Lynn [place] [id number], [facility] CATH LAB      Social History     Tobacco Use    Smoking status: Never Smoker    Smokeless tobacco: Never Used   Substance Use Topics    Alcohol use: No     Alcohol/week: 0.0 standard drinks      Family History   Problem Relation Age of Onset    Stroke Mother     Hypertension Mother     Kidney Disease Father       Prior to Admission medications    Medication Sig Start Date End Date Taking? Authorizing Provider   cephALEXin (KEFLEX) 500 mg capsule Take 1 Cap by mouth three (3) times daily for 5 days. 2/6/20 2/11/20 Yes Genesis Lynn MD   metoprolol tartrate (LOPRESSOR) 50 mg tablet Take 0.5 Tabs by mouth two (2) times a day.  7/17/19 Yes Hollie Coffin, DO   amLODIPine (NORVASC) 5 mg tablet Take 1 Tab by mouth daily. 7/17/19  Yes Hollie Coffin, DO   nitroglycerin (NITROSTAT) 0.4 mg SL tablet 1 Tab by SubLINGual route every five (5) minutes as needed for Chest Pain. 7/5/19  Yes General Mary NAJERA NP   ipratropium (ATROVENT) 0.03 % nasal spray 2 Sprays by Both Nostrils route two (2) times daily as needed for Rhinitis. Yes Provider, Historical   mometasone (NASONEX) 50 mcg/actuation nasal spray 2 Sprays daily. Yes Provider, Historical   furosemide (LASIX) 20 mg tablet Take 10 mg by mouth daily. Yes Other, MD Di   metroNIDAZOLE (METROCREAM) 0.75 % topical cream Apply  to affected area nightly. Use a thin layer to affected areas after washing   Yes Provider, Historical   apixaban (ELIQUIS) 2.5 mg tablet Take 1 Tab by mouth two (2) times a day. 1/25/17  Yes Bee Love MD   tamsulosin [facility]) 0.4 mg capsule Take 0.4 mg by mouth nightly. Yes Provider, Historical   levothyroxine (LEVOTHROID) 50 mcg tablet Take 50 mcg by mouth Daily (before breakfast). Yes Provider, Historical   omeprazole (PRILOSEC) 20 mg capsule Take 20 mg by mouth daily. Yes Provider, Historical   simvastatin (ZOCOR) 40 mg tablet Take 20 mg by mouth every Monday, Wednesday, Friday. Patient takes at night   Yes Provider, Historical   Multivits with Min-FA-Lycopene (MEN'S DAILY MULTIVIT-MINERAL) 0.4-600 mg-mcg tab Take 1 Tab by mouth daily. Yes Provider, Historical   antiox #8/om3/dha/epa/lut/zeax (PRESERVISION AREDS 2, OMEGA-3, PO) Take 1 Cap by mouth two (2) times a day.     Provider, Historical     Current Facility-Administered Medications   Medication Dose Route Frequency    sodium chloride (NS) flush 5-40 mL  5-40 mL IntraVENous Q8H    sodium chloride (NS) flush 5-40 mL  5-40 mL IntraVENous PRN    0.9% sodium chloride infusion  100 mL/hr IntraVENous CONTINUOUS    apixaban (ELIQUIS) tablet 2.5 mg  2.5 mg Oral BID    amLODIPine (NORVASC) tablet 5 mg  5 mg Oral DAILY    cephALEXin (KEFLEX) capsule 500 mg  500 mg Oral TID    furosemide (LASIX) tablet 10 mg  10 mg Oral DAILY    levothyroxine (SYNTHROID) tablet 50 mcg  50 mcg Oral ACB    ipratropium (ATROVENT) 0.03 % nasal spray 2 Spray  2 Spray Both Nostrils TID    metoprolol tartrate (LOPRESSOR) tablet 25 mg  25 mg Oral BID    fluticasone propionate (FLONASE) 50 mcg/actuation nasal spray 2 Spray  2 Spray Both Nostrils DAILY    metroNIDAZOLE (METROGEL) 0.75 % gel   Topical QHS    nitroglycerin (NITROSTAT) tablet 0.4 mg  0.4 mg SubLINGual Q5MIN PRN    multivitamin, tx-iron-ca-min (THERA-M w/ IRON) tablet 1 Tab  1 Tab Oral DAILY    simvastatin (ZOCOR) tablet 20 mg  20 mg Oral Q MON, WED & FRI    tamsulosin (FLOMAX) capsule 0.4 mg  0.4 mg Oral QHS    sodium chloride (NS) flush 5-40 mL  5-40 mL IntraVENous Q8H    sodium chloride (NS) flush 5-40 mL  5-40 mL IntraVENous PRN    acetaminophen (TYLENOL) tablet 650 mg  650 mg Oral Q4H PRN    morphine injection 1 mg  1 mg IntraVENous Q4H PRN    ondansetron (ZOFRAN) injection 4 mg  4 mg IntraVENous Q4H PRN    albuterol-ipratropium (DUO-NEB) 2.5 MG-0.5 MG/3 ML  3 mL Nebulization Q6H PRN     No Known Allergies       Subjective:     Review of Systems:    A comprehensive review of systems was negative except for that written in the History of Present Illness. Objective:     Blood pressure 145/66, pulse 64, temperature 98.7 °F (37.1 °C), resp. rate 22, height 5' 8\" (1.727 m), weight 93.8 kg (206 lb 12.7 oz), SpO2 97 %.   Temp (24hrs), Av.1 °F (37.3 °C), Min:98 °F (36.7 °C), Max:100.6 °F (38.1 °C)      Recent Labs     02/10/20  0239 20  1327   WBC 6.1 9.1   HGB 11.8* 13.9   HCT 37.4 43.3   * 139*     Recent Labs     02/10/20  0239 20  1439 20  1327     --  137   K 4.0 4.6 5.6*   *  --  102   CO2 24  --  27   *  --  121*   BUN 23*  --  22*   CREA 1.36*  --  1.38*   CA 8.0*  --  8.9   ALB  --   --  3.5   TBILI  --   --  1.0 SGOT  --   --  41*   ALT  --   --  18     Recent Labs     02/09/20  1327   LPSE 895*         Intake/Output Summary (Last 24 hours) at 2/10/2020 1001  Last data filed at 2/9/2020 2044  Gross per 24 hour   Intake    Output 100 ml   Net -100 ml       _____________________  Physical Exam:     General:  Alert, cooperative, no distress, appears stated age. Eyes:   EOMs intact. Sclera clear. Throat: Lips, mucosa, and tongue normal.   Neck: Supple, symmetrical, trachea midline. Lungs:   Clear to auscultation bilaterally. Heart:  Regular rate and rhythm. Abdomen:   Normal BS. Small protuberant umbilical hernia which is soft, non-tender, and easily reduces. Extremities: Bilat LE with 2+ edema. Skin: Skin color, texture, turgor normal. No rashes or lesions. Assessment:   Active Problems:    Acute pancreatitis (2/9/2020)      Elevated lipase (2/9/2020)    cholelithiasis    Umbilical hernia, chronic containing small bowel. No obstruction. Plan:     No emergent surgical indication regarding his umbilical hernia. If he desires to have surgery in the future he can f/u with us in the office. Pancreatitis may be related to gallstones, though he has no elevated in LFTs. If symptoms persist would proceed with Ultrasound to assess biliary system. Further plan per Dr. Elie Ferrer    Thank you for allowing us to participate in the care of this patient. Total time spent with patient and review of records: 23 minutes. Signed By: Jas Cloud NP     February 10, 2020            I have independently examined the patient and have reviewed the chart. I agree with the above plan. The pt does have an umbilical hernia. The hernia does not cause him any pain and never has. He has had the hernia about 5 years. The hernia is soft, reducible on exam. The defect is about 3x2cm ont he CT and exam.  He is not interested in surgery for the hernia despite the size because it does not bother him at all.   It is easily reducible on exam and risk of incarceration seems low. He will not need surgery for this currently. He also was admitted with possible pancreatitis but states the abdominal pain he had on admission was lower abdominal pain below the umbilicus. This would not correlate with pancreatitis. He did have an elevated lipase on admission and he does have gallstones on the CT but no changes in the LFTs or signs of biliary dilation. I do not see a need for laparoscopic cholecystectomy at this time. He does have a recently placed pacer and is on eliquis. Given his age and the nebulous diagnosis of pancreatitis I would not proceed with laparoscopic cholecystectomy either. As far as I am concerned he can be DC home and follow up with me PRN.       Zenon Dixon MD

## 2020-02-10 NOTE — PROGRESS NOTES
10:08 AM: CM attempted to meet pt at bedside for initial eval, pt asleep. Will follow up.     Elmo Iqbal

## 2020-02-10 NOTE — PROGRESS NOTES
Hospitalist Progress Note  Hernando Luther MD  Answering service: [phone] from in house phone      Date of Service:  2/10/2020  NAME:  Zhao Delarosa  :  1922  MRN:  [id number]    Admission Summary:   89M p/w Nausea  Interval history / Subjective:   Patient seen and examined at bedside, feels ok, mild nausea. No fever, no abdominal pain, no cp. No palpitations. His PPM battery was changed last week. Assessment & Plan:     Elevated lipase- Lipase: 895 (not above 3 times the normal limit)- No abdominal pain  Nausea and fever- No source of infection/no leukocytosis, CXR: NAD, UA wnl-no Abx needed. - CT unremarkable for pancreatic inflammation- No history of alcohol use, no gallstone- TG 88  - IVF- Antiemetics - Diet as tolerated, check RUQ US, monitor lipase, if spikes fever will get BCs     Incarcerated umbilical hernia- Non tender- No sign of strangulation or obstruction. reducible  History of A. Fib- S/p pacemaker placement- Continue home BB and Eliquis  COPD- Not in acute exacerbation- DuoNebs PRN   Hypertension- Stable- Continue home meds  CKD stage III- Creatinine at baseline- Avoid nephrotoxic drugs, renally dose meds  Lung nodule: 8mm, f/u op. Code status: Full  DVT prophylaxis: Eliquis  Care Plan discussed with: Patient/Family and Nurse  Disposition: TBD     Hospital Problems  Date Reviewed: 2020          Codes Class Noted POA    Acute pancreatitis ICD-10-CM: K85.90  ICD-9-CM: 452.5  2020 Unknown        Elevated lipase ICD-10-CM: R74.8  ICD-9-CM: 790.5  2020 Unknown            Review of Systems:   Pertinent items are mentioned in interval history. Vital Signs:    Last 24hrs VS reviewed since prior progress note.  Most recent are:  Visit Vitals  /66 (BP 1 Location: Right arm, BP Patient Position: At rest)   Pulse 64   Temp 98.7 °F (37.1 °C)   Resp 22   Ht 5' 8\" (1.727 m)   Wt 93.8 kg (206 lb 12.7 oz) SpO2 97%   BMI 31.44 kg/m²         Intake/Output Summary (Last 24 hours) at 2/10/2020 1005  Last data filed at 2/9/2020 2044  Gross per 24 hour   Intake    Output 100 ml   Net -100 ml        Physical Examination:   General:  Alert, oriented, No acute distress, pleasant. Hard of hearing  Card:  S1, S2 without murmurs, good peripheral perfusion  Resp:  No accessory muscle use, Good AE, no wheezes, no rhonchi  Abd:  Soft, non-tender, non-distended, BS+, obese  Extremities:  No cyanosis or clubbing, no significant edema  Neuro:  Grossly normal, no focal neuro deficits, follows commands   Psych:  Good insight, AAOx3, not agitated. Data Review:    Review and/or order of clinical lab test  Review and/or order of tests in the radiology section of CPT  Review and/or order of tests in the medicine section of CPT  Labs:     Recent Labs     02/10/20  0239 02/09/20  1327   WBC 6.1 9.1   HGB 11.8* 13.9   HCT 37.4 43.3   * 139*     Recent Labs     02/10/20  0239 02/09/20  1439 02/09/20  1327     --  137   K 4.0 4.6 5.6*   *  --  102   CO2 24  --  27   BUN 23*  --  22*   CREA 1.36*  --  1.38*   *  --  121*   CA 8.0*  --  8.9     Recent Labs     02/09/20  1327   SGOT 41*   ALT 18      TBILI 1.0   TP 7.1   ALB 3.5   GLOB 3.6   LPSE 895*     No results for input(s): INR, PTP, APTT, INREXT in the last 72 hours. No results for input(s): FE, TIBC, PSAT, FERR in the last 72 hours. No results found for: FOL, RBCF   No results for input(s): PH, PCO2, PO2 in the last 72 hours.   Recent Labs     02/09/20  1327   TROIQ <0.05     Lab Results   Component Value Date/Time    Cholesterol, total 111 02/10/2020 02:39 AM    HDL Cholesterol 41 02/10/2020 02:39 AM    LDL, calculated 52.4 02/10/2020 02:39 AM    Triglyceride 88 02/10/2020 02:39 AM    CHOL/HDL Ratio 2.7 02/10/2020 02:39 AM     Lab Results   Component Value Date/Time    Glucose (POC) 93 02/10/2020 06:31 AM    Glucose (POC) 110 (H) 02/09/2020 09:33 PM Lab Results   Component Value Date/Time    Color YELLOW/STRAW 02/09/2020 01:27 PM    Appearance CLEAR 02/09/2020 01:27 PM    Specific gravity 1.020 02/09/2020 01:27 PM    Specific gravity 1.011 07/03/2019 02:12 PM    pH (UA) 6.0 02/09/2020 01:27 PM    Protein TRACE (A) 02/09/2020 01:27 PM    Glucose NEGATIVE  02/09/2020 01:27 PM    Ketone NEGATIVE  02/09/2020 01:27 PM    Bilirubin NEGATIVE  02/09/2020 01:27 PM    Urobilinogen 0.2 02/09/2020 01:27 PM    Nitrites NEGATIVE  02/09/2020 01:27 PM    Leukocyte Esterase NEGATIVE  02/09/2020 01:27 PM    Epithelial cells FEW 02/09/2020 01:27 PM    Bacteria NEGATIVE  02/09/2020 01:27 PM    WBC 5-10 02/09/2020 01:27 PM    RBC 0-5 02/09/2020 01:27 PM     Medications Reviewed:     Current Facility-Administered Medications   Medication Dose Route Frequency    sodium chloride (NS) flush 5-40 mL  5-40 mL IntraVENous Q8H    sodium chloride (NS) flush 5-40 mL  5-40 mL IntraVENous PRN    0.9% sodium chloride infusion  100 mL/hr IntraVENous CONTINUOUS    apixaban (ELIQUIS) tablet 2.5 mg  2.5 mg Oral BID    amLODIPine (NORVASC) tablet 5 mg  5 mg Oral DAILY    cephALEXin (KEFLEX) capsule 500 mg  500 mg Oral TID    furosemide (LASIX) tablet 10 mg  10 mg Oral DAILY    levothyroxine (SYNTHROID) tablet 50 mcg  50 mcg Oral ACB    ipratropium (ATROVENT) 0.03 % nasal spray 2 Spray  2 Spray Both Nostrils TID    metoprolol tartrate (LOPRESSOR) tablet 25 mg  25 mg Oral BID    fluticasone propionate (FLONASE) 50 mcg/actuation nasal spray 2 Spray  2 Spray Both Nostrils DAILY    metroNIDAZOLE (METROGEL) 0.75 % gel   Topical QHS    nitroglycerin (NITROSTAT) tablet 0.4 mg  0.4 mg SubLINGual Q5MIN PRN    multivitamin, tx-iron-ca-min (THERA-M w/ IRON) tablet 1 Tab  1 Tab Oral DAILY    simvastatin (ZOCOR) tablet 20 mg  20 mg Oral Q MON, WED & FRI    tamsulosin (FLOMAX) capsule 0.4 mg  0.4 mg Oral QHS    sodium chloride (NS) flush 5-40 mL  5-40 mL IntraVENous Q8H    sodium chloride (NS) flush 5-40 mL  5-40 mL IntraVENous PRN    acetaminophen (TYLENOL) tablet 650 mg  650 mg Oral Q4H PRN    morphine injection 1 mg  1 mg IntraVENous Q4H PRN    ondansetron (ZOFRAN) injection 4 mg  4 mg IntraVENous Q4H PRN    albuterol-ipratropium (DUO-NEB) 2.5 MG-0.5 MG/3 ML  3 mL Nebulization Q6H PRN   ______________________________________________________________________  EXPECTED LENGTH OF STAY: - - -  ACTUAL LENGTH OF STAY:          1               Jim Severance, MD

## 2020-02-10 NOTE — PROGRESS NOTES
Transition of Care Plan     Return to independent living [facility]) once medically stable, pending recommendations   Transport: [facility]: [phone])  [address] Follow up with PCP/Specialist     Reason for Admission:   Nausea, acute pancreatitis                    RUR Score:          18%, low. Plan for utilizing home health:      None. Current Advanced Directive/Advance Care Plan: Full code, advanced directive on file. Transition of Care Plan:           Pt is expected to return to independent living  facility [facility]) with transportation provided by [facility]: [phone]), pending medical recommendations. Met with pt at bedside, introduced CM role, confirmed demographics. Pt lives in assisted living facility. He uses a walker at baseline and is independent with ADL's. Pt was supposed to see PCP today, and reports last PCP visit as about a month ago. Pt reports he is on a 30 day cycle for PCP visits. Pt is expected to return to independent living facility [facility]) with transportation provided by [facility]: [phone]), pending medical recommendations. CM spoke with nursing at [facility] ([phone]) and informed staff of pt's current hospitalization. Observation notice provided in writing to patient and/or caregiver as well as verbal explanation of the policy. Patients who are in outpatient status also receive the Observation notice. Care Management Interventions  PCP Verified by CM: Yes(Last visit about a month ago)  Mode of Transport at Discharge:  Other (see comment)(Family member)  Transition of Care Consult (CM Consult): Discharge Planning  MyChart Signup: Yes  Discharge Durable Medical Equipment: No  Health Maintenance Reviewed: Yes  Physical Therapy Consult: No  Occupational Therapy Consult: No  Speech Therapy Consult: No  Current Support Network: Assisted Living  Confirm Follow Up Transport: Family  The Patient and/or Patient Representative was Provided with a Choice of Provider and Agrees with the Discharge Plan?: Yes  Freedom of Choice List was Provided with Basic Dialogue that Supports the Patient's Individualized Plan of Care/Goals, Treatment Preferences and Shares the Quality Data Associated with the Providers?: No  Discharge Location  Discharge Placement: Assisted Living          PAIGE Lewis Intern

## 2020-02-10 NOTE — PROGRESS NOTES
Primary Nurse Margareth Ricketts RN and Keyon Mckenzie RN performed a dual skin assessment on this patient Impairment noted- see wound doc flow sheet. Thanh score is 17    Red blanchable sacrum, open in gluteal cleft, wound care consulted    TRANSFER - IN REPORT:    Verbal report received from Ami(name) on Ana Casey  being received from short pump ed (unit) for change in patient condition(admitted )      Report consisted of patients Situation, Background, Assessment and   Recommendations(SBAR). Information from the following report(s) SBAR, Kardex, Procedure Summary, Intake/Output, MAR, Accordion, Recent Results and Cardiac Rhythm paced was reviewed with the receiving nurse. Opportunity for questions and clarification was provided. Assessment completed upon patients arrival to unit and care assumed. Bedside shift change report given to Keyon Mckenzie (oncoming nurse) by Brian Patten (offgoing nurse). Report included the following information SBAR, Kardex, Procedure Summary, Intake/Output, MAR, Recent Results, Med Rec Status and Cardiac Rhythm paced.

## 2020-02-10 NOTE — H&P
HISTORY AND PHYSICAL  Homa May MD        PCP: Adelfo Coppola MD    Please note that this dictation was completed with [employer], the computer voice recognition software. Quite often unanticipated grammatical, syntax, homophones, and other interpretive errors are inadvertently transcribed by the computer software. Please disregard these errors. Please excuse any errors that have escaped final proofreading. CHIEF COMPLAINTS   Nausea        HISTORY OF PRESENT ILLNESS  Patient is a 80-year-old male with medical history for A. fib S/P pacemaker placement, COPD, CKD and hyperlipidemia who presented [place] [facility] with chief complaint of nausea. Patient was transferred to [facility] [place] with working diagnosis of acute pancreatitis. Patient reports nausea and fever but denies any chills, vomiting, shortness of breath, chest pain, palpitation, syncope or presyncope, abdominal pain, diarrhea, melena or dark stool. At [facility]  emergency department, V/S: Remarkable for T 100.6 °F.  Lab work-ups: No leukocytosis or lactic acidosis, lipase 895, troponin WNL. EKG unremarkable for acute ischemia, CXR no acute process, CT of the abdomen no sign of pancreatic inflammation, remarkable for incarcerated umbilical hernia. PMH/PSH:  Past Medical History:   Diagnosis Date    Atrial fibrillation (HCC)     Chronic obstructive pulmonary disease ([facility] [address]     Essential hypertension     Hyperlipidemia     Long term (current) use of anticoagulants     Pacemaker     Rosacea 2016     Past Surgical History:   Procedure Laterality Date    HX CORONARY STENT PLACEMENT      HX HEART CATHETERIZATION      HX PACEMAKER      TN REMVL PERM PM PLS GEN W/REPL PLSE GEN 2 LEAD SYS N/A 2/6/2020    REMOVE & REPLACE PPM GEN DUAL LEAD performed by Billy Medina MD at [place] [address], [facility] CATH LAB       Home meds:   Prior to Admission medications    Medication Sig Start Date End Date Taking?  Authorizing Provider   cephALEXin (KEFLEX) 500 mg capsule Take 1 Cap by mouth three (3) times daily for 5 days. 2/6/20 2/11/20 Yes Sheryle Grade, MD   metoprolol tartrate (LOPRESSOR) 50 mg tablet Take 0.5 Tabs by mouth two (2) times a day. 7/17/19  Yes Cherl Rival, DO   amLODIPine (NORVASC) 5 mg tablet Take 1 Tab by mouth daily. 7/17/19  Yes Cherl Rival, DO   nitroglycerin (NITROSTAT) 0.4 mg SL tablet 1 Tab by SubLINGual route every five (5) minutes as needed for Chest Pain. 7/5/19  Yes Lambert NAJERA NP   ipratropium (ATROVENT) 0.03 % nasal spray 2 Sprays by Both Nostrils route two (2) times daily as needed for Rhinitis. Yes Provider, Historical   mometasone (NASONEX) 50 mcg/actuation nasal spray 2 Sprays daily. Yes Provider, Historical   furosemide (LASIX) 20 mg tablet Take 10 mg by mouth daily. Yes Other, MD Di   metroNIDAZOLE (METROCREAM) 0.75 % topical cream Apply  to affected area nightly. Use a thin layer to affected areas after washing   Yes Provider, Historical   apixaban (ELIQUIS) 2.5 mg tablet Take 1 Tab by mouth two (2) times a day. 1/25/17  Yes Sheryle Grade, MD   tamsulosin [facility]) 0.4 mg capsule Take 0.4 mg by mouth nightly. Yes Provider, Historical   levothyroxine (LEVOTHROID) 50 mcg tablet Take 50 mcg by mouth Daily (before breakfast). Yes Provider, Historical   omeprazole (PRILOSEC) 20 mg capsule Take 20 mg by mouth daily. Yes Provider, Historical   simvastatin (ZOCOR) 40 mg tablet Take 20 mg by mouth every Monday, Wednesday, Friday. Patient takes at night   Yes Provider, Historical   Multivits with Min-FA-Lycopene (MEN'S DAILY MULTIVIT-MINERAL) 0.4-600 mg-mcg tab Take 1 Tab by mouth daily. Yes Provider, Historical   antiox #8/om3/dha/epa/lut/zeax (PRESERVISION AREDS 2, OMEGA-3, PO) Take 1 Cap by mouth two (2) times a day.     Provider, Historical       Allergies:  No Known Allergies    FH:  Family History   Problem Relation Age of Onset    Stroke Mother     Hypertension Mother    [address] [facility] Kid[facility] Disease Father        SH:  Social History     Tobacco Use    Smoking status: Never Smoker    Smokeless tobacco: Never Used   Substance Use Topics    Alcohol use: No     Alcohol/week: 0.0 standard drinks       ROS: A comprehensive review of systems was negative except for that written in the HPI. PHYSICAL EXAM:  Visit Vitals  /70 (BP 1 Location: Right arm, BP Patient Position: At rest)   Pulse 65   Temp (!) 100.6 °F (38.1 °C)   Resp 22   Ht 5' 8\" (1.727 m)   Wt 93.8 kg (206 lb 12.7 oz)   SpO2 95%   BMI 31.44 kg/m²       General:          Alert, cooperative, no distress, appears stated age. HEENT:           Atraumatic, anicteric sclerae, pink conjunctivae                          No oral ulcers, mucosa moist, throat clear, dentition fair  Neck:               Supple, symmetrical,  thyroid: non tender  Lungs:             Clear to auscultation bilaterally. No Wheezing or Rhonchi. No rales. Chest wall:      No tenderness  No Accessory muscle use. Heart:              Regular  rhythm,  No  murmur   No edema  Abdomen:        Soft, non-tender. Not distended. Bowel sounds normal                            Nontender umbilical hernia  Extremities:     No cyanosis. No clubbing,                            Skin turgor normal, Capillary refill normal, Radial dial pulse 2+  Skin:                Not pale. Not Jaundiced  No rashes   Psych:             Not anxious or agitated. Neurologic:     Alert and oriented to PPT, CNII-XII intact. Motor and sensory exam grossly intact. Labs/Imaging:  Recent Results (from the past 24 hour(s))   SAMPLES BEING HELD    Collection Time: 02/09/20  1:27 PM   Result Value Ref Range    SAMPLES BEING HELD 1RED, 1BLUE, 2BC     COMMENT        Add-on orders for these samples will be processed based on acceptable specimen integrity and analyte stability, which may vary by analyte.    CBC WITH AUTOMATED DIFF    Collection Time: 02/09/20  1:27 PM   Result Value Ref Range    WBC 9.1 4.1 - 11.1 K/uL    RBC 4.57 4.10 - 5.70 M/uL    HGB 13.9 12.1 - 17.0 g/dL    HCT 43.3 36.6 - 50.3 %    MCV 94.7 80.0 - 99.0 FL    MCH 30.4 26.0 - 34.0 PG    MCHC 32.1 30.0 - 36.5 g/dL    RDW 14.6 (H) 11.5 - 14.5 %    PLATELET 548 (L) 887 - 400 K/uL    MPV 10.7 8.9 - 12.9 FL    NRBC 0.0 0  WBC    ABSOLUTE NRBC 0.00 0.00 - 0.01 K/uL    NEUTROPHILS 84 (H) 32 - 75 %    LYMPHOCYTES 6 (L) 12 - 49 %    MONOCYTES 7 5 - 13 %    EOSINOPHILS 3 0 - 7 %    BASOPHILS 0 0 - 1 %    IMMATURE GRANULOCYTES 0 0.0 - 0.5 %    ABS. NEUTROPHILS 7.7 1.8 - 8.0 K/UL    ABS. LYMPHOCYTES 0.5 (L) 0.8 - 3.5 K/UL    ABS. MONOCYTES 0.6 0.0 - 1.0 K/UL    ABS. EOSINOPHILS 0.3 0.0 - 0.4 K/UL    ABS. BASOPHILS 0.0 0.0 - 0.1 K/UL    ABS. IMM. GRANS. 0.0 0.00 - 0.04 K/UL    DF SMEAR SCANNED      RBC COMMENTS NORMOCYTIC, NORMOCHROMIC     METABOLIC PANEL, COMPREHENSIVE    Collection Time: 02/09/20  1:27 PM   Result Value Ref Range    Sodium 137 136 - 145 mmol/L    Potassium 5.6 (H) 3.5 - 5.1 mmol/L    Chloride 102 97 - 108 mmol/L    CO2 27 21 - 32 mmol/L    Anion gap 8 5 - 15 mmol/L    Glucose 121 (H) 65 - 100 mg/dL    BUN 22 (H) 6 - 20 MG/DL    Creatinine 1.38 (H) 0.70 - 1.30 MG/DL    BUN/Creatinine ratio 16 12 - 20      GFR est AA 58 (L) >60 ml/min/1.73m2    GFR est non-AA 48 (L) >60 ml/min/1.73m2    Calcium 8.9 8.5 - 10.1 MG/DL    Bilirubin, total 1.0 0.2 - 1.0 MG/DL    ALT (SGPT) 18 12 - 78 U/L    AST (SGOT) 41 (H) 15 - 37 U/L    Alk.  phosphatase 106 45 - 117 U/L    Protein, total 7.1 6.4 - 8.2 g/dL    Albumin 3.5 3.5 - 5.0 g/dL    Globulin 3.6 2.0 - 4.0 g/dL    A-G Ratio 1.0 (L) 1.1 - 2.2     LACTIC ACID    Collection Time: 02/09/20  1:27 PM   Result Value Ref Range    Lactic acid 1.4 0.4 - 2.0 MMOL/L   TROPONIN I    Collection Time: 02/09/20  1:27 PM   Result Value Ref Range    Troponin-I, Qt. <0.05 <0.05 ng/mL   URINALYSIS W/MICROSCOPIC    Collection Time: 02/09/20  1:27 PM   Result Value Ref Range    Color YELLOW/STRAW      Appearance CLEAR CLEAR      Specific gravity 1.020 1.003 - 1.030      pH (UA) 6.0 5.0 - 8.0      Protein TRACE (A) NEG mg/dL    Glucose NEGATIVE  NEG mg/dL    Ketone NEGATIVE  NEG mg/dL    Bilirubin NEGATIVE  NEG      Blood NEGATIVE  NEG      Urobilinogen 0.2 0.2 - 1.0 EU/dL    Nitrites NEGATIVE  NEG      Leukocyte Esterase NEGATIVE  NEG      WBC 5-10 0 - 4 /hpf    RBC 0-5 0 - 5 /hpf    Epithelial cells FEW FEW /lpf    Bacteria NEGATIVE  NEG /hpf    Mucus 1+ (A) NEG /lpf    Hyaline cast 2-5 0 - 5 /lpf   URINE CULTURE HOLD SAMPLE    Collection Time: 02/09/20  1:27 PM   Result Value Ref Range    Urine culture hold        Urine on hold in Microbiology dept for 2 days. If unpreserved urine is submitted, it cannot be used for addtional testing after 24 hours, recollection will be required. LIPASE    Collection Time: 02/09/20  1:27 PM   Result Value Ref Range    Lipase 895 (H) 73 - 393 U/L   POTASSIUM    Collection Time: 02/09/20  2:39 PM   Result Value Ref Range    Potassium 4.6 3.5 - 5.1 mmol/L       Recent Labs     02/09/20  1327   WBC 9.1   HGB 13.9   HCT 43.3   *     Recent Labs     02/09/20  1439 02/09/20  1327   NA  --  137   K 4.6 5.6*   CL  --  102   CO2  --  27   BUN  --  22*   CREA  --  1.38*   GLU  --  121*   CA  --  8.9     Recent Labs     02/09/20  1327   SGOT 41*   ALT 18      TBILI 1.0   TP 7.1   ALB 3.5   GLOB 3.6   LPSE 895*       Recent Labs     02/09/20  1327   TROIQ <0.05       No results for input(s): INR, PTP, APTT, INREXT in the last 72 hours. No results for input(s): PH, PCO2, PO2 in the last 72 hours. CT ABD PELV W CONT  Narrative: INDICATION: abdominal pain, pancreatitis    COMPARISON: None    TECHNIQUE:  Following the uneventful intravenous administration of IV contrast, thin axial  images were obtained through the abdomen and pelvis. Coronal and sagittal  reconstructions were generated. Oral contrast was not administered.  CT dose  reduction was achieved through use of a standardized protocol tailored for this  examination and automatic exposure control for dose modulation. FINDINGS:  LUNG BASES: 8 mm nodule right upper lobe abutting the minor fissure is  unchanged. Bibasilar atelectasis/scarring. LIVER: No mass or biliary dilatation. GALLBLADDER: Numerous gallstones in the dependent portion of the gallbladder. SPLEEN: No enlargement or lesion. PANCREAS: No mass or ductal dilatation. ADRENALS: No mass. KIDNEYS: Cortical thinning of both kidneys. No hydronephrosis. GI TRACT: Diverticulosis of the colon with no diverticulitis. Stomach is  distended with air and fluid. No bowel obstruction. Umbilical hernia contains a  small portion of small bowel and mesenteric fat without inflammation or  obstruction. PERITONEUM: No free air or free fluid. APPENDIX: Unremarkable. RETROPERITONEUM: Atherosclerosis of the abdominal aorta, which is aneurysmal in  the infrarenal segment of the 3.2 cm. 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 LYMPH NODES: None enlarged. ADDITIONAL COMMENTS: N/A.    URINARY BLADDER: Not well distended. REPRODUCTIVE ORGANS: Unremarkable. LYMPH NODES: None enlarged. FREE FLUID: None. BONES: No destructive bone lesion. ADDITIONAL COMMENTS: N/A. Impression: IMPRESSION:    1. Nonobstructing umbilical hernia containing a short segment of small bowel. No  associated inflammatory stranding. Hernia may be incarcerated, correlate  clinically. 2. Colonic diverticulosis without diverticulitis. 3. Aneurysmal dilatation of the abdominal aorta, common iliac arteries and most  significantly the left internal iliac artery as above. 4. 8 mm right upper lobe pulmonary nodule, unchanged.   XR CHEST PA LAT  Narrative: INDICATION:   nausea, ?fever, recent right upper pacemaker    COMPARISON: July 3, 2019    FINDINGS:    Frontal and lateral views of the chest demonstrate right subclavian pacemaker. Heart size upper normal. The lungs are adequately expanded with bibasilar  atelectasis. There is no edema, effusion, consolidation, or pneumothorax. The  osseous structures are unremarkable. Impression: IMPRESSION:  Bibasilar atelectasis. Assessment & Plan:  =====================  Elevated lipase  Nausea and fever  Lipase: 895 (not above 3 times the normal limit)  No abdominal pain  CT unremarkable for pancreatic inflammation  Does not meet criteria for acute pancreatitis  Possible early/impending pancreatitis  No history of alcohol use, no gallstone  We will check lipid panel  No source of infection/no leukocytosis or lactic acidosis, chest x-ray unremarkable, UA normal-no antibiotics indicated at this time  IVF  Antiemetics   Diet as tolerated    Incarcerated umbilical hernia  Non tender  No sign of strangulation or obstruction  Surgery consult    History of A. Fib  S/p pacemaker placement  Rate controlled  Continue home BB and Eliquis    COPD  Not in acute exacerbation  Breathing treatment as needed    Hypertension  Stable  Continue home meds    CKD stage III  Creatinine at baseline  Avoid nephrotoxic drugs, renally dose meds        Patient's Baseline: Ambulates with walking  DVT ppx: On Eliquis  Code status: Full  Disposition: TBD  Care plan discussed with patient/family and nurse.                 Signed By: Shweta Argueta MD     February 9, 2020

## 2020-02-11 VITALS
HEART RATE: 64 BPM | OXYGEN SATURATION: 97 % | SYSTOLIC BLOOD PRESSURE: 153 MMHG | HEIGHT: 68 IN | TEMPERATURE: 98.4 F | DIASTOLIC BLOOD PRESSURE: 81 MMHG | WEIGHT: 206.79 LBS | BODY MASS INDEX: 31.34 KG/M2 | RESPIRATION RATE: 18 BRPM

## 2020-02-11 LAB
ANION GAP SERPL CALC-SCNC: 5 MMOL/L (ref 5–15)
BUN SERPL-MCNC: 24 MG/DL (ref 6–20)
BUN/CREAT SERPL: 18 (ref 12–20)
CALCIUM SERPL-MCNC: 7.7 MG/DL (ref 8.5–10.1)
CHLORIDE SERPL-SCNC: 109 MMOL/L (ref 97–108)
CO2 SERPL-SCNC: 24 MMOL/L (ref 21–32)
CREAT SERPL-MCNC: 1.31 MG/DL (ref 0.7–1.3)
GLUCOSE BLD STRIP.AUTO-MCNC: 90 MG/DL (ref 65–100)
GLUCOSE SERPL-MCNC: 97 MG/DL (ref 65–100)
LIPASE SERPL-CCNC: 190 U/L (ref 73–393)
POTASSIUM SERPL-SCNC: 4 MMOL/L (ref 3.5–5.1)
SERVICE CMNT-IMP: NORMAL
SODIUM SERPL-SCNC: 138 MMOL/L (ref 136–145)

## 2020-02-11 PROCEDURE — 74011250637 HC RX REV CODE- 250/637: Performed by: STUDENT IN AN ORGANIZED HEALTH CARE EDUCATION/TRAINING PROGRAM

## 2020-02-11 PROCEDURE — 83690 ASSAY OF LIPASE: CPT

## 2020-02-11 PROCEDURE — 80048 BASIC METABOLIC PNL TOTAL CA: CPT

## 2020-02-11 PROCEDURE — 82962 GLUCOSE BLOOD TEST: CPT

## 2020-02-11 PROCEDURE — 97165 OT EVAL LOW COMPLEX 30 MIN: CPT

## 2020-02-11 PROCEDURE — 36415 COLL VENOUS BLD VENIPUNCTURE: CPT

## 2020-02-11 PROCEDURE — 94760 N-INVAS EAR/PLS OXIMETRY 1: CPT

## 2020-02-11 PROCEDURE — 97116 GAIT TRAINING THERAPY: CPT

## 2020-02-11 PROCEDURE — 97535 SELF CARE MNGMENT TRAINING: CPT

## 2020-02-11 PROCEDURE — 99218 HC RM OBSERVATION: CPT

## 2020-02-11 PROCEDURE — 97161 PT EVAL LOW COMPLEX 20 MIN: CPT

## 2020-02-11 RX ORDER — ONDANSETRON 4 MG/1
4 TABLET, FILM COATED ORAL
Qty: 60 TAB | Refills: 0 | Status: SHIPPED | OUTPATIENT
Start: 2020-02-11 | End: 2020-03-12

## 2020-02-11 RX ADMIN — Medication 10 ML: at 00:52

## 2020-02-11 RX ADMIN — APIXABAN 2.5 MG: 2.5 TABLET, FILM COATED ORAL at 08:47

## 2020-02-11 RX ADMIN — MULTIPLE VITAMINS W/ MINERALS TAB 1 TABLET: TAB at 08:47

## 2020-02-11 RX ADMIN — FUROSEMIDE 10 MG: 20 TABLET ORAL at 08:47

## 2020-02-11 RX ADMIN — LEVOTHYROXINE SODIUM 50 MCG: 0.05 TABLET ORAL at 06:20

## 2020-02-11 RX ADMIN — METOPROLOL TARTRATE 25 MG: 25 TABLET ORAL at 08:47

## 2020-02-11 RX ADMIN — AMLODIPINE BESYLATE 5 MG: 5 TABLET ORAL at 08:46

## 2020-02-11 RX ADMIN — CEPHALEXIN 500 MG: 250 CAPSULE ORAL at 08:47

## 2020-02-11 NOTE — PROGRESS NOTES
Bedside and Verbal shift change report given to Helene Garcia (oncoming nurse) by Vinnie Rosario RN (offgoing nurse). Report included the following information SBAR, Kardex, MAR and Recent Results.

## 2020-02-11 NOTE — DISCHARGE SUMMARY
Discharge Summary       PATIENT ID: Alyce Caro  MRN: [id number]   YOB: 1922    DATE OF ADMISSION: 2/9/2020  1:11 PM    DATE OF DISCHARGE: 2/11/2020   PRIMARY CARE PROVIDER: Christina Sneed MD     ATTENDING PHYSICIAN: Jose Antonio Camacho MD  DISCHARGING PROVIDER: Jose Antonio Camacho MD    To contact this individual call [phone] and ask the  to page. If unavailable ask to be transferred the Adult Hospitalist Department. CONSULTATIONS: IP CONSULT TO HOSPITALIST  IP CONSULT TO GENERAL SURGERY    PROCEDURES/SURGERIES: * No surgery found *    ADMITTING [address] [place] [address] COURSE:   Admitted with nausea, had one spike of fever, work up revealed elevated lipase, however patient didn't have any pain, nausea resolved, and no vomiting. Repeat lipase was wnl. US/CT abdo/pelvis: sludge and small gallblader stones. No evidence of pancreatitis. Risk of Re-Admission: mod  DISCHARGE DIAGNOSES / PLAN:      Elevated lipase- Lipase: 895 (not above 3 times the normal limit)- No abdominal pain- resolved on repeat next day lipase 190. Nausea and fever- No source of infection/no leukocytosis, CXR: NAD, UA wnl-no Abx needed. - CT unremarkable for pancreatic inflammation- No history of alcohol use, no gallstone- TG 88  - IVF- Antiemetics - Diet as tolerated, RUQ US: small gallstones/sludge, no CBD dilation. Significant improvement. PT/OT evaluation. And dc back to facility.     Incarcerated umbilical hernia- Non tender- No sign of strangulation or obstruction. reducible  History of A. Fib- S/p pacemaker placement- Continue home BB and Eliquis  COPD- Not in acute exacerbation- DuoNebs PRN   Hypertension- Stable- Continue home meds  CKD stage III- Creatinine at baseline- Avoid nephrotoxic drugs, renally dose meds  Lung nodule: 8mm, f/u op.      FOLLOW UP APPOINTMENTS:    Follow-up Information     Follow up With Specialties Details Why Contact Info    Christina Sneed MD Family Practice In 1 week  [address] [place] [address]  [phone]           ADDITIONAL CARE RECOMMENDATIONS:  Follow up with PCP    DIET: Resume previous diet    ACTIVITY: Activity as tolerated    DISCHARGE MEDICATIONS:  Current Discharge Medication List      CONTINUE these medications which have NOT CHANGED    Details   metoprolol tartrate (LOPRESSOR) 50 mg tablet Take 0.5 Tabs by mouth two (2) times a day. Qty: 60 Tab, Refills: 0      amLODIPine (NORVASC) 5 mg tablet Take 1 Tab by mouth daily. Qty: 30 Tab, Refills: 11      nitroglycerin (NITROSTAT) 0.4 mg SL tablet 1 Tab by SubLINGual route every five (5) minutes as needed for Chest Pain. Qty: 1 Bottle, Refills: 1      ipratropium (ATROVENT) 0.03 % nasal spray 2 Sprays by Both Nostrils route two (2) times daily as needed for Rhinitis. mometasone (NASONEX) 50 mcg/actuation nasal spray 2 Sprays daily. furosemide (LASIX) 20 mg tablet Take 10 mg by mouth daily. metroNIDAZOLE (METROCREAM) 0.75 % topical cream Apply  to affected area nightly. Use a thin layer to affected areas after washing      apixaban (ELIQUIS) 2.5 mg tablet Take 1 Tab by mouth two (2) times a day. Qty: 60 Tab, Refills: 6    Associated Diagnoses: Paroxysmal atrial fibrillation (HCC)      tamsulosin (FLOMAX) 0.4 mg capsule Take 0.4 mg by mouth nightly. levothyroxine (LEVOTHROID) 50 mcg tablet Take 50 mcg by mouth Daily (before breakfast). omeprazole (PRILOSEC) 20 mg capsule Take 20 mg by mouth daily. simvastatin (ZOCOR) 40 mg tablet Take 20 mg by mouth every Monday, Wednesday, Friday. Patient takes at night      Multivits with Min-FA-Lycopene (MEN'S DAILY MULTIVIT-MINERAL) 0.4-600 mg-mcg tab Take 1 Tab by mouth daily. antiox #8/om3/dha/epa/lut/zeax (PRESERVISION AREDS 2, OMEGA-3, PO) Take 1 Cap by mouth two (2) times a day.          STOP taking these medications       cephALEXin (KEFLEX) 500 mg capsule Comments:   Reason for Stopping:             NOTIFY [address] [place] [address] THE FOLLOWING:   Fever over 101 degrees for 24 hours. Chest pain, shortness of breath, fever, chills, nausea, vomiting, diarrhea, change in mentation, falling, weakness, bleeding. Severe pain or pain not relieved by medications. Or, any other signs or symptoms that you may have questions about. DISPOSITION:    Home With:   OT  PT  HH  RN       Long term SNF/Inpatient Rehab   x Independent/assisted living    Hospice    Other:     PATIENT CONDITION AT DISCHARGE:     Functional status    Poor     Deconditioned     Independent      Cognition     Lucid     Forgetful     Dementia      Catheters/lines (plus indication)    [place]     PICC     PEG     None      Code status     Full code     DNR      PHYSICAL EXAMINATION AT DISCHARGE:  Patient seen and examined at bedside, Condition stable, explained discharge and follow up plans. /81 (BP 1 Location: Left arm, BP Patient Position: At rest)   Pulse 64   Temp 98.4 °F (36.9 °C)   Resp 18   Ht 5' 8\" (1.727 m)   Wt 93.8 kg (206 lb 12.7 oz)   SpO2 97%   BMI 31.44 kg/m²   General:  Alert, oriented, No acute distress  Resp:  No accessory muscle use, Good AE.   Neuro:  Grossly normal, no focal neuro deficits, follows commands   CHRONIC MEDICAL DIAGNOSES:  Problem List as of 2/11/2020 Date Reviewed: 1/24/2020          Codes Class Noted - Resolved    Acute pancreatitis ICD-10-CM: K85.90  ICD-9-CM: 237.7  2/9/2020 - Present        Elevated lipase ICD-10-CM: R74.8  ICD-9-CM: 790.5  2/9/2020 - Present        NSTEMI (non-ST elevated myocardial infarction) [facility]) ICD-10-CM: I21.4  ICD-9-CM: 410.70  7/17/2019 - Present        Chest pain ICD-10-CM: R07.9  ICD-9-CM: 786.50  7/3/2019 - Present        Sepsis ([facility] [address] ICD-10-CM: A41.9  ICD-9-CM: 038.9, 995.91  1/27/2019 - Present        Chronic obstructive pulmonary disease ([facility] [address] ICD-10-CM: J44.9  ICD-9-CM: 496  1/27/2019 - Present        Hyperlipidemia ICD-10-CM: E78.5  ICD-9-CM: 272.4  1/27/2019 - Present        Essential hypertension ICD-10-CM: I10  ICD-9-CM: 401.9  1/27/2019 - Present        Pacemaker ICD-10-CM: Z95.0  ICD-9-CM: V45.01  1/27/2019 - Present        Fever ICD-10-CM: R50.9  ICD-9-CM: 780.60  1/27/2019 - Present        Leukocytosis ICD-10-CM: D72.829  ICD-9-CM: 288.60  1/27/2019 - Present        CKD (chronic kidney disease) stage 3, GFR 30-59 ml/min ([facility]) ICD-10-CM: N18.3  ICD-9-CM: 585.3  1/27/2019 - Present        Cough ICD-10-CM: R05  ICD-9-CM: 786.2  1/27/2019 - Present        Dyspnea ICD-10-CM: R06.00  ICD-9-CM: 786.09  1/27/2019 - Present        Atrial fibrillation ([facility] ICD-10-CM: I48.91  ICD-9-CM: 427.31  2/17/2016 - Present        Encounter to establish care ICD-10-CM: Z76.89  ICD-9-CM: V65.8  8/5/2015 - Present              35 minutes were spent with the patient on counseling and coordination of care.     Signed:   Olamide Colon MD  2/11/2020  9:25 AM

## 2020-02-11 NOTE — DISCHARGE INSTRUCTIONS
Discharge Instructions       PATIENT ID: Jasiel Arvizu  MRN: [id number]   YOB: 1922    DATE OF ADMISSION: 2/9/2020  1:11 PM    DATE OF DISCHARGE: 2/11/2020    PRIMARY CARE PROVIDER: Crystal Bautista MD     ATTENDING PHYSICIAN: Velvet Bertrand MD  DISCHARGING PROVIDER: Matthew Smith MD    To contact this individual call [phone] and ask the  to page. If unavailable ask to be transferred the Adult Hospitalist Department. DISCHARGE DIAGNOSES transient lipase elevation. CONSULTATIONS: IP CONSULT TO HOSPITALIST  IP CONSULT TO GENERAL SURGERY    PROCEDURES/SURGERIES: * No surgery found *    FOLLOW UP APPOINTMENTS:   Follow-up Information     Follow up With Specialties Details Why Contact Info    Crystal Bautista MD [facility] In 1 week  [place] [id number] [phone]             ADDITIONAL CARE RECOMMENDATIONS: follow up with PCP     DIET: Resume previous diet    DISCHARGE MEDICATIONS:      · It is important that you take the medication exactly as they are prescribed. · Keep your medication in the bottles provided by the pharmacist and keep a list of the medication names, dosages, and times to be taken in your wallet. · Do not take other medications without consulting your doctor. NOTIFY YOUR PHYSICIAN FOR ANY OF THE FOLLOWING:   Fever over 101 degrees for 24 hours. Chest pain, shortness of breath, fever, chills, nausea, vomiting, diarrhea, change in mentation, falling, weakness, bleeding. Severe pain or pain not relieved by medications. Or, any other signs or symptoms that you may have questions about. It was our pleasure to help take care of you and we hope you get well very soon.     DISPOSITION:    Home With:   OT  PT  HH  RN       SNF/Inpatient Rehab/LTAC   x Independent/assisted living    Hospice    Other:     CDMP Checked:   Yes x     PROBLEM LIST Updated:  Yes x     Signed:   Matthew Smith MD  2/11/2020  9:25 AM

## 2020-02-11 NOTE — PROGRESS NOTES
[facility] at [facility] Surgery      Subjective     Doing well no issues    Objective     Patient Vitals for the past 24 hrs:   Temp Pulse Resp BP SpO2   02/11/20 0746 98.4 °F (36.9 °C) 64 18 153/81 97 %   02/11/20 0308 98.1 °F (36.7 °C) 66 20 153/90 96 %   02/11/20 0044 98.3 °F (36.8 °C) 60 19 145/80 94 %   02/10/20 1931 98.4 °F (36.9 °C) 60 20 131/71 95 %   02/10/20 1806  61  114/67    02/10/20 1545 97.5 °F (36.4 °C) 60 18 139/75 96 %       Date 02/10/20 0700 - 02/11/20 0659 02/11/20 0700 - 02/12/20 0659   Shift 4415-1959 1156-8193 24 Hour Total 8879-5339 5816-4441 24 Hour Total   INTAKE   P.O.  240 240        P. O.  240 240      I. V.(mL/kg/hr)  800(0.7) 800(0.4)        Volume (0.9% sodium chloride infusion)  800 800      Shift Total(mL/kg)  1040(11.1) 1040(11.1)      OUTPUT   Urine(mL/kg/hr)           Urine Occurrence(s)  1 x 1 x      Stool           Stool Occurrence(s) 1 x  1 x      Shift Total(mL/kg)         NET  1040 1040      Weight (kg) 93.8 93.8 93.8 93.8 93.8 93.8       PE  Pulm - CTAB  CV - RRR  Abd - soft, ND, BS present, NTTP    Labs  Recent Results (from the past 12 hour(s))   LIPASE    Collection Time: 02/11/20 12:46 AM   Result Value Ref Range    Lipase 190 73 - 954 U/L   METABOLIC PANEL, BASIC    Collection Time: 02/11/20 12:46 AM   Result Value Ref Range    Sodium 138 136 - 145 mmol/L    Potassium 4.0 3.5 - 5.1 mmol/L    Chloride 109 (H) 97 - 108 mmol/L    CO2 24 21 - 32 mmol/L    Anion gap 5 5 - 15 mmol/L    Glucose 97 65 - 100 mg/dL    BUN 24 (H) 6 - 20 MG/DL    Creatinine 1.31 (H) 0.70 - 1.30 MG/DL    BUN/Creatinine ratio 18 12 - 20      GFR est AA >60 >60 ml/min/1.73m2    GFR est non-AA 51 (L) >60 ml/min/1.73m2    Calcium 7.7 (L) 8.5 - 10.1 MG/DL   GLUCOSE, POC    Collection Time: 02/11/20  6:19 AM   Result Value Ref Range    Glucose (POC) 90 65 - 100 mg/dL    Performed by Grzegozr CHURCH Guillaume Rodriguez is a 80 y. o.yr old male with pancreatitis and umbilical hernia    Plan     He is ok for DC home  He will follow up with me in a few weeks as OP  DC info is in the chart for DC home    Jesus Bishop MD

## 2020-02-11 NOTE — PROGRESS NOTES
PHYSICAL THERAPY EVALUATION/DISCHARGE  Patient: Oriana Purcell (76 y.o. male)  Date: 2/11/2020  Primary Diagnosis: Acute pancreatitis [K85.90]  Elevated lipase [R74.8]  Elevated lipase [R74.8]       Precautions:   Fall      ASSESSMENT  Based on the objective data described below, the patient presents with functional mobility near his baseline level of function. Pt resides in an Assisted Living facility however pt reports he does not require any assistance with medications, ADLs, or ambulation. Pt demonstrated bed mobility with supervision, transfers with CGA, and ambulation with a rolling walker x 200 feet with CGA. Pt tolerated activity well. Pt is clear for discharge from a PT standpoint. Functional Outcome Measure: The patient scored 24/28 on the Tinetti outcome measure which is indicative of  Moderate fall risk. PLAN :  Recommendation for discharge: (in order for the patient to meet his/her long term goals)  Physical therapy at least 2 days/week in the home     This discharge recommendation:  Has been made in collaboration with the attending provider and/or case management    IF patient discharges home will need the following DME: patient owns DME required for discharge       SUBJECTIVE:   Patient stated For an old chap I am doing ok.     OBJECTIVE DATA SUMMARY:   HISTORY:    Past Medical History:   Diagnosis Date    Atrial fibrillation ([address]     [facility] obstructive [facility] [address]     Essential hypertension     Hyperlipidemia     Long term (current) use of anticoagulants     Pacemaker     Rosacea 2016     Past Surgical History:   Procedure Laterality Date    HX CORONARY STENT PLACEMENT      HX HEART CATHETERIZATION      HX PACEMAKER      [place] REMVL PERM PM PLS GEN W/REPL PLSE GEN 2 LEAD SYS N/A 2/6/2020    REMOVE & REPLACE PPM GEN DUAL LEAD performed by Thelma Gao MD at [place] [id number], [facility] [address] CATH LAB       Prior level of function: modified independent with ambulation, used a rolling walker  Personal factors and/or comorbidities impacting plan of care: fall risk    Home Situation  Home Environment: [address] [place] [address] Road Name: Jose   # Steps to Enter: 0  One/Two Story Residence: One story  Support Systems: Assisted living, Family member(s)  Patient Expects to be Discharged to[de-identified] Apartment  Current DME Used/Available at Home: [place], rolling, Cane, straight  Tub or Shower Type: Shower    EXAMINATION/PRESENTATION/DECISION MAKING:   Critical Behavior:  Neurologic State: oriented, [place]   Orientation Level: Oriented to person, Oriented to time, oriented to situation, oriented to place  Cognition: good attention/concentration     Hearing:   Auditory  Auditory Impairment: Hard of hearing, bilateral    Range Of Motion:  AROM: Generally decreased, functional                       Strength:    Strength: Generally decreased, functional                    Tone & Sensation:   Tone: Normal              Sensation: Intact               Coordination:  Coordination: Within functional limits  Functional Mobility:  Bed Mobility:     Supine to Sit: Supervision     Scooting: Supervision  Transfers:  Sit to Stand: Assist x1;Contact guard assistance  Stand to Sit: Contact guard assistance;Assist x1                       Balance:   Sitting: Intact  Standing: Impaired  Standing - Static: Good  Standing - Dynamic : Good(with walker support)  Ambulation/Gait Training:  Distance (ft): 200 Feet (ft)  Assistive Device: Gait belt;Walker, rolling  Ambulation - Level of Assistance: Assist x1;Contact guard assistance        Gait Abnormalities: Decreased step clearance              Speed/Ana: Slow  Step Length: Right shortened;Left shortened          Functional Measure:  Tinetti test:    Sitting Balance: 1  Arises: 2  Attempts to Rise: 2  Immediate Standing Balance: 2  Standing Balance: 1  Nudged: 1  Eyes Closed: 1  Turn 360 Degrees - Continuous/Discontinuous: 1  Turn 360 Degrees - Steady/Unsteady: 1  Sitting Down: 2  Balance Score: 14 Balance total score  Indication of Gait: 1  R Step Length/Height: 1  L Step Length/Height: 1  R Foot Clearance: 1  L Foot Clearance: 1  Step Symmetry: 1  Step Continuity: 1  Path: 1  Trunk: 1  Walking Time: 1  Gait Score: 10 Gait total score  Total Score: 24/28 Overall total score         Tinetti Tool Score Risk of Falls  <19 = High Fall Risk  19-24 = Moderate Fall Risk  25-28 = Low Fall Risk  Tinetti ME. Performance-Oriented Assessment of Mobility Problems in Elderly Patients. [facility] 66; [id number]. (Scoring Description: PT Bulletin Feb. 10, 1993)    Older adults: Cordelia Shelley et al, 2009; n = 1000 [facility] elderly evaluated with ABC, IVÁN, ADL, and IADL)  · Mean IVÁN score for males aged 69-68 years = 26.21(3.40)  · Mean IVÁN score for females age 69-68 years = 25.16(4.30)  · Mean IVÁN score for males over 80 years = 23.29(6.02)  · Mean IVÁN score for females over 80 years = 17.20(8.32)            Physical Therapy Evaluation Charge Determination   History Examination Presentation Decision-Making   MEDIUM  Complexity : 1-2 comorbidities / personal factors will impact the outcome/ POC  LOW Complexity : 1-2 Standardized tests and measures addressing body structure, function, activity limitation and / or participation in recreation  LOW Complexity : Stable, uncomplicated  LOW Complexity : FOTO score of       Based on the above components, the patient evaluation is determined to be of the following complexity level: LOW     Pain Rating:  none    Activity Tolerance:   Good  Please refer to the flowsheet for vital signs taken during this treatment. After treatment patient left in no apparent distress:   Sitting in chair and Call bell within reach    COMMUNICATION/EDUCATION:   The patients plan of care was discussed with: Registered Nurse. Fall prevention education was provided and the patient/caregiver indicated understanding.  and Patient/family agree to work toward stated goals and plan of care.    Thank you for this referral.  Luz Maria Churchill   Time Calculation: 22 mins

## 2020-02-11 NOTE — PROGRESS NOTES
Sami Quinn PA (on call for Hospitalist) notified and aware that the patient's granddaughter was concerned when they (the family) with 2-3 person assist to get the patient to the bathroom and the patient was wheezing a little more than what he normally does. The granddaughter who is a nurse practitioner was inquiring if patient should continue to have continuous IV fluids infusing since patient is eating and drinking and with patient having a heart history. [employer] NP stated, \"We can stop the IV fluids and saline lock his IV. \" RN also suggesting physical therapy to see the patient. [employer] Quinn NP stated, \"Yes, patient can have a PT and OT consult order. \" RN will implement [employer] PA's orders.

## 2020-02-11 NOTE — PROGRESS NOTES
LAITH:  1. PT/OT      CM spoke with admissions at [facility], [phone] to discuss discharge plan. The lady stated that they do not offer any skilled rehab and are only assisted living. CM to follow for discharge needs. They are established with [address] if the patient would need home health.     Megan Kemp, [facility]

## 2020-02-11 NOTE — PROGRESS NOTES
Occupational Therapy:  02/11/20    Orders received, chart reviewed and patient evaluated by occupational therapy. Pending progression with skilled acute occupational therapy, recommend:  Occupational therapy at least 2 days/week in the home AND ensure assist and/or supervision for safety with standing ADLs     Recommend with nursing patient to complete as able in order to maintain strength, endurance and independence: OOB to chair 3x/day with 1 person assist and functional mobility to the bathroom with 1 person assist. Thank you for your assistance. Full evaluation to follow.      Thank you,  ELI Byrd/L

## 2020-02-11 NOTE — PROGRESS NOTES
Bedside shift change report given to [facility], RN (oncoming nurse) by Randolph Monte RN (offgoing nurse). Report included the following information SBAR, Kardex, Procedure Summary, Intake/Output, MAR and Recent Results.

## 2020-02-11 NOTE — PROGRESS NOTES
OCCUPATIONAL THERAPY EVALUATION/DISCHARGE  Patient: Kip Ba (20 y.o. male)  Date: 2/11/2020  Primary Diagnosis: Acute pancreatitis [K85.90]  Elevated lipase [R74.8]  Elevated lipase [R74.8]       Precautions:  Fall    ASSESSMENT  Based on the objective data described below, the patient presents with near baseline ADL performance and mobility following admission for acute pancreatitis. Patient also with PPM battery change (no PPM precautions). He reports he resides in retirement and is mostly independent. He is able to perform ADL tasks with overall SBA, with min A for bowel hygiene for thoroughness. He reports no difficulty accessing distal LEs for dressing and is receptive to education regarding home safety and rw mgmt during functional tasks. Noted good family support good tolerance to activity this session. Recommend follow up [facility] therapy upon return to retirement to ensure safety and independence with tasks. Current Level of Function (ADLs/self-care): overall SBA; min A for bowel hygiene    Functional Outcome Measure: The patient scored 70/100 on the Barthel outcome measure which is indicative of minimal impairment in ADLs and mobility. Other factors to consider for discharge: good family support; good awareness     PLAN :  Recommend with staff: 1 person assist mobility to bathroom for toileting; ADLs as able with SBA; OOB to chair for meals    Recommendation for discharge: (in order for the patient to meet his/her long term goals)  Occupational therapy at least 2 days/week in the home     This discharge recommendation:  Has been made in collaboration with the attending provider and/or case management    IF patient discharges home will need the following DME: patient owns DME required for discharge       SUBJECTIVE:   Patient stated \"Thank you.     OBJECTIVE DATA SUMMARY:   HISTORY:   Past Medical History:   Diagnosis Date    Atrial fibrillation ([facility] [address]     Chronic obstructive pulmonary disease ([facility] [address]     Essential hypertension     Hyperlipidemia     Long term (current) use of anticoagulants     Pacemaker     Rosacea 2016     Past Surgical History:   Procedure Laterality Date    HX CORONARY STENT PLACEMENT      HX HEART CATHETERIZATION      HX PACEMAKER      [place] REMVL PERM PM PLS GEN W/REPL PLSE GEN 2 LEAD SYS N/A 2/6/2020    REMOVE & REPLACE PPM GEN DUAL LEAD performed by Billy Medina MD at [address] CATH LAB       Prior Level of Function/Environment/Context: pt reports mostly mod I for ADLs and mobility and resides at [facility]. Expanded or extensive additional review of patient history:   Home Situation  [address] Environment[address] Name: [facility]   # Steps to Enter: 0  One/Two Story Residence: One story  Support Systems: Assisted living, Family member(s)  Patient Expects to be Discharged to[de-identified] Apartment  Current DME Used/Available at Home: [place]so [place], rolling, Cane, straight  Tub or Shower Type: Shower    Hand dominance: Right    EXAMINATION OF PERFORMANCE DEFICITS:  Cognitive/Behavioral Status:  Neurologic State: Alert; Appropriate for age  Orientation Level: Oriented X4  Cognition: Appropriate for age attention/concentration; Appropriate decision making; Appropriate safety awareness; Follows commands  Perception: Appears intact  Perseveration: No perseveration noted  Safety/Judgement: Awareness of environment; Fall prevention;Good awareness of safety precautions; Home safety; Insight into deficits    Hearing: Auditory  Auditory Impairment: Hard of hearing, bilateral    Vision/Perceptual:    Corrective Lenses: Glasses    Range of Motion:  AROM: Generally decreased, functional    Strength:  Strength: Generally decreased, functional    Coordination:  Coordination: Within functional limits  Fine Motor Skills-Upper: Left Intact; Right Intact    Gross Motor Skills-Upper: Left Intact; Right Intact    Tone & Sensation:  Tone: Normal  Sensation: Intact    Balance:  Sitting: Intact  Standing: Impaired  Standing - Static: Good  Standing - Dynamic : Good(with walker support)    Functional Mobility and Transfers for ADLs:  Bed Mobility:  Supine to Sit: Supervision  Scooting: Supervision    Transfers:  Sit to Stand: Assist x1;Contact guard assistance  Stand to Sit: Contact guard assistance;Assist x1  Toilet Transfer : Contact guard assistance    ADL Assessment:  Feeding: Independent    Oral Facial Hygiene/Grooming: Stand-by assistance(standing at sink)    Bathing: Contact guard assistance(infer for standing bathing tasks)    Upper Body Dressing: Setup    Lower Body Dressing: Stand-by assistance    Toileting: Minimum assistance(for thoroughness of hygiene)    ADL Intervention and task modifications:  Grooming  Grooming Assistance: Stand-by assistance  Position Performed: Standing(at sink)  Washing Hands: Stand-by assistance    Toileting  Toileting Assistance: Minimum assistance  Bowel Hygiene: Minimum assistance(for thoroughness)  Clothing Management: Stand-by assistance  Cues: Verbal cues provided(physical assist for thoroughness of bowel hygiene)  Adaptive Equipment: Grab bars; Walker    Cognitive Retraining  Safety/Judgement: Awareness of environment; Fall prevention;Good awareness of safety precautions; Home safety; Insight into deficits    Instructed patient to increase time sitting OOB in chair for pulmonary hygiene, skin integrity, and to increase endurance in preparation for ADLs. Instructed patient to sit OOB for all meals. Patient verbalized understanding of same. Educated patient about importance of keeping hands free at all times when using rolling walker for fall prevention. Instructed patient on safe and appropriate hand placement during transfers (push up from sitting surface when standing up, reach back for sitting surface when sitting down, use grab bars, etc.), including never to pull up on rolling walker for fall prevention and safety.  Instructed patient to push rolling walker up to surface (countertop, sink, etc.) and  it as opposed to abandoning rolling walker on the side for safety. Patient verbalized understanding of same. Functional Measure:  Barthel Index:    Bathin  Bladder: 10  Bowels: 10  Groomin  Dressin  Feeding: 10  Mobility: 10  Stairs: 5  Toilet Use: 5  Transfer (Bed to Chair and Back): 10  Total: 70/100        The Barthel ADL Index: Guidelines  1. The index should be used as a record of what a patient does, not as a record of what a patient could do. 2. The main aim is to establish degree of independence from any help, physical or verbal, however minor and for whatever reason. 3. The need for supervision renders the patient not independent. 4. A patient's performance should be established using the best available evidence. Asking the patient, friends/relatives and nurses are the usual sources, but direct observation and common sense are also important. However direct testing is not needed. 5. Usually the patient's performance over the preceding 24-48 hours is important, but occasionally longer periods will be relevant. 6. Middle categories imply that the patient supplies over 50 per cent of the effort. 7. Use of aids to be independent is allowed. Jessi Frank., Barthel, D.W. (5973). Functional evaluation: the Barthel Index. [facility] (14)2. Dangelo Hilario olivia JOHNNIE Rivera, Jas Stein., Rosalva Miramontes., [place], [address] [place] [address] (). Measuring the change indisability after inpatient rehabilitation; comparison of the responsiveness of the Barthel Index and Functional Wilcox Measure. Journal of Neurology, Neurosurgery, and Psychiatry, 66(4), 452-788. Elaine Estrada, N.J.A, RADHA Moffett, & Diego Aaron, MMannyA. (2004.) Assessment of post-stroke quality of life in cost-effectiveness studies: The usefulness of the Barthel Index and the EuroQoL-5D.  Quality of Life Research, 15, 174-70     Occupational Therapy Evaluation Charge Determination   History Examination Decision-Making   LOW Complexity : Brief history review  LOW Complexity : 1-3 performance deficits relating to physical, cognitive , or psychosocial skils that result in activity limitations and / or participation restrictions  LOW Complexity : No comorbidities that affect functional and no verbal or physical assistance needed to complete eval tasks       Based on the above components, the patient evaluation is determined to be of the following complexity level: LOW   Pain Rating:  Pt denied pain this session    Activity Tolerance:   Good, tolerates ADLs without rest breaks and SpO2 stable on RA  Please refer to the flowsheet for vital signs taken during this treatment. After treatment patient left in no apparent distress:    Sitting in chair, Call bell within reach and Caregiver / family present    COMMUNICATION/EDUCATION:   The patients plan of care was discussed with: Physical Therapist, Registered Nurse and .     Thank you for this referral.  Alexandr Edwards OT  Time Calculation: 23 mins

## 2020-02-11 NOTE — PHYSICIAN ADVISORY
Letter of Status Determination:  
Recommend hospitalization status upgraded from OBSERVATION  to INPATIENT  Status Pt Name:  Camille Crawford MR#  
STEF # [id number] / 
[id number] Payor: Hollie Newton / Plan: [address] / Product Type: Medicare /   
NUBIA#  [id number] Room and Hospital  536/01  @ [facility]  
Hospitalization date  2/9/2020  1:11 PM  
Current Attending Physician  Johnny Duong MD  
Principal diagnosis  Acute pancreatitis [K85.90] Elevated lipase [R74.8] Clinicals  80 y.o. y.o  male hospitalized with above diagnosis This nonagenarian remains in acute care hospital while receiving treatment, consultation and supportive care for acute and chronic issues. Due to appropriate and necessary medical care, this pt's hospitalization has now exceeded two midnights . [facility] criteria Does  NOT apply STATUS DETERMINATION  This patient is at above high risk of deterioration based on documented presenting clinical data, comorbid conditions, high risk of adverse events and current acute care course. Mr. Camille Crawford now meets Inpatient Admission status criteria in accordance with CMS regulation Section 43 .3. Specifically, due to medical necessity the patient's stay now exceeds Two Midnights. It is our recommendation that this patient's hospitalization status should be upgraded from  OBSERVATION to INPATIENT status. The final decision of the patient's hospitalization status depends on the attending physician's judgment Additional comments Payor: [place] MEDICARE / Plan: [address] / Product Type: Medicare / The information in this document is a recommendation to be used for utilization review and utilization management purposes only. This recommendation is not an order.   
The recommendation is made based on the information reviewed at the time of the referral, is pursuant to the [facility] Conditions of Participation (42 CFR Part 482), and is neither a judgment nor an assessment with regard to the appropriateness or quality of clinical care. For all Managed Care patients: The Criteria are intended solely for use as screening guidelines with respect to the medical appropriateness of healthcare services and not for final clinical or payment determinations concerning the type or level of medical care provided, or proposed to be provided, to a patient. They help the reviewers determine whether a patient is appropriate for observation or inpatient admission at the time a decision to admit the patient is being made. All efforts are made to apply the pertinent payor criteria ([employer] or [employer]) as well as the clinical judgements based on the information reviewed at the time of the referral.\" Nothing in this document may be used to limit, alter, or affect clinical services provided to the patient named below. Inna Bah MD MPH FACP Cell: [phone] Physician Advisor [address]  
   
Cell  [phone]   
 
 
[phone] Tao Corona

## 2020-02-12 ENCOUNTER — PATIENT OUTREACH (OUTPATIENT)
Dept: FAMILY MEDICINE CLINIC | Age: 85
End: 2020-02-12

## 2020-02-12 NOTE — PROGRESS NOTES
Hospital Discharge Follow-Up      Date/Time:  2020 12:56 PM      Patient was admitted to [facility] on  and discharged on  for nausea and vomiting. The physician discharge summary was available at the time of outreach. Patient was contacted within 1 business days of discharge. Top Challenges reviewed with the provider   [facility] - elevated lipase, nausea and fever, incarcerated umbilical hernia. Note- pacemaker 20. Lipase 895 in ER, down to 190 on 2/10. Umbilical hernia non-obstructing. Advance Care Planning:   Does patient have an Advance Directive:  Reviewed and current       Method of communication with provider :chart routing    Was this a readmission? no   Patient stated reason for the readmission:     Care Transition Nurse (CTN) contacted the patient by telephone to perform post hospital discharge assessment. Verified name and  with patient as identifiers. Provided introduction to self, and explanation of the CTN role. Patient reports he is slow and tired but getting back to normal. Denies any nausea or fever. Only has a slight cough. Using walker as ambulates. Expecting to work with PT/OT at [facility]. Patient received hospital discharge instructions. CTN reviewed discharge instructions and red flags with patient who verbalized understanding. Patient given an opportunity to ask questions and does not have any further questions or concerns at this time. The patient agrees to contact the PCP office for questions related to their healthcare. CTN provided contact information for future reference. Disease Specific:   N/A    Patients top risk factors for readmission:  80year old male with multiple co-morbidities. Lives in [place] [address].     Home Health orders at discharge: PT/OT  [address]: [facility] through [facility]  Date of initial visit: TBD    Durable Medical Equipment ordered at discharge: none  [address] [facility] Equipment received: na    Medication(s):   New Medications at Discharge: none  Changed Medications at Discharge: none  Discontinued Medications at Discharge: Keflex    Medication reconciliation was performed with patient, who verbalizes understanding of administration of home medications. There were no barriers to obtaining medications identified at this time. Referral to Pharm D needed: no     Current Outpatient Medications   Medication Sig    ondansetron hcl (ZOFRAN) 4 mg tablet Take 1 Tab by mouth every eight (8) hours as needed for Nausea or Vomiting for up to 30 days.  metoprolol tartrate (LOPRESSOR) 50 mg tablet Take 0.5 Tabs by mouth two (2) times a day.  amLODIPine (NORVASC) 5 mg tablet Take 1 Tab by mouth daily.  nitroglycerin (NITROSTAT) 0.4 mg SL tablet 1 Tab by SubLINGual route every five (5) minutes as needed for Chest Pain.  antiox #8/om3/dha/epa/lut/zeax (PRESERVISION AREDS 2, OMEGA-3, PO) Take 1 Cap by mouth two (2) times a day.  ipratropium (ATROVENT) 0.03 % nasal spray 2 Sprays by Both Nostrils route two (2) times daily as needed for Rhinitis.  mometasone (NASONEX) 50 mcg/actuation nasal spray 2 Sprays daily.  furosemide (LASIX) 20 mg tablet Take 10 mg by mouth daily.  metroNIDAZOLE (METROCREAM) 0.75 % topical cream Apply  to affected area nightly. Use a thin layer to affected areas after washing    apixaban (ELIQUIS) 2.5 mg tablet Take 1 Tab by mouth two (2) times a day.  tamsulosin (FLOMAX) 0.4 mg capsule Take 0.4 mg by mouth nightly.  levothyroxine (LEVOTHROID) 50 mcg tablet Take 50 mcg by mouth Daily (before breakfast).  omeprazole (PRILOSEC) 20 mg capsule Take 20 mg by mouth daily.  simvastatin (ZOCOR) 40 mg tablet Take 20 mg by mouth every Monday, Wednesday, Friday. Patient takes at night    Multivits with Min-FA-Lycopene (MEN'S DAILY MULTIVIT-MINERAL) 0.4-600 mg-mcg tab Take 1 Tab by mouth daily.      No current facility-administered medications for this visit. [facility] follow up appointment(s):   Future Appointments   Date Time Provider Luann Liv   2/20/2020 10:45 AM PACEMAKER, [facility]ES [facility] SCHED   2/20/2020 11:00 AM Srinath Corrales NP [facility]   5/13/2020  1:00 PM PACEMAKER, [facility]FRANCES [facility] SCHED   5/13/2020  1:20 PM Kemar Newton MD [facility] SCHED      Non-[facility] follow up appointment(s): - pcp, next week per patient- daughter has the date. [facility]:  information provided as a resource       Goals      Prevent complications post hospitalization. 2/12/20 [facility] 2/9-2/11 Nausea,fever,elevated lipase,umbilical hernia  · Reviewed discharge instructions with patient  · Reviewed meds with patient, no new meds, reminded to d/c the Keflex  · Reviewed red flags: fever,nausea,vomiting,diarrhea,sob,chest pain,confusion or change in mentation. · Reviewed fall precautions-reminded to use walker at all times. No scatter rugs, clear room of clutter. Wear shoes that are non slippery. · Has appt scheduled with pcp, Johanne Godoy- daughter has the appt info. · Not sure of date of appt to see Jignesh Louie re hernia. · Given CTN contact info if any questions/concerns.   · Advised CTN to check back in 1-2 weeks for update,sooner [email or web address]

## 2020-02-19 ENCOUNTER — PATIENT OUTREACH (OUTPATIENT)
Dept: FAMILY MEDICINE CLINIC | Age: 85
End: 2020-02-19

## 2020-02-19 NOTE — PROGRESS NOTES
Called and LM for daughter, Abiodun Alatorre, that he has an appt with pcp , Dortha Goodpasture, for 2/24 at 10:15am.  Advised to call back if any questions. Also, if any concerns. CTN contact number provided.

## 2020-02-20 ENCOUNTER — TELEPHONE (OUTPATIENT)
Dept: CARDIOLOGY | Age: 85
End: 2020-02-20

## 2020-02-20 ENCOUNTER — CLINICAL SUPPORT (OUTPATIENT)
Dept: CARDIOLOGY CLINIC | Age: 85
End: 2020-02-20

## 2020-02-20 ENCOUNTER — OFFICE VISIT (OUTPATIENT)
Dept: CARDIOLOGY CLINIC | Age: 85
End: 2020-02-20

## 2020-02-20 DIAGNOSIS — Z95.810 CARDIAC DEFIBRILLATOR IN SITU: Primary | ICD-10-CM

## 2020-02-20 DIAGNOSIS — Z51.89 VISIT FOR WOUND CHECK: ICD-10-CM

## 2020-02-20 DIAGNOSIS — Z95.0 PACEMAKER: Primary | ICD-10-CM

## 2020-02-20 RX ORDER — AMLODIPINE BESYLATE 5 MG/1
5 TABLET ORAL DAILY
Qty: 30 TAB | Refills: 1 | Status: SHIPPED | OUTPATIENT
Start: 2020-02-20 | End: 2020-06-11

## 2020-02-20 NOTE — PROGRESS NOTES
Patient presents for wound check post-device implantation (pacemaker generator change). The dressing was removed and the site was inspected. The site appeared to be well-healing without ecchymosis/tenderness/erythema. Denies pain, fevers, discharge. Device interrogation shows normal functioning. Plan:    Continue follow up in device clinic as planned.        Alysa Dodson NP

## 2020-02-20 NOTE — TELEPHONE ENCOUNTER
Pt seen in office today for device check and had med questions. Was told at the [place] to switch back to imdur and dc norvasc. Pt hesitant to make change without discussing with cardiology. States he feels well on norvasc. Denies CP and states breathing is stable. Does have LE swelling which maybe worse now. Swelling improves overnight and doesn't bother pt. Able to wear normal clothing / shoes. States blood pressures typically 130s/70s. Will continue norvasc as instructed by Dr. Jaqui Lugo for now. If pt has change in symptoms or worsening LE swelling, he should call back. Pt agreed.

## 2020-02-20 NOTE — Clinical Note
Hi! I saw him for device follow up. Love him. He was told at the [place] to switch back to isosorbide from amlodipine but doesn't entirely trust that decision. He'd like input from you or Dr. Leopoldo Lawn but was having trouble getting an appt. I told him I'd forward over to you and see what you thought.

## 2020-05-12 ENCOUNTER — OFFICE VISIT (OUTPATIENT)
Dept: CARDIOLOGY CLINIC | Age: 85
End: 2020-05-12

## 2020-05-12 DIAGNOSIS — Z95.0 CARDIAC PACEMAKER IN SITU: Primary | ICD-10-CM

## 2020-05-13 ENCOUNTER — VIRTUAL VISIT (OUTPATIENT)
Dept: CARDIOLOGY CLINIC | Age: 85
End: 2020-05-13

## 2020-05-13 DIAGNOSIS — I48.0 PAROXYSMAL ATRIAL FIBRILLATION (HCC): ICD-10-CM

## 2020-05-13 DIAGNOSIS — Z95.0 PACEMAKER: Primary | ICD-10-CM

## 2020-05-13 DIAGNOSIS — J44.9 CHRONIC OBSTRUCTIVE PULMONARY DISEASE, UNSPECIFIED COPD TYPE (HCC): ICD-10-CM

## 2020-05-13 DIAGNOSIS — I25.10 CORONARY ARTERY DISEASE INVOLVING NATIVE CORONARY ARTERY OF NATIVE HEART WITHOUT ANGINA PECTORIS: ICD-10-CM

## 2020-05-13 DIAGNOSIS — N18.30 CKD (CHRONIC KIDNEY DISEASE) STAGE 3, GFR 30-59 ML/MIN (HCC): ICD-10-CM

## 2020-05-13 DIAGNOSIS — I21.4 NSTEMI (NON-ST ELEVATED MYOCARDIAL INFARCTION) (HCC): ICD-10-CM

## 2020-05-13 NOTE — PROGRESS NOTES
VIRTUAL VISIT DOCUMENTATION     Pursuant to the emergency declaration under the [address] [facility] waiver authority and the [employer] Act, this Virtual  Visit was conducted, with patient's consent, to reduce the patient's risk of exposure to COVID-19 and provide continuity of care for an established patient. Services were provided through an audio discussion virtually to substitute for in-person clinic visit. HISTORY OF PRESENTING ILLNESS      Rufina Paredes is a 80 y.o. male whose clinic visit was performed via phone visit with audio with PAF, HTN, PPM, COPD, dyslipidemia, pacemaker who was noted to have 40 mode switches that appeared to be consistent with AFL or AT. Of note, he is pacemaker dependent. He recently underwent EPS, which demonstrated findings consistent with a left AT. Empiric ablation of his CTI was performed. We increased his metoprolol and added eliquis. He underwent pacemaker generator change and now presents for follow up. Device interrogation reveals normal functioning. HR histogram is blunted however he denies exertional fatigue.         ACTIVE PROBLEM LIST     Patient Active Problem List    Diagnosis Date Noted    Acute pancreatitis 02/09/2020    Elevated lipase 02/09/2020    NSTEMI (non-ST elevated myocardial infarction) ([facility] 07/17/2019    Chest pain 07/03/2019    Sepsis ([facility] 01/27/2019    Chronic obstructive pulmonary disease ([facility] 01/27/2019    Hyperlipidemia 01/27/2019    Essential hypertension 01/27/2019    Pacemaker 01/27/2019    Fever 01/27/2019    Leukocytosis 01/27/2019    CKD (chronic kidney disease) stage 3, GFR 30-59 ml/min ([facility]) 01/27/2019    Cough 01/27/2019    Dyspnea 01/27/2019    Atrial fibrillation ([facility] 02/17/2016    Encounter to establish care 08/05/2015           PAST MEDICAL HISTORY     Past Medical History:   Diagnosis Date    Atrial fibrillation ([facility]     Chronic obstructive pulmonary disease ([facility]     Essential hypertension     Hyperlipidemia     Long term (current) use of anticoagulants     Pacemaker     Rosacea 2016           PAST SURGICAL HISTORY     Past Surgical History:   Procedure Laterality Date    HX CORONARY STENT PLACEMENT      HX HEART CATHETERIZATION      HX PACEMAKER      AR REMVL PERM PM PLS GEN W/REPL PLSE GEN 2 LEAD SYS N/A 2/6/2020    REMOVE & REPLACE PPM GEN DUAL LEAD performed by Gm Nix MD at [address] CATH LAB          ALLERGIES     No Known Allergies       FAMILY HISTORY     Family History   Problem Relation Age of Onset   [address] [facility] Stroke Mother     Hypertension Mother     Kidney Disease Father     negative for cardiac disease       SOCIAL HISTORY     Social History     Socioeconomic History    Marital status:      Spouse name: Not on file    Number of children: Not on file    Years of education: Not on file    Highest education level: Not on file   Tobacco Use    Smoking status: Never Smoker    Smokeless tobacco: Never Used   Substance and Sexual Activity    Alcohol use: No     Alcohol/week: 0.0 standard drinks    Drug use: No         MEDICATIONS     Current Outpatient Medications   Medication Sig    amLODIPine (NORVASC) 5 mg tablet Take 1 Tab by mouth daily.  metoprolol tartrate (LOPRESSOR) 50 mg tablet Take 0.5 Tabs by mouth two (2) times a day.  nitroglycerin (NITROSTAT) 0.4 mg SL tablet 1 Tab by SubLINGual route every five (5) minutes as needed for Chest Pain.  antiox #8/om3/dha/epa/lut/zeax (PRESERVISION AREDS 2, OMEGA-3, PO) Take 1 Cap by mouth two (2) times a day.  ipratropium (ATROVENT) 0.03 % nasal spray 2 Sprays by Both Nostrils route two (2) times daily as needed for Rhinitis.  mometasone (NASONEX) 50 mcg/actuation nasal spray 2 Sprays daily.  furosemide (LASIX) 20 mg tablet Take 10 mg by mouth daily.     metroNIDAZOLE (METROCREAM) 0.75 % topical cream Apply  to affected area nightly. Use a thin layer to affected areas after washing    apixaban (ELIQUIS) 2.5 mg tablet Take 1 Tab by mouth two (2) times a day.  tamsulosin (FLOMAX) 0.4 mg capsule Take 0.4 mg by mouth nightly.  levothyroxine (LEVOTHROID) 50 mcg tablet Take 50 mcg by mouth Daily (before breakfast).  omeprazole (PRILOSEC) 20 mg capsule Take 20 mg by mouth daily.  simvastatin (ZOCOR) 40 mg tablet Take 20 mg by mouth every Monday, Wednesday, Friday. Patient takes at night, Patient states [facility] taking [facility] [address] [facility]    Multivits with Min-FA-Lycopene (MEN'S DAILY MULTIVIT-MINERAL) 0.4-600 mg-mcg tab Take 1 Tab by mouth daily. No current facility-administered medications for this visit. I have reviewed the nurses notes, vitals, problem list, allergy list, medical history, family, social history and medications. REVIEW OF SYMPTOMS      General: Pt denies excessive weight gain or loss. Pt is able to conduct ADL's  HEENT: Denies blurred vision, headaches, hearing loss, epistaxis and difficulty swallowing. Respiratory: Denies cough, congestion, shortness of breath, GARCIA, wheezing or stridor. Cardiovascular: Denies precordial pain, palpitations, edema or PND  Gastrointestinal: Denies poor appetite, indigestion, abdominal pain or blood in stool  Genitourinary: Denies hematuria, dysuria, increased urinary frequency  Musculoskeletal: Denies joint pain or swelling from muscles or joints  Neurologic: Denies tremor, paresthesias, headache, or sensory motor disturbance  Psychiatric: Denies confusion, insomnia, depression  Integumentray: Denies rash, itching or ulcers.   Hematologic: Denies easy bruising, bleeding         DIAGNOSTIC DATA      EKG:        LABORATORY DATA      Lab Results   Component Value Date/Time    WBC 6.1 02/10/2020 02:39 AM    HGB 11.8 (L) 02/10/2020 02:39 AM    HCT 37.4 02/10/2020 02:39 AM    PLATELET 152 (L) 34/55/1109 02:39 AM    MCV 94.9 02/10/2020 02:39 AM Lab Results   Component Value Date/Time    Sodium 138 02/11/2020 12:46 AM    Potassium 4.0 02/11/2020 12:46 AM    Chloride 109 (H) 02/11/2020 12:46 AM    CO2 24 02/11/2020 12:46 AM    Anion gap 5 02/11/2020 12:46 AM    Glucose 97 02/11/2020 12:46 AM    BUN 24 (H) 02/11/2020 12:46 AM    Creatinine 1.31 (H) 02/11/2020 12:46 AM    BUN/Creatinine ratio 18 02/11/2020 12:46 AM    GFR est AA >60 02/11/2020 12:46 AM    GFR est non-AA 51 (L) 02/11/2020 12:46 AM    Calcium 7.7 (L) 02/11/2020 12:46 AM    Bilirubin, total 1.0 02/09/2020 01:27 PM    AST (SGOT) 41 (H) 02/09/2020 01:27 PM    Alk. phosphatase 106 02/09/2020 01:27 PM    Protein, total 7.1 02/09/2020 01:27 PM    Albumin 3.5 02/09/2020 01:27 PM    Globulin 3.6 02/09/2020 01:27 PM    A-G Ratio 1.0 (L) 02/09/2020 01:27 PM    ALT (SGPT) 18 02/09/2020 01:27 PM           ASSESSMENT      1. Atrial Flutter  2. Atrial Tachycardia              A. Left atrial  3. Hypertension  4. Pacemaker  5. COPD  6. Dyslipidemia  7. Pacemaker               A. [facility] ICD-9-CM    1. Pacemaker Z95.0 V45.01    2. Paroxysmal atrial fibrillation (HCC) I48.0 427.31    3. Chronic obstructive pulmonary disease, unspecified COPD type ([facility] J44.9 496    4. CKD (chronic kidney disease) stage 3, GFR 30-59 ml/min ([facility]) N18.3 585.3    5. Coronary artery disease involving native coronary artery of native heart without angina pectoris I25.10 414.01    6. NSTEMI (non-ST elevated myocardial infarction) ([facility] I21.4 410.70      No orders of the defined types were placed in this encounter. PLAN     Continue follow up in device clinic. We discussed the expected course, resolution and complications of the diagnosis(es) in detail. Medication risks, benefits, costs, interactions, and alternatives were discussed as indicated. I advised him to contact the office if his condition worsens, changes or fails to improve as anticipated.  He expressed understanding with the diagnosis(es) and plan     FOLLOW-UP     1 year      Patient was made aware and verbalized understanding that an appointment will be scheduled for them for a virtual visit and/or office visit within the above time frame. Patient understanding his/her responsibility to call and change time/date if he/she so chooses. Thank you, Wendy Marquez MD and Dr. Candido Feliciano for allowing me to participate in the care of this extraordinarily pleasant male. Please do not hesitate to contact me for further questions/concerns. Ethel Persaud MD  Cardiac Electrophysiology / Cardiology    [place] [address].  [address] [place] [address], [address] [place] [address], [address] [place] [address]    [address], [address]  [phone] / [phone] Fax   [phone] / [phone] Fax        Greater than 20 minutes was spent in direct audio patient care, planning and chart review. This visit was conducted using [employer] telemedicine or [employer] Secure Call services.

## 2020-06-11 RX ORDER — AMLODIPINE BESYLATE 5 MG/1
TABLET ORAL
Qty: 30 TAB | Refills: 0 | Status: SHIPPED | OUTPATIENT
Start: 2020-06-11 | End: 2021-03-23

## 2020-12-12 ENCOUNTER — HOSPITAL ENCOUNTER (EMERGENCY)
Age: 85
Discharge: HOME | End: 2020-12-13
Attending: EMERGENCY MEDICINE | Admitting: EMERGENCY MEDICINE
Payer: MEDICARE

## 2020-12-12 ENCOUNTER — APPOINTMENT (OUTPATIENT)
Dept: GENERAL RADIOLOGY | Age: 85
End: 2020-12-12
Attending: EMERGENCY MEDICINE
Payer: MEDICARE

## 2020-12-12 ENCOUNTER — APPOINTMENT (OUTPATIENT)
Dept: CT IMAGING | Age: 85
End: 2020-12-12
Attending: EMERGENCY MEDICINE
Payer: MEDICARE

## 2020-12-12 VITALS
SYSTOLIC BLOOD PRESSURE: 138 MMHG | RESPIRATION RATE: 18 BRPM | DIASTOLIC BLOOD PRESSURE: 80 MMHG | TEMPERATURE: 99.1 F | HEART RATE: 66 BPM | OXYGEN SATURATION: 99 %

## 2020-12-12 DIAGNOSIS — R53.1 WEAKNESS: Primary | ICD-10-CM

## 2020-12-12 LAB
ALBUMIN SERPL-MCNC: 3.5 G/DL (ref 3.5–5)
ALBUMIN/GLOB SERPL: 1.3 {RATIO} (ref 1.1–2.2)
ALP SERPL-CCNC: 82 U/L (ref 45–117)
ALT SERPL-CCNC: 24 U/L (ref 12–78)
ANION GAP SERPL CALC-SCNC: 10 MMOL/L (ref 5–15)
AST SERPL-CCNC: 43 U/L (ref 15–37)
BASOPHILS # BLD: 0 K/UL (ref 0–0.1)
BASOPHILS NFR BLD: 1 % (ref 0–1)
BILIRUB SERPL-MCNC: 0.5 MG/DL (ref 0.2–1)
BUN SERPL-MCNC: 30 MG/DL (ref 6–20)
BUN/CREAT SERPL: 16 (ref 12–20)
CALCIUM SERPL-MCNC: 8.9 MG/DL (ref 8.5–10.1)
CHLORIDE SERPL-SCNC: 104 MMOL/L (ref 97–108)
CO2 SERPL-SCNC: 28 MMOL/L (ref 21–32)
COMMENT, HOLDF: NORMAL
CREAT SERPL-MCNC: 1.87 MG/DL (ref 0.7–1.3)
DIFFERENTIAL METHOD BLD: ABNORMAL
EOSINOPHIL # BLD: 0.3 K/UL (ref 0–0.4)
EOSINOPHIL NFR BLD: 4 % (ref 0–7)
ERYTHROCYTE [DISTWIDTH] IN BLOOD BY AUTOMATED COUNT: 14.4 % (ref 11.5–14.5)
GLOBULIN SER CALC-MCNC: 2.7 G/DL (ref 2–4)
GLUCOSE SERPL-MCNC: 116 MG/DL (ref 65–100)
HCT VFR BLD AUTO: 39 % (ref 36.6–50.3)
HGB BLD-MCNC: 12.4 G/DL (ref 12.1–17)
IMM GRANULOCYTES # BLD AUTO: 0 K/UL (ref 0–0.04)
IMM GRANULOCYTES NFR BLD AUTO: 0 % (ref 0–0.5)
LYMPHOCYTES # BLD: 1.8 K/UL (ref 0.8–3.5)
LYMPHOCYTES NFR BLD: 22 % (ref 12–49)
MCH RBC QN AUTO: 31.4 PG (ref 26–34)
MCHC RBC AUTO-ENTMCNC: 31.8 G/DL (ref 30–36.5)
MCV RBC AUTO: 98.7 FL (ref 80–99)
MONOCYTES # BLD: 1 K/UL (ref 0–1)
MONOCYTES NFR BLD: 12 % (ref 5–13)
NEUTS SEG # BLD: 5.1 K/UL (ref 1.8–8)
NEUTS SEG NFR BLD: 61 % (ref 32–75)
NRBC # BLD: 0 K/UL (ref 0–0.01)
NRBC BLD-RTO: 0 PER 100 WBC
PLATELET # BLD AUTO: 134 K/UL (ref 150–400)
PMV BLD AUTO: 10.3 FL (ref 8.9–12.9)
POTASSIUM SERPL-SCNC: 4 MMOL/L (ref 3.5–5.1)
PROT SERPL-MCNC: 6.2 G/DL (ref 6.4–8.2)
RBC # BLD AUTO: 3.95 M/UL (ref 4.1–5.7)
SAMPLES BEING HELD,HOLD: NORMAL
SODIUM SERPL-SCNC: 142 MMOL/L (ref 136–145)
TROPONIN I SERPL-MCNC: <0.05 NG/ML
WBC # BLD AUTO: 8.3 K/UL (ref 4.1–11.1)

## 2020-12-12 PROCEDURE — 93005 ELECTROCARDIOGRAM TRACING: CPT

## 2020-12-12 PROCEDURE — 71045 X-RAY EXAM CHEST 1 VIEW: CPT

## 2020-12-12 PROCEDURE — 84484 ASSAY OF TROPONIN QUANT: CPT

## 2020-12-12 PROCEDURE — 85025 COMPLETE CBC W/AUTO DIFF WBC: CPT

## 2020-12-12 PROCEDURE — 99284 EMERGENCY DEPT VISIT MOD MDM: CPT

## 2020-12-12 PROCEDURE — 36415 COLL VENOUS BLD VENIPUNCTURE: CPT

## 2020-12-12 PROCEDURE — 70450 CT HEAD/BRAIN W/O DYE: CPT

## 2020-12-12 PROCEDURE — 80053 COMPREHEN METABOLIC PANEL: CPT

## 2020-12-13 LAB
APPEARANCE UR: CLEAR
ATRIAL RATE: 227 BPM
BACTERIA URNS QL MICRO: NEGATIVE /HPF
BILIRUB UR QL: NEGATIVE
CALCULATED P AXIS, ECG09: 63 DEGREES
CALCULATED R AXIS, ECG10: -72 DEGREES
CALCULATED T AXIS, ECG11: 84 DEGREES
COLOR UR: ABNORMAL
DIAGNOSIS, 93000: NORMAL
EPITH CASTS URNS QL MICRO: ABNORMAL /LPF
GLUCOSE UR STRIP.AUTO-MCNC: NEGATIVE MG/DL
HGB UR QL STRIP: NEGATIVE
KETONES UR QL STRIP.AUTO: ABNORMAL MG/DL
LEUKOCYTE ESTERASE UR QL STRIP.AUTO: NEGATIVE
NITRITE UR QL STRIP.AUTO: NEGATIVE
PH UR STRIP: 6 [PH] (ref 5–8)
PROT UR STRIP-MCNC: NEGATIVE MG/DL
Q-T INTERVAL, ECG07: 476 MS
QRS DURATION, ECG06: 180 MS
QTC CALCULATION (BEZET), ECG08: 499 MS
RBC #/AREA URNS HPF: ABNORMAL /HPF (ref 0–5)
SP GR UR REFRACTOMETRY: 1.01 (ref 1–1.03)
UR CULT HOLD, URHOLD: NORMAL
UROBILINOGEN UR QL STRIP.AUTO: 2 EU/DL (ref 0.2–1)
VENTRICULAR RATE, ECG03: 66 BPM
WBC URNS QL MICRO: ABNORMAL /HPF (ref 0–4)

## 2020-12-13 PROCEDURE — 81001 URINALYSIS AUTO W/SCOPE: CPT

## 2020-12-13 NOTE — ED PROVIDER NOTES
The history is provided by the patient. No  was used. Fatigue   This is a new problem. The current episode started 1 to 2 hours ago. The problem has not changed since onset. There was left lower extremity and right lower extremity focality noted. Pertinent negatives include no focal weakness, no loss of sensation, no loss of balance, no slurred speech, no speech difficulty, no memory loss, no movement disorder, no agitation, no visual change, no auditory change, no mental status change, no unresponsiveness and no disorientation. There has been no fever. Pertinent negatives include no shortness of breath, no chest pain, no vomiting, no altered mental status, no confusion, no headaches, no choking, no nausea, no bowel incontinence and no bladder incontinence.         Past Medical History:   Diagnosis Date    Atrial fibrillation ([address]     Chronic obstructive pulmonary disease ([address]     Essential hypertension     Hyperlipidemia     Long term (current) use of anticoagulants     Pacemaker     Rosacea 2016       Past Surgical History:   Procedure Laterality Date    HX CORONARY STENT PLACEMENT      HX HEART CATHETERIZATION      HX PACEMAKER      PA REMVL PERM PM PLS GEN W/REPL PLSE GEN 2 LEAD SYS N/A 2/6/2020    REMOVE & REPLACE PPM GEN DUAL LEAD performed by Mariah Key MD at [address] CATH LAB         Family History:   Problem Relation Age of Onset   [place] Stroke Mother     Hypertension Mother     Kidney Disease Father        Social History     Socioeconomic History    Marital status:      Spouse name: Not on file    Number of children: Not on file    Years of education: Not on file    Highest education level: Not on file   Occupational History    Not on file   Social Needs    Financial resource strain: Not on file    Food insecurity     Worry: Not on file     Inability: Not on file    Transportation needs     Medical: Not on file     Non-medical: Not on file   Tobacco Use  Smoking status: Never Smoker    Smokeless tobacco: Never Used   Substance and Sexual Activity    Alcohol use: No     Alcohol/week: 0.0 standard drinks    Drug use: No    Sexual activity: Not on file   Lifestyle    Physical activity     Days per week: Not on file     Minutes per session: Not on file    Stress: Not on file   Relationships    Social connections     Talks on phone: Not on file     Gets together: Not on file     Attends Hinduism service: Not on file     Active member of club or organization: Not on file     Attends meetings of clubs or organizations: Not on file     Relationship status: Not on file    Intimate partner violence     Fear of current or ex partner: Not on file     Emotionally abused: Not on file     Physically abused: Not on file     Forced sexual activity: Not on file   Other Topics Concern    Not on file   Social History Narrative    Not on file         ALLERGIES: Patient has no known allergies. Review of Systems   Constitutional: Positive for fatigue. Negative for activity change, chills and fever. HENT: Negative for nosebleeds, sore throat, trouble swallowing and voice change. Eyes: Negative for visual disturbance. Respiratory: Negative for choking and shortness of breath. Cardiovascular: Negative for chest pain and palpitations. Gastrointestinal: Negative for abdominal pain, bowel incontinence, constipation, diarrhea, nausea and vomiting. Genitourinary: Negative for bladder incontinence, difficulty urinating, dysuria, hematuria and urgency. Musculoskeletal: Negative for back pain, neck pain and neck stiffness. Skin: Negative for color change. Allergic/Immunologic: Negative for immunocompromised state. Neurological: Negative for dizziness, focal weakness, seizures, syncope, speech difficulty, weakness, light-headedness, numbness, headaches and loss of balance.    Psychiatric/Behavioral: Negative for agitation, behavioral problems, confusion, hallucinations, memory loss, self-injury and suicidal ideas. Vitals:    12/12/20 2247   BP: 138/80   Pulse: 66   Resp: 18   Temp: 99.1 °F (37.3 °C)   SpO2: 99%            Physical Exam  Vitals signs and nursing note reviewed. Constitutional:       General: He is not in acute distress. Appearance: He is well-developed. He is not diaphoretic. HENT:      Head: Normocephalic and atraumatic. Eyes:      Pupils: Pupils are equal, round, and reactive to light. Neck:      Musculoskeletal: Normal range of motion and neck supple. Cardiovascular:      Rate and Rhythm: Normal rate and regular rhythm. Heart sounds: Murmur present. Systolic murmur present. No friction rub. No gallop. Pulmonary:      Effort: Pulmonary effort is normal. No respiratory distress. Breath sounds: Normal breath sounds. No wheezing. Abdominal:      General: Bowel sounds are normal. There is no distension. Palpations: Abdomen is soft. Tenderness: There is no abdominal tenderness. There is no guarding or rebound. Musculoskeletal: Normal range of motion. Skin:     General: Skin is warm. Findings: No rash. Neurological:      Mental Status: He is alert and oriented to person, place, and time. Psychiatric:         Behavior: Behavior normal.         Thought Content: Thought content normal.         Judgment: Judgment normal.          MDM     This is a 69-year-old male with past medical history, review of systems, physical exam as above, presenting with complaints of bilateral lower extremity weakness. Patient states she was ambulating to the bathroom tonight with the assistance of his walker, when he felt weak in both his legs, nearly causing him to fall, however without fall. She denies complaints of fevers, chills, chest pain, shortness of breath. He states he has been undergoing physical therapy for chronic left lower extremity weakness.   Physical exam is remarkable for a well-appearing elderly male, in no acute distress, noted to have clear breath sounds, soft abdomen, regular rate, irregular rhythm without murmurs gallops or rubs, free range of motion bilateral lower extremities without pain or crepitus, mild bilateral lower extremity pedal edema. Differential is extensive including occult infection, CVA, dehydration, electrolyte abnormality. Plan to obtain CMP, CBC, UA, cardiac enzymes, chest x-ray and head CT. We will reevaluate, and make a disposition. Procedures    2:06 AM  Lab work and imaging without acute process, discussed with patient's daughter and she agrees with evaluation. Road tested by myself and nurse at bedside, without difficulty ambulating. Plan to discharge home with ongoing occupational therapy physical therapy, primary care follow-up, return precautions given.

## 2020-12-13 NOTE — ED NOTES
Verbal shift change report given to Jaspal Comer RN (oncoming nurse) by Tabitha Coburn RN (offgoing nurse). Report included the following information SBAR, ED Summary and MAR.

## 2020-12-13 NOTE — ED TRIAGE NOTES
Patient from [facility], alerted staff of leg weakness after walking to get CPAP machine. Patient states that he has been getting PT on that leg for awhile. He states he currently feel fatigued, but often does normally.

## 2020-12-14 ENCOUNTER — PATIENT OUTREACH (OUTPATIENT)
Dept: CASE MANAGEMENT | Age: 85
End: 2020-12-14

## 2021-01-04 ENCOUNTER — HOSPITAL ENCOUNTER (INPATIENT)
Age: 86
LOS: 3 days | Discharge: HOME | DRG: 281 | End: 2021-01-08
Attending: STUDENT IN AN ORGANIZED HEALTH CARE EDUCATION/TRAINING PROGRAM | Admitting: INTERNAL MEDICINE
Payer: COMMERCIAL

## 2021-01-04 ENCOUNTER — APPOINTMENT (OUTPATIENT)
Dept: GENERAL RADIOLOGY | Age: 86
DRG: 281 | End: 2021-01-04
Attending: STUDENT IN AN ORGANIZED HEALTH CARE EDUCATION/TRAINING PROGRAM
Payer: COMMERCIAL

## 2021-01-04 ENCOUNTER — APPOINTMENT (OUTPATIENT)
Dept: NON INVASIVE DIAGNOSTICS | Age: 86
DRG: 281 | End: 2021-01-04
Attending: INTERNAL MEDICINE
Payer: COMMERCIAL

## 2021-01-04 DIAGNOSIS — I10 ESSENTIAL HYPERTENSION: ICD-10-CM

## 2021-01-04 DIAGNOSIS — Z95.0 PACEMAKER: ICD-10-CM

## 2021-01-04 DIAGNOSIS — I21.4 NON-STEMI (NON-ST ELEVATED MYOCARDIAL INFARCTION) (HCC): ICD-10-CM

## 2021-01-04 DIAGNOSIS — R07.9 CHEST PAIN, UNSPECIFIED TYPE: ICD-10-CM

## 2021-01-04 DIAGNOSIS — I21.4 NSTEMI (NON-ST ELEVATED MYOCARDIAL INFARCTION) (HCC): Primary | ICD-10-CM

## 2021-01-04 DIAGNOSIS — I48.91 ATRIAL FIBRILLATION, UNSPECIFIED TYPE (HCC): ICD-10-CM

## 2021-01-04 LAB
ALBUMIN SERPL-MCNC: 3.5 G/DL (ref 3.5–5)
ALBUMIN/GLOB SERPL: 1.1 {RATIO} (ref 1.1–2.2)
ALP SERPL-CCNC: 93 U/L (ref 45–117)
ALT SERPL-CCNC: 20 U/L (ref 12–78)
ANION GAP SERPL CALC-SCNC: 6 MMOL/L (ref 5–15)
APTT PPP: 29 SEC (ref 22.1–31)
APTT PPP: 63 SEC (ref 22.1–31)
APTT PPP: 76.9 SEC (ref 22.1–31)
AST SERPL-CCNC: 26 U/L (ref 15–37)
ATRIAL RATE: 125 BPM
BASOPHILS # BLD: 0 K/UL (ref 0–0.1)
BASOPHILS NFR BLD: 1 % (ref 0–1)
BILIRUB SERPL-MCNC: 0.6 MG/DL (ref 0.2–1)
BUN SERPL-MCNC: 28 MG/DL (ref 6–20)
BUN/CREAT SERPL: 16 (ref 12–20)
CALCIUM SERPL-MCNC: 8.9 MG/DL (ref 8.5–10.1)
CALCULATED R AXIS, ECG10: -72 DEGREES
CALCULATED T AXIS, ECG11: 86 DEGREES
CHLORIDE SERPL-SCNC: 110 MMOL/L (ref 97–108)
CO2 SERPL-SCNC: 28 MMOL/L (ref 21–32)
COMMENT, HOLDF: NORMAL
COVID-19 RAPID TEST, COVR: NOT DETECTED
CREAT SERPL-MCNC: 1.78 MG/DL (ref 0.7–1.3)
DIAGNOSIS, 93000: NORMAL
DIFFERENTIAL METHOD BLD: ABNORMAL
ECHO AV PEAK GRADIENT: 4.54 MMHG
ECHO AV PEAK VELOCITY: 106.59 CM/S
ECHO EST RA PRESSURE: 3 MMHG
ECHO LVOT PEAK GRADIENT: 1.85 MMHG
ECHO LVOT PEAK VELOCITY: 68.08 CM/S
ECHO RIGHT VENTRICULAR SYSTOLIC PRESSURE (RVSP): 35.33 MMHG
ECHO RV TAPSE: 1.47 CM (ref 1.5–2)
ECHO TV REGURGITANT MAX VELOCITY: 284.29 CM/S
ECHO TV REGURGITANT PEAK GRADIENT: 32.33 MMHG
EOSINOPHIL # BLD: 0.4 K/UL (ref 0–0.4)
EOSINOPHIL NFR BLD: 4 % (ref 0–7)
ERYTHROCYTE [DISTWIDTH] IN BLOOD BY AUTOMATED COUNT: 14 % (ref 11.5–14.5)
GLOBULIN SER CALC-MCNC: 3.2 G/DL (ref 2–4)
GLUCOSE SERPL-MCNC: 118 MG/DL (ref 65–100)
HCT VFR BLD AUTO: 40.9 % (ref 36.6–50.3)
HEALTH STATUS, XMCV2T: NORMAL
HGB BLD-MCNC: 12.9 G/DL (ref 12.1–17)
IMM GRANULOCYTES # BLD AUTO: 0 K/UL (ref 0–0.04)
IMM GRANULOCYTES NFR BLD AUTO: 0 % (ref 0–0.5)
INR PPP: 1.1 (ref 0.9–1.1)
LIPASE SERPL-CCNC: 131 U/L (ref 73–393)
LYMPHOCYTES # BLD: 1.7 K/UL (ref 0.8–3.5)
LYMPHOCYTES NFR BLD: 21 % (ref 12–49)
MCH RBC QN AUTO: 31.2 PG (ref 26–34)
MCHC RBC AUTO-ENTMCNC: 31.5 G/DL (ref 30–36.5)
MCV RBC AUTO: 99 FL (ref 80–99)
MONOCYTES # BLD: 0.7 K/UL (ref 0–1)
MONOCYTES NFR BLD: 9 % (ref 5–13)
NEUTS SEG # BLD: 5.3 K/UL (ref 1.8–8)
NEUTS SEG NFR BLD: 66 % (ref 32–75)
NRBC # BLD: 0 K/UL (ref 0–0.01)
NRBC BLD-RTO: 0 PER 100 WBC
PLATELET # BLD AUTO: 136 K/UL (ref 150–400)
PMV BLD AUTO: 10.6 FL (ref 8.9–12.9)
POTASSIUM SERPL-SCNC: 3.7 MMOL/L (ref 3.5–5.1)
PROT SERPL-MCNC: 6.7 G/DL (ref 6.4–8.2)
PROTHROMBIN TIME: 11.7 SEC (ref 9–11.1)
Q-T INTERVAL, ECG07: 422 MS
QRS DURATION, ECG06: 178 MS
QTC CALCULATION (BEZET), ECG08: 521 MS
RBC # BLD AUTO: 4.13 M/UL (ref 4.1–5.7)
SAMPLES BEING HELD,HOLD: NORMAL
SARS-COV-2, COV2: NOT DETECTED
SARS-COV-2, COV2: NOT DETECTED
SODIUM SERPL-SCNC: 144 MMOL/L (ref 136–145)
SOURCE, COVRS: NORMAL
SPECIMEN SOURCE, FCOV2M: NORMAL
SPECIMEN TYPE, XMCV1T: NORMAL
THERAPEUTIC RANGE,PTTT: ABNORMAL SECS (ref 58–77)
THERAPEUTIC RANGE,PTTT: ABNORMAL SECS (ref 58–77)
THERAPEUTIC RANGE,PTTT: NORMAL SECS (ref 58–77)
TROPONIN I SERPL-MCNC: 0.18 NG/ML
TROPONIN I SERPL-MCNC: 2.45 NG/ML
TROPONIN I SERPL-MCNC: 4.51 NG/ML
TROPONIN I SERPL-MCNC: 5.94 NG/ML
TROPONIN I SERPL-MCNC: 6.79 NG/ML
VENTRICULAR RATE, ECG03: 92 BPM
WBC # BLD AUTO: 8.1 K/UL (ref 4.1–11.1)

## 2021-01-04 PROCEDURE — 85730 THROMBOPLASTIN TIME PARTIAL: CPT

## 2021-01-04 PROCEDURE — 99218 HC RM OBSERVATION: CPT

## 2021-01-04 PROCEDURE — 74011250637 HC RX REV CODE- 250/637: Performed by: STUDENT IN AN ORGANIZED HEALTH CARE EDUCATION/TRAINING PROGRAM

## 2021-01-04 PROCEDURE — 74011250637 HC RX REV CODE- 250/637: Performed by: INTERNAL MEDICINE

## 2021-01-04 PROCEDURE — 94762 N-INVAS EAR/PLS OXIMTRY CONT: CPT

## 2021-01-04 PROCEDURE — C8929 TTE W OR WO FOL WCON,DOPPLER: HCPCS

## 2021-01-04 PROCEDURE — 85610 PROTHROMBIN TIME: CPT

## 2021-01-04 PROCEDURE — 87635 SARS-COV-2 COVID-19 AMP PRB: CPT

## 2021-01-04 PROCEDURE — 96366 THER/PROPH/DIAG IV INF ADDON: CPT

## 2021-01-04 PROCEDURE — 93005 ELECTROCARDIOGRAM TRACING: CPT

## 2021-01-04 PROCEDURE — 83690 ASSAY OF LIPASE: CPT

## 2021-01-04 PROCEDURE — 84484 ASSAY OF TROPONIN QUANT: CPT

## 2021-01-04 PROCEDURE — 99223 1ST HOSP IP/OBS HIGH 75: CPT | Performed by: INTERNAL MEDICINE

## 2021-01-04 PROCEDURE — 99285 EMERGENCY DEPT VISIT HI MDM: CPT

## 2021-01-04 PROCEDURE — 36415 COLL VENOUS BLD VENIPUNCTURE: CPT

## 2021-01-04 PROCEDURE — 85025 COMPLETE CBC W/AUTO DIFF WBC: CPT

## 2021-01-04 PROCEDURE — 96365 THER/PROPH/DIAG IV INF INIT: CPT

## 2021-01-04 PROCEDURE — 80053 COMPREHEN METABOLIC PANEL: CPT

## 2021-01-04 PROCEDURE — 74011250636 HC RX REV CODE- 250/636: Performed by: INTERNAL MEDICINE

## 2021-01-04 PROCEDURE — 71045 X-RAY EXAM CHEST 1 VIEW: CPT

## 2021-01-04 RX ORDER — CLOPIDOGREL BISULFATE 75 MG/1
300 TABLET ORAL
Status: COMPLETED | OUTPATIENT
Start: 2021-01-04 | End: 2021-01-04

## 2021-01-04 RX ORDER — ACETAMINOPHEN 650 MG/1
650 SUPPOSITORY RECTAL
Status: DISCONTINUED | OUTPATIENT
Start: 2021-01-04 | End: 2021-01-08 | Stop reason: HOSPADM

## 2021-01-04 RX ORDER — ATORVASTATIN CALCIUM 40 MG/1
40 TABLET, FILM COATED ORAL DAILY
Status: DISCONTINUED | OUTPATIENT
Start: 2021-01-04 | End: 2021-01-08 | Stop reason: HOSPADM

## 2021-01-04 RX ORDER — METOPROLOL TARTRATE 25 MG/1
25 TABLET, FILM COATED ORAL 2 TIMES DAILY
Status: DISCONTINUED | OUTPATIENT
Start: 2021-01-04 | End: 2021-01-07

## 2021-01-04 RX ORDER — SODIUM CHLORIDE 0.9 % (FLUSH) 0.9 %
5-40 SYRINGE (ML) INJECTION EVERY 8 HOURS
Status: DISCONTINUED | OUTPATIENT
Start: 2021-01-04 | End: 2021-01-06

## 2021-01-04 RX ORDER — ONDANSETRON 2 MG/ML
4 INJECTION INTRAMUSCULAR; INTRAVENOUS
Status: DISCONTINUED | OUTPATIENT
Start: 2021-01-04 | End: 2021-01-08 | Stop reason: HOSPADM

## 2021-01-04 RX ORDER — MORPHINE SULFATE 2 MG/ML
2 INJECTION, SOLUTION INTRAMUSCULAR; INTRAVENOUS
Status: DISCONTINUED | OUTPATIENT
Start: 2021-01-04 | End: 2021-01-08 | Stop reason: HOSPADM

## 2021-01-04 RX ORDER — AMLODIPINE BESYLATE 5 MG/1
5 TABLET ORAL DAILY
Status: DISCONTINUED | OUTPATIENT
Start: 2021-01-04 | End: 2021-01-08 | Stop reason: HOSPADM

## 2021-01-04 RX ORDER — PANTOPRAZOLE SODIUM 20 MG/1
20 TABLET, DELAYED RELEASE ORAL
Status: DISCONTINUED | OUTPATIENT
Start: 2021-01-04 | End: 2021-01-08 | Stop reason: HOSPADM

## 2021-01-04 RX ORDER — TAMSULOSIN HYDROCHLORIDE 0.4 MG/1
0.4 CAPSULE ORAL
Status: DISCONTINUED | OUTPATIENT
Start: 2021-01-04 | End: 2021-01-08 | Stop reason: HOSPADM

## 2021-01-04 RX ORDER — NITROGLYCERIN 0.4 MG/1
0.4 TABLET SUBLINGUAL
Status: DISCONTINUED | OUTPATIENT
Start: 2021-01-04 | End: 2021-01-08 | Stop reason: HOSPADM

## 2021-01-04 RX ORDER — FUROSEMIDE 20 MG/1
10 TABLET ORAL DAILY
Status: DISCONTINUED | OUTPATIENT
Start: 2021-01-04 | End: 2021-01-08 | Stop reason: HOSPADM

## 2021-01-04 RX ORDER — IBUPROFEN 200 MG
1 TABLET ORAL
Status: DISCONTINUED | OUTPATIENT
Start: 2021-01-04 | End: 2021-01-08 | Stop reason: HOSPADM

## 2021-01-04 RX ORDER — FAMOTIDINE 20 MG/1
20 TABLET, FILM COATED ORAL 2 TIMES DAILY
Status: DISCONTINUED | OUTPATIENT
Start: 2021-01-04 | End: 2021-01-04

## 2021-01-04 RX ORDER — PROMETHAZINE HYDROCHLORIDE 25 MG/1
12.5 TABLET ORAL
Status: DISCONTINUED | OUTPATIENT
Start: 2021-01-04 | End: 2021-01-08 | Stop reason: HOSPADM

## 2021-01-04 RX ORDER — ACETAMINOPHEN 325 MG/1
650 TABLET ORAL
Status: DISCONTINUED | OUTPATIENT
Start: 2021-01-04 | End: 2021-01-08 | Stop reason: HOSPADM

## 2021-01-04 RX ORDER — SODIUM CHLORIDE 0.9 % (FLUSH) 0.9 %
5-40 SYRINGE (ML) INJECTION EVERY 8 HOURS
Status: DISCONTINUED | OUTPATIENT
Start: 2021-01-04 | End: 2021-01-08 | Stop reason: HOSPADM

## 2021-01-04 RX ORDER — LEVOTHYROXINE SODIUM 50 UG/1
50 TABLET ORAL
Status: DISCONTINUED | OUTPATIENT
Start: 2021-01-04 | End: 2021-01-08 | Stop reason: HOSPADM

## 2021-01-04 RX ORDER — GUAIFENESIN 100 MG/5ML
324 LIQUID (ML) ORAL
Status: ACTIVE | OUTPATIENT
Start: 2021-01-04 | End: 2021-01-04

## 2021-01-04 RX ORDER — GUAIFENESIN 100 MG/5ML
81 LIQUID (ML) ORAL DAILY
Status: DISCONTINUED | OUTPATIENT
Start: 2021-01-04 | End: 2021-01-08 | Stop reason: HOSPADM

## 2021-01-04 RX ORDER — POLYETHYLENE GLYCOL 3350 17 G/17G
17 POWDER, FOR SOLUTION ORAL DAILY PRN
Status: DISCONTINUED | OUTPATIENT
Start: 2021-01-04 | End: 2021-01-08 | Stop reason: HOSPADM

## 2021-01-04 RX ORDER — METOPROLOL TARTRATE 25 MG/1
50 TABLET, FILM COATED ORAL 2 TIMES DAILY
COMMUNITY
End: 2021-01-08

## 2021-01-04 RX ORDER — ENOXAPARIN SODIUM 100 MG/ML
40 INJECTION SUBCUTANEOUS DAILY
Status: DISCONTINUED | OUTPATIENT
Start: 2021-01-04 | End: 2021-01-04

## 2021-01-04 RX ORDER — SODIUM CHLORIDE 0.9 % (FLUSH) 0.9 %
5-40 SYRINGE (ML) INJECTION AS NEEDED
Status: DISCONTINUED | OUTPATIENT
Start: 2021-01-04 | End: 2021-01-06

## 2021-01-04 RX ORDER — HEPARIN SODIUM 10000 [USP'U]/100ML
10-25 INJECTION, SOLUTION INTRAVENOUS
Status: DISCONTINUED | OUTPATIENT
Start: 2021-01-04 | End: 2021-01-06

## 2021-01-04 RX ORDER — SODIUM CHLORIDE 0.9 % (FLUSH) 0.9 %
5-40 SYRINGE (ML) INJECTION AS NEEDED
Status: DISCONTINUED | OUTPATIENT
Start: 2021-01-04 | End: 2021-01-08 | Stop reason: HOSPADM

## 2021-01-04 RX ADMIN — ASPIRIN 81 MG: 81 TABLET, CHEWABLE ORAL at 08:21

## 2021-01-04 RX ADMIN — AMLODIPINE BESYLATE 5 MG: 5 TABLET ORAL at 08:22

## 2021-01-04 RX ADMIN — HEPARIN SODIUM AND DEXTROSE 14 UNITS/KG/HR: 10000; 5 INJECTION INTRAVENOUS at 10:00

## 2021-01-04 RX ADMIN — TAMSULOSIN HYDROCHLORIDE 0.4 MG: 0.4 CAPSULE ORAL at 22:30

## 2021-01-04 RX ADMIN — METOPROLOL TARTRATE 25 MG: 25 TABLET, FILM COATED ORAL at 08:21

## 2021-01-04 RX ADMIN — PANTOPRAZOLE SODIUM 20 MG: 20 TABLET, DELAYED RELEASE ORAL at 09:56

## 2021-01-04 RX ADMIN — METOPROLOL TARTRATE 25 MG: 25 TABLET, FILM COATED ORAL at 18:58

## 2021-01-04 RX ADMIN — ATORVASTATIN CALCIUM 40 MG: 40 TABLET, FILM COATED ORAL at 08:21

## 2021-01-04 RX ADMIN — CLOPIDOGREL BISULFATE 300 MG: 75 TABLET ORAL at 18:58

## 2021-01-04 RX ADMIN — FUROSEMIDE 10 MG: 20 TABLET ORAL at 09:55

## 2021-01-04 RX ADMIN — LEVOTHYROXINE SODIUM 50 MCG: 0.1 TABLET ORAL at 08:22

## 2021-01-04 RX ADMIN — ALUMINUM HYDROXIDE AND MAGNESIUM HYDROXIDE 30 ML: 200; 200 SUSPENSION ORAL at 02:45

## 2021-01-04 NOTE — ACP (ADVANCE CARE PLANNING)
Had a short discussion with the patient regarding his CODE STATUS. He is a 49-year-old man being with a history of CAD, status post coronary stenting, conduction disease requiring a pacemaker, who is being admitted for an NSTEMI. Patient states he does not want any \"life prolonging procedures\" though he does want \"routine care\"    When patient was provided details of CPR, he states that he does not want chest compressions/CPR as this is an attempt to restart a heart that has already stopped beating, and he wants to be \"let go\" if his heart stops beating. Patient also states he does not want to be on a ventilator, and does not want a tube in his throat. He is okay with all other procedures.     Patient will be DNR/DNI

## 2021-01-04 NOTE — PROGRESS NOTES
Notified pt's daughter Leticia Drew- cell phone [phone]) of plan for [facility] tomorrow.

## 2021-01-04 NOTE — PROGRESS NOTES
Admission Medication Reconciliation:      PTA med list compiled from [facility], . Liang [address] transfer documents, which were faxed from facility by  [facility] L[facility]. Medication changes (since last review):  Revised:  Metoprolol tartate to 50 mg dose  Simvastatin to QHS      Thank you for allowing me to participate in the care of your patient. Linsey Valencia PharmD, RN # [phone]       [facility] pharmacy benefit data reflects medications filled and processed through the patient's insurance, however   this data does NOT capture whether the medication was picked up or is currently being taken by the patient. Allergies:  Patient has no known allergies. Significant PMH/Disease States:   Past Medical History:   Diagnosis Date    Atrial fibrillation ([address]     Chronic obstructive pulmonary disease ([facility] [address]     Essential hypertension     Hyperlipidemia     Long term (current) use of anticoagulants     Pacemaker     Rosacea      Chief Complaint for this Admission:    Chief Complaint   Patient presents with    Chest Pain     Prior to Admission Medications:   Prior to Admission Medications   Prescriptions Last Dose Informant Taking? Multivits with Min-FA-Lycopene (MEN'S DAILY MULTIVIT-MINERAL) 0.4-600 mg-mcg tab  Child Yes   Sig: Take 1 Tab by mouth daily. amLODIPine (NORVASC) 5 mg tablet   Yes   Sig: TAKE ONE TABLET BY MOUTH DAILY   antiox #8/om3/dha/epa/lut/zeax (PRESERVISION AREDS 2, OMEGA-3, PO)  Child Yes   Sig: Take 1 Cap by mouth two (2) times a day. apixaban (ELIQUIS) 2.5 mg tablet  Child Yes   Sig: Take 1 Tab by mouth two (2) times a day. furosemide (LASIX) 20 mg tablet  Child Yes   Sig: Take 10 mg by mouth daily. ipratropium (ATROVENT) 0.03 % nasal spray  Child Yes   Si Sprays by Both Nostrils route two (2) times daily as needed for Rhinitis. levothyroxine (LEVOTHROID) 50 mcg tablet  Child Yes   Sig: Take 50 mcg by mouth Daily (before breakfast).    metoprolol tartrate (LOPRESSOR) 25 mg tablet   Yes   Sig: Take 50 mg by mouth two (2) times a day. metroNIDAZOLE (METROCREAM) 0.75 % topical cream  Child Yes   Sig: Apply  to affected area nightly. Use a thin layer to face after washing  Indications: a skin condition on the cheeks and nose with a reddish rash and acne called acne rosacea   mometasone (NASONEX) 50 mcg/actuation nasal spray  Child Yes   Si Sprays by Both Nostrils route daily. nitroglycerin (NITROSTAT) 0.4 mg SL tablet   Yes   Si Tab by SubLINGual route every five (5) minutes as needed for Chest Pain. omeprazole (PRILOSEC) 20 mg capsule  Child Yes   Sig: Take 20 mg by mouth daily. simvastatin (ZOCOR) 40 mg tablet  Child Yes   Sig: Take 20 mg by mouth nightly. tamsulosin (FLOMAX) 0.4 mg capsule  Child Yes   Sig: Take 0.4 mg by mouth nightly. Facility-Administered Medications: None     Please contact the main inpatient pharmacy with any questions or concerns at [phone] and we will direct you to the clinical pharmacist covering this patient's care while in-house.    RIN Ferrer

## 2021-01-04 NOTE — ED NOTES
Verbal shift change report given to Ry Mccain (oncoming nurse) by Meliton Grullon (offgoing nurse). Report included the following information SBAR, Kardex, ED Summary, [facility] and Recent Results.

## 2021-01-04 NOTE — PROGRESS NOTES
Patient seen and examined. Admitted by my colleague Dr. Theresa Armstrong early this morning. In brief 80-year-old male with a history of A. fib status post pacemaker, coronary artery disease status post stents, COPD and hypertension hyperlipidemia who is presenting for chest pain.  -Initial troponin was slightly elevated, this is now increased.   -Agree with holding apixaban, will initiate heparin gtt.  -Patient currently chest pain-free, however if he were to start experience chest discomfort will give sublingual nitro, morphine and if unable to control his pain with then started nitro gtt. -Need to send a Covid PCR given that patient is from an assisted living facility. Low suspicion for Covid infection. Remains on precautions until it comes back as negative.     Rest per Dr. Theresa Armstrong

## 2021-01-04 NOTE — ED PROVIDER NOTES
Gagan Valle is a 80 y.o. male with past medical history notable for atrial fibrillation, CAD status post coronary stenting, history of conduction disease requiring pacemaker placement presenting with chest pain. Patient states that he was feeling essentially in his usual state of health, states that he ate \"too well\" and had more food than usual this evening around 5 PM.  He awoke from sleep just prior to ED arrival with anterior chest pressure with associated diaphoresis and lightheadedness. Has been gradually improving since onset. He denies associated leg swelling, fever, chills, cough.            Past Medical History:   Diagnosis Date    [facility]     Chronic obstructive pulmonary disease ([facility] [address]     Essential hypertension     Hyperlipidemia     Long term (current) use of anticoagulants     Pacemaker     Rosacea 2016       Past Surgical History:   Procedure Laterality Date    HX CORONARY STENT PLACEMENT      HX HEART CATHETERIZATION      HX PACEMAKER      CT REMVL PERM PM PLS GEN W/REPL PLSE GEN 2 LEAD SYS N/A 2/6/2020    REMOVE & REPLACE PPM GEN DUAL LEAD performed by Sourav Fox MD at [address] CATH LAB         Family History:   Problem Relation Age of Onset    Stroke Mother     Hypertension Mother     Kidney Disease Father        Social History     Socioeconomic History    Marital status:      Spouse name: Not on file    Number of children: Not on file    Years of education: Not on file    Highest education level: Not on file   Occupational History    Not on file   Social Needs    Financial resource strain: Not on file    Food insecurity     Worry: Not on file     Inability: Not on file    Transportation needs     Medical: Not on file     Non-medical: Not on file   Tobacco Use    Smoking status: Never Smoker    Smokeless tobacco: Never Used   Substance and Sexual Activity    Alcohol use: No     Alcohol/week: 0.0 standard drinks    Drug use: No    Sexual activity: Not on file   Lifestyle    Physical activity     Days per week: Not on file     Minutes per session: Not on file    Stress: Not on file   Relationships    Social connections     Talks on phone: Not on file     Gets together: Not on file     Attends [employer] service: Not on file     Active member of club or organization: Not on file     Attends meetings of clubs or organizations: Not on file     Relationship status: Not on file    Intimate partner violence     Fear of current or ex partner: Not on file     Emotionally abused: Not on file     Physically abused: Not on file     Forced sexual activity: Not on file   Other Topics Concern    Not on file   Social History Narrative    Not on file         ALLERGIES: Patient has no known allergies. Review of Systems   Constitutional: Positive for fever. Negative for chills and fatigue. HENT: Negative for ear pain, sore throat and trouble swallowing. Eyes: Negative for visual disturbance. Respiratory: Positive for shortness of breath. Negative for cough. Cardiovascular: Positive for chest pain. Negative for palpitations and leg swelling. Gastrointestinal: Negative for abdominal pain and nausea. Genitourinary: Negative for dysuria. Musculoskeletal: Negative for back pain. Skin: Negative for rash. Neurological: Positive for light-headedness. Negative for headaches. Psychiatric/Behavioral: Negative for confusion. All other systems reviewed and are negative. There were no vitals filed for this visit. Physical Exam  Vitals signs reviewed. Constitutional:       General: He is not in acute distress. HENT:      Head: Normocephalic and atraumatic. Mouth/Throat:      Mouth: Mucous membranes are moist.      Pharynx: Oropharynx is clear. Cardiovascular:      Rate and Rhythm: Normal rate and regular rhythm. Heart sounds: Normal heart sounds.    Pulmonary:      Effort: Pulmonary effort is normal.      Breath sounds: Normal breath sounds. Abdominal:      Tenderness: There is no abdominal tenderness. There is no guarding or rebound. Musculoskeletal: Normal range of motion. Skin:     General: Skin is warm and dry. Capillary Refill: Capillary refill takes less than 2 seconds. Neurological:      General: No focal deficit present. Mental Status: He is alert and oriented to person, place, and time. Psychiatric:         Mood and Affect: Mood normal.          MDM  Number of Diagnoses or Management Options  Chest pain, unspecified type  NSTEMI (non-ST elevated myocardial infarction) ([facility] [address]  Diagnosis management comments: EK:33 AM  Ventricular paced rhythm, ventricular rate 92, no pathologic ST elevation apparent. Perfect Serve Consult for Admission  4:23 AM    ED Room Number: NJ77/00  Patient Name and age:  Anitha Maldonado 80 y.o.  male  Working Diagnosis:   1. NSTEMI (non-ST elevated myocardial infarction) ([facility] [address]    2. Chest pain, unspecified type        COVID-19 Suspicion:  no  Sepsis present:  no  Reassessment needed: no  Code Status:  Full Code  Readmission: no  Isolation Requirements:  no  Recommended Level of Care:  telemetry  Department:[facility] Adult ED - 21   Other: 69-year-old gentleman with history of CAD presenting with chest pain, typical features. Positive troponin noted, EKG is not able to be interpreted for signs of ischemia given that he has ventricular paced rhythm. Has been adherent with his anticoagulant, given aspirin in the emergency department. Currently pain-free. Procedures        I was personally available for consultation in the emergency department. I have reviewed the chart and agree with the documentation recorded by the [facility], including the assessment, treatment plan, and disposition.   Sandra Castañeda MD

## 2021-01-04 NOTE — PROGRESS NOTES
Occupational Therapy  1/4/2021    Orders received, chart reviewed and patient is currently under investigation for COVID-19. In attempts to have only essential personnel enter the room, we will confer with nursing and/or the referring provider to determine the most appropriate timing of our therapy intervention. Until this time, we will follow the patient peripherally to support nursing staff on an appropriate care plan. Recommend with nursing patient to complete as able in order to maintain strength, endurance and independence: ADLs with supervision/setup, once Egress Test completed OOB to chair 3x/day and mobilizing to the bathroom for toileting with assist.     Also noted, pt with increased troponin, now trending upward during the day. Cardiology following with plan for cardiac cath. Thank you for your assistance.    Doe Guerrero MS, OTR/L

## 2021-01-04 NOTE — PROGRESS NOTES
Chart reviewed and patient is currently being ruled out for COVID-19. In attempts to have only essential personnel enter the room, we will confer with nursing and/or the referring provider to determine the most appropriate timing of our therapy intervention. We are following the patient peripherally to support nursing staff on his care plan. Thank you for your assistance. Sophie Astudillo PT, DPT  Geriatric Clinical Specialist     11:00 Troponin levels trending upward, last 5.94. Waiting on Cardiology recommendations. Will follow up for evaluation when medically appropriate. Thanks!

## 2021-01-04 NOTE — PROGRESS NOTES
Just received notification of consult at [phone]. Came to ER to see pt. Appreciate Dr. Lisa Peng assist in the meantime. Pt with PMH of NSTEMI in 7/2019 medically managed, afib, HLD, PPM, PCI 11 years ago presented with chief c/o chest pain. Ruled in for NSTEMI. Chest pain currently resolved, no SOB. Plan [facility] tomorrow with Dr. Rendon Ek at 12 noon. Hold Eliquis, continue heparin, ASA, statin, BB. Full note to follow.

## 2021-01-04 NOTE — ROUTINE PROCESS
TRANSFER - OUT REPORT: 
 
Verbal report given to Elvie MOREIRA(name) on Ana Casey  being transferred to 414(unit) for routine progression of care Report consisted of patients Situation, Background, Assessment and  
Recommendations(SBAR). Information from the following report(s) SBAR, Kardex, ED Summary, [facility] and Recent Results was reviewed with the receiving nurse. Lines:  
Peripheral IV 01/04/21 Left Antecubital (Active) Site Assessment Clean, dry, & intact 01/04/21 0240 Phlebitis Assessment 0 01/04/21 0240 Infiltration Assessment 0 01/04/21 0240 Dressing Status Clean, dry, & intact 01/04/21 0240 Dressing Type 4 X 4 01/04/21 0240 Hub Color/Line Status Pink 01/04/21 0240 Opportunity for questions and clarification was provided. Patient transported with: 
 Monitor Registered Nurse

## 2021-01-04 NOTE — H&P
[facility] [facility] Adult  Hospitalist Group  History and Physical    Primary Care Provider: Gwendolyn Lawton MD  Date of Service:  1/4/2021    Subjective:     Luh Sanchez is a 80 y.o. male has a past medical history of atrial fibrillation, conduction disorder requiring permanent pacemaker, CAD status post stenting, COPD, hypertension, hyperlipidemia, who is being admitted for an NSTEMI. Patient states that he has been in his routine self for the past few weeks. He states he has been actively participating in PT and OT, and that he has felt overall well. He states he has felt an increase in fatigue over the course of the past year, which he has believed is due to his age. He states he walks with a walker and he never has any chest pain. Patient awoke from sleep in the middle of the night tonight, with chest pain unlike any pain he is experienced before. He states he went through the \"routine procedure at the home\" to get taken to the hospital.  He states he is currently not any pain after receiving aspirin in the ER. He has a chronic cough, but has had no increased cough or sputum. He has no fevers, chills, muscle aches, body aches, or other sick symptoms. He does live in the [facility] assisted living      Review of Systems:    A comprehensive review of systems was negative except for that written in the History of Present Illness.      Past Medical History:   Diagnosis Date    Atrial fibrillation (HCC)     Chronic obstructive pulmonary disease ([address]     Essential hypertension     Hyperlipidemia     Long term (current) use of anticoagulants     Pacemaker     Rosacea 2016      Past Surgical History:   Procedure Laterality Date    HX CORONARY STENT PLACEMENT      HX HEART CATHETERIZATION      HX PACEMAKER      RI REMVL PERM PM PLS GEN W/REPL PLSE GEN 2 LEAD SYS N/A 2/6/2020    REMOVE & REPLACE PPM GEN DUAL LEAD performed by Genesis Lynn MD at [address] [address], [facility] CATH LAB Prior to Admission medications    Medication Sig Start Date End Date Taking? Authorizing Provider   amLODIPine (NORVASC) 5 mg tablet TAKE ONE TABLET BY MOUTH DAILY 6/11/20   Merly Patel NP   metoprolol tartrate (LOPRESSOR) 50 mg tablet Take 0.5 Tabs by mouth two (2) times a day. 7/17/19   Amanda Rivaally,    nitroglycerin (NITROSTAT) 0.4 mg SL tablet 1 Tab by SubLINGual route every five (5) minutes as needed for Chest Pain. 7/5/19   Rafy Reese NP   antiox #8/om3/dha/epa/lut/zeax (PRESERVISION AREDS 2, OMEGA-3, PO) Take 1 Cap by mouth two (2) times a day. Provider, Historical   ipratropium (ATROVENT) 0.03 % nasal spray 2 Sprays by Both Nostrils route two (2) times daily as needed for Rhinitis. Provider, Historical   mometasone (NASONEX) 50 mcg/actuation nasal spray 2 Sprays daily. Provider, Historical   furosemide (LASIX) 20 mg tablet Take 10 mg by mouth daily. Other, MD Di   metroNIDAZOLE (METROCREAM) 0.75 % topical cream Apply  to affected area nightly. Use a thin layer to affected areas after washing    Provider, Historical   apixaban (ELIQUIS) 2.5 mg tablet Take 1 Tab by mouth two (2) times a day. 1/25/17   Sheryle Grade, MD   tamsulosin [facility]) 0.4 mg capsule Take 0.4 mg by mouth nightly. Provider, Historical   levothyroxine (LEVOTHROID) 50 mcg tablet Take 50 mcg by mouth Daily (before breakfast). Provider, Historical   omeprazole (PRILOSEC) 20 mg capsule Take 20 mg by mouth daily. Provider, Historical   simvastatin (ZOCOR) 40 mg tablet Take 20 mg by mouth every Monday, Wednesday, Friday. Patient takes at night, Patient states he stopped taking it in [address] [place], [place] [address]way to restart it    Provider, Historical   Multivits with Min-FA-Lycopene (MEN'S DAILY MULTIVIT-MINERAL) 0.4-600 mg-mcg tab Take 1 Tab by mouth daily.     Provider, Historical     No Known Allergies   Family History   Problem Relation Age of Onset   [facility]     Hypertension Mother     Kidney Disease Father SOCIAL HISTORY:  Patient resides at [facility]  Patient ambulates with a walker  Smoking history: Prior  Alcohol history: None        Objective:       Physical Exam:   Visit Vitals  BP (!) 141/73   Pulse 62   Temp 98.5 °F (36.9 °C)   Resp 21   Wt 93.4 kg (206 lb)   SpO2 100%   BMI 31.32 kg/m²     General appearance: alert, cooperative, no distress, appears stated age  Head: Normocephalic, without obvious abnormality, atraumatic  Eyes: negative  Neck: supple, symmetrical, trachea midline, no adenopathy, thyroid: not enlarged, symmetric, no tenderness/mass/nodules, no carotid bruit and no JVD  Back: symmetric, no curvature. ROM normal. No CVA tenderness. Lungs: clear to auscultation bilaterally  Chest wall: no tenderness  Heart: regular rate and rhythm, S1, S2 normal, no murmur, click, rub or gallop  Abdomen: soft, non-tender. Bowel sounds normal. No masses,  no organomegaly  Extremities: extremities normal, atraumatic, no cyanosis or edema  Pulses: 2+ and symmetric  Skin: Skin color, texture, turgor normal. + DeCubitus ulcer  Neurologic: Grossly normal  Cap refill: Brisk, less than 3 seconds  Pulses: 2+, symmetric in all extremities    ECG: Paced rhythm    Data Review: All diagnostic labs and studies have been reviewed. Chest x-ray was negative for infiltrate, effusion, pneumothorax, or wide mediastinum. Assessment:     Active Problems:    NSTEMI (non-ST elevated myocardial infarction) ([facility] [address] (7/17/2019)        Plan:     1. NSTEMI, POA  History of CAD, atrial fibrillation, AV node dysfunction status post PPM  Chest pain worse than any prior  Troponin 0 0.79  Will admit to telemetry  Echo  Trend troponins  Trend EKGs  Aspirin, continue anticoagulant, continue statin, continue beta-blocker  Renal function prohibits ACE inhibitor  Due to patient being from an assisted living he is required to have a PCR Covid, though he has no symptoms  Cardiology consulted    2.   Atrial fibrillation, AV node dysfunction, CAD, PPM, POA  We will continue patient's cardiac medications  Cardiology consulted as above    3. COPD  No recent increase in cough, sputum production  No symptoms    4. Hypertension, hyperlipidemia, POA  Continue meds as above    CODE STATUS discussion. A short CODE STATUS discussion was held with patient regarding his end-of-life wishes. He does not want CPR or intubation. He was okay with all other \"routine care. \"  He states his granddaughter is a nurse, and that she should help make any decisions that he is unsure of      FUNCTIONAL STATUS PRIOR TO HOSPITALIZATION Ambulatory with Use of Assistive Devices (including history of recent falls):None     Signed By: Melissa Alfaro MD     January 4, 2021

## 2021-01-04 NOTE — CONSULTS
Cardiology Consult Note    CC: CP  Reason for consult:  NSTEMI  Requesting MD:  Dr. Kamaljit Singleton     Subjective:      Date of  Admission: 1/4/2021  2:25 AM     Admission type:Emergency    Svetlana Saul is a 80 y.o. male admitted for NSTEMI (non-ST elevated myocardial infarction) ([facility] [I21.4]. Patient complains of SS chest tightness that was 'throbbing discomfort\" with radiation into Lt arm and SOB/weakness. It came on at night. His troponins still going up, latest 5.94. His primary cardiologist is Dr. Cristhian Ellis His office and paging his partners met with no answer and thus after waiting for four hours, ER called out group. At the present time, he is no longer experiencing CP. He has been more tired lately at home. His chronic CORINNE seems worse over last month or so when he could no longer place ten hose himself. Denies any palpitations or orthopnea or weight gain. His past cardiac data include CAD, s/p stents, permanent Afib on chronic AC, and s/p pacemaker.     Patient Active Problem List    Diagnosis Date Noted    Acute pancreatitis 02/09/2020    Elevated lipase 02/09/2020    NSTEMI (non-ST elevated myocardial infarction) ([facility] 07/17/2019    Chest pain 07/03/2019    Sepsis ([facility] 01/27/2019    Chronic obstructive pulmonary disease ([facility] 01/27/2019    Hyperlipidemia 01/27/2019    Essential hypertension 01/27/2019    Pacemaker 01/27/2019    Fever 01/27/2019    Leukocytosis 01/27/2019    CKD (chronic kidney disease) stage 3, GFR 30-59 ml/min 01/27/2019    Cough 01/27/2019    Dyspnea 01/27/2019    Atrial fibrillation ([facility] 02/17/2016    Encounter to establish care 08/05/2015      Adelfo Coppola MD  Past Medical History:   Diagnosis Date    Atrial fibrillation [facility])     Chronic obstructive pulmonary disease ([facility]     Essential hypertension     Hyperlipidemia     Long term (current) use of anticoagulants     Pacemaker     Rosacea 2016      Past Surgical History:   Procedure Laterality Date    HX CORONARY STENT PLACEMENT      HX HEART CATHETERIZATION      HX PACEMAKER      GA REMVL PERM PM PLS GEN W/REPL PLSE GEN 2 LEAD SYS N/A 2/6/2020    REMOVE & REPLACE PPM GEN DUAL LEAD performed by Antonio Medina MD at [place] [address], [facility] CATH LAB     No Known Allergies   Family History   Problem Relation Age of Onset    Stroke Mother     Hypertension Mother     Kidney Disease Father       Current Facility-Administered Medications   Medication Dose Route Frequency    aspirin chewable tablet 324 mg  324 mg Oral NOW    sodium chloride (NS) flush 5-40 mL  5-40 mL IntraVENous Q8H    sodium chloride (NS) flush 5-40 mL  5-40 mL IntraVENous PRN    acetaminophen (TYLENOL) tablet 650 mg  650 mg Oral Q6H PRN    Or    acetaminophen (TYLENOL) suppository 650 mg  650 mg Rectal Q6H PRN    polyethylene glycol (MIRALAX) packet 17 g  17 g Oral DAILY PRN    promethazine (PHENERGAN) tablet 12.5 mg  12.5 mg Oral Q6H PRN    Or    ondansetron (ZOFRAN) injection 4 mg  4 mg IntraVENous Q6H PRN    nicotine (NICODERM CQ) 14 mg/24 hr patch 1 Patch  1 Patch TransDERmal DAILY PRN    sodium chloride (NS) flush 5-40 mL  5-40 mL IntraVENous Q8H    sodium chloride (NS) flush 5-40 mL  5-40 mL IntraVENous PRN    nitroglycerin (NITROSTAT) tablet 0.4 mg  0.4 mg SubLINGual Q5MIN PRN    aspirin chewable tablet 81 mg  81 mg Oral DAILY    amLODIPine (NORVASC) tablet 5 mg  5 mg Oral DAILY    furosemide (LASIX) tablet 10 mg  10 mg Oral DAILY    levothyroxine (SYNTHROID) tablet 50 mcg  50 mcg Oral ACB    [Held by provider] apixaban (ELIQUIS) tablet 2.5 mg  2.5 mg Oral BID    metoprolol tartrate (LOPRESSOR) tablet 25 mg  25 mg Oral BID    pantoprazole (PROTONIX) tablet 20 mg  20 mg Oral ACB    atorvastatin (LIPITOR) tablet 40 mg  40 mg Oral DAILY    tamsulosin (FLOMAX) capsule 0.4 mg  0.4 mg Oral QHS    heparin 25,000 units in D5W 250 ml infusion  10-25 Units/kg/hr IntraVENous TITRATE    morphine injection 2 mg  2 mg IntraVENous Q4H PRN     Current Outpatient Medications   Medication Sig    metoprolol tartrate (LOPRESSOR) 25 mg tablet Take 50 mg by mouth two (2) times a day.  amLODIPine (NORVASC) 5 mg tablet TAKE ONE TABLET BY MOUTH DAILY    nitroglycerin (NITROSTAT) 0.4 mg SL tablet 1 Tab by SubLINGual route every five (5) minutes as needed for Chest Pain.  antiox #8/om3/dha/epa/lut/zeax (PRESERVISION AREDS 2, OMEGA-3, PO) Take 1 Cap by mouth two (2) times a day.  ipratropium (ATROVENT) 0.03 % nasal spray 2 Sprays by Both Nostrils route two (2) times daily as needed for Rhinitis.  mometasone (NASONEX) 50 mcg/actuation nasal spray 2 Sprays by Both Nostrils route daily.  furosemide (LASIX) 20 mg tablet Take 10 mg by mouth daily.  metroNIDAZOLE (METROCREAM) 0.75 % topical cream Apply  to affected area nightly. Use a thin layer to face after washing  Indications: a skin condition on the cheeks and nose with a reddish rash and acne called acne rosacea    apixaban (ELIQUIS) 2.5 mg tablet Take 1 Tab by mouth two (2) times a day.  tamsulosin (FLOMAX) 0.4 mg capsule Take 0.4 mg by mouth nightly.  levothyroxine (LEVOTHROID) 50 mcg tablet Take 50 mcg by mouth Daily (before breakfast).  omeprazole (PRILOSEC) 20 mg capsule Take 20 mg by mouth daily.  simvastatin (ZOCOR) 40 mg tablet Take 20 mg by mouth nightly.  Multivits with Min-FA-Lycopene (MEN'S DAILY MULTIVIT-MINERAL) 0.4-600 mg-mcg tab Take 1 Tab by mouth daily. Prior to Admission Medications:  Prior to Admission medications    Medication Sig Start Date End Date Taking? Authorizing Provider   metoprolol tartrate (LOPRESSOR) 25 mg tablet Take 50 mg by mouth two (2) times a day. Yes Provider, Historical   amLODIPine (NORVASC) 5 mg tablet TAKE ONE TABLET BY MOUTH DAILY 6/11/20  Yes Enrique Brasher NP   nitroglycerin (NITROSTAT) 0.4 mg SL tablet 1 Tab by SubLINGual route every five (5) minutes as needed for Chest Pain.  7/5/19  Yes Baron Chang, NP   antiox #8/om3/dha/epa/lut/zeax (PRESERVISION AREDS 2, OMEGA-3, PO) Take 1 Cap by mouth two (2) times a day. Yes Provider, Historical   ipratropium (ATROVENT) 0.03 % nasal spray 2 Sprays by Both Nostrils route two (2) times daily as needed for Rhinitis. Yes Provider, Historical   mometasone (NASONEX) 50 mcg/actuation nasal spray 2 Sprays by Both Nostrils route daily. Yes Provider, Historical   furosemide (LASIX) 20 mg tablet Take 10 mg by mouth daily. Yes Other, MD Di   metroNIDAZOLE (METROCREAM) 0.75 % topical cream Apply  to affected area nightly. Use a thin layer to face after washing  Indications: a skin condition on the cheeks and nose with a reddish rash and acne called acne rosacea   Yes Provider, Historical   apixaban (ELIQUIS) 2.5 mg tablet Take 1 Tab by mouth two (2) times a day. 17  Yes Evgeny Hernandez MD   [facility]) 0.4 mg capsule Take 0.4 mg by mouth nightly. Yes Provider, Historical   levothyroxine (LEVOTHROID) 50 mcg tablet Take 50 mcg by mouth Daily (before breakfast). Yes Provider, Historical   omeprazole (PRILOSEC) 20 mg capsule Take 20 mg by mouth daily. Yes Provider, Historical   simvastatin (ZOCOR) 40 mg tablet Take 20 mg by mouth nightly. Yes Provider, Historical   Multivits with Min-FA-Lycopene (MEN'S DAILY MULTIVIT-MINERAL) 0.4-600 mg-mcg tab Take 1 Tab by mouth daily. Yes Provider, Historical        Review of Symptoms:  Except as noted in HPI, patient denies recent fever or chills, nausea, vomiting, diarrhea, hemoptysis, hematemesis, dysuria, myalgias, focal neurologic symptoms, ecchymosis, angioedema, odynophagia, dysphagia, sore throat, earache,rash, melena, hematochezia, depression, GERD, cold intolerance, petechia, bleeding gums, or significant weight loss. A comprehensive review of systems was negative except for that written in the HPI.      Subjective:    24 hr VS reviewed, overall VSSAF  Temp (24hrs), Av °F (36.7 °C), Min:97.8 °F (36.6 °C), Max:98.5 °F (36.9 °C)    Patient Vitals for the past 8 hrs:   Pulse   01/04/21 1300 67   01/04/21 1133 62   01/04/21 1100 62   01/04/21 1000 62   01/04/21 0955 63   01/04/21 0900 65   01/04/21 0821 64    Patient Vitals for the past 8 hrs:   Resp   01/04/21 1300 20   01/04/21 1133 22   01/04/21 1100 16   01/04/21 1000 23   01/04/21 0900 19    Patient Vitals for the past 8 hrs:   BP   01/04/21 1300 137/84   01/04/21 1133 122/79   01/04/21 1100 (!) 110/59   01/04/21 1000 (!) 147/83   01/04/21 0955 139/83   01/04/21 0900 139/83   01/04/21 0821 (!) 144/79        No intake or output data in the 24 hours ending 01/04/21 1333      Physical Exam (complete single organ system exam)      Visit Vitals  /84   Pulse 67   Temp 97.8 °F (36.6 °C)   Resp 20   Ht 5' 9\" (1.753 m) Comment: 's license   Wt 93.4 kg (206 lb)   SpO2 100%   BMI 30.42 kg/m²     General Appearance:  Well developed, well nourished,alert and oriented x 3, and individual in no acute distress.   Ears/Nose/Mouth/Throat:   Hearing grossly normal.         Neck: Supple.   Chest:   Lungs clear to auscultation bilaterally.   Cardiovascular:  irregular rate and rhythm, S1, S2 normal, no murmur.   Abdomen:   Soft, non-tender, bowel sounds are active.   Extremities: 2+ edema bilaterally.    Skin: Warm and dry.               Cardiographics    Telemetry: AFIB  ECG: atrial fibrillation, rate   Echocardiogram: Not done    Labs:   Recent Results (from the past 24 hour(s))   CBC WITH AUTOMATED DIFF    Collection Time: 01/04/21  2:42 AM   Result Value Ref Range    WBC 8.1 4.1 - 11.1 K/uL    RBC 4.13 4.10 - 5.70 M/uL    HGB 12.9 12.1 - 17.0 g/dL    HCT 40.9 36.6 - 50.3 %    MCV 99.0 80.0 - 99.0 FL    MCH 31.2 26.0 - 34.0 PG    MCHC 31.5 30.0 - 36.5 g/dL    RDW 14.0 11.5 - 14.5 %    PLATELET 136 (L) 150 - 400 K/uL    MPV 10.6 8.9 - 12.9 FL    NRBC 0.0 0  WBC    ABSOLUTE NRBC 0.00 0.00 - 0.01 K/uL    NEUTROPHILS 66 32 - 75 %    LYMPHOCYTES 21 12 - 49 %    MONOCYTES 9 5 -  13 %    EOSINOPHILS 4 0 - 7 %    BASOPHILS 1 0 - 1 %    IMMATURE GRANULOCYTES 0 0.0 - 0.5 %    ABS. NEUTROPHILS 5.3 1.8 - 8.0 K/UL    ABS. LYMPHOCYTES 1.7 0.8 - 3.5 K/UL    ABS. MONOCYTES 0.7 0.0 - 1.0 K/UL    ABS. EOSINOPHILS 0.4 0.0 - 0.4 K/UL    ABS. BASOPHILS 0.0 0.0 - 0.1 K/UL    ABS. IMM. GRANS. 0.0 0.00 - 0.04 K/UL    DF AUTOMATED     METABOLIC PANEL, COMPREHENSIVE    Collection Time: 01/04/21  2:42 AM   Result Value Ref Range    Sodium 144 136 - 145 mmol/L    Potassium 3.7 3.5 - 5.1 mmol/L    Chloride 110 (H) 97 - 108 mmol/L    CO2 28 21 - 32 mmol/L    Anion gap 6 5 - 15 mmol/L    Glucose 118 (H) 65 - 100 mg/dL    BUN 28 (H) 6 - 20 MG/DL    Creatinine 1.78 (H) 0.70 - 1.30 MG/DL    BUN/Creatinine ratio 16 12 - 20      GFR est AA 43 (L) >60 ml/min/1.73m2    GFR est non-AA 35 (L) >60 ml/min/1.73m2    Calcium 8.9 8.5 - 10.1 MG/DL    Bilirubin, total 0.6 0.2 - 1.0 MG/DL    ALT (SGPT) 20 12 - 78 U/L    AST (SGOT) 26 15 - 37 U/L    Alk. phosphatase 93 45 - 117 U/L    Protein, total 6.7 6.4 - 8.2 g/dL    Albumin 3.5 3.5 - 5.0 g/dL    Globulin 3.2 2.0 - 4.0 g/dL    A-G Ratio 1.1 1.1 - 2.2     TROPONIN I    Collection Time: 01/04/21  2:42 AM   Result Value Ref Range    Troponin-I, Qt. 0.18 (H) <0.05 ng/mL   SAMPLES BEING HELD    Collection Time: 01/04/21  2:42 AM   Result Value Ref Range    SAMPLES BEING HELD 1 BLUE,1 RED     COMMENT        Add-on orders for these samples will be processed based on acceptable specimen integrity and analyte stability, which may vary by analyte.    PROTHROMBIN TIME + INR    Collection Time: 01/04/21  2:42 AM   Result Value Ref Range    INR 1.1 0.9 - 1.1      Prothrombin time 11.7 (H) 9.0 - 11.1 sec   PTT    Collection Time: 01/04/21  2:42 AM   Result Value Ref Range    aPTT 29.0 22.1 - 31.0 sec    aPTT, therapeutic range     58.0 - 77.0 SECS   LIPASE    Collection Time: 01/04/21  3:45 AM   Result Value Ref Range    Lipase 131 73 - 393 U/L   SARS-COV-2    Collection Time: 01/04/21  5:57 AM   Result Value Ref Range    Specimen source Nasopharyngeal      Specimen source Nasopharyngeal      COVID-19 rapid test Not detected NOTD      Specimen type NP Swab      Health status Symptomatic Testing     TROPONIN I    Collection Time: 01/04/21  6:05 AM   Result Value Ref Range    Troponin-I, Qt. 2.45 (H) <0.05 ng/mL   SARS-COV-2    Collection Time: 01/04/21  8:30 AM   Result Value Ref Range    Specimen source Nasopharyngeal      SARS-CoV-2 PENDING     SARS-CoV-2 PENDING     Specimen source Nasopharyngeal      COVID-19 rapid test PENDING     Specimen type NP Swab      Health status PENDING     COVID-19 PENDING    TROPONIN I    Collection Time: 01/04/21  9:54 AM   Result Value Ref Range    Troponin-I, Qt. 5.94 (H) <0.05 ng/mL   SARS-COV-2    Collection Time: 01/04/21  9:58 AM   Result Value Ref Range    Specimen source Nasopharyngeal      SARS-CoV-2 PENDING     SARS-CoV-2 PENDING     Specimen source Nasopharyngeal      COVID-19 rapid test PENDING     Specimen type NP Swab      Health status PENDING     COVID-19 PENDING         Assessment:     Assessment:   NSTEMI; troponin is still going up  CAD; s/p stents in past  Permanent Afib; underlying rhythm  S/p pacer; working well  COPD  CKD      Plan:   Tele  IV Heparin  Hold Eliquis  BB/NTP  He will need cardiac cath as he is still very functional    Laura Mendieta MD

## 2021-01-04 NOTE — ED TRIAGE NOTES
Client awoke with midsternal cp radiating to stomach. Pain initially 2/10. fsbs of 167. NAD. Pain free on arrival to ER. Client has cardiac hx. Axox5.

## 2021-01-04 NOTE — CONSULTS
Cardiology Consult Note      Patient Name: Nigel Shell  : 1922 MRN: [id number]  Date: 2021  Time: 3:14 PM    Admit Diagnosis: NSTEMI (non-ST elevated myocardial infarction) [facility]) [I21.4]    Primary Cardiologist: Dr. David Cuellar Cardiologist: Say Keene MD    Reason for Consult: chest pain, elevated troponin    Requesting MD:       HPI:  Nigel Shell is a 80 y.o. male admitted on 2021  for NSTEMI (non-ST elevated myocardial infarction) ([facility] [I21.4]. Has PMH of PAF on eliquis, HTN, PPM COPD,CKD,  HLD. Has history of NSTEMI in 2019 with mildly elevated troponin peak of 0.39 and pt had no further chest pain, thus medical management was recommended. He does have prior history of CAD with stent to LAD several years prior to 2019 (done in [place]). Simeon Ulrich He is still ambulatory. He presents this a. m.from Assisted living facility with chief c/o chest pain. States he awoke in middle of night with  midsternal chest pain which radiated to Left arm. EMS was called. He reports that the chest pain resolved upon arrival to ER. He has ruled in for NSTEMI with uptrending troponin of 5.84. He does report worsening BLE edema over last month or so. Subjective: Denies chest pain, SOB, dizziness. Assessment and Plan     1. NSTEMI:    -Troponin trending up 5.79. continue to trend to peak   -12 lead EKG v paced, pvc   -Echo with mildly reduced EF 40-45%, anteroapical hypokinesis.    -No further chest/arm pain   -Continue ASA, statin, Lopressor   -Continue heparin gtt. (stop eliquis)   -Plan LHC with Dr. Issac العلي tomorrow at 12 noon (pending COVID test negative) - will check renal function in a.m.   -Dr. Issac العلي also reviewed case and started pt on plavix. 2. Hx of CAD: s/p stent to LAD few years prior to 2019   -Continue ASA, statin, BB.    3. CKD: creatinine 1.78   -Monitor closely.  Will hold po diuretic and repeat labs in a.m. 4. HLD   -Continue lipitor    5. HTN: bp acceptable   -Continue lopressor and amlodipine.   -no ACE-I/ARB due to renal dysfunction    6. Advanced directives: pt is currently DNR. This will need to be rescinded for [facility] tomorrow. Pt was reviewed with Dr. Clara Saab. 80 y.o. male with PMH of CAD, CKD, HTN presents with chief c/o chest pain and ruled in for NSTEMI with mildly reduced LV function and WMA. While he is 80, he is an active 80 and feel he would benefit from [facility] and possible PCI. Will proceed with [facility] tomorrow pending covid test and renal labs in a.m. [facility] tomorrow at 9:30AM.. Evaluated pt and agree with above assessment and plan. Pt with NSTEMI. Recommend cardiac cath and will tentatively plan for tomorrow after covid test resulted. Emilee Kahn MD    Addendum: covid test is now negative. Cardiac testing hx:  01/04/21   ECHO ADULT COMPLETE 01/04/2021 1/4/2021    Narrative · Contrast used: DEFINITY. · LV: Estimated LVEF is 40 - 45% with anteroapical hypokinesis. Signed by: Barrington An MD     Echo 2/3/2020: EF 55-60%, Abnormal LV wall motion. Lipids 7/3/19 - , HDL 53, LDL 75.6, , VLDL 22.4    5/12/10- Medtronic Pacemaker    Stress/ Echo-3/17/15: EF 55%,dilated aorta at 4.3, Mild MR, trivial to mild TR, PA pressure 35mmHg    3/15/16- Successful cavotricuspid isthmus ablation per Dr. Tony Armendariz    2/6/2020: Medtronic pacemaker generator change per Dr. Johana Michelle. Review of Symptoms:  Cardiovascular ROS: + chest pain PTA,  Respiratory ROS: no SOB  Neurological ROS: no TIA or stroke symptoms  All other systems negative except as above. Previous treatment/evaluation includes Percutaneous Coronary Intervention and echocardiogram .  Cardiac risk factors: dyslipidemia, sedentary life style, male gender, hypertension.     Past Medical History:   Diagnosis Date    Atrial fibrillation ([address]     Chronic obstructive pulmonary disease (HCC)     Essential hypertension  Hyperlipidemia     Long term (current) use of anticoagulants     Pacemaker     Rosacea 2016     Past Surgical History:   Procedure Laterality Date    HX CORONARY STENT PLACEMENT      HX HEART CATHETERIZATION      HX PACEMAKER      ND REMVL PERM PM PLS GEN W/REPL PLSE GEN 2 LEAD SYS N/A 2/6/2020    REMOVE & REPLACE PPM GEN DUAL LEAD performed by Bety Hawley MD at [place] [address], [facility] CATH LAB     Current Facility-Administered Medications   Medication Dose Route Frequency    aspirin chewable tablet 324 mg  324 mg Oral NOW    sodium chloride (NS) flush 5-40 mL  5-40 mL IntraVENous Q8H    sodium chloride (NS) flush 5-40 mL  5-40 mL IntraVENous PRN    acetaminophen (TYLENOL) tablet 650 mg  650 mg Oral Q6H PRN    Or    acetaminophen (TYLENOL) suppository 650 mg  650 mg Rectal Q6H PRN    polyethylene glycol (MIRALAX) packet 17 g  17 g Oral DAILY PRN    promethazine (PHENERGAN) tablet 12.5 mg  12.5 mg Oral Q6H PRN    Or    ondansetron (ZOFRAN) injection 4 mg  4 mg IntraVENous Q6H PRN    nicotine (NICODERM CQ) 14 mg/24 hr patch 1 Patch  1 Patch TransDERmal DAILY PRN    sodium chloride (NS) flush 5-40 mL  5-40 mL IntraVENous Q8H    sodium chloride (NS) flush 5-40 mL  5-40 mL IntraVENous PRN    nitroglycerin (NITROSTAT) tablet 0.4 mg  0.4 mg SubLINGual Q5MIN PRN    aspirin chewable tablet 81 mg  81 mg Oral DAILY    amLODIPine (NORVASC) tablet 5 mg  5 mg Oral DAILY    furosemide (LASIX) tablet 10 mg  10 mg Oral DAILY    levothyroxine (SYNTHROID) tablet 50 mcg  50 mcg Oral ACB    metoprolol tartrate (LOPRESSOR) tablet 25 mg  25 mg Oral BID    pantoprazole (PROTONIX) tablet 20 mg  20 mg Oral ACB    atorvastatin (LIPITOR) tablet 40 mg  40 mg Oral DAILY    tamsulosin (FLOMAX) capsule 0.4 mg  0.4 mg Oral QHS    heparin 25,000 units in D5W 250 ml infusion  10-25 Units/kg/hr IntraVENous TITRATE    morphine injection 2 mg  2 mg IntraVENous Q4H PRN       No Known Allergies   Family History   Problem Relation Age of Onset    Stroke Mother     Hypertension Mother     Kidney Disease Father       Social History     Socioeconomic History    Marital status:      Spouse name: Not on file    Number of children: Not on file    Years of education: Not on file    Highest education level: Not on file   Tobacco Use    Smoking status: Never Smoker    Smokeless tobacco: Never Used   Substance and Sexual Activity    Alcohol use: No     Alcohol/week: 0.0 standard drinks    Drug use: No       Objective:    Physical Exam    Vitals:   Vitals:    01/04/21 1133 01/04/21 1300 01/04/21 1442 01/04/21 1504   BP: 122/79 137/84 137/84 (!) 144/89   Pulse: 62 67  62   Resp: 22 20  13   Temp:  97.8 °F (36.6 °C)  97.8 °F (36.6 °C)   SpO2: 98% 100%  100%   Weight:   206 lb (93.4 kg) 208 lb 5.4 oz (94.5 kg)   Height:   5' 9\" (1.753 m)          General:    Alert, cooperative, no distress, appears stated age. Neck:   Supple,    Back:     Symmetric,    Lungs:      Respirations nonlabored   Heart[de-identified]    Regular rate and rhythm,      Abdomen:     nondistended   Extremities:   2+ BLE edema   Vascular:   Pulses - 2+   Skin:   Skin color normal. No rashes or lesions   Neurologic:  MARLOW        Telemetry:     ECG: Media Information                Data Review:     Radiology:   XR Results (most recent):  Results from Hospital Encounter encounter on 01/04/21   XR CHEST PORT    Narrative EXAM:  XR CHEST PORT    INDICATION: Chest pain    COMPARISON: 12/12/2020    TECHNIQUE: Portable AP semiupright chest view at 0241 hours    FINDINGS: The right chest ICD and wires are stable. The cardiomediastinal  contours are stable. The pulmonary vasculature is within normal limits. There are increased bilateral interstitial and patchy airspace opacities. There  is no pleural effusion or pneumothorax. The bones and upper abdomen are stable.       Impression IMPRESSION:    Increased bilateral interstitial and patchy airspace opacities can be seen with  atypical/viral infection or pulmonary edema.       .    Recent Labs     01/04/21  0954 01/04/21  0605 01/04/21  0242   TROIQ 5.94* 2.45* 0.18*     Recent Labs     01/04/21 0242      K 3.7   *   CO2 28   BUN 28*   CREA 1.78*   *   CA 8.9     Recent Labs     01/04/21 0242   WBC 8.1   HGB 12.9   HCT 40.9   *     Recent Labs     01/04/21 0242   PTP 11.7*   INR 1.1   AP 93     No results for input(s): CHOL, LDLC in the last 72 hours.    No lab exists for component: TGL, HDLC,  HBA1C  No results for input(s): CRP, TSH, TSHEXT in the last 72 hours.    No lab exists for component: ESR    Thank you very much for this referral. I appreciate the opportunity to participate in this patient's care. I will follow along with above stated plan.    Kayy Everett NP         [facility] [address], [address]     [place], [place] [address] [phone]    CC:Ronan Karimi MD

## 2021-01-05 LAB
ANION GAP SERPL CALC-SCNC: 4 MMOL/L (ref 5–15)
APTT PPP: 100.2 SEC (ref 22.1–31)
ATRIAL RATE: 55 BPM
ATRIAL RATE: 61 BPM
BASOPHILS # BLD: 0 K/UL (ref 0–0.1)
BASOPHILS NFR BLD: 1 % (ref 0–1)
BUN SERPL-MCNC: 23 MG/DL (ref 6–20)
BUN/CREAT SERPL: 17 (ref 12–20)
CALCIUM SERPL-MCNC: 8.8 MG/DL (ref 8.5–10.1)
CALCULATED R AXIS, ECG10: -77 DEGREES
CALCULATED R AXIS, ECG10: -81 DEGREES
CALCULATED T AXIS, ECG11: 86 DEGREES
CALCULATED T AXIS, ECG11: 86 DEGREES
CHLORIDE SERPL-SCNC: 109 MMOL/L (ref 97–108)
CO2 SERPL-SCNC: 27 MMOL/L (ref 21–32)
CREAT SERPL-MCNC: 1.35 MG/DL (ref 0.7–1.3)
DIAGNOSIS, 93000: NORMAL
DIAGNOSIS, 93000: NORMAL
DIFFERENTIAL METHOD BLD: ABNORMAL
EOSINOPHIL # BLD: 0.3 K/UL (ref 0–0.4)
EOSINOPHIL NFR BLD: 4 % (ref 0–7)
ERYTHROCYTE [DISTWIDTH] IN BLOOD BY AUTOMATED COUNT: 13.9 % (ref 11.5–14.5)
GLUCOSE SERPL-MCNC: 90 MG/DL (ref 65–100)
HCT VFR BLD AUTO: 42.5 % (ref 36.6–50.3)
HGB BLD-MCNC: 13.8 G/DL (ref 12.1–17)
IMM GRANULOCYTES # BLD AUTO: 0 K/UL (ref 0–0.04)
IMM GRANULOCYTES NFR BLD AUTO: 0 % (ref 0–0.5)
LYMPHOCYTES # BLD: 1.5 K/UL (ref 0.8–3.5)
LYMPHOCYTES NFR BLD: 18 % (ref 12–49)
MAGNESIUM SERPL-MCNC: 2 MG/DL (ref 1.6–2.4)
MCH RBC QN AUTO: 31.4 PG (ref 26–34)
MCHC RBC AUTO-ENTMCNC: 32.5 G/DL (ref 30–36.5)
MCV RBC AUTO: 96.8 FL (ref 80–99)
MONOCYTES # BLD: 0.7 K/UL (ref 0–1)
MONOCYTES NFR BLD: 8 % (ref 5–13)
NEUTS SEG # BLD: 5.9 K/UL (ref 1.8–8)
NEUTS SEG NFR BLD: 70 % (ref 32–75)
NRBC # BLD: 0 K/UL (ref 0–0.01)
NRBC BLD-RTO: 0 PER 100 WBC
PHOSPHATE SERPL-MCNC: 3 MG/DL (ref 2.6–4.7)
PLATELET # BLD AUTO: 135 K/UL (ref 150–400)
PMV BLD AUTO: 10.7 FL (ref 8.9–12.9)
POTASSIUM SERPL-SCNC: 3.7 MMOL/L (ref 3.5–5.1)
Q-T INTERVAL, ECG07: 492 MS
Q-T INTERVAL, ECG07: 496 MS
QRS DURATION, ECG06: 176 MS
QRS DURATION, ECG06: 180 MS
QTC CALCULATION (BEZET), ECG08: 503 MS
QTC CALCULATION (BEZET), ECG08: 507 MS
RBC # BLD AUTO: 4.39 M/UL (ref 4.1–5.7)
SODIUM SERPL-SCNC: 140 MMOL/L (ref 136–145)
THERAPEUTIC RANGE,PTTT: ABNORMAL SECS (ref 58–77)
VENTRICULAR RATE, ECG03: 63 BPM
VENTRICULAR RATE, ECG03: 63 BPM
WBC # BLD AUTO: 8.4 K/UL (ref 4.1–11.1)

## 2021-01-05 PROCEDURE — 99218 HC RM OBSERVATION: CPT

## 2021-01-05 PROCEDURE — 74011000250 HC RX REV CODE- 250: Performed by: INTERNAL MEDICINE

## 2021-01-05 PROCEDURE — 74011250637 HC RX REV CODE- 250/637: Performed by: INTERNAL MEDICINE

## 2021-01-05 PROCEDURE — 85025 COMPLETE CBC W/AUTO DIFF WBC: CPT

## 2021-01-05 PROCEDURE — 84100 ASSAY OF PHOSPHORUS: CPT

## 2021-01-05 PROCEDURE — 99152 MOD SED SAME PHYS/QHP 5/>YRS: CPT | Performed by: INTERNAL MEDICINE

## 2021-01-05 PROCEDURE — 36415 COLL VENOUS BLD VENIPUNCTURE: CPT

## 2021-01-05 PROCEDURE — B2111ZZ FLUOROSCOPY OF MULTIPLE CORONARY ARTERIES USING LOW OSMOLAR CONTRAST: ICD-10-PCS | Performed by: INTERNAL MEDICINE

## 2021-01-05 PROCEDURE — 77030013744: Performed by: INTERNAL MEDICINE

## 2021-01-05 PROCEDURE — 93458 L HRT ARTERY/VENTRICLE ANGIO: CPT | Performed by: INTERNAL MEDICINE

## 2021-01-05 PROCEDURE — 4A023N7 MEASUREMENT OF CARDIAC SAMPLING AND PRESSURE, LEFT HEART, PERCUTANEOUS APPROACH: ICD-10-PCS | Performed by: INTERNAL MEDICINE

## 2021-01-05 PROCEDURE — 74011000636 HC RX REV CODE- 636: Performed by: INTERNAL MEDICINE

## 2021-01-05 PROCEDURE — C1894 INTRO/SHEATH, NON-LASER: HCPCS | Performed by: INTERNAL MEDICINE

## 2021-01-05 PROCEDURE — 80048 BASIC METABOLIC PNL TOTAL CA: CPT

## 2021-01-05 PROCEDURE — 83735 ASSAY OF MAGNESIUM: CPT

## 2021-01-05 PROCEDURE — 99153 MOD SED SAME PHYS/QHP EA: CPT | Performed by: INTERNAL MEDICINE

## 2021-01-05 PROCEDURE — 65660000000 HC RM CCU STEPDOWN

## 2021-01-05 PROCEDURE — 77030004522 HC CATH ANGI DX EXPO BSC -A: Performed by: INTERNAL MEDICINE

## 2021-01-05 PROCEDURE — 74011250636 HC RX REV CODE- 250/636: Performed by: INTERNAL MEDICINE

## 2021-01-05 PROCEDURE — 99233 SBSQ HOSP IP/OBS HIGH 50: CPT | Performed by: INTERNAL MEDICINE

## 2021-01-05 PROCEDURE — 85730 THROMBOPLASTIN TIME PARTIAL: CPT

## 2021-01-05 RX ORDER — SODIUM CHLORIDE 9 MG/ML
INJECTION, SOLUTION INTRAVENOUS
Status: COMPLETED | OUTPATIENT
Start: 2021-01-05 | End: 2021-01-05

## 2021-01-05 RX ORDER — SODIUM CHLORIDE 0.9 % (FLUSH) 0.9 %
5-40 SYRINGE (ML) INJECTION EVERY 8 HOURS
Status: DISCONTINUED | OUTPATIENT
Start: 2021-01-05 | End: 2021-01-06

## 2021-01-05 RX ORDER — SODIUM CHLORIDE 0.9 % (FLUSH) 0.9 %
5-40 SYRINGE (ML) INJECTION AS NEEDED
Status: DISCONTINUED | OUTPATIENT
Start: 2021-01-05 | End: 2021-01-06

## 2021-01-05 RX ORDER — HEPARIN SODIUM 200 [USP'U]/100ML
INJECTION, SOLUTION INTRAVENOUS
Status: COMPLETED | OUTPATIENT
Start: 2021-01-05 | End: 2021-01-05

## 2021-01-05 RX ORDER — SODIUM CHLORIDE 9 MG/ML
50 INJECTION, SOLUTION INTRAVENOUS CONTINUOUS
Status: DISCONTINUED | OUTPATIENT
Start: 2021-01-05 | End: 2021-01-06

## 2021-01-05 RX ORDER — LIDOCAINE HYDROCHLORIDE 10 MG/ML
INJECTION INFILTRATION; PERINEURAL AS NEEDED
Status: DISCONTINUED | OUTPATIENT
Start: 2021-01-05 | End: 2021-01-05 | Stop reason: HOSPADM

## 2021-01-05 RX ADMIN — Medication 10 ML: at 06:33

## 2021-01-05 RX ADMIN — Medication 10 ML: at 22:18

## 2021-01-05 RX ADMIN — ASPIRIN 81 MG: 81 TABLET, CHEWABLE ORAL at 09:07

## 2021-01-05 RX ADMIN — Medication 10 ML: at 22:19

## 2021-01-05 RX ADMIN — HEPARIN SODIUM AND DEXTROSE 14 UNITS/KG/HR: 10000; 5 INJECTION INTRAVENOUS at 05:29

## 2021-01-05 RX ADMIN — TAMSULOSIN HYDROCHLORIDE 0.4 MG: 0.4 CAPSULE ORAL at 22:17

## 2021-01-05 RX ADMIN — ATORVASTATIN CALCIUM 40 MG: 40 TABLET, FILM COATED ORAL at 09:07

## 2021-01-05 RX ADMIN — AMLODIPINE BESYLATE 5 MG: 5 TABLET ORAL at 09:07

## 2021-01-05 RX ADMIN — Medication 10 ML: at 01:58

## 2021-01-05 RX ADMIN — PANTOPRAZOLE SODIUM 20 MG: 20 TABLET, DELAYED RELEASE ORAL at 08:18

## 2021-01-05 RX ADMIN — METOPROLOL TARTRATE 25 MG: 25 TABLET, FILM COATED ORAL at 19:06

## 2021-01-05 RX ADMIN — LEVOTHYROXINE SODIUM 50 MCG: 0.1 TABLET ORAL at 08:18

## 2021-01-05 RX ADMIN — METOPROLOL TARTRATE 25 MG: 25 TABLET, FILM COATED ORAL at 09:07

## 2021-01-05 NOTE — PROGRESS NOTES
TRANSITION OF CARE  RUR--N/A  Disposition--TBD. Patient had cardiac cath 1/5/20. PT and OT evaluations are pending. Transport--TBD. Probable daughter. Note: daughter had updated [place] Medicare card effective 1/1/21. CM made 2 copies. CM personnaly conveyed copy to Patient Registration and placed copy on chart. Care Management Interventions  PCP Verified by CM: Yes  Transition of Care Consult (CM Consult): Other(TBD)  Physical Therapy Consult: Yes  Occupational Therapy Consult: Yes  Speech Therapy Consult: No  Current Support Network: Lives Alone  Confirm Follow Up Transport: Family  The Plan for Transition of Care is Related to the Following Treatment Goals : TBD  Discharge Location  Discharge Placement: Other:(TBD)    Reason for Admission: NSTEMI POA           A fib           COPD (not on oxygen)                                 HTN                     RUR Score:            N/A           Plan for utilizing home health:      TBD    PCP: First and Last name:  Richard Truong MD// seen every 4 to 6 weeks   Name of Practice:    Are you a current patient: Yes    Approximate date of last visit: one month ago   Can you participate in a virtual visit with your PCP:                     Current Advanced Directive/Advance Care Plan: AMD dated 2013 scanned to chart in 2017. Transition of Care Plan:                    CM met with patient and daughter Naima Pham. Patient lives alone In an apartment in [address] [place] [address]. Daughter Naima Pham provides transport with assistance with bills and other care needs. Patient has 2 supportive grand daughters locally. Patient reports good family /social support. Patient is ambulatory with a cane or walker and hopes return to independent functioning. Patient confirmed PCP, health insurance, and prescription coverage. CM gave patient the Patient Guide to Observation and Outpatient Care which patient signed--original to patient and copy to chart.   CM gave patient the Medicare Outpatient Observation Notice which patient signed-- original to patient and copy to chart.

## 2021-01-05 NOTE — PROGRESS NOTES
TRANSFER - OUT REPORT:    Verbal report given HARISH Pearson(name) on Tera Dudley  being transferred to [facility] for routine progression of care       Report consisted of patients Situation, Background, Assessment and   Recommendations(SBAR). Information from the following report(s) SBAR and Procedure Summary was reviewed with the receiving nurse. Lines:   Peripheral IV 01/04/21 Left Antecubital (Active)   Site Assessment Clean, dry, & intact 01/05/21 0741   Phlebitis Assessment 0 01/05/21 0741   Infiltration Assessment 0 01/05/21 0741   Dressing Status Clean, dry, & intact 01/05/21 0741   Dressing Type Transparent 01/05/21 0741   Hub Color/Line Status Pink; Infusing;Capped 01/05/21 0741   Action Taken Open ports on tubing capped 01/05/21 0741   Alcohol Cap Used Yes 01/05/21 0741        Opportunity for questions and clarification was provided.       Patient transported with:   Monitor  Registered Nurse

## 2021-01-05 NOTE — PROGRESS NOTES
Interventional Cardiology Progress Note    Name: Unique Ward  : 1922  MRN: [id number]  Date: 2021      Mr. Kelsie Naranjo is a 25-year-old white male with a known history of coronary artery disease who presents with recurrent chest discomfort and non-ST elevation myocardial infarction. Cardiac catheterization via the right groin today without difficulty. 60 cc contrast was utilized. Findings show severe left main stenosis 70%. There is further high-grade narrowing of 70% in the proximal LAD. Occluded D1. Severe 70% narrowing in OM1 and OM 2. RCA is heavily calcified with noncritical stenosis. He has known mild LV dysfunction by echocardiogram.    Treatment options include: Continued medical therapy, Impella assisted intervention to the left main and LAD, or surgical revascularization. All are increased risk. I spoke with the patient's granddaughter Lina Yu at [phone]. She and the family will discuss options with the patient. I will proceed with a surgical consult.       Dileep Dewitt MD  21  12:30 PM

## 2021-01-05 NOTE — PROGRESS NOTES
Cardiac Cath Lab Procedure Area Arrival Note:    Konstantin Colbert arrived to Cardiac Cath Lab, Procedure Area. Patient identifiers verified with NAME and DATE OF BIRTH. Procedure verified with patient. Consent forms verified. Allergies verified. Patient informed of procedure and plan of care. Questions answered with review. Patient voiced understanding of procedure and plan of care. Patient on cardiac monitor, non-invasive blood pressure, SPO2 monitor. On RA and placed on O2 @ 2 lpm via NC.  IV of NSS on pump at 75 ml/hr. Patient status doing well without problems. Patient is A&Ox 4. Patient reports no chest pain or shortness of breath. Patient medicated during procedure with orders obtained and verified by Dr. Petty Shown. Refer to patients Cardiac Cath Lab PROCEDURE REPORT for vital signs, assessment, status, and response during procedure, printed at end of case. Printed report on chart or scanned into chart.

## 2021-01-05 NOTE — PROGRESS NOTES
Bedside shift change report given to Joanie James RN by [facility], RN. Report included the following information SBAR, Kardex, Intake/Output, MAR and Cardiac Rhythm Paced.

## 2021-01-05 NOTE — PROGRESS NOTES
Patient returned to room post cath, laying flat in bed, reconnected to monitor. No bleeding or hematoma at groin site.

## 2021-01-05 NOTE — PROGRESS NOTES
Cardiology Progress Note            Admit Date: 1/4/2021  Admit Diagnosis: NSTEMI (non-ST elevated myocardial infarction) [facility]) [I21.4]  Date: 1/5/2021     Time: 9:45 AM    HPI: Terri Hernandez is a 80 y.o. male admitted on 1/4/2021  for NSTEMI (non-ST elevated myocardial infarction) ([facility] [address] [I21.4]. Has PMH of PAF on eliquis, HTN, PPM COPD,CKD,  HLD. Has history of NSTEMI in 7/2019 with mildly elevated troponin peak of 0.39 and pt had no further chest pain, thus medical management was recommended. He does have prior history of CAD with stent to LAD several years prior to 2019 (done in [place]). . Presented 1/4/20 with chief c/o midsternal chest pain radiating to left arm. Ruled in for NSTEMI and has new reduced LV function with anteriorapical hypokinesis.        Subjective: Denies any further chest pain since prior to arrival to hospital. Denies chest pain, SOB and orthopnea. COVID PCR is negative. Assessment and Plan     1. NSTEMI:               -Troponin peak 6.70, trending down now. -Echo with EF 40-45%, anteroapical hypokinesis.               -No further chest/arm pain              -Continue ASA, statin, Lopressor              -Continue heparin gtt. (off eliquis for cath)   -Received Plavix 300 mg po yesterday x 1              -Discussed with pt and pt's wife and granddaughter- Reviewed risk/benefits. All in agreement to proceed with [facility] today with Dr. Kelli Womack (12 noon).       2. CAD:    -s/p stent to LAD few years prior to 2019              -Continue ASA, statin, BB.    3. Cardiomyopathy, likely ischemic   -EF 40-45%   -Will re-evaluate GDMT after cath. -Few basilar crackles but no SOB     4. CKD:    -Creatinine improved 1.38 today              -Hold po diuretic for now due to cath. .     4. HLD              -Continue lipitor 40 mg daily     5. HTN: bp acceptable              -Continue lopressor and amlodipine. -no ACE-I/ARB due to renal dysfunction     6. Advanced directives:   -He is agreeable to rescind DNR for cardiac catheterization. 80 y.o. male with PMH of CAD, stent to LAD presents with chief c/o chest pain and ruled in for NSTEMI with anteroapical hypokinesis and mildly reduced EF on echocardiogram.  Discussed cardiac catheterization with pt, pt's wife and spoke with pt's rob (who is a critical care NP) by phone per pt request. Discussed risks/benefits. All are in agreement with proceeding with [facility] to further evaluate coronary arteries. All aware and agreeable with revoking DNR for cardiac cath procedure. Saw and evaluated pt and agree with above assessment and plan. Proceed with [facility] today given NSTEMI, drop in EF/apical hypokinesis likely LAD distribution involved. Kar Duran MD    Cardiac testin21   ECHO ADULT COMPLETE 2021    Narrative · Contrast used: DEFINITY. · LV: Estimated LVEF is 40 - 45% with anteroapical hypokinesis. Signed by: Dulce Claude, MD     2/3/20 echo:  · Image quality for this study was technically difficult. · Normal cavity size and systolic function (ejection fraction normal). Mildly increased wall thickness. Estimated left ventricular ejection fraction is 55 - 60%. Visually measured ejection fraction. Abnormal left ventricular wall motion. · Mild aortic valve sclerosis. Mild aortic valve regurgitation is present. · Mild ascending aorta dilatation.        PMH  Past Medical History:   Diagnosis Date    Atrial fibrillation ([address]     Chronic obstructive pulmonary disease ([address]     Essential hypertension     Hyperlipidemia     Long term (current) use of anticoagulants     Pacemaker     Rosacea 2016      Social Hx  Social History     Socioeconomic History    Marital status:      Spouse name: Not on file    Number of children: Not on file    Years of education: Not on file    Highest education level: Not on file Occupational History    Not on file   Social Needs    Financial resource strain: Not on file    Food insecurity     Worry: Not on file     Inability: Not on file    Transportation needs     Medical: Not on file     Non-medical: Not on file   Tobacco Use    Smoking status: Never Smoker    Smokeless tobacco: Never Used   Substance and Sexual Activity    Alcohol use: No     Alcohol/week: 0.0 standard drinks    Drug use: No    Sexual activity: Not on file   Lifestyle    Physical activity     Days per week: Not on file     Minutes per session: Not on file    Stress: Not on file   Relationships    Social connections     Talks on phone: Not on file     Gets together: Not on file     Attends [employer] service: Not on file     Active member of club or organization: Not on file     Attends meetings of clubs or organizations: Not on file     Relationship status: Not on file    Intimate partner violence     Fear of current or ex partner: Not on file     Emotionally abused: Not on file     Physically abused: Not on file     Forced sexual activity: Not on file   Other Topics Concern    Not on file   Social History Narrative    Not on file   Objective:      Physical Exam:                Visit Vitals  BP (!) 144/87 (BP 1 Location: Right arm, BP Patient Position: At rest)   Pulse 61   Temp 97.7 °F (36.5 °C)   Resp 16   Ht 5' 9\" (1.753 m)   Wt 208 lb 5.4 oz (94.5 kg)   SpO2 97%   BMI 30.77 kg/m²          General Appearance:   Well developed, well nourished,alert and oriented x 3, and   individual in no acute distress. Ears/Nose/Mouth/Throat:    Native. Neck:  Supple. Chest:    Lungs bibasilar faint crackles. No use of accessory muscles. .   Cardiovascular:   Regular rate and rhythm, S1, S2 normal, no murmur. Abdomen:    Soft, non-tender, bowel sounds are active. Extremities:  1+ BLE edema. Skin:  Warm and dry.      Telemetry: NSR          Data Review:    Labs:    Recent Results (from the past 24 hour(s))   TROPONIN I    Collection Time: 01/04/21  9:54 AM   Result Value Ref Range    Troponin-I, Qt. 5.94 (H) <0.05 ng/mL   SARS-COV-2    Collection Time: 01/04/21  9:58 AM   Result Value Ref Range    Specimen source Nasopharyngeal      SARS-CoV-2 Not detected NOTD      Specimen source Nasopharyngeal      Specimen type NP Swab      Health status Symptomatic Testing     ECHO ADULT COMPLETE    Collection Time: 01/04/21 10:41 AM   Result Value Ref Range    LVOT Peak Gradient 1.85 mmHg    LVOT Peak Velocity 68.08 cm/s    RVSP 35.33 mmHg    Est. RA Pressure 3.00 mmHg    AoV PG 4.54 mmHg    Aortic Valve Systolic Peak Velocity 106.59 cm/s    Tapse 1.47 (A) 1.5 - 2.0 cm    Triscuspid Valve Regurgitation Peak Gradient 32.33 mmHg    TR Max Velocity 284.29 cm/s   EKG, 12 LEAD, INITIAL    Collection Time: 01/04/21  1:38 PM   Result Value Ref Range    Ventricular Rate 63 BPM    Atrial Rate 55 BPM    QRS Duration 176 ms    Q-T Interval 492 ms    QTC Calculation (Bezet) 503 ms    Calculated R Axis -81 degrees    Calculated T Axis 86 degrees    Diagnosis       Ventricular-paced rhythm with occasional premature ventricular complexes  When compared with ECG of 04-JAN-2021 02:33,  The heart rate has decreased  Confirmed by BLAKE Gomez Samuel ([id number]) on 1/5/2021 12:20:04 AM     EKG, 12 LEAD, INITIAL    Collection Time: 01/04/21  1:38 PM   Result Value Ref Range    Ventricular Rate 63 BPM    Atrial Rate 61 BPM    QRS Duration 180 ms    Q-T Interval 496 ms    QTC Calculation (Bezet) 507 ms    Calculated R Axis -77 degrees    Calculated T Axis 86 degrees    Diagnosis       Ventricular-paced rhythm with occasional premature ventricular complexes  When compared with ECG of 04-JAN-2021 13:38,  No significant change    Confirmed by BLAKE Gomez Samuel ([id number]) on 1/5/2021 12:20:20 AM     PTT    Collection Time: 01/04/21  4:41 PM   Result Value Ref Range    aPTT 63.0 (H) 22.1 - 31.0 sec    aPTT, therapeutic range     58.0 - 77.0 SECS   TROPONIN I  Collection Time: 01/04/21  4:41 PM   Result Value Ref Range    Troponin-I, Qt. 6.79 (H) <0.05 ng/mL   PTT    Collection Time: 01/04/21 10:24 PM   Result Value Ref Range    aPTT 76.9 (H) 22.1 - 31.0 sec    aPTT, therapeutic range     58.0 - 77.0 SECS   TROPONIN I    Collection Time: 01/04/21 10:24 PM   Result Value Ref Range    Troponin-I, Qt. 4.51 (H) <0.05 ng/mL   PTT    Collection Time: 01/05/21  4:44 AM   Result Value Ref Range    aPTT 100.2 (HH) 22.1 - 31.0 sec    aPTT, therapeutic range     58.0 - 77.0 SECS   CBC WITH AUTOMATED DIFF    Collection Time: 01/05/21  4:44 AM   Result Value Ref Range    WBC 8.4 4.1 - 11.1 K/uL    RBC 4.39 4. 10 - 5.70 M/uL    HGB 13.8 12.1 - 17.0 g/dL    HCT 42.5 36.6 - 50.3 %    MCV 96.8 80.0 - 99.0 FL    MCH 31.4 26.0 - 34.0 PG    MCHC 32.5 30.0 - 36.5 g/dL    RDW 13.9 11.5 - 14.5 %    PLATELET 335 (L) 573 - 400 K/uL    MPV 10.7 8.9 - 12.9 FL    NRBC 0.0 0  WBC    ABSOLUTE NRBC 0.00 0.00 - 0.01 K/uL    NEUTROPHILS 70 32 - 75 %    LYMPHOCYTES 18 12 - 49 %    MONOCYTES 8 5 - 13 %    EOSINOPHILS 4 0 - 7 %    BASOPHILS 1 0 - 1 %    IMMATURE GRANULOCYTES 0 0.0 - 0.5 %    ABS. NEUTROPHILS 5.9 1.8 - 8.0 K/UL    ABS. LYMPHOCYTES 1.5 0.8 - 3.5 K/UL    ABS. MONOCYTES 0.7 0.0 - 1.0 K/UL    ABS. EOSINOPHILS 0.3 0.0 - 0.4 K/UL    ABS. BASOPHILS 0.0 0.0 - 0.1 K/UL    ABS. IMM.  GRANS. 0.0 0.00 - 0.04 K/UL    DF AUTOMATED     MAGNESIUM    Collection Time: 01/05/21  4:44 AM   Result Value Ref Range    Magnesium 2.0 1.6 - 2.4 mg/dL   METABOLIC PANEL, BASIC    Collection Time: 01/05/21  4:44 AM   Result Value Ref Range    Sodium 140 136 - 145 mmol/L    Potassium 3.7 3.5 - 5.1 mmol/L    Chloride 109 (H) 97 - 108 mmol/L    CO2 27 21 - 32 mmol/L    Anion gap 4 (L) 5 - 15 mmol/L    Glucose 90 65 - 100 mg/dL    BUN 23 (H) 6 - 20 MG/DL    Creatinine 1.35 (H) 0.70 - 1.30 MG/DL    BUN/Creatinine ratio 17 12 - 20      GFR est AA 59 (L) >60 ml/min/1.73m2    GFR est non-AA 49 (L) >60 ml/min/1.73m2 Calcium 8.8 8.5 - 10.1 MG/DL   PHOSPHORUS    Collection Time: 01/05/21  4:44 AM   Result Value Ref Range    Phosphorus 3.0 2.6 - 4.7 MG/DL          Radiology:        Current Facility-Administered Medications   Medication Dose Route Frequency    sodium chloride (NS) flush 5-40 mL  5-40 mL IntraVENous Q8H    sodium chloride (NS) flush 5-40 mL  5-40 mL IntraVENous PRN    acetaminophen (TYLENOL) tablet 650 mg  650 mg Oral Q6H PRN    Or    acetaminophen (TYLENOL) suppository 650 mg  650 mg Rectal Q6H PRN    polyethylene glycol (MIRALAX) packet 17 g  17 g Oral DAILY PRN    promethazine (PHENERGAN) tablet 12.5 mg  12.5 mg Oral Q6H PRN    Or    ondansetron (ZOFRAN) injection 4 mg  4 mg IntraVENous Q6H PRN    nicotine (NICODERM CQ) 14 mg/24 hr patch 1 Patch  1 Patch TransDERmal DAILY PRN    sodium chloride (NS) flush 5-40 mL  5-40 mL IntraVENous Q8H    sodium chloride (NS) flush 5-40 mL  5-40 mL IntraVENous PRN    nitroglycerin (NITROSTAT) tablet 0.4 mg  0.4 mg SubLINGual Q5MIN PRN    aspirin chewable tablet 81 mg  81 mg Oral DAILY    amLODIPine (NORVASC) tablet 5 mg  5 mg Oral DAILY    [Held by provider] furosemide (LASIX) tablet 10 mg  10 mg Oral DAILY    levothyroxine (SYNTHROID) tablet 50 mcg  50 mcg Oral ACB    metoprolol tartrate (LOPRESSOR) tablet 25 mg  25 mg Oral BID    pantoprazole (PROTONIX) tablet 20 mg  20 mg Oral ACB    atorvastatin (LIPITOR) tablet 40 mg  40 mg Oral DAILY    tamsulosin (FLOMAX) capsule 0.4 mg  0.4 mg Oral QHS    heparin 25,000 units in D5W 250 ml infusion  10-25 Units/kg/hr IntraVENous TITRATE    morphine injection 2 mg  2 mg IntraVENous Q4H PRN     Prior to Admission Medications   Prescriptions Last Dose Informant Patient Reported? Taking? Multivits with Min-FA-Lycopene (MEN'S DAILY MULTIVIT-MINERAL) 0.4-600 mg-mcg tab  Child Yes Yes   Sig: Take 1 Tab by mouth daily.    amLODIPine (NORVASC) 5 mg tablet   No Yes   Sig: TAKE ONE TABLET BY MOUTH DAILY   antiox #8/om3/dha/epa/lut/zeax (PRESERVISION AREDS 2, OMEGA-3, PO)  Child Yes Yes   Sig: Take 1 Cap by mouth two (2) times a day. apixaban (ELIQUIS) 2.5 mg tablet  Child No Yes   Sig: Take 1 Tab by mouth two (2) times a day. furosemide (LASIX) 20 mg tablet  Child Yes Yes   Sig: Take 10 mg by mouth daily. ipratropium (ATROVENT) 0.03 % nasal spray  Child Yes Yes   Si Sprays by Both Nostrils route two (2) times daily as needed for Rhinitis. levothyroxine (LEVOTHROID) 50 mcg tablet  Child Yes Yes   Sig: Take 50 mcg by mouth Daily (before breakfast). metoprolol tartrate (LOPRESSOR) 25 mg tablet   Yes Yes   Sig: Take 50 mg by mouth two (2) times a day. metroNIDAZOLE (METROCREAM) 0.75 % topical cream  Child Yes Yes   Sig: Apply  to affected area nightly. Use a thin layer to face after washing  Indications: a skin condition on the cheeks and nose with a reddish rash and acne called acne rosacea   mometasone (NASONEX) 50 mcg/actuation nasal spray  Child Yes Yes   Si Sprays by Both Nostrils route daily. nitroglycerin (NITROSTAT) 0.4 mg SL tablet   No Yes   Si Tab by SubLINGual route every five (5) minutes as needed for Chest Pain. omeprazole (PRILOSEC) 20 mg capsule  Child Yes Yes   Sig: Take 20 mg by mouth daily. simvastatin (ZOCOR) 40 mg tablet  Child Yes Yes   Sig: Take 20 mg by mouth nightly. tamsulosin (FLOMAX) 0.4 mg capsule  Child Yes Yes   Sig: Take 0.4 mg by mouth nightly. Facility-Administered Medications: None          Sourav Payne.  BRIAN Everett     [facility] [address] [place] [address] [address], [address] [facility] [address]   Josue Renee   [phone]

## 2021-01-05 NOTE — PROGRESS NOTES
Pt remains on bed rest at this time s/p cardiac cath.  Plan is for PT to return tomorrow for the eval. Thank you, Joe Albright, PT

## 2021-01-05 NOTE — PROGRESS NOTES
[address] [facility]  Hospitalist Group                                                                                          Hospitalist Progress Note  Jah Cho NP  Answering service: [phone] from in house phone        Date of Service:  2021  NAME:  Tera Dudley  :  1922  MRN:  [id number]      Admission Summary:   Per H&P: Tera Dudley is a 80 y.o. male has a past medical history of atrial fibrillation, conduction disorder requiring permanent pacemaker, CAD status post stenting, COPD, hypertension, hyperlipidemia, who is being admitted for an NSTEMI. Patient states that he has been in his routine self for the past few weeks. He states he has been actively participating in PT and OT, and that he has felt overall well. He states he has felt an increase in fatigue over the course of the past year, which he has believed is due to his age. He states he walks with a walker and he never has any chest pain. Patient awoke from sleep in the middle of the night tonight, with chest pain unlike any pain he is experienced before. He states he went through the \"routine procedure at the home\" to get taken to the hospital.  He states he is currently not any pain after receiving aspirin in the ER. He has a chronic cough, but has had no increased cough or sputum. He has no fevers, chills, muscle aches, body aches, or other sick symptoms. He does live in the [facility] assisted living    Interval history / Subjective:    Seen and examined patient, sitting up in bed. Daughter at bedside. States that he feels good. No longer having any chest pain denies any shortness of breath. No new complaints. No acute distress noted. Discussed plan of care of with daughter, questions answered. Assessment & Plan:     1.   NSTEMI, POA  - History of CAD, atrial fibrillation, AV node dysfunction status post PPM  - Cardiac cath scheduled today, Keep NPO   - Cardiology following   - Continue heparin gtts   - Continue telemetry      2. Atrial fibrillation, AV node dysfunction, CAD, PPM, POA  We will continue patient's cardiac medications  Cardiology following      3. COPD  No recent increase in cough, sputum production  No symptoms     4. Hypertension, hyperlipidemia, POA  Continue meds as above    Code status: DNR   DVT prophylaxis: Heparin gtts    Care Plan discussed with: Patient/Family and Nurse  Anticipated Disposition: USP   Anticipated Discharge: Less than 24 hours     Hospital Problems  Date Reviewed: 5/13/2020          Codes Class Noted POA    NSTEMI (non-ST elevated myocardial infarction) [facility]) ICD-10-CM: I21.4  ICD-9-CM: 410.70  7/17/2019 Unknown                Review of Systems:   A comprehensive review of systems was negative except for that written in the HPI. Vital Signs:    Last 24hrs VS reviewed since prior progress note. Most recent are:  Visit Vitals  BP (!) 144/87 (BP 1 Location: Right arm, BP Patient Position: At rest)   Pulse 61   Temp 97.7 °F (36.5 °C)   Resp 16   Ht 5' 9\" (1.753 m)   Wt 94.5 kg (208 lb 5.4 oz)   SpO2 97%   BMI 30.77 kg/m²       No intake or output data in the 24 hours ending 01/05/21 1137     Physical Examination:             Constitutional:  No acute distress, cooperative, pleasant    ENT:  Oral mucosa moist, oropharynx benign. Resp:  CTA bilaterally. No wheezing/rhonchi/rales. No accessory muscle use   CV:  Regular rhythm, normal rate, no murmurs, gallops, rubs    GI:  Soft, non distended, non tender. normoactive bowel sounds, no hepatosplenomegaly     Musculoskeletal:  No edema, warm, 2+ pulses throughout    Neurologic:  Moves all extremities.   AAOx3, CN II-XII reviewed     Psych:  Not anxious or agiated       Data Review:    Review and/or order of clinical lab test  Review and/or order of tests in the radiology section of CPT  Review and/or order of tests in the medicine section of CPT      Labs:     Recent Labs 01/05/21 0444 01/04/21 0242   WBC 8.4 8.1   HGB 13.8 12.9   HCT 42.5 40.9   * 136*     Recent Labs     01/05/21 0444 01/04/21 0242    144   K 3.7 3.7   * 110*   CO2 27 28   BUN 23* 28*   CREA 1.35* 1.78*   GLU 90 118*   CA 8.8 8.9   MG 2.0  --    PHOS 3.0  --      Recent Labs     01/04/21 0345 01/04/21 0242   ALT  --  20   AP  --  93   TBILI  --  0.6   TP  --  6.7   ALB  --  3.5   GLOB  --  3.2   LPSE 131  --      Recent Labs     01/05/21 0444 01/04/21 2224 01/04/21 1641 01/04/21 0242   INR  --   --   --  1.1   PTP  --   --   --  11.7*   APTT 100.2* 76.9* 63.0* 29.0      No results for input(s): FE, TIBC, PSAT, FERR in the last 72 hours. No results found for: FOL, RBCF   No results for input(s): PH, PCO2, PO2 in the last 72 hours.   Recent Labs     01/04/21 2224 01/04/21 1641 01/04/21  0954   TROIQ 4.51* 6.79* 5.94*     Lab Results   Component Value Date/Time    Cholesterol, total 111 02/10/2020 02:39 AM    HDL Cholesterol 41 02/10/2020 02:39 AM    LDL, calculated 52.4 02/10/2020 02:39 AM    Triglyceride 88 02/10/2020 02:39 AM    CHOL/HDL Ratio 2.7 02/10/2020 02:39 AM     Lab Results   Component Value Date/Time    Glucose (POC) 90 02/11/2020 06:19 AM    Glucose (POC) 121 (H) 02/10/2020 09:45 PM    Glucose (POC) 91 02/10/2020 04:41 PM    Glucose (POC) 135 (H) 02/10/2020 12:00 PM    Glucose (POC) 93 02/10/2020 06:31 AM     Lab Results   Component Value Date/Time    Color YELLOW/STRAW 12/13/2020 01:23 AM    Appearance CLEAR 12/13/2020 01:23 AM    Specific gravity 1.015 12/13/2020 01:23 AM    Specific gravity 1.011 07/03/2019 02:12 PM    pH (UA) 6.0 12/13/2020 01:23 AM    Protein Negative 12/13/2020 01:23 AM    Glucose Negative 12/13/2020 01:23 AM    Ketone TRACE (A) 12/13/2020 01:23 AM    Bilirubin Negative 12/13/2020 01:23 AM    Urobilinogen 2.0 (H) 12/13/2020 01:23 AM    Nitrites Negative 12/13/2020 01:23 AM    Leukocyte Esterase Negative 12/13/2020 01:23 AM    Epithelial cells FEW 12/13/2020 01:23 AM    Bacteria Negative 12/13/2020 01:23 AM    WBC 0-4 12/13/2020 01:23 AM    RBC 0-5 12/13/2020 01:23 AM         Medications Reviewed:     Current Facility-Administered Medications   Medication Dose Route Frequency    sodium chloride (NS) flush 5-40 mL  5-40 mL IntraVENous Q8H    sodium chloride (NS) flush 5-40 mL  5-40 mL IntraVENous PRN    acetaminophen (TYLENOL) tablet 650 mg  650 mg Oral Q6H PRN    Or    acetaminophen (TYLENOL) suppository 650 mg  650 mg Rectal Q6H PRN    polyethylene glycol (MIRALAX) packet 17 g  17 g Oral DAILY PRN    promethazine (PHENERGAN) tablet 12.5 mg  12.5 mg Oral Q6H PRN    Or    ondansetron (ZOFRAN) injection 4 mg  4 mg IntraVENous Q6H PRN    nicotine (NICODERM CQ) 14 mg/24 hr patch 1 Patch  1 Patch TransDERmal DAILY PRN    sodium chloride (NS) flush 5-40 mL  5-40 mL IntraVENous Q8H    sodium chloride (NS) flush 5-40 mL  5-40 mL IntraVENous PRN    nitroglycerin (NITROSTAT) tablet 0.4 mg  0.4 mg SubLINGual Q5MIN PRN    aspirin chewable tablet 81 mg  81 mg Oral DAILY    amLODIPine (NORVASC) tablet 5 mg  5 mg Oral DAILY    [Held by provider] furosemide (LASIX) tablet 10 mg  10 mg Oral DAILY    levothyroxine (SYNTHROID) tablet 50 mcg  50 mcg Oral ACB    metoprolol tartrate (LOPRESSOR) tablet 25 mg  25 mg Oral BID    pantoprazole (PROTONIX) tablet 20 mg  20 mg Oral ACB    atorvastatin (LIPITOR) tablet 40 mg  40 mg Oral DAILY    tamsulosin (FLOMAX) capsule 0.4 mg  0.4 mg Oral QHS    heparin 25,000 units in D5W 250 ml infusion  10-25 Units/kg/hr IntraVENous TITRATE    morphine injection 2 mg  2 mg IntraVENous Q4H PRN     ______________________________________________________________________  EXPECTED LENGTH OF STAY: - - -  ACTUAL LENGTH OF STAY:          0                 Ulyess Cesario, NP

## 2021-01-05 NOTE — PROGRESS NOTES
TRANSFER - IN REPORT:    Verbal report received from HARISH Johns (name) on Alyce Caro  being received from [facility] (unit) for routine progression of care      Report consisted of patients Situation, Background, Assessment and   Recommendations(SBAR). Information from the following report(s) SBAR, Kardex, Intake/Output and Cardiac Rhythm NSR was reviewed with the receiving nurse. Opportunity for questions and clarification was provided. Assessment completed upon patients arrival to unit and care assumed.

## 2021-01-05 NOTE — PROGRESS NOTES
Chart reviewed. Orders acknowledged. Spoke with nursing, cardiac cath planned today.  Will defer and follow up as able  Winford Woodlake   OTR/L

## 2021-01-05 NOTE — PROGRESS NOTES
Chart checked, case discussed with pt's nurse. Pt is leaving the unit for the CCL.  PT will defer, follow, and see as appropriate for the eval. Thank you, Lopez Hatch, PT

## 2021-01-06 LAB
ALBUMIN SERPL-MCNC: 3.1 G/DL (ref 3.5–5)
ALBUMIN/GLOB SERPL: 1 {RATIO} (ref 1.1–2.2)
ALP SERPL-CCNC: 94 U/L (ref 45–117)
ALT SERPL-CCNC: 21 U/L (ref 12–78)
ANION GAP SERPL CALC-SCNC: 6 MMOL/L (ref 5–15)
AST SERPL-CCNC: 34 U/L (ref 15–37)
BASOPHILS # BLD: 0 K/UL (ref 0–0.1)
BASOPHILS NFR BLD: 0 % (ref 0–1)
BILIRUB SERPL-MCNC: 1 MG/DL (ref 0.2–1)
BUN SERPL-MCNC: 24 MG/DL (ref 6–20)
BUN/CREAT SERPL: 18 (ref 12–20)
CALCIUM SERPL-MCNC: 8.9 MG/DL (ref 8.5–10.1)
CHLORIDE SERPL-SCNC: 109 MMOL/L (ref 97–108)
CO2 SERPL-SCNC: 25 MMOL/L (ref 21–32)
CREAT SERPL-MCNC: 1.34 MG/DL (ref 0.7–1.3)
DIFFERENTIAL METHOD BLD: ABNORMAL
EOSINOPHIL # BLD: 0.4 K/UL (ref 0–0.4)
EOSINOPHIL NFR BLD: 4 % (ref 0–7)
ERYTHROCYTE [DISTWIDTH] IN BLOOD BY AUTOMATED COUNT: 13.8 % (ref 11.5–14.5)
GLOBULIN SER CALC-MCNC: 3 G/DL (ref 2–4)
GLUCOSE SERPL-MCNC: 91 MG/DL (ref 65–100)
HCT VFR BLD AUTO: 40.8 % (ref 36.6–50.3)
HGB BLD-MCNC: 13.3 G/DL (ref 12.1–17)
IMM GRANULOCYTES # BLD AUTO: 0 K/UL (ref 0–0.04)
IMM GRANULOCYTES NFR BLD AUTO: 0 % (ref 0–0.5)
LYMPHOCYTES # BLD: 1.2 K/UL (ref 0.8–3.5)
LYMPHOCYTES NFR BLD: 15 % (ref 12–49)
MAGNESIUM SERPL-MCNC: 1.9 MG/DL (ref 1.6–2.4)
MCH RBC QN AUTO: 31.4 PG (ref 26–34)
MCHC RBC AUTO-ENTMCNC: 32.6 G/DL (ref 30–36.5)
MCV RBC AUTO: 96.2 FL (ref 80–99)
MONOCYTES # BLD: 0.6 K/UL (ref 0–1)
MONOCYTES NFR BLD: 8 % (ref 5–13)
NEUTS SEG # BLD: 5.9 K/UL (ref 1.8–8)
NEUTS SEG NFR BLD: 73 % (ref 32–75)
NRBC # BLD: 0 K/UL (ref 0–0.01)
NRBC BLD-RTO: 0 PER 100 WBC
PLATELET # BLD AUTO: 130 K/UL (ref 150–400)
PMV BLD AUTO: 10.6 FL (ref 8.9–12.9)
POTASSIUM SERPL-SCNC: 4.1 MMOL/L (ref 3.5–5.1)
PROT SERPL-MCNC: 6.1 G/DL (ref 6.4–8.2)
RBC # BLD AUTO: 4.24 M/UL (ref 4.1–5.7)
SODIUM SERPL-SCNC: 140 MMOL/L (ref 136–145)
WBC # BLD AUTO: 8.1 K/UL (ref 4.1–11.1)

## 2021-01-06 PROCEDURE — 74011250637 HC RX REV CODE- 250/637: Performed by: NURSE PRACTITIONER

## 2021-01-06 PROCEDURE — 97165 OT EVAL LOW COMPLEX 30 MIN: CPT

## 2021-01-06 PROCEDURE — 74011250637 HC RX REV CODE- 250/637: Performed by: INTERNAL MEDICINE

## 2021-01-06 PROCEDURE — 80053 COMPREHEN METABOLIC PANEL: CPT

## 2021-01-06 PROCEDURE — 99223 1ST HOSP IP/OBS HIGH 75: CPT | Performed by: THORACIC SURGERY (CARDIOTHORACIC VASCULAR SURGERY)

## 2021-01-06 PROCEDURE — 99232 SBSQ HOSP IP/OBS MODERATE 35: CPT | Performed by: INTERNAL MEDICINE

## 2021-01-06 PROCEDURE — 65660000000 HC RM CCU STEPDOWN

## 2021-01-06 PROCEDURE — 99233 SBSQ HOSP IP/OBS HIGH 50: CPT | Performed by: INTERNAL MEDICINE

## 2021-01-06 PROCEDURE — 97530 THERAPEUTIC ACTIVITIES: CPT

## 2021-01-06 PROCEDURE — 83735 ASSAY OF MAGNESIUM: CPT

## 2021-01-06 PROCEDURE — 85025 COMPLETE CBC W/AUTO DIFF WBC: CPT

## 2021-01-06 PROCEDURE — 97535 SELF CARE MNGMENT TRAINING: CPT

## 2021-01-06 PROCEDURE — 36415 COLL VENOUS BLD VENIPUNCTURE: CPT

## 2021-01-06 PROCEDURE — 74011250636 HC RX REV CODE- 250/636: Performed by: NURSE PRACTITIONER

## 2021-01-06 PROCEDURE — 97162 PT EVAL MOD COMPLEX 30 MIN: CPT

## 2021-01-06 RX ORDER — HEPARIN SODIUM 5000 [USP'U]/ML
5000 INJECTION, SOLUTION INTRAVENOUS; SUBCUTANEOUS EVERY 12 HOURS
Status: DISCONTINUED | OUTPATIENT
Start: 2021-01-06 | End: 2021-01-07

## 2021-01-06 RX ORDER — FUROSEMIDE 40 MG/1
40 TABLET ORAL ONCE
Status: DISCONTINUED | OUTPATIENT
Start: 2021-01-06 | End: 2021-01-06

## 2021-01-06 RX ORDER — FUROSEMIDE 20 MG/1
20 TABLET ORAL ONCE
Status: COMPLETED | OUTPATIENT
Start: 2021-01-06 | End: 2021-01-06

## 2021-01-06 RX ADMIN — Medication 10 ML: at 06:57

## 2021-01-06 RX ADMIN — AMLODIPINE BESYLATE 5 MG: 5 TABLET ORAL at 11:28

## 2021-01-06 RX ADMIN — METOPROLOL TARTRATE 25 MG: 25 TABLET, FILM COATED ORAL at 11:25

## 2021-01-06 RX ADMIN — PANTOPRAZOLE SODIUM 20 MG: 20 TABLET, DELAYED RELEASE ORAL at 06:53

## 2021-01-06 RX ADMIN — METOPROLOL TARTRATE 25 MG: 25 TABLET, FILM COATED ORAL at 18:14

## 2021-01-06 RX ADMIN — TAMSULOSIN HYDROCHLORIDE 0.4 MG: 0.4 CAPSULE ORAL at 20:57

## 2021-01-06 RX ADMIN — ATORVASTATIN CALCIUM 40 MG: 40 TABLET, FILM COATED ORAL at 11:25

## 2021-01-06 RX ADMIN — Medication 10 ML: at 06:54

## 2021-01-06 RX ADMIN — HEPARIN SODIUM 5000 UNITS: 5000 INJECTION INTRAVENOUS; SUBCUTANEOUS at 15:48

## 2021-01-06 RX ADMIN — ASPIRIN 81 MG: 81 TABLET, CHEWABLE ORAL at 11:25

## 2021-01-06 RX ADMIN — ACETAMINOPHEN 650 MG: 325 TABLET ORAL at 15:48

## 2021-01-06 RX ADMIN — Medication 10 ML: at 21:03

## 2021-01-06 RX ADMIN — FUROSEMIDE 20 MG: 20 TABLET ORAL at 12:27

## 2021-01-06 RX ADMIN — LEVOTHYROXINE SODIUM 50 MCG: 0.1 TABLET ORAL at 06:53

## 2021-01-06 RX ADMIN — Medication 10 ML: at 15:52

## 2021-01-06 NOTE — PROGRESS NOTES
Problem: Mobility Impaired (Adult and Pediatric)  Goal: *Acute Goals and Plan of Care (Insert Text)  Description: FUNCTIONAL STATUS PRIOR TO ADMISSION: Patient was modified independent using a rolling walker and single point cane for functional mobility. HOME SUPPORT PRIOR TO ADMISSION: The patient lived at [facility] with some assistance from staff as needed. Physical Therapy Goals  Initiated 1/6/2021  1. Patient will move from supine to sit and sit to supine , scoot up and down, and roll side to side in bed with independence within 7 day(s). 2.  Patient will transfer from bed to chair and chair to bed with modified independence using the least restrictive device within 7 day(s). 3.  Patient will perform sit to stand with modified independence within 7 day(s). 4.  Patient will ambulate with modified independence for 150 feet with the least restrictive device within 7 day(s). Outcome: Progressing Towards Goal   PHYSICAL THERAPY EVALUATION  Patient: Cary Calles (38 y.o. male)  Date: 1/6/2021  Primary Diagnosis: NSTEMI (non-ST elevated myocardial infarction) ([facility] [address] [I21.4]  Procedure(s) (LRB):  LEFT HEART CATH / CORONARY ANGIOGRAPHY (N/A) 1 Day Post-Op   Precautions:   Fall      ASSESSMENT  Based on the objective data described below, the patient presents with generalized weakness, limited activity tolerance, orthostatic hypotension and mildly unsteady gait relative to his baseline level s/p admission for NSTEMI. Patient up in chair upon arrival, spoke with OT prior who noted orthostatic hypotension with transfer from supine to chair. Patient without c/o and at no time was symptomatic. However, orthostatic hypotension again occurred with each transfer to standing, thus gait assessment further than just around his chair was deferred. Patient asymptomatic the entire session.   BP as low as 82/57 but after activity (changed brief with patient in standing with RW support), marching, BP up to 120s/60s after sitting back down. BSC placed nearby for staff to use with patient to encourage practice and time out of bed. At baseline, patient lives in an [facility] but was mod indep using a RW. Was attending PT at [facility] for balance issues. Anticipate with BP stabilization and further practice, he will be appropriate to return to [facility] with HHPT. Current Level of Function Impacting Discharge (mobility/balance): CGA for transfers and gait, OH impacting mobility    Functional Outcome Measure: The patient scored Total: 40/100 on the Barthel Index which is indicative of 60% impaired ability to care for basic self needs/dependency on others. Other factors to consider for discharge:      Patient will benefit from skilled therapy intervention to address the above noted impairments. PLAN :  Recommendations and Planned Interventions: bed mobility training, transfer training, gait training, therapeutic exercises, neuromuscular re-education, patient and family training/education, and therapeutic activities      Frequency/Duration: Patient will be followed by physical therapy:  5 times a week to address goals. Recommendation for discharge: (in order for the patient to meet his/her long term goals)  Physical therapy at least 2 days/week in the home at his [facility]    This discharge recommendation:  Has been made in collaboration with the attending provider and/or case management    IF patient discharges home will need the following DME: patient owns DME required for discharge         SUBJECTIVE:   Patient stated I really enjoy working with physical therapists.     OBJECTIVE DATA SUMMARY:   HISTORY:    Past Medical History:   Diagnosis Date    Atrial fibrillation ([facility] [address]     Chronic obstructive pulmonary disease ([facility] [address]     Essential hypertension     Hyperlipidemia     Long term (current) use of anticoagulants     Pacemaker     Rosacea 2016     Past Surgical History:   Procedure Laterality Date    HX CORONARY STENT PLACEMENT      HX HEART CATHETERIZATION      HX PACEMAKER      UT REMVL PERM PM PLS GEN W/REPL PLSE GEN 2 LEAD SYS N/A 2/6/2020    REMOVE & REPLACE PPM GEN DUAL LEAD performed by Bee Love MD at [address], [facility]  CATH LAB       Personal factors and/or comorbidities impacting plan of care: hard of hearing    Home Situation  Home Environment: [address] [place] [address] Road Name: [place] at [place]  # Steps to Enter: 0  One/Two Story Residence: One story  Living Alone: No  Support Systems: Assisted living, Child(nunu)  Patient Expects to be Discharged to[de-identified] Assisted living  Current DME Used/Available at Home: Alfredito Sample, rolling, Shower chair, Grab bars(transport W/C)  Tub or Shower Type: Shower    EXAMINATION/PRESENTATION/DECISION MAKING:   Critical Behavior:  Neurologic State: Alert  Orientation Level: Oriented X4, Appropriate for age  Cognition: Follows commands, Appropriate safety awareness, Appropriate for age attention/concentration, Appropriate decision making  Safety/Judgement: Awareness of environment  Hearing: Auditory  Auditory Impairment: Hard of hearing, bilateral    Range Of Motion:  AROM: Generally decreased, functional           PROM: Within functional limits           Strength:    Strength: Generally decreased, functional                    Tone & Sensation:   Tone: Normal              Sensation: Intact               Coordination:  Coordination: Generally decreased, functional  Vision:   Corrective Lenses: Glasses  Functional Mobility:  Bed Mobility:     Supine to Sit: Supervision;Bed Modified(head of bed elevated, use of bedrails)     Scooting: Supervision  Transfers:  Sit to Stand: Contact guard assistance  Stand to Sit: Contact guard assistance        Bed to Chair: Contact guard assistance              Balance:   Sitting: Intact; With support  Standing: Impaired; With support  Standing - Static: Good;Constant support  Standing - Dynamic : Good;Fair;Constant support  Ambulation/Gait Training:  Distance (ft): 5 Feet (ft)  Assistive Device: Gait belt;Walker, rolling  Ambulation - Level of Assistance: Contact guard assistance        Gait Abnormalities: Decreased step clearance              Speed/Ana: Pace decreased (<100 feet/min)                         Functional Measure:  Barthel Index:    Bathin  Bladder: 0  Bowels: 5  Groomin  Dressin  Feeding: 10  Mobility: 0  Stairs: 0  Toilet Use: 5  Transfer (Bed to Chair and Back): 10  Total: 40/100       The Barthel ADL Index: Guidelines  1. The index should be used as a record of what a patient does, not as a record of what a patient could do. 2. The main aim is to establish degree of independence from any help, physical or verbal, however minor and for whatever reason. 3. The need for supervision renders the patient not independent. 4. A patient's performance should be established using the best available evidence. Asking the patient, friends/relatives and nurses are the usual sources, but direct observation and common sense are also important. However direct testing is not needed. 5. Usually the patient's performance over the preceding 24-48 hours is important, but occasionally longer periods will be relevant. 6. Middle categories imply that the patient supplies over 50 per cent of the effort. 7. Use of aids to be independent is allowed. Holli Ramirez., Barthel, D.W. ([id number]). Functional evaluation: the Barthel Index. [address] [facility] (14)2. Hermon Deiters olivia [place], J.J.M.F, [place]., Masha Figueroa, [place], [address] [place] [address] (). Measuring the change indisability after inpatient rehabilitation; comparison of the responsiveness of the Barthel Index and Functional [place] Measure. Journal of Neurology, Neurosurgery, and Psychiatry, 66(4), 155-987. Alessandra Ha N.J.A, Cristian Byers,  JORDY.J.M, & Jeffrey Caldwell, MMannyA. (2004.) Assessment of post-stroke quality of life in cost-effectiveness studies: The usefulness of the Barthel Index and the EuroQoL-5D.  Quality of Life Research, 15, 734-53        Physical Therapy Evaluation Charge Determination   History Examination Presentation Decision-Making   HIGH Complexity :3+ comorbidities / personal factors will impact the outcome/ POC  MEDIUM Complexity : 3 Standardized tests and measures addressing body structure, function, activity limitation and / or participation in recreation  MEDIUM Complexity : Evolving with changing characteristics  Other outcome measures Barthel 40  MEDIUM      Based on the above components, the patient evaluation is determined to be of the following complexity level: MEDIUM    Pain Rating:  None reported    Activity Tolerance:   Fair, Poor, and signs and symptoms of orthostatic hypotension    After treatment patient left in no apparent distress:   Sitting in chair, Call bell within reach, Bed / chair alarm activated, and Caregiver / family present    COMMUNICATION/EDUCATION:   The patients plan of care was discussed with: Occupational therapist and Registered nurse. Fall prevention education was provided and the patient/caregiver indicated understanding. and Patient/family agree to work toward stated goals and plan of care.     Thank you for this referral.  Bashir Levi, PT   Time Calculation: 27 mins

## 2021-01-06 NOTE — PROGRESS NOTES
1/6/2021 -   LAITH:  - RUR: 18%  - Potential disposition is for return to [address] with transport via daughter     - PT and OT pending  - Potential cardiac surgical plan is pending patient decision   - Patient is S/P Cardiac Cath 1/5    CM notes that patient has been upgraded to INPATIENT status:    Medicare pt has received, reviewed, and signed 2nd IM letter informing them of their right to appeal the discharge. Signed copy has been placed on pt bedside chart. CRM: Shmuel Batista, MPH, CHES; Z: [phone]    16:05 -   CM contacted [facility], Resident Care Director: [phone]) and left a  requesting a return call regarding patient's anticipated return to the facility and if patient will need a COVID test prior to return. CRM: Shmuel Batista, MPH, CHES; Z: [phone]    16:15 -   CM received return call from Bashir Nam. Patient will need a COVID test within 24 hours of discharge; Rapid Test is fine with the facility. Attending is aware.     Report will need to be called to: [phone], Wellness Office  CRM: Shmuel Batista MPH, [address] [facility]; Z: [phone]

## 2021-01-06 NOTE — PROGRESS NOTES
Bedside and Verbal shift change report given to Malcolm Banuelos RN (oncoming nurse) by Paradise Groves RN (offgoing nurse). Report included the following information SBAR, Kardex, Procedure Summary, Intake/Output, MAR, Recent Results and Cardiac Rhythm V-Paced.

## 2021-01-06 NOTE — PROGRESS NOTES
Problem: Self Care Deficits Care Plan (Adult)  Goal: *Acute Goals and Plan of Care (Insert Text)  Description:   FUNCTIONAL STATUS PRIOR TO ADMISSION: Pt reports living at [facility], with Mod [place] for RW dressing and Mod A for bathing x3 weekly with caregiver assist.      HOME SUPPORT: The patient lived with long-term assist, as well as, PRN assist from local daughter. Occupational Therapy Goals  Initiated 1/6/2021  1. Patient will perform EOB/RW lower body dressing with modified independence within 7 day(s). 2.  Patient will perform RW grooming with modified independence within 7 day(s). 3.  Patient will perform RW toilet transfers with modified independence within 7 day(s). 4.  Patient will perform all aspects of toileting, at RW, with modified independence within 7 day(s). 5.  Patient will utilize energy conservation techniques during functional activities with Min verbal cues within 7 day(s). Outcome: Not Met  OCCUPATIONAL THERAPY EVALUATION  Patient: Fara Odell (54 y.o. male)  Date: 1/6/2021  Primary Diagnosis: NSTEMI (non-ST elevated myocardial infarction) ([facility] [address] [I21.4]  Procedure(s) (LRB):  LEFT HEART CATH / CORONARY ANGIOGRAPHY (N/A) 1 Day Post-Op   Precautions:   Fall    ASSESSMENT  Based on the objective data described below, the patient presents with decreased higher level mobility/balance, higher level activity tolerance and decreased distal LE ADL management, all of which limits pt's ability to complete self-care routine at level congruent with PLOF. Currently, pt benefits from [place] for self-feeding, CGA for RW grooming, S/U for UB dressing, Moderate A for LE dressing and bathing and Min A for toileting. Pt very pleasant and participatory with OT evaluation; however, noted with [facility] even with hearing aids in, benefiting from raised voice.   Pt benefits from skilled OT to address above listed functional deficits in an overall attempt at maximizing highest level of safe functional independence prior to discharge, with reporting therapist believing pt would benefit from return to [facility] with [facility] services upon discharge. Vitals:    01/06/21 0949 01/06/21 0953 01/06/21 0957 01/06/21 0959   BP: 120/68 102/68 (!) 97/56 108/61   BP 1 Location: Left arm Left arm Left arm Left arm   BP Patient Position: Supine  Comment: at EOB Standing Sitting  Comment: in armchair, legs down Sitting  Comment: armchair with legs elevated   Pulse: 61 62 62 61   Resp:       Temp:       SpO2:       Weight:       Height:            Current Level of Function Impacting Discharge (ADLs/self-care): [place] for self-feeding, CGA for RW grooming, S/U for UB dressing, Moderate A for LE dressing and bathing and Min A for toileting    Functional Outcome Measure: The patient scored 40/100 on the Barthel Index outcome measure. Other factors to consider for discharge: BP, Advanced age     Patient will benefit from skilled therapy intervention to address the above noted impairments. PLAN :  Recommendations and Planned Interventions: self care training, functional mobility training, therapeutic exercise, balance training, therapeutic activities, endurance activities, and patient education    Frequency/Duration: Patient will be followed by occupational therapy 4 times a week to address goals. Recommendation for discharge: (in order for the patient to meet his/her long term goals)  Occupational therapy at least 2 days/week in the home AND ensure assist and/or supervision for safety with ADLs/IADLs    This discharge recommendation:  Has been made in collaboration with the attending provider and/or case management    IF patient discharges home will need the following DME: patient owns DME required for discharge       SUBJECTIVE:   Patient stated I still standing, I think I'm doing well.     OBJECTIVE DATA SUMMARY:   HISTORY:   Past Medical History:   Diagnosis Date    Atrial fibrillation (HCC)     Chronic obstructive pulmonary disease (HCC)     Essential hypertension     Hyperlipidemia     Long term (current) use of anticoagulants     Pacemaker     Rosacea 2016     Past Surgical History:   Procedure Laterality Date    HX CORONARY STENT PLACEMENT      HX HEART CATHETERIZATION      HX PACEMAKER      MA REMVL PERM PM PLS GEN W/REPL PLSE GEN 2 LEAD SYS N/A 2/6/2020    REMOVE & REPLACE PPM GEN DUAL LEAD performed by Khadar Kyle MD at [facility] CATH LAB       Expanded or extensive additional review of patient history:     Home Situation  Home Environment: [address] [place] [address] Road Name: [facility]  # Steps to Enter: 0  One/Two Story Residence: One story  Living Alone: No  Support Systems: Assisted living, Child(nunu)  Patient Expects to be Discharged to[de-identified] Assisted living  Current DME Used/Available at Home: Walker, rolling, Shower chair, Grab bars(transport W/C)  Tub or Shower Type: Shower    Hand dominance: Right    EXAMINATION OF PERFORMANCE DEFICITS:  Cognitive/Behavioral Status:  Neurologic State: Alert  Orientation Level: Oriented X4  Cognition: Follows commands  Perception: Appears intact  Perseveration: No perseveration noted  Safety/Judgement: Awareness of environment    Hearing: Auditory  Auditory Impairment: Hard of hearing, bilateral    Vision/Perceptual:    Corrective Lenses: Glasses    Range of Motion:  AROM: Generally decreased, functional  PROM: Within functional limits    Strength:  Strength: Generally decreased, functional    Coordination:  Coordination: Generally decreased, functional  Fine Motor Skills-Upper: Left Intact; Right Intact    Gross Motor Skills-Upper: Left Intact; Right Intact    Tone & Sensation:  Tone: Normal  Sensation: Intact    Balance:  Sitting: Intact; With support  Standing: Impaired; With support  Standing - Static: Good;Constant support  Standing - Dynamic : Good;Fair;Constant support    Functional Mobility and Transfers for ADLs:  Bed Mobility:  Supine to Sit: Supervision;Bed Modified(head of bed elevated, use of bedrails)  Scooting: Supervision    Transfers:  Sit to Stand: Contact guard assistance  Stand to Sit: Contact guard assistance  Bed to Chair: Contact guard assistance    ADL Assessment:  Feeding: Independent    Oral Facial Hygiene/Grooming: Contact guard assistance(standing at RW)    Bathing: Moderate assistance    Upper Body Dressing: Setup    Lower Body Dressing: Moderate assistance    Toileting: Minimum assistance    ADL Intervention and task modifications:  Patient instructed and indicated understanding the benefits of maintaining activity tolerance, functional mobility, and independence with self care tasks during acute stay  to ensure safe return home and to baseline. Encouraged patient to increase frequency and duration OOB, be out of bed for all meals, perform daily ADLs (as approved by RN/MD regarding bathing etc), and performing functional mobility to/from [facility]. Pt educated on safe transfer techniques, with specific emphasis on proper hand placement to push up from seated surface rather than attempt to pull self up, fully positioning self in-front of desired seated location, feeling chair on back of legs and reaching back with 1-2 UE to slowly lower self to seated position. Cognitive Retraining  Safety/Judgement: Awareness of environment    Functional Measure:  Barthel Index:    Bathin  Bladder: 0  Bowels: 5  Groomin  Dressin  Feeding: 10  Mobility: 0  Stairs: 0  Toilet Use: 5  Transfer (Bed to Chair and Back): 10  Total: 40/100        The Barthel ADL Index: Guidelines  1. The index should be used as a record of what a patient does, not as a record of what a patient could do. 2. The main aim is to establish degree of independence from any help, physical or verbal, however minor and for whatever reason. 3. The need for supervision renders the patient not independent.   4. A patient's performance should be established using the best available evidence. Asking the patient, friends/relatives and nurses are the usual sources, but direct observation and common sense are also important. However direct testing is not needed.  5. Usually the patient's performance over the preceding 24-48 hours is important, but occasionally longer periods will be relevant.  6. Middle categories imply that the patient supplies over 50 per cent of the effort.  7. Use of aids to be independent is allowed.    Miguel Elam., BarthelDANIELLE. (1965). Functional evaluation: the Barthel Index. Md State Med J (14)2.  JOHNNIE Lewis, CARMEN Rangel., DAYSI Harrison., NOÉ Awad. (1999). Measuring the change indisability after inpatient rehabilitation; comparison of the responsiveness of the Barthel Index and Functional [place] Measure. Journal of Neurology, Neurosurgery, and Psychiatry, 66(4), 480-484.  Van RAI RomeroJ.A, RADHA Hernandez, & Sandy MBYRON. (2004.) Assessment of post-stroke quality of life in cost-effectiveness studies: The usefulness of the Barthel Index and the EuroQoL-5D. Quality of Life Research, 13, 427-43     Occupational Therapy Evaluation Charge Determination   History Examination Decision-Making   LOW Complexity : Brief history review  MEDIUM Complexity : 3-5 performance deficits relating to physical, cognitive , or psychosocial skils that result in activity limitations and / or participation restrictions LOW Complexity : No comorbidities that affect functional and no verbal or physical assistance needed to complete eval tasks       Based on the above components, the patient evaluation is determined to be of the following complexity level: LOW   Pain Rating:  No c/o pain    Activity Tolerance:   Fair, requires rest breaks and signs and symptoms of orthostatic hypotension    After treatment patient left in no apparent distress:    Sitting in chair, Call bell within reach, Bed / chair alarm activated, and Caregiver / family  present    COMMUNICATION/EDUCATION:   The patients plan of care was discussed with: Physical therapist, Registered nurse, and Case management. Home safety education was provided and the patient/caregiver indicated understanding., Patient/family have participated as able in goal setting and plan of care. , and Patient/family agree to work toward stated goals and plan of care. This patients plan of care is appropriate for delegation to INDU.     Thank you for this referral.  Brandi Sandoval OT  Time Calculation: 41 mins

## 2021-01-06 NOTE — CONSULTS
CSS   History and Physical    Subjective:      Nigel Shell is a 80 y.o. male who was referred by Dr. Issac العلي for CAD/NSTEMI. He has a PMH significant for CAD s/p stenting, Atrial fibrillation on Eliquis, COPD, HTN, HLD, PPM.     He was admitted on 1/4 after having chest pain in the middle of the night which woke him from sleep. He describes this pain as severe, tightness with throbbing with radiation into his left arm. The pain was associated with shortness of breath and weakness. He denies nausea, vomiting, dizziness, syncope, or diaphoresis. This was the only recent time he reports chest pain. He does endorse some recent fatigue but felt that was due to his advancing age. He was still actively participating in PT/OT at his assisted living facility. He is a retired  who lives alone at an assisted living facility. His daughter lives nearby and is available to assist.    Cardiac Testing    Cardiac catheterization: 1/5/21  Conclusion       · LM CAD with severe LAD and LCx stenosis  · Mild LV systolic dysfunction by echocardiogram     Cardiologist: Ethel Persaud MD     Right groin access. Manual pressure hold  Tolerated well  60 cc contrast  447 cGy, 5.3 mins     Left subclavian and LIMA patent         Complications    Complications documented before study signed (1/5/2021 72:08 PM)     No complications were associated with this study. Documented by Savannah Sainz MD - 1/5/2021 12:35 PM      Coronary Findings    Diagnostic  Dominance: Right  Left Main   The vessel was visualized by angiography and is large. The vessel is severely calcified. Ost LM lesion, 70% stenosed. Left Anterior Descending   The vessel was visualized by angiography and is moderate in size. The vessel is severely calcified. Prox LAD to Mid LAD lesion, 70% stenosed. Mid LAD lesion, 50% stenosed. The lesion was previously treated using a stent (unknown type).    First Diagonal Branch   1st Diag lesion, 100% stenosed. Left Circumflex   The vessel was visualized by angiography and is moderate in size. The vessel is severely calcified. First Obtuse Marginal Branch   1st Mrg lesion, 70% stenosed. Second Obtuse Marginal Branch   2nd Mrg lesion, 70% stenosed. Right Coronary Artery   The vessel was visualized by angiography and is moderate in size. There is moderate disease. The diseased area appears to be diffuse. Disease is noted to be 40%. The vessel is severely calcified. Intervention    No interventions have been documented. ECHO: 1/4/21  Interpretation Summary    · Contrast used: DEFINITY. · LV: Estimated LVEF is 40 - 45% with anteroapical hypokinesis. Echo Findings    Left Ventricle Normal cavity size and wall thickness. The estimated EF is 40 - 45%. Mildly reduced systolic function. There is mild (grade 1) left ventricular diastolic dysfunction. Wall Scoring The following segments are hypokinetic: apical anterior, apical septal, apical inferior, apical lateral and apex. All other segments are normal.            Left Atrium Mildly dilated left atrium. Right Ventricle Normal cavity size, wall thickness and global systolic function. Right Atrium Normal cavity size. Aortic Valve Normal valve structure, trileaflet valve structure and no stenosis. Trace aortic valve regurgitation. Mitral Valve Normal valve structure and no stenosis. Trace regurgitation. Tricuspid Valve Normal valve structure and no stenosis. Trace regurgitation. Pulmonic Valve The pulmonic valve was not assessed. Pulmonic valve not well visualized. Aorta Normal aortic root, ascending aortic, and aortic arch. Pericardium Insignificant pericardial effusion or fat.          Past Medical History:   Diagnosis Date    Atrial fibrillation ([address]     Chronic obstructive pulmonary disease ([address]     Essential hypertension     Hyperlipidemia     Long term (current) use of anticoagulants     Pacemaker     Rosacea 2016 Past Surgical History:   Procedure Laterality Date    HX CORONARY STENT PLACEMENT      HX HEART CATHETERIZATION      HX PACEMAKER      NV REMVL PERM PM PLS GEN W/REPL PLSE GEN 2 LEAD SYS N/A 2/6/2020    REMOVE & REPLACE PPM GEN DUAL LEAD performed by Bety Hawley MD at [address] CATH LAB      Social History     Tobacco Use    Smoking status: Never Smoker    Smokeless tobacco: Never Used   Substance Use Topics    Alcohol use: No     Alcohol/week: 0.0 standard drinks      Family History   Problem Relation Age of Onset    Stroke Mother     Hypertension Mother     Kidney Disease Father      Prior to Admission medications    Medication Sig Start Date End Date Taking? Authorizing Provider   metoprolol tartrate (LOPRESSOR) 25 mg tablet Take 50 mg by mouth two (2) times a day. Yes Provider, Historical   amLODIPine (NORVASC) 5 mg tablet TAKE ONE TABLET BY MOUTH DAILY 6/11/20  Yes Mikey Hilliard NP   nitroglycerin (NITROSTAT) 0.4 mg SL tablet 1 Tab by SubLINGual route every five (5) minutes as needed for Chest Pain. 7/5/19  Yes Dada Aparicio NP   antiox #8/om3/dha/epa/lut/zeax (PRESERVISION AREDS 2, OMEGA-3, PO) Take 1 Cap by mouth two (2) times a day. Yes Provider, Historical   ipratropium (ATROVENT) 0.03 % nasal spray 2 Sprays by Both Nostrils route two (2) times daily as needed for Rhinitis. Yes Provider, Historical   mometasone (NASONEX) 50 mcg/actuation nasal spray 2 Sprays by Both Nostrils route daily. Yes Provider, Historical   furosemide (LASIX) 20 mg tablet Take 10 mg by mouth daily. Yes Other, MD Di   metroNIDAZOLE (METROCREAM) 0.75 % topical cream Apply  to affected area nightly. Use a thin layer to face after washing  Indications: a skin condition on the cheeks and nose with a reddish rash and acne called acne rosacea   Yes Provider, Historical   apixaban (ELIQUIS) 2.5 mg tablet Take 1 Tab by mouth two (2) times a day.  1/25/17  Yes Bety Hawley MD   tamsulosin (FLOMAX) 0.4 mg capsule Take 0.4 mg by mouth nightly. Yes Provider, Historical   levothyroxine (LEVOTHROID) 50 mcg tablet Take 50 mcg by mouth Daily (before breakfast). Yes Provider, Historical   omeprazole (PRILOSEC) 20 mg capsule Take 20 mg by mouth daily. Yes Provider, Historical   simvastatin (ZOCOR) 40 mg tablet Take 20 mg by mouth nightly. Yes Provider, Historical   Multivits with Min-FA-Lycopene (MEN'S DAILY MULTIVIT-MINERAL) 0.4-600 mg-mcg tab Take 1 Tab by mouth daily. Yes Provider, Historical       No Known Allergies      Review of Systems:   Consititutional: Denies fever or chills. +fatigue  Eyes:  +Glasses  ENT:  +[place]  CV: +CP, + HLD, +HTN  Resp: +dyspnea, denies productive cough. : Denies dialysis or kidney problems. GI: Denies ulcers, esophageal strictures, liver problems. M/S: Denies joint or bone problems, or implanted artificial hardware. Skin: Denies varicose veins, edema. Neuro: Denies strokes, or TIAs. Psych: Denies anxiety or depression. Endocrine: Denies thyroid problems or diabetes. Heme/Lymphatic: Denies easy bruising or lymphedema. Objective:     VS:   Visit Vitals  /61 (BP 1 Location: Left arm, BP Patient Position: Sitting)   Pulse 61   Temp 97.7 °F (36.5 °C)   Resp 16   Ht 5' 9\" (1.753 m)   Wt 205 lb 4 oz (93.1 kg)   SpO2 98%   BMI 30.31 kg/m²       Physical Exam:    General appearance: alert, cooperative, no distress  Head: normocephalic, without obvious abnormality; atraumatic  Eyes: conjunctivae/corneas clear; EOM's intact. Nose: nares normal; no drainage. Neck: no carotid bruit and no JVD  Lungs: clear to auscultation bilaterally  Heart: regular rate and rhythm; no murmur  Abdomen: soft, non-tender; bowel sounds normal  Extremities: moves all extremities; no weakness. Skin: Skin color normal; No varicose veins.  +Trace LE edema  Neurologic: Grossly normal      Labs:   Recent Labs     01/06/21  0452 01/04/21  0242 01/04/21  0242   WBC 8.1   < > 8.1   HGB 13.3   < > 12.9   HCT 40.8   < > 40.9   *   < > 136*      < > 144   K 4.1   < > 3.7   BUN 24*   < > 28*   CREA 1.34*   < > 1.78*   GLU 91   < > 118*   INR  --   --  1.1    < > = values in this interval not displayed. Diagnostics:   PA and lateral: 21  IMPRESSION:     Increased bilateral interstitial and patchy airspace opacities can be seen with  atypical/viral infection or pulmonary edema. Carotid doppler: None    PFTS-FEV1: None    EK21  Ventricular-paced rhythm with occasional premature ventricular complexes     Assessment:     Active Problems:    NSTEMI (non-ST elevated myocardial infarction) ([facility] [address] (2019)        Plan:   The risk and benefit of surgery were reviewed with patient and family and all questions answered. Risk include infection, bleeding, stroke, heart attack, irregular heart rhythm, kidney failure and death. ST[facility] Adult Cardiac Surgery Database Version 4.20  RISK SCORES  Procedure: Isolated CAB  Risk of Mortality: 11.554%  Renal Failure: 8.802%  Permanent Stroke: 2.621%  Prolonged Ventilation: 15.300%  DSW Infection: 0.325%  Reoperation: 3.042%  Morbidity or Mortality: 23.165%  Short Length of Stay: 10.943%  Long Length of Stay: 18.982%      Treatment Plan:      1. CAD/NSTEMI: LM  CAD with severe LAD and LCx stenosis. On ASA, Statin, BB, PRN NTG. Currently Pain free. Discussed options of Medical Tx v. Impella assisted PCI v. CABG. High-prohibitive surgical risk. Patient is still discussing with his family but feels they are not considering CABG. 2. HTN: Controlled on current medications. On BB, Norvasc, Lasix    3. HLD: On Statin    4. Atrial Fibrillation: Currently paced. On BB, Eliquis    5. COPD: Not currently on any treatment    6. Hypothyroid: On synthroid    7. PPM in place    Discussed with Dr. Ciera Best. Cardiac Surgery will sign off. Signed By: Caleb Rosado NP     2021      Saw patient. History and films reviewed.  Patient seen by PA/NP and agree with above.    Findings/Conditions: CAD / left main disease  Plan of Care: discussed with patient and family - proceed with Impella supported PCI; available if axillary Impella needed    Risk of morbidity and mortality - high  Medical decision making - high complexity

## 2021-01-06 NOTE — PROGRESS NOTES
[facility]  Hospitalist Group                                                                                          Hospitalist Progress Note  Jessy Nassar NP  Answering service: [phone] OR [phone] from in house phone        Date of Service:  2021  NAME:  Madonna Betancourt  :  1922  MRN:  [id number]      Admission Summary:   Per H&P: Milady Wang a 80 y. o. male has a past medical history of atrial fibrillation, conduction disorder requiring permanent pacemaker, CAD status post stenting, COPD, hypertension, hyperlipidemia, who is being admitted for an NSTEMI. Patient states that he has been in his routine self for the past few weeks. [facility] states he has been actively participating in PT and OT, and that he has felt overall well. [facility] states he has felt an increase in fatigue over the course of the past year, which he has believed is due to his age. [facility] states he walks with a walker and he never has any chest pain. Patient awoke from sleep in the middle of the night tonight, with chest pain unlike any pain he is experienced before. [facility] states he went through the \"routine procedure at the home\" to get taken to the hospital. [facility] states he is currently not any pain after receiving aspirin in the ER. He has a chronic cough, but has had no increased cough or sputum.  He has no fevers, chills, muscle aches, body aches, or other sick symptoms. [facility] does live in the [facility] assisted living    Interval history / Subjective:    Seen and examined patient sitting in bedside chair. Daughter at bedside. Patient states that he is doing good. Has not had any chest pain or shortness of breath. Per daughter, They spoke with cardiac surgery and they are going to proceed with medical management for now. If patient starts to experience chest pain again, they will reevaluate decision at that time. No new complaints. No overnight events noted. Assessment & Plan:     1. Ana Lopez   NSTEMI, POA  - History of CAD, atrial fibrillation, AV node dysfunction status post PPM  - Cardiac cath 01/06- Showing severe left main  Stenosis 70%. There is further high-grade narrowing of 70% in the proximal LAD. Occluded D1. Severe 70% narrowing in OM1 and OM 2. RCA is heavily calcified with noncritical stenosis. He has known mild LV dysfunction by echocardiogram.  - Cardiology following   - Cardiac surgery consulted. - Continue telemetry      2.  Atrial fibrillation, AV node dysfunction, CAD, PPM, POA  - Continue to hold Eliquis,   - Cardiology following      3.  COPD  - No recent increase in cough, sputum production  - No symptoms     4.  Hypertension, hyperlipidemia, POA  - Continue meds as above    5. CKD   - Stable  - Creatinine 1.34  - Monitor labs.      Code status: DNR   DVT prophylaxis: Heparin     Care Plan discussed with: Patient/Family, Nurse and   Anticipated Disposition: Return to [facility]  Anticipated Discharge: 24 hours to 48 hours     Hospital Problems  Date Reviewed: 5/13/2020          Codes Class Noted POA    NSTEMI (non-ST elevated myocardial infarction) [facility]) ICD-10-CM: I21.4  ICD-9-CM: 410.70  7/17/2019 Unknown                Review of Systems:   A comprehensive review of systems was negative except for that written in the HPI. Vital Signs:    Last 24hrs VS reviewed since prior progress note. Most recent are:  Visit Vitals  /63 (BP 1 Location: Left arm, BP Patient Position: Sitting)   Pulse 60   Temp 97.9 °F (36.6 °C)   Resp 15   Ht 5' 9\" (1.753 m)   Wt 93.1 kg (205 lb 4 oz)   SpO2 99%   BMI 30.31 kg/m²       No intake or output data in the 24 hours ending 01/06/21 0242     Physical Examination:             Constitutional:  No acute distress, cooperative, pleasant, answers questions    ENT:  Oral mucosa moist, oropharynx benign. Resp:  CTA bilaterally. No wheezing/rhonchi/rales.  No accessory muscle use   CV:  Regular rhythm, normal rate, no murmurs, gallops, rubs    GI:  Soft, non distended, non tender. normoactive bowel sounds, no hepatosplenomegaly     Musculoskeletal:  No edema, warm, 2+ pulses throughout    Neurologic:  Moves all extremities. AAOx3, CN II-XII reviewed, follows commands     Psych:  Good insight, Not anxious nor agitated. Data Review:    Review and/or order of clinical lab test  Review and/or order of tests in the radiology section of [facility]  Review and/or order of tests in the medicine section of [facility]      Labs:     Recent Labs     01/06/21 0452 01/05/21 0444   WBC 8.1 8.4   HGB 13.3 13.8   HCT 40.8 42.5   * 135*     Recent Labs     01/06/21 0452 01/05/21 0444 01/04/21  0242    140 144   K 4.1 3.7 3.7   * 109* 110*   CO2 25 27 28   BUN 24* 23* 28*   CREA 1.34* 1.35* 1.78*   GLU 91 90 118*   CA 8.9 8.8 8.9   MG 1.9 2.0  --    PHOS  --  3.0  --      Recent Labs     01/06/21 0452 01/04/21  0345 01/04/21  0242   ALT 21  --  20   AP 94  --  93   TBILI 1.0  --  0.6   TP 6.1*  --  6.7   ALB 3.1*  --  3.5   GLOB 3.0  --  3.2   LPSE  --  131  --      Recent Labs     01/05/21 0444 01/04/21 2224 01/04/21 1641 01/04/21  0242   INR  --   --   --  1.1   PTP  --   --   --  11.7*   APTT 100.2* 76.9* 63.0* 29.0      No results for input(s): FE, TIBC, PSAT, FERR in the last 72 hours. No results found for: FOL, RBCF   No results for input(s): PH, PCO2, PO2 in the last 72 hours.   Recent Labs     01/04/21 2224 01/04/21 1641 01/04/21  0954   TROIQ 4.51* 6.79* 5.94*     Lab Results   Component Value Date/Time    Cholesterol, total 111 02/10/2020 02:39 AM    HDL Cholesterol 41 02/10/2020 02:39 AM    LDL, calculated 52.4 02/10/2020 02:39 AM    Triglyceride 88 02/10/2020 02:39 AM    CHOL/HDL Ratio 2.7 02/10/2020 02:39 AM     Lab Results   Component Value Date/Time    Glucose (POC) 90 02/11/2020 06:19 AM    Glucose (POC) 121 (H) 02/10/2020 09:45 PM    Glucose (POC) 91 02/10/2020 04:41 PM    Glucose (POC) 135 (H) 02/10/2020 12:00 PM    Glucose (POC) 93 02/10/2020 06:31 AM     Lab Results   Component Value Date/Time    Color YELLOW/STRAW 12/13/2020 01:23 AM    Appearance CLEAR 12/13/2020 01:23 AM    Specific gravity 1.015 12/13/2020 01:23 AM    Specific gravity 1.011 07/03/2019 02:12 PM    pH (UA) 6.0 12/13/2020 01:23 AM    Protein Negative 12/13/2020 01:23 AM    Glucose Negative 12/13/2020 01:23 AM    Ketone TRACE (A) 12/13/2020 01:23 AM    Bilirubin Negative 12/13/2020 01:23 AM    Urobilinogen 2.0 (H) 12/13/2020 01:23 AM    Nitrites Negative 12/13/2020 01:23 AM    Leukocyte Esterase Negative 12/13/2020 01:23 AM    Epithelial cells FEW 12/13/2020 01:23 AM    Bacteria Negative 12/13/2020 01:23 AM    WBC 0-4 12/13/2020 01:23 AM    RBC 0-5 12/13/2020 01:23 AM         Medications Reviewed:     Current Facility-Administered Medications   Medication Dose Route Frequency   • heparin (porcine) injection 5,000 Units  5,000 Units SubCUTAneous Q12H   • 0.9% sodium chloride infusion  50 mL/hr IntraVENous CONTINUOUS   • sodium chloride (NS) flush 5-40 mL  5-40 mL IntraVENous Q8H   • sodium chloride (NS) flush 5-40 mL  5-40 mL IntraVENous PRN   • sodium chloride (NS) flush 5-40 mL  5-40 mL IntraVENous Q8H   • sodium chloride (NS) flush 5-40 mL  5-40 mL IntraVENous PRN   • acetaminophen (TYLENOL) tablet 650 mg  650 mg Oral Q6H PRN    Or   • acetaminophen (TYLENOL) suppository 650 mg  650 mg Rectal Q6H PRN   • polyethylene glycol (MIRALAX) packet 17 g  17 g Oral DAILY PRN   • promethazine (PHENERGAN) tablet 12.5 mg  12.5 mg Oral Q6H PRN    Or   • ondansetron (ZOFRAN) injection 4 mg  4 mg IntraVENous Q6H PRN   • nicotine (NICODERM CQ) 14 mg/24 hr patch 1 Patch  1 Patch TransDERmal DAILY PRN   • sodium chloride (NS) flush 5-40 mL  5-40 mL IntraVENous Q8H   • sodium chloride (NS) flush 5-40 mL  5-40 mL IntraVENous PRN   • nitroglycerin (NITROSTAT) tablet 0.4 mg  0.4 mg SubLINGual Q5MIN PRN   • aspirin chewable tablet 81 mg  81 mg Oral DAILY   • amLODIPine (NORVASC) tablet 5  mg  5 mg Oral DAILY    [Held by provider] furosemide (LASIX) tablet 10 mg  10 mg Oral DAILY    levothyroxine (SYNTHROID) tablet 50 mcg  50 mcg Oral ACB    metoprolol tartrate (LOPRESSOR) tablet 25 mg  25 mg Oral BID    pantoprazole (PROTONIX) tablet 20 mg  20 mg Oral ACB    atorvastatin (LIPITOR) tablet 40 mg  40 mg Oral DAILY    tamsulosin (FLOMAX) capsule 0.4 mg  0.4 mg Oral QHS    morphine injection 2 mg  2 mg IntraVENous Q4H PRN     ______________________________________________________________________  EXPECTED LENGTH OF STAY: - - -  ACTUAL LENGTH OF STAY:          1                 Deon Baron NP

## 2021-01-07 LAB
ALBUMIN SERPL-MCNC: 3 G/DL (ref 3.5–5)
ALBUMIN/GLOB SERPL: 1.3 {RATIO} (ref 1.1–2.2)
ALP SERPL-CCNC: 91 U/L (ref 45–117)
ALT SERPL-CCNC: 20 U/L (ref 12–78)
ANION GAP SERPL CALC-SCNC: 6 MMOL/L (ref 5–15)
AST SERPL-CCNC: 30 U/L (ref 15–37)
BASOPHILS # BLD: 0 K/UL (ref 0–0.1)
BASOPHILS NFR BLD: 0 % (ref 0–1)
BILIRUB SERPL-MCNC: 1 MG/DL (ref 0.2–1)
BUN SERPL-MCNC: 23 MG/DL (ref 6–20)
BUN/CREAT SERPL: 17 (ref 12–20)
CALCIUM SERPL-MCNC: 8.8 MG/DL (ref 8.5–10.1)
CHLORIDE SERPL-SCNC: 109 MMOL/L (ref 97–108)
CO2 SERPL-SCNC: 26 MMOL/L (ref 21–32)
CREAT SERPL-MCNC: 1.33 MG/DL (ref 0.7–1.3)
DIFFERENTIAL METHOD BLD: ABNORMAL
EOSINOPHIL # BLD: 0.4 K/UL (ref 0–0.4)
EOSINOPHIL NFR BLD: 5 % (ref 0–7)
ERYTHROCYTE [DISTWIDTH] IN BLOOD BY AUTOMATED COUNT: 13.5 % (ref 11.5–14.5)
GLOBULIN SER CALC-MCNC: 2.4 G/DL (ref 2–4)
GLUCOSE SERPL-MCNC: 77 MG/DL (ref 65–100)
HCT VFR BLD AUTO: 38.4 % (ref 36.6–50.3)
HGB BLD-MCNC: 12.5 G/DL (ref 12.1–17)
IMM GRANULOCYTES # BLD AUTO: 0 K/UL (ref 0–0.04)
IMM GRANULOCYTES NFR BLD AUTO: 0 % (ref 0–0.5)
LYMPHOCYTES # BLD: 1.1 K/UL (ref 0.8–3.5)
LYMPHOCYTES NFR BLD: 14 % (ref 12–49)
MAGNESIUM SERPL-MCNC: 1.9 MG/DL (ref 1.6–2.4)
MCH RBC QN AUTO: 31.6 PG (ref 26–34)
MCHC RBC AUTO-ENTMCNC: 32.6 G/DL (ref 30–36.5)
MCV RBC AUTO: 97 FL (ref 80–99)
MONOCYTES # BLD: 0.7 K/UL (ref 0–1)
MONOCYTES NFR BLD: 9 % (ref 5–13)
NEUTS SEG # BLD: 5.7 K/UL (ref 1.8–8)
NEUTS SEG NFR BLD: 72 % (ref 32–75)
NRBC # BLD: 0 K/UL (ref 0–0.01)
NRBC BLD-RTO: 0 PER 100 WBC
PLATELET # BLD AUTO: 116 K/UL (ref 150–400)
PMV BLD AUTO: 10.3 FL (ref 8.9–12.9)
POTASSIUM SERPL-SCNC: 4.1 MMOL/L (ref 3.5–5.1)
PROT SERPL-MCNC: 5.4 G/DL (ref 6.4–8.2)
RBC # BLD AUTO: 3.96 M/UL (ref 4.1–5.7)
SODIUM SERPL-SCNC: 141 MMOL/L (ref 136–145)
WBC # BLD AUTO: 8 K/UL (ref 4.1–11.1)

## 2021-01-07 PROCEDURE — 65660000000 HC RM CCU STEPDOWN

## 2021-01-07 PROCEDURE — 74011250636 HC RX REV CODE- 250/636: Performed by: NURSE PRACTITIONER

## 2021-01-07 PROCEDURE — 97116 GAIT TRAINING THERAPY: CPT

## 2021-01-07 PROCEDURE — 87635 SARS-COV-2 COVID-19 AMP PRB: CPT

## 2021-01-07 PROCEDURE — 85025 COMPLETE CBC W/AUTO DIFF WBC: CPT

## 2021-01-07 PROCEDURE — 99233 SBSQ HOSP IP/OBS HIGH 50: CPT | Performed by: INTERNAL MEDICINE

## 2021-01-07 PROCEDURE — 74011250637 HC RX REV CODE- 250/637: Performed by: NURSE PRACTITIONER

## 2021-01-07 PROCEDURE — 97530 THERAPEUTIC ACTIVITIES: CPT

## 2021-01-07 PROCEDURE — 80053 COMPREHEN METABOLIC PANEL: CPT

## 2021-01-07 PROCEDURE — 74011250637 HC RX REV CODE- 250/637: Performed by: INTERNAL MEDICINE

## 2021-01-07 PROCEDURE — 36415 COLL VENOUS BLD VENIPUNCTURE: CPT

## 2021-01-07 PROCEDURE — 83735 ASSAY OF MAGNESIUM: CPT

## 2021-01-07 PROCEDURE — 97535 SELF CARE MNGMENT TRAINING: CPT

## 2021-01-07 RX ORDER — METOPROLOL SUCCINATE 25 MG/1
25 TABLET, EXTENDED RELEASE ORAL EVERY EVENING
Status: DISCONTINUED | OUTPATIENT
Start: 2021-01-07 | End: 2021-01-08 | Stop reason: HOSPADM

## 2021-01-07 RX ADMIN — HEPARIN SODIUM 5000 UNITS: 5000 INJECTION INTRAVENOUS; SUBCUTANEOUS at 16:27

## 2021-01-07 RX ADMIN — PANTOPRAZOLE SODIUM 20 MG: 20 TABLET, DELAYED RELEASE ORAL at 08:42

## 2021-01-07 RX ADMIN — ASPIRIN 81 MG: 81 TABLET, CHEWABLE ORAL at 08:52

## 2021-01-07 RX ADMIN — FUROSEMIDE 10 MG: 20 TABLET ORAL at 08:52

## 2021-01-07 RX ADMIN — ATORVASTATIN CALCIUM 40 MG: 40 TABLET, FILM COATED ORAL at 08:52

## 2021-01-07 RX ADMIN — Medication 10 ML: at 21:40

## 2021-01-07 RX ADMIN — METOPROLOL TARTRATE 25 MG: 25 TABLET, FILM COATED ORAL at 08:52

## 2021-01-07 RX ADMIN — METOPROLOL SUCCINATE 25 MG: 25 TABLET, EXTENDED RELEASE ORAL at 18:15

## 2021-01-07 RX ADMIN — LEVOTHYROXINE SODIUM 50 MCG: 0.1 TABLET ORAL at 08:43

## 2021-01-07 RX ADMIN — AMLODIPINE BESYLATE 5 MG: 5 TABLET ORAL at 08:52

## 2021-01-07 RX ADMIN — HEPARIN SODIUM 5000 UNITS: 5000 INJECTION INTRAVENOUS; SUBCUTANEOUS at 05:28

## 2021-01-07 RX ADMIN — Medication 10 ML: at 13:21

## 2021-01-07 RX ADMIN — TAMSULOSIN HYDROCHLORIDE 0.4 MG: 0.4 CAPSULE ORAL at 21:40

## 2021-01-07 RX ADMIN — Medication 10 ML: at 08:44

## 2021-01-07 NOTE — PROGRESS NOTES
Interventional cardiology progress note     Seen at the request of Dr. Beatriz Santos.  Mr. Aileen Odell presented to the hospital with acute onset chest discomfort with low to moderate elevation in troponins. His chest discomfort has since subsided. Patient's echocardiogram, troponin trends as well as recent cardiac catheterization images since Dr. Petty Shown was not available. In summary Mr. Aileen Odell is a reasonably functional 80-year-old with history of prior coronary stenting, pacemaker placement. I suspect he either had a non-ST elevation myocardial infarction from possible subtotal subacute occlusion of diagonal or alternatively had acute stress cardiomyopathy based on appearance of his echocardiogram.  He does have significant ostial and proximal left main disease mild nonsignificant distal left main disease severe mid LAD disease as well as calcified 70% disease in 2 marginal branches of circumflex. All the segments appear relatively chronic except possibly in diagonal and the mid LAD lesions. Nonetheless presence of apical lateral and apical inferior hypokinesis and mild basal hyperkinesis suggests that stress cardiomyopathy is as feasible  differential diagnosis as much as ischemic LV dysfunction. Since the patient is not having any further chest discomfort, it would be reasonable to continue medical therapy for potential acute coronary syndrome. If he has recurrent chest discomfort, consideration can be made to perform percutaneous coronary intervention on mid LAD and ostial left main lesion and deferring rest of the segmental stenosis. I feel coronary artery bypass grafting would be very high risk may not be the best option given his age. I had extensive discussion with the patient, daughter and granddaughter about natural course of his coronary artery disease.   Family agrees to defer bypass at this point of time and will  decide between continued medical therapy versus downstream PCI if needed based on clinical course. If there is in fact a stress cardiomyopathy we will see improvement in his LV systolic function in about 4 to 6 weeks. /72 (BP 1 Location: Left arm, BP Patient Position: At rest)   Pulse 60   Temp 97.8 °F (36.6 °C)   Resp 16   Ht 5' 9\" (1.753 m)   Wt 205 lb 4 oz (93.1 kg)   SpO2 98%   BMI 30.31 kg/m²   General:    Alert, cooperative, no distress. Psychiatric:    Normal Mood and affect    Eye/ENT:      Pupils equal, No asymmetry, Conjunctival pink. Hearing loss   Lungs:      Visibly symmetric chest expansion, No palpable tenderness. Clear to auscultation bilaterally. Heart[de-identified]    Regular rate and rhythm, S1, S2 normal, no murmur, click, rub or gallop. No JVD, Normal palpable peripheral pulses. No cyanosis   Abdomen:     Soft, non-tender. Bowel sounds normal. No masses,  No      organomegaly. Extremities:   Extremities normal, atraumatic, no edema. Neurologic:   CN II-XII grossly intact.  No gross focal deficits

## 2021-01-07 NOTE — PROGRESS NOTES
Problem: Mobility Impaired (Adult and Pediatric)  Goal: *Acute Goals and Plan of Care (Insert Text)  Description: FUNCTIONAL STATUS PRIOR TO ADMISSION: Patient was modified independent using a rolling walker and single point cane for functional mobility. HOME SUPPORT PRIOR TO ADMISSION: The patient lived at [facility] with some assistance from staff as needed. Physical Therapy Goals  Initiated 1/6/2021  1. Patient will move from supine to sit and sit to supine , scoot up and down, and roll side to side in bed with independence within 7 day(s). 2.  Patient will transfer from bed to chair and chair to bed with modified independence using the least restrictive device within 7 day(s). 3.  Patient will perform sit to stand with modified independence within 7 day(s). 4.  Patient will ambulate with modified independence for 150 feet with the least restrictive device within 7 day(s). Outcome: Progressing Towards Goal    PHYSICAL THERAPY TREATMENT  Patient: Rufina Paredes (60 y.o. male)  Date: 1/7/2021  Diagnosis: NSTEMI (non-ST elevated myocardial infarction) ([facility] [address] [I21.4] <principal problem not specified>  Procedure(s) (LRB):  LEFT HEART CATH / CORONARY ANGIOGRAPHY (N/A) 2 Days Post-Op  Precautions: Fall  Chart, physical therapy assessment, plan of care and goals were reviewed. ASSESSMENT  Patient continues with skilled PT services and is progressing towards goals. Pt was able to increase gait tolerance with rolling walker. Pt required v.c for sequencing for sit to stand. Pt lives in [place]. and may require increase assistance at discharge with HHPT     Current Level of Function Impacting Discharge (mobility/balance): Min A    Other factors to consider for discharge: AL, may need more assistance at discharge. PLAN :  Patient continues to benefit from skilled intervention to address the above impairments. Continue treatment per established plan of care. to address goals.     Recommendation for discharge: (in order for the patient to meet his/her long term goals)  Physical therapy at least 2 days/week in the home AND ensure assist and/or supervision for safety with mobility as needed     This discharge recommendation:  Has not yet been discussed the attending provider and/or case management    IF patient discharges home will need the following DME: patient owns DME required for discharge       SUBJECTIVE:   Patient stated I need to use the bathroom.     OBJECTIVE DATA SUMMARY:   Critical Behavior:  Neurologic State: Alert  Orientation Level: Oriented X4  Cognition: Follows commands  Safety/Judgement: Awareness of environment  Functional Mobility Training:  Bed Mobility:     Supine to Sit: Minimum assistance              Transfers:  Sit to Stand: Contact guard assistance  Stand to Sit: Contact guard assistance         Pt had a BM required assistance for self care, pt was able. Balance:  Sitting: Intact  Standing: Impaired  Standing - Static: Constant support;Good(rolling walker)  Standing - Dynamic : Constant support; Fair  Ambulation/Gait Training:  Distance (ft): 12 Feet (ft)(x2 to bathroom and back)  Assistive Device: Gait belt;Walker, rolling  Ambulation - Level of Assistance: Contact guard assistance        Gait Abnormalities: Decreased step clearance              Speed/Ana: Shuffled; Slow                         Stairs: Therapeutic Exercises:   Seated AP. LAQ, marches    Pain Rating:  No complaints       Activity Tolerance:   Fair    After treatment patient left in no apparent distress:   Sitting in chair, Call bell within reach, Bed / chair alarm activated, and Caregiver / family present    COMMUNICATION/COLLABORATION:   The patients plan of care was discussed with: Registered nurse.      Alejandra Perez PTA   Time Calculation: 25 mins

## 2021-01-07 NOTE — PROGRESS NOTES
Cardiology Progress Note            Admit Date: 1/4/2021  Admit Diagnosis: NSTEMI (non-ST elevated myocardial infarction) [facility]) [I21.4]  Date: 1/7/2021     Time: 9:45 AM    HPI: Helen Mejia is a 80 y.o. male admitted on 1/4/2021  for NSTEMI (non-ST elevated myocardial infarction) ([facility] [address] [I21.4]. Has PMH of PAF on eliquis, HTN, PPM COPD,CKD,  HLD. Has history of NSTEMI in 7/2019 with mildly elevated troponin peak of 0.39 and pt had no further chest pain, thus medical management was recommended. He does have prior history of CAD with stent to LAD several years prior to 2019 (done in [place]). . Presented 1/4/20 with chief c/o midsternal chest pain radiating to left arm. Ruled in for NSTEMI and has new reduced LV function with anteriorapical hypokinesis. [facility] 1/6/20 with  LM stenosis, high-grade narrowing of 70% in the proximal LAD. Occluded D1. Severe 70% narrowing in OM1 and OM 2. RCA is heavily calcified with noncritical stenosis.         Subjective: Denies any further chest pain or SOB. Walked with PT yesterday and he said he did fine, no chest pain or SOB. Assessment and Plan     1. NSTEMI/CAD:    -[facility] with severe LM disease, LAD and OM1, OM2 disease.               -Echo EF 40-45%, anteroapical hypokinesis.               -No further chest pain   -Would not advise CABG due to high risk. Craig Diaz evaluation. Agree, medical management is reasonable at this time since he has had no further chest discomfort/symptoms. -If symptoms reoccur, may consider PCI to mid LAD and ostial LM       -Continue ASA, statin, Lopressor                 2. Cardiomyopathy, ischemic vs stress CM   -EF 40-45%   -Continue lopressor for now with plans for change to Toprol XL prior to discharge.   -Lasix 10 mg daily po.     4. CKD:    -Creatinine stable 1.33           5. HLD              -Lipitor 40 mg daily     5.  HTN: -Controlled              -Continue lopressor and amlodipine.              -no ACE-I/ARB due to renal dysfunction     6. History of PAF:   -currently v-paced rhythm   -Off heparin gtt. Plan to restart Eliquis at discharge    7. History of PPM   -follows with Dr. Afia Trammell. 8. Advanced directives:   -DNR    80 y.o. male with cardiomyopathy and multivessel CAD, NSTEMI. No further chest pain. Plan to continue medical management as he has had no further ACS symptoms. Continue to monitor, possible dc as early as tomorrow if continues to have no chest pain symptoms. Jen Wetzel. BRIAN Everett    Saw and evaluated pt and agree with above assessment and plan. Had NSTEMI, apical hypokinesis on echo and found to have severe CAD involving LAD/diagonal. Not a good candidate for CABG given age and appreciate CT surgery input. Had out interventionalist Dr. Quang Gonzalez review his case in detail and discussed options going forward. As pt was feeling better, recommended continued medical therapy versus downstream PCI if needed based on clinical course. If he does well with PT/OT here without symptoms, will continue medical tx. If he develops symptom and is limited/restricted, will consider PCI. Did ok yesterday. Continue to increase activity today and tomorrow. If continues without symptoms, possible dc tomorrow on medical tx. Will need close outpt follow up with his primary cardiologist Dr. Afia Trammell. Kar Duran MD    Cardiac testing:  [facility] 1/5/2020. Findings show severe left main stenosis 70%. There is further high-grade narrowing of 70% in the proximal LAD. Occluded D1. Severe 70% narrowing in OM1 and OM 2. RCA is heavily calcified with noncritical stenosis  01/04/21   ECHO ADULT COMPLETE 01/04/2021 1/4/2021    Narrative · Contrast used: DEFINITY. · LV: Estimated LVEF is 40 - 45% with anteroapical hypokinesis. Signed by: Dulce Claude, MD     2/3/20 echo:  · Image quality for this study was technically difficult.   · Normal cavity size and systolic function (ejection fraction normal). Mildly increased wall thickness. Estimated left ventricular ejection fraction is 55 - 60%. Visually measured ejection fraction. Abnormal left ventricular wall motion. · Mild aortic valve sclerosis. Mild aortic valve regurgitation is present. · Mild ascending aorta dilatation.        PMH  Past Medical History:   Diagnosis Date    Atrial fibrillation ([facility] [address]     [facility]     Essential hypertension     Hyperlipidemia     Long term (current) use of anticoagulants     Pacemaker     Rosacea 2016      Social Hx  Social History     Socioeconomic History    Marital status:      Spouse name: Not on file    Number of children: Not on file    Years of education: Not on file    Highest education level: Not on file   Occupational History    Not on file   Social Needs    Financial resource strain: Not on file    Food insecurity     Worry: Not on file     Inability: Not on file    Transportation needs     Medical: Not on file     Non-medical: Not on file   Tobacco Use    Smoking status: Never Smoker    Smokeless tobacco: Never Used   Substance and Sexual Activity    Alcohol use: No     Alcohol/week: 0.0 standard drinks    Drug use: No    Sexual activity: Not on file   Lifestyle    Physical activity     Days per week: Not on file     Minutes per session: Not on file    Stress: Not on file   Relationships    Social connections     Talks on phone: Not on file     Gets together: Not on file     Attends [employer] service: Not on file     Active member of club or organization: Not on file     Attends meetings of clubs or organizations: Not on file     Relationship status: Not on file    Intimate partner violence     Fear of current or ex partner: Not on file     Emotionally abused: Not on file     Physically abused: Not on file     Forced sexual activity: Not on file   Other Topics Concern    Not on file Social History Narrative    Not on file   Objective:      Physical Exam:                Visit Vitals  BP 99/60 (BP 1 Location: Left arm, BP Patient Position: Sitting) Comment: BP taken twice, RN notified. Pulse 60   Temp 98.5 °F (36.9 °C)   Resp 18   Ht 5' 9\" (1.753 m)   Wt 179 lb 14.3 oz (81.6 kg)   SpO2 100%   BMI 26.57 kg/m²          General Appearance:   Well developed, well nourished,alert and oriented x 3, and   individual in no acute distress. Ears/Nose/Mouth/Throat:    [place]. Neck:  Supple. Chest:    Lungs clear bilaterally. No use of accessory muscles. .   Cardiovascular:   Regular rate and rhythm, S1, S2 normal, no murmur. Abdomen:    Soft, non-tender, bowel sounds are active. Extremities:  no BLE edema. Skin:  Warm and dry. Telemetry:           Data Review:    Labs:    Recent Results (from the past 24 hour(s))   MAGNESIUM    Collection Time: 01/07/21  3:54 AM   Result Value Ref Range    Magnesium 1.9 1.6 - 2.4 mg/dL   CBC WITH AUTOMATED DIFF    Collection Time: 01/07/21  3:54 AM   Result Value Ref Range    WBC 8.0 4.1 - 11.1 K/uL    RBC 3.96 (L) 4.10 - 5.70 M/uL    HGB 12.5 12.1 - 17.0 g/dL    HCT 38.4 36.6 - 50.3 %    MCV 97.0 80.0 - 99.0 FL    MCH 31.6 26.0 - 34.0 PG    MCHC 32.6 30.0 - 36.5 g/dL    RDW 13.5 11.5 - 14.5 %    PLATELET 525 (L) 970 - 400 K/uL    MPV 10.3 8.9 - 12.9 FL    NRBC 0.0 0  WBC    ABSOLUTE NRBC 0.00 0.00 - 0.01 K/uL    NEUTROPHILS 72 32 - 75 %    LYMPHOCYTES 14 12 - 49 %    MONOCYTES 9 5 - 13 %    EOSINOPHILS 5 0 - 7 %    BASOPHILS 0 0 - 1 %    IMMATURE GRANULOCYTES 0 0.0 - 0.5 %    ABS. NEUTROPHILS 5.7 1.8 - 8.0 K/UL    ABS. LYMPHOCYTES 1.1 0.8 - 3.5 K/UL    ABS. MONOCYTES 0.7 0.0 - 1.0 K/UL    ABS. EOSINOPHILS 0.4 0.0 - 0.4 K/UL    ABS. BASOPHILS 0.0 0.0 - 0.1 K/UL    ABS. IMM.  GRANS. 0.0 0.00 - 0.04 K/UL    DF AUTOMATED     METABOLIC PANEL, COMPREHENSIVE    Collection Time: 01/07/21  3:54 AM   Result Value Ref Range    Sodium 141 136 - 145 mmol/L    Potassium 4.1 3.5 - 5.1 mmol/L    Chloride 109 (H) 97 - 108 mmol/L    CO2 26 21 - 32 mmol/L    Anion gap 6 5 - 15 mmol/L    Glucose 77 65 - 100 mg/dL    BUN 23 (H) 6 - 20 MG/DL    Creatinine 1.33 (H) 0.70 - 1.30 MG/DL    BUN/Creatinine ratio 17 12 - 20      GFR est AA >60 >60 ml/min/1.73m2    GFR est non-AA 50 (L) >60 ml/min/1.73m2    Calcium 8.8 8.5 - 10.1 MG/DL    Bilirubin, total 1.0 0.2 - 1.0 MG/DL    ALT (SGPT) 20 12 - 78 U/L    AST (SGOT) 30 15 - 37 U/L    Alk.  phosphatase 91 45 - 117 U/L    Protein, total 5.4 (L) 6.4 - 8.2 g/dL    Albumin 3.0 (L) 3.5 - 5.0 g/dL    Globulin 2.4 2.0 - 4.0 g/dL    A-G Ratio 1.3 1.1 - 2.2            Radiology:        Current Facility-Administered Medications   Medication Dose Route Frequency    heparin (porcine) injection 5,000 Units  5,000 Units SubCUTAneous Q12H    sodium chloride (NS) flush 5-40 mL  5-40 mL IntraVENous Q8H    acetaminophen (TYLENOL) tablet 650 mg  650 mg Oral Q6H PRN    Or    acetaminophen (TYLENOL) suppository 650 mg  650 mg Rectal Q6H PRN    polyethylene glycol (MIRALAX) packet 17 g  17 g Oral DAILY PRN    promethazine (PHENERGAN) tablet 12.5 mg  12.5 mg Oral Q6H PRN    Or    ondansetron (ZOFRAN) injection 4 mg  4 mg IntraVENous Q6H PRN    nicotine (NICODERM CQ) 14 mg/24 hr patch 1 Patch  1 Patch TransDERmal DAILY PRN    sodium chloride (NS) flush 5-40 mL  5-40 mL IntraVENous PRN    nitroglycerin (NITROSTAT) tablet 0.4 mg  0.4 mg SubLINGual Q5MIN PRN    aspirin chewable tablet 81 mg  81 mg Oral DAILY    amLODIPine (NORVASC) tablet 5 mg  5 mg Oral DAILY    furosemide (LASIX) tablet 10 mg  10 mg Oral DAILY    levothyroxine (SYNTHROID) tablet 50 mcg  50 mcg Oral ACB    metoprolol tartrate (LOPRESSOR) tablet 25 mg  25 mg Oral BID    pantoprazole (PROTONIX) tablet 20 mg  20 mg Oral ACB    atorvastatin (LIPITOR) tablet 40 mg  40 mg Oral DAILY    tamsulosin (FLOMAX) capsule 0.4 mg  0.4 mg Oral QHS    morphine injection 2 mg  2 mg IntraVENous Q4H PRN     Prior to Admission Medications   Prescriptions Last Dose Informant Patient Reported? Taking? Multivits with Min-FA-Lycopene (MEN'S DAILY MULTIVIT-MINERAL) 0.4-600 mg-mcg tab  Child Yes Yes   Sig: Take 1 Tab by mouth daily. amLODIPine (NORVASC) 5 mg tablet   No Yes   Sig: TAKE ONE TABLET BY MOUTH DAILY   antiox #8/om3/dha/epa/lut/zeax (PRESERVISION AREDS 2, OMEGA-3, PO)  Child Yes Yes   Sig: Take 1 Cap by mouth two (2) times a day. apixaban (ELIQUIS) 2.5 mg tablet  Child No Yes   Sig: Take 1 Tab by mouth two (2) times a day. furosemide (LASIX) 20 mg tablet  Child Yes Yes   Sig: Take 10 mg by mouth daily. ipratropium (ATROVENT) 0.03 % nasal spray  Child Yes Yes   Si Sprays by Both Nostrils route two (2) times daily as needed for Rhinitis. levothyroxine (LEVOTHROID) 50 mcg tablet  Child Yes Yes   Sig: Take 50 mcg by mouth Daily (before breakfast). metoprolol tartrate (LOPRESSOR) 25 mg tablet   Yes Yes   Sig: Take 50 mg by mouth two (2) times a day. metroNIDAZOLE (METROCREAM) 0.75 % topical cream  Child Yes Yes   Sig: Apply  to affected area nightly. Use a thin layer to face after washing  Indications: a skin condition on the cheeks and nose with a reddish rash and acne called acne rosacea   mometasone (NASONEX) 50 mcg/actuation nasal spray  Child Yes Yes   Si Sprays by Both Nostrils route daily. nitroglycerin (NITROSTAT) 0.4 mg SL tablet   No Yes   Si Tab by SubLINGual route every five (5) minutes as needed for Chest Pain. omeprazole (PRILOSEC) 20 mg capsule  Child Yes Yes   Sig: Take 20 mg by mouth daily. simvastatin (ZOCOR) 40 mg tablet  Child Yes Yes   Sig: Take 20 mg by mouth nightly. tamsulosin (FLOMAX) 0.4 mg capsule  Child Yes Yes   Sig: Take 0.4 mg by mouth nightly. Facility-Administered Medications: None          Mikey Bradshaw.  BRIAN Everett     [facility] [address] [place] [address], [address] [facility] [address]   Josue Renee   [phone]

## 2021-01-07 NOTE — PROGRESS NOTES
Problem: Pressure Injury - Risk of  Goal: *Prevention of pressure injury  Description: Document Thanh Scale and appropriate interventions in the flowsheet. Outcome: Progressing Towards Goal  Note: Pressure Injury Interventions:  Sensory Interventions: Assess changes in LOC, Keep linens dry and wrinkle-free, Minimize linen layers    Moisture Interventions: Absorbent underpads, Check for incontinence Q2 hours and as needed    Activity Interventions: Pressure redistribution bed/mattress(bed type), PT/OT evaluation, Increase time out of bed    Mobility Interventions: HOB 30 degrees or less, Pressure redistribution bed/mattress (bed type), PT/OT evaluation, Turn and reposition approx. every two hours(pillow and wedges)    Nutrition Interventions: Document food/fluid/supplement intake, Offer support with meals,snacks and hydration    Friction and Shear Interventions: HOB 30 degrees or less, Lift sheet, Minimize layers                Problem: Falls - Risk of  Goal: *Absence of Falls  Description: Document Jaqui Fall Risk and appropriate interventions in the flowsheet.   Outcome: Progressing Towards Goal  Note: Fall Risk Interventions:  Mobility Interventions: Patient to call before getting OOB, PT Consult for mobility concerns, PT Consult for assist device competence, OT consult for ADLs, Communicate number of staff needed for ambulation/transfer, Bed/chair exit alarm    Mentation Interventions: Adequate sleep, hydration, pain control, Bed/chair exit alarm, Door open when patient unattended, Increase mobility, More frequent rounding, Reorient patient, Toileting rounds, Update white board    Medication Interventions: Bed/chair exit alarm, Evaluate medications/consider consulting pharmacy, Patient to call before getting OOB, Teach patient to arise slowly    Elimination Interventions: Bed/chair exit alarm, Call light in reach, Patient to call for help with toileting needs, Toileting schedule/hourly rounds

## 2021-01-07 NOTE — PROGRESS NOTES
1/7/2021 -   LAITH:  - RUR: 17%  -  Potential disposition is for return to [address] with transport via daughter   - Patient will need a COVID test within 24 hours of discharge; attending is aware  - Patient received IM Letter 1/6    - Family has decided to defer CABG    10:20 -   CM met with patient's daughter at bedside to discuss disposition planning. Daughter is aware the [facility] can accept patient back once a COVID test has been completed. CM to follow for COVID testing and results.     Report will need to be called to: [phone], Wellness Office  CRM: Kyleigh Spencer, MPH, [place]; Z: [phone]

## 2021-01-07 NOTE — PROGRESS NOTES
Problem: Self Care Deficits Care Plan (Adult)  Goal: *Acute Goals and Plan of Care (Insert Text)  Description:   FUNCTIONAL STATUS PRIOR TO ADMISSION: Pt reports living at [facility], with Mod [place] for RW dressing and Mod A for bathing x3 weekly with caregiver assist.      HOME SUPPORT: The patient lived with [facility] assist, as well as, PRN assist from local daughter.    Occupational Therapy Goals  Initiated 1/6/2021  1.  Patient will perform EOB/RW lower body dressing with modified independence within 7 day(s).  2.  Patient will perform RW grooming with modified independence within 7 day(s).  3.  Patient will perform RW toilet transfers with modified independence within 7 day(s).  4.  Patient will perform all aspects of toileting, at RW, with modified independence within 7 day(s).  5.  Patient will utilize energy conservation techniques during functional activities with Min verbal cues within 7 day(s).            Outcome: Progressing Towards Goal  OCCUPATIONAL THERAPY TREATMENT  Patient: Orville King (89 y.o. male)  Date: 1/7/2021  Diagnosis: NSTEMI (non-ST elevated myocardial infarction) ([facility]) [I21.4] <principal problem not specified>  Procedure(s) (LRB):  LEFT HEART CATH / CORONARY ANGIOGRAPHY (N/A) 2 Days Post-Op  Precautions: Fall  Chart, occupational therapy assessment, plan of care, and goals were reviewed.    ASSESSMENT  Patient continues with skilled OT services and is progressing towards goals.  Patient able to complete seated UB/LB ADL with set up, close supervision for standing aspects of tasks. Used walker in stand for support. Patient demonstrates good safety awareness during activity, has good insight into deficits.     Current Level of Function Impacting Discharge (ADLs): Set up, close supervision for ADL tasks in supported stand (with walker or grab bar)    Other factors to consider for discharge: [place]           PLAN :  Patient continues to benefit from skilled intervention to  address the above impairments. Continue treatment per established plan of care. to address goals. Recommend with staff: ADL participation    Recommend next OT session: Continue POC     Recommendation for discharge: (in order for the patient to meet his/her long term goals)  Occupational therapy at least 2 days/week in the home     This discharge recommendation:  Has not yet been discussed the attending provider and/or case management    IF patient discharges home will need the following DME: patient owns DME required for discharge       SUBJECTIVE:   Patient stated I used to make the bed for the OT that came to my home.     OBJECTIVE DATA SUMMARY:   Cognitive/Behavioral Status:  Neurologic State: Alert  Orientation Level: Oriented X4  Cognition: Follows commands             Functional Mobility and Transfers for ADLs:  Bed Mobility:  Supine to Sit: Minimum assistance    Transfers:  Sit to Stand: Contact guard assistance          Balance:  Sitting: Intact  Standing: Impaired  Standing - Static: Constant support;Good(rolling walker)  Standing - Dynamic : Constant support; Fair    ADL Intervention:            Upper Body Bathing  Bathing Assistance: Set-up  Position Performed: Seated in chair    Lower Body Bathing  Perineal  : Supervision  Position Performed: Standing  Adaptive Equipment: Walker  Lower Body : Set-up  Position Performed: Seated in chair         Lower Body Dressing Assistance  Socks: Stand-by assistance  Leg Crossed Method Used: Yes  Position Performed: Seated in chair            Pain:  0/10    Activity Tolerance:   tolerates ADLs without rest breaks    After treatment patient left in no apparent distress:   Sitting in chair, Call bell within reach, Bed / chair alarm activated, and Caregiver / family present    COMMUNICATION/COLLABORATION:   The patients plan of care was discussed with: Physical therapy assistant and Registered nurse.      Francisco Sales OT  Time Calculation: 33 mins

## 2021-01-07 NOTE — PROGRESS NOTES
[facility] Adult  Hospitalist Group                                                                                          Hospitalist Progress Note  Javan Smith MD  Answering service: [phone] from in house phone        Date of Service:  2021  NAME:  Alyce Caro  :  1922  MRN:  [id number]      Admission Summary:   Per H&P: Genet Layton a 80 y. o. male has a past medical history of atrial fibrillation, conduction disorder requiring permanent pacemaker, CAD status post stenting, COPD, hypertension, hyperlipidemia, who is being admitted for an NSTEMI. Patient states that he has been in his routine self for the past few weeks. Alicia Mahajan states he has been actively participating in PT and OT, and that he has felt overall well. Alicia Mahajan states he has felt an increase in fatigue over the course of the past year, which he has believed is due to his age. Alicia Mahajan states he walks with a walker and he never has any chest pain. Patient awoke from sleep in the middle of the night tonight, with chest pain unlike any pain he is experienced before. Alicia Mahajan states he went through the \"routine procedure at the home\" to get taken to the hospital. Alicia Mahajan states he is currently not any pain after receiving aspirin in the ER. He has a chronic cough, but has had no increased cough or sputum.  He has no fevers, chills, muscle aches, body aches, or other sick symptoms. Alicia Mahajan does live in the [facility] assisted living    Interval history / Subjective:    Seen and examined patient . Daughter at bedside. Denied any chest pain,SOB today     Assessment & Plan:     1. .  NSTEMI:   - History of CAD, atrial fibrillation, AV node dysfunction status post PPM  - Cardiac cath - Showing severe left main  Stenosis 70%. There is further high-grade narrowing of 70% in the proximal LAD. Occluded D1. Severe 70% narrowing in OM1 and OM 2. RCA is heavily calcified with noncritical stenosis.   He has known mild LV dysfunction by echocardiogram.  -conservative medical management -cardiology following  -on aspirin,statin,metoprolol  -Monitor for any symptoms today per cards    2.  Atrial fibrillation, AV node dysfunction, CAD, PPM, POA  - will resume  Eliquis at discharge,   - Cardiology following      3.  COPD  - No recent increase in cough, sputum production       4.  Hypertension, hyperlipidemia, POA  - Continue meds as above    5. CKD   - Stable  - Creatinine 1.34       Code status: DNR   DVT prophylaxis: Heparin     Care Plan discussed with: Patient/Family, Nurse and   Anticipated Disposition: Return to [facility]  Anticipated Discharge: 24 hours to 48 hours     Hospital Problems  Date Reviewed: 5/13/2020          Codes Class Noted POA    NSTEMI (non-ST elevated myocardial infarction) [facility]) ICD-10-CM: I21.4  ICD-9-CM: 410.70  7/17/2019 Unknown                Review of Systems:   A comprehensive review of systems was negative except for that written in the HPI. Vital Signs:    Last 24hrs VS reviewed since prior progress note. Most recent are:  Visit Vitals  /77 (BP 1 Location: Left arm, BP Patient Position: At rest;Supine)   Pulse 61   Temp 97.8 °F (36.6 °C)   Resp 18   Ht 5' 9\" (1.753 m)   Wt 81.6 kg (179 lb 14.3 oz)   SpO2 100%   BMI 26.57 kg/m²         Intake/Output Summary (Last 24 hours) at 1/7/2021 1735  Last data filed at 1/7/2021 0858  Gross per 24 hour   Intake 120 ml   Output    Net 120 ml        Physical Examination:             Constitutional:  No acute distress, cooperative, pleasant, answers questions    ENT:  Oral mucosa moist, oropharynx benign. Resp:  CTA bilaterally. No wheezing/rhonchi/rales. No accessory muscle use   CV:  Regular rhythm, normal rate, no murmurs, gallops, rubs    GI:  Soft, non distended, non tender. normoactive bowel sounds, no hepatosplenomegaly     Musculoskeletal:  No edema, warm, 2+ pulses throughout    Neurologic:  Moves all extremities.   AAOx3, CN II-XII reviewed, follows commands     Psych:  Good insight, Not anxious nor agitated. Data Review:    Review and/or order of clinical lab test  Review and/or order of tests in the radiology section of CPT  Review and/or order of tests in the medicine section of CPT      Labs:     Recent Labs     01/07/21 0354 01/06/21 0452   WBC 8.0 8.1   HGB 12.5 13.3   HCT 38.4 40.8   * 130*     Recent Labs     01/07/21  0354 01/06/21 0452 01/05/21  0444    140 140   K 4.1 4.1 3.7   * 109* 109*   CO2 26 25 27   BUN 23* 24* 23*   CREA 1.33* 1.34* 1.35*   GLU 77 91 90   CA 8.8 8.9 8.8   MG 1.9 1.9 2.0   PHOS  --   --  3.0     Recent Labs     01/07/21 0354 01/06/21 0452   ALT 20 21   AP 91 94   TBILI 1.0 1.0   TP 5.4* 6.1*   ALB 3.0* 3.1*   GLOB 2.4 3.0     Recent Labs     01/05/21 0444 01/04/21  2224   APTT 100.2* 76.9*      No results for input(s): FE, TIBC, PSAT, FERR in the last 72 hours. No results found for: FOL, RBCF   No results for input(s): PH, PCO2, PO2 in the last 72 hours.   Recent Labs     01/04/21  2224   TROIQ 4.51*     Lab Results   Component Value Date/Time    Cholesterol, total 111 02/10/2020 02:39 AM    HDL Cholesterol 41 02/10/2020 02:39 AM    LDL, calculated 52.4 02/10/2020 02:39 AM    Triglyceride 88 02/10/2020 02:39 AM    CHOL/HDL Ratio 2.7 02/10/2020 02:39 AM     Lab Results   Component Value Date/Time    Glucose (POC) 90 02/11/2020 06:19 AM    Glucose (POC) 121 (H) 02/10/2020 09:45 PM    Glucose (POC) 91 02/10/2020 04:41 PM    Glucose (POC) 135 (H) 02/10/2020 12:00 PM    Glucose (POC) 93 02/10/2020 06:31 AM     Lab Results   Component Value Date/Time    Color YELLOW/STRAW 12/13/2020 01:23 AM    Appearance CLEAR 12/13/2020 01:23 AM    Specific gravity 1.015 12/13/2020 01:23 AM    Specific gravity 1.011 07/03/2019 02:12 PM    pH (UA) 6.0 12/13/2020 01:23 AM    Protein Negative 12/13/2020 01:23 AM    Glucose Negative 12/13/2020 01:23 AM    Ketone TRACE (A) 12/13/2020 01:23 AM Bilirubin Negative 12/13/2020 01:23 AM    Urobilinogen 2.0 (H) 12/13/2020 01:23 AM    Nitrites Negative 12/13/2020 01:23 AM    Leukocyte Esterase Negative 12/13/2020 01:23 AM    Epithelial cells FEW 12/13/2020 01:23 AM    Bacteria Negative 12/13/2020 01:23 AM    WBC 0-4 12/13/2020 01:23 AM    RBC 0-5 12/13/2020 01:23 AM         Medications Reviewed:     Current Facility-Administered Medications   Medication Dose Route Frequency    metoprolol succinate (TOPROL-XL) XL tablet 25 mg  25 mg Oral QPM    heparin (porcine) injection 5,000 Units  5,000 Units SubCUTAneous Q12H    sodium chloride (NS) flush 5-40 mL  5-40 mL IntraVENous Q8H    acetaminophen (TYLENOL) tablet 650 mg  650 mg Oral Q6H PRN    Or    acetaminophen (TYLENOL) suppository 650 mg  650 mg Rectal Q6H PRN    polyethylene glycol (MIRALAX) packet 17 g  17 g Oral DAILY PRN    promethazine (PHENERGAN) tablet 12.5 mg  12.5 mg Oral Q6H PRN    Or    ondansetron (ZOFRAN) injection 4 mg  4 mg IntraVENous Q6H PRN    nicotine (NICODERM CQ) 14 mg/24 hr patch 1 Patch  1 Patch TransDERmal DAILY PRN    sodium chloride (NS) flush 5-40 mL  5-40 mL IntraVENous PRN    nitroglycerin (NITROSTAT) tablet 0.4 mg  0.4 mg SubLINGual Q5MIN PRN    aspirin chewable tablet 81 mg  81 mg Oral DAILY    amLODIPine (NORVASC) tablet 5 mg  5 mg Oral DAILY    furosemide (LASIX) tablet 10 mg  10 mg Oral DAILY    levothyroxine (SYNTHROID) tablet 50 mcg  50 mcg Oral ACB    pantoprazole (PROTONIX) tablet 20 mg  20 mg Oral ACB    atorvastatin (LIPITOR) tablet 40 mg  40 mg Oral DAILY    tamsulosin (FLOMAX) capsule 0.4 mg  0.4 mg Oral QHS    morphine injection 2 mg  2 mg IntraVENous Q4H PRN     ______________________________________________________________________  EXPECTED LENGTH OF STAY: - - -  ACTUAL LENGTH OF STAY:          2                 Destiny Rowley MD

## 2021-01-07 NOTE — PROGRESS NOTES
1100: This nurse spoke with Dr. Adria Ocampo, he said on cardiologist's standpoint, the patient can be discharged tomorrow. Notified the attending physician, Dr. Carlos Timmons. 1325: COVID swab done and hand delivered to the lab. .    1600: Bedside and Verbal shift change report given to Dmitri Yuen (oncoming nurse) by Mikayla Frausto (offgoing nurse). Report included the following information SBAR.

## 2021-01-08 VITALS
HEART RATE: 60 BPM | HEIGHT: 69 IN | SYSTOLIC BLOOD PRESSURE: 152 MMHG | RESPIRATION RATE: 16 BRPM | WEIGHT: 172.84 LBS | TEMPERATURE: 98.4 F | OXYGEN SATURATION: 99 % | BODY MASS INDEX: 25.6 KG/M2 | DIASTOLIC BLOOD PRESSURE: 95 MMHG

## 2021-01-08 LAB
ALBUMIN SERPL-MCNC: 2.9 G/DL (ref 3.5–5)
ALBUMIN/GLOB SERPL: 1 {RATIO} (ref 1.1–2.2)
ALP SERPL-CCNC: 89 U/L (ref 45–117)
ALT SERPL-CCNC: 20 U/L (ref 12–78)
ANION GAP SERPL CALC-SCNC: 6 MMOL/L (ref 5–15)
AST SERPL-CCNC: 25 U/L (ref 15–37)
BASOPHILS # BLD: 0 K/UL (ref 0–0.1)
BASOPHILS NFR BLD: 1 % (ref 0–1)
BILIRUB SERPL-MCNC: 0.7 MG/DL (ref 0.2–1)
BUN SERPL-MCNC: 26 MG/DL (ref 6–20)
BUN/CREAT SERPL: 18 (ref 12–20)
CALCIUM SERPL-MCNC: 8.7 MG/DL (ref 8.5–10.1)
CHLORIDE SERPL-SCNC: 108 MMOL/L (ref 97–108)
CO2 SERPL-SCNC: 26 MMOL/L (ref 21–32)
CREAT SERPL-MCNC: 1.46 MG/DL (ref 0.7–1.3)
DIFFERENTIAL METHOD BLD: ABNORMAL
EOSINOPHIL # BLD: 0.4 K/UL (ref 0–0.4)
EOSINOPHIL NFR BLD: 5 % (ref 0–7)
ERYTHROCYTE [DISTWIDTH] IN BLOOD BY AUTOMATED COUNT: 13.9 % (ref 11.5–14.5)
GLOBULIN SER CALC-MCNC: 2.8 G/DL (ref 2–4)
GLUCOSE SERPL-MCNC: 91 MG/DL (ref 65–100)
HCT VFR BLD AUTO: 39.5 % (ref 36.6–50.3)
HEALTH STATUS, XMCV2T: NORMAL
HGB BLD-MCNC: 12.8 G/DL (ref 12.1–17)
IMM GRANULOCYTES # BLD AUTO: 0 K/UL (ref 0–0.04)
IMM GRANULOCYTES NFR BLD AUTO: 1 % (ref 0–0.5)
LYMPHOCYTES # BLD: 1.3 K/UL (ref 0.8–3.5)
LYMPHOCYTES NFR BLD: 16 % (ref 12–49)
MCH RBC QN AUTO: 31.1 PG (ref 26–34)
MCHC RBC AUTO-ENTMCNC: 32.4 G/DL (ref 30–36.5)
MCV RBC AUTO: 95.9 FL (ref 80–99)
MONOCYTES # BLD: 0.6 K/UL (ref 0–1)
MONOCYTES NFR BLD: 8 % (ref 5–13)
NEUTS SEG # BLD: 5.9 K/UL (ref 1.8–8)
NEUTS SEG NFR BLD: 71 % (ref 32–75)
NRBC # BLD: 0 K/UL (ref 0–0.01)
NRBC BLD-RTO: 0 PER 100 WBC
PLATELET # BLD AUTO: 132 K/UL (ref 150–400)
PMV BLD AUTO: 10.7 FL (ref 8.9–12.9)
POTASSIUM SERPL-SCNC: 4 MMOL/L (ref 3.5–5.1)
PROT SERPL-MCNC: 5.7 G/DL (ref 6.4–8.2)
RBC # BLD AUTO: 4.12 M/UL (ref 4.1–5.7)
SARS-COV-2, COV2: NOT DETECTED
SODIUM SERPL-SCNC: 140 MMOL/L (ref 136–145)
SOURCE, COVRS: NORMAL
SPECIMEN SOURCE, FCOV2M: NORMAL
SPECIMEN TYPE, XMCV1T: NORMAL
WBC # BLD AUTO: 8.3 K/UL (ref 4.1–11.1)

## 2021-01-08 PROCEDURE — 85025 COMPLETE CBC W/AUTO DIFF WBC: CPT

## 2021-01-08 PROCEDURE — 99232 SBSQ HOSP IP/OBS MODERATE 35: CPT | Performed by: INTERNAL MEDICINE

## 2021-01-08 PROCEDURE — 80053 COMPREHEN METABOLIC PANEL: CPT

## 2021-01-08 PROCEDURE — 74011250637 HC RX REV CODE- 250/637: Performed by: HOSPITALIST

## 2021-01-08 PROCEDURE — 74011250637 HC RX REV CODE- 250/637: Performed by: INTERNAL MEDICINE

## 2021-01-08 PROCEDURE — 36415 COLL VENOUS BLD VENIPUNCTURE: CPT

## 2021-01-08 RX ORDER — GUAIFENESIN 100 MG/5ML
81 LIQUID (ML) ORAL DAILY
Qty: 30 TAB | Refills: 0 | Status: SHIPPED | OUTPATIENT
Start: 2021-01-08 | End: 2021-03-23

## 2021-01-08 RX ORDER — LISINOPRIL 2.5 MG/1
2.5 TABLET ORAL DAILY
Qty: 30 TAB | Refills: 0 | Status: SHIPPED | OUTPATIENT
Start: 2021-01-08 | End: 2021-03-23

## 2021-01-08 RX ORDER — GUAIFENESIN 100 MG/5ML
81 LIQUID (ML) ORAL DAILY
Status: SHIPPED | COMMUNITY
Start: 2021-01-08 | End: 2021-01-08

## 2021-01-08 RX ORDER — LISINOPRIL 5 MG/1
2.5 TABLET ORAL DAILY
Status: DISCONTINUED | OUTPATIENT
Start: 2021-01-08 | End: 2021-01-08 | Stop reason: HOSPADM

## 2021-01-08 RX ORDER — METOPROLOL SUCCINATE 25 MG/1
25 TABLET, EXTENDED RELEASE ORAL EVERY EVENING
Qty: 30 TAB | Refills: 0 | Status: SHIPPED | OUTPATIENT
Start: 2021-01-08

## 2021-01-08 RX ADMIN — Medication 10 ML: at 06:46

## 2021-01-08 RX ADMIN — PANTOPRAZOLE SODIUM 20 MG: 20 TABLET, DELAYED RELEASE ORAL at 06:45

## 2021-01-08 RX ADMIN — APIXABAN 2.5 MG: 2.5 TABLET, FILM COATED ORAL at 09:10

## 2021-01-08 RX ADMIN — ASPIRIN 81 MG: 81 TABLET, CHEWABLE ORAL at 09:11

## 2021-01-08 RX ADMIN — AMLODIPINE BESYLATE 5 MG: 5 TABLET ORAL at 09:10

## 2021-01-08 RX ADMIN — LEVOTHYROXINE SODIUM 50 MCG: 0.1 TABLET ORAL at 06:45

## 2021-01-08 RX ADMIN — FUROSEMIDE 10 MG: 20 TABLET ORAL at 09:10

## 2021-01-08 RX ADMIN — ATORVASTATIN CALCIUM 40 MG: 40 TABLET, FILM COATED ORAL at 09:11

## 2021-01-08 RX ADMIN — LISINOPRIL 2.5 MG: 5 TABLET ORAL at 10:58

## 2021-01-08 NOTE — PROGRESS NOTES
1/8/2021 -   LAITH:  - RUR: 16%  - Disposition is for return to [facility] with transport via daughter  - Patient received IM Letter 1/6  - Patient has been cleared for discharge today, 1/8    - Patient has been cleared by Cardio    09:40 -   CM contacted [facility] Michael Kilgore) and confirmed that facility is able to receive patient back today, 1/8. NURSING TO COMPLETE: FAXING MAR, DISCHARGE REPORT, AVS, AND COVID RESULTS TO CONFIRMED FAX F: [phone], REPORT -0353, Bashir Nam with the [address]. Nursing, Attending, and Daughter aware of the above. Daughter is getting patient's medications and will be at the hospital for transport by noon. Care Management Interventions  PCP Verified by CM: Yes  Mode of Transport at Discharge: Other (see comment)(daughter)  Hospital Transport Time of Discharge: 1200  Transition of Care Consult (CM Consult):  Other(TBD)  Physical Therapy Consult: Yes  Occupational Therapy Consult: Yes  Speech Therapy Consult: No  Current Support Network: Assisted Living([facility])  Confirm Follow Up Transport: Family  The Plan for Transition of Care is Related to the Following Treatment Goals : TBD  Discharge Location  Discharge Placement: Assisted Living([place])   CRM: Shmuel Batista, MPH, [address] [place] [address]; Z: [phone]

## 2021-01-08 NOTE — ROUTINE PROCESS
Hospital follow-up PCP transitional care appointment has been scheduled with Dr. Marcella Haines for Jan 11 @ 3PM. Pending patient discharge. Cullen Price, Care Management Specialist. 
 
Hospital follow-up visit has been scheduled with [facility] for 1/10/2020 to address NSTEMI.  Office will contact patient prior to arrival.  Cullen Price, Care Management Specialist.

## 2021-01-08 NOTE — DISCHARGE SUMMARY
Discharge Summary       PATIENT ID: Jasiel Arvizu  MRN: [id number]   YOB: 1922    DATE OF ADMISSION: 1/4/2021  2:25 AM    DATE OF DISCHARGE: 1/8/2021  PRIMARY CARE PROVIDER: Crystal Bautista MD     ATTENDING PHYSICIAN: Paresh Davenport MD    DISCHARGING PROVIDER: Jessica River MD    To contact this individual call [phone] and ask the  to page. If unavailable ask to be transferred the Adult Hospitalist Department. CONSULTATIONS: IP CONSULT TO HOSPITALIST  IP CONSULT TO CARDIOLOGY  IP CONSULT TO CARDIAC SURGERY    PROCEDURES/SURGERIES: Procedure(s):  LEFT HEART CATH / CORONARY ANGIOGRAPHY    ADMITTING [address] [place] [address] COURSE:     DISCHARGE DIAGNOSES / PLAN:      Per H&P: Orville King is a 80 y. o. male has a past medical history of atrial fibrillation, conduction disorder requiring permanent pacemaker, CAD status post stenting, COPD, hypertension, hyperlipidemia came back of chest pain    1. .  NSTEMI:   - History of CAD, atrial fibrillation, AV node dysfunction status post PPM  - Cardiac cath 01/06- Showing severe left main  Stenosis 70%.  There is further high-grade narrowing of 70% in the proximal LAD.  Occluded D1.  Severe 70% narrowing in OM1 and OM 2.  RCA is heavily calcified with noncritical stenosis.  He has known mild LV dysfunction by echocardiogram.  CT suregy consulted-family elected against surgery as he is high risk  -conservative medical management for now   -on aspirin,statin,metoprolol  -OP follow up with  for further management - per cardiology team \"  -If symptoms worsen, may consider PCI to mid LAD and ostial LM \"               # Stress or ischemic cardiomyopathy:  -EF is 40 - 45%  -on lasix,low dose lisinopril,betablocker.     # Atrial fibrillation, AV node dysfunction, CAD, PPM, POA  - on  Eliquis ,rate controlled  - Cardiology following      #  COPD  - prn duonebs        # Hypertension, hyperlipidemia, POA  - Continue meds as above     # CKD   - Stable  - Creatinine 1.34        Cardiac cath:  · LM CAD with severe LAD and LCx stenosis  · Mild LV systolic dysfunction by echocardiogram    Left Ventricle Normal cavity size and wall thickness. The estimated EF is 40 - 45%. Mildly reduced systolic function. There is mild (grade 1) left ventricular diastolic dysfunction. Wall Scoring The following segments are hypokinetic: apical anterior, apical septal, apical inferior, apical lateral and apex. All other segments are normal.            Left Atrium Mildly dilated left atrium. Right Ventricle Normal cavity size, wall thickness and global systolic function. Right Atrium Normal cavity size. Aortic Valve Normal valve structure, trileaflet valve structure and no stenosis. Trace aortic valve regurgitation. Mitral Valve Normal valve structure and no stenosis. Trace regurgitation. Tricuspid Valve Normal valve structure and no stenosis. Trace regurgitation. Pulmonic Valve The pulmonic valve was not assessed. Pulmonic valve not well visualized. Aorta Normal aortic root, ascending aortic, and aortic arch. Pericardium Insignificant pericardial effusion or fat. Ct Head Wo Cont    Result Date: 12/13/2020  EXAM: CT HEAD WO CONT INDICATION: Bilateral lower extremity weakness, lethargy. COMPARISON: CT head on 11/2/2015. CONTRAST: None. TECHNIQUE: Unenhanced CT of the head was performed using 5 mm images. Brain and bone windows were generated. Coronal and sagittal reformats. CT dose reduction was achieved through use of a standardized protocol tailored for this examination and automatic exposure control for dose modulation. FINDINGS: Cerebral volume loss is increased and moderate. No hydrocephalus. Chronic microvascular ischemic disease is increased and at least moderate. There is no intracranial hemorrhage, extra-axial collection, or mass effect. The basilar cisterns are open.  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 No CT evidence of acute infarct. The bone windows demonstrate no abnormalities. The visualized portions of the paranasal sinuses and mastoid air cells are clear. IMPRESSION: No acute intracranial hemorrhage or mass effect. Increased volume loss and chronic microvascular ischemic disease since 5 years ago. Xr Chest Port    Result Date: 1/4/2021  EXAM:  XR CHEST PORT INDICATION: Chest pain COMPARISON: 12/12/2020 TECHNIQUE: Portable AP semiupright chest view at 0241 hours FINDINGS: The right chest ICD and wires are stable. The cardiomediastinal contours are stable. The pulmonary vasculature is within normal limits. There are increased bilateral interstitial and patchy airspace opacities. There is no pleural effusion or pneumothorax. The bones and upper abdomen are stable. IMPRESSION: Increased bilateral interstitial and patchy airspace opacities can be seen with atypical/viral infection or pulmonary edema. Xr Chest Port    Result Date: 12/12/2020  EXAM:  XR CHEST PORT INDICATION: Lethargy and weakness. COMPARISON: Chest views on 2/9/2020 and 1/29/2019. CT chest on 1/27/2019. TECHNIQUE: Upright portable chest AP view FINDINGS: Pacemaker battery pack and leads are unchanged. Cardiac monitoring wires overlie the thorax. Cardiomegaly is unchanged. Aortic arch is atherosclerotic but not enlarged. The pulmonary vasculature is within normal limits. Left lung base opacity is increased. Right lung base scarring is unchanged. Upper lobes are grossly clear. Osteopenia is unchanged. IMPRESSION: Left lung base atelectasis versus pneumonia. Recommend followup PA and lateral chest views in 8-10 weeks to ensure resolution. Echo Adult Complete    Result Date: 1/4/2021  · Contrast used: DEFINITY. · LV: Estimated LVEF is 40 - 45% with anteroapical hypokinesis.       Cardiac Procedure    Result Date: 1/5/2021  · LM CAD with severe LAD and LCx stenosis · Mild LV systolic dysfunction by echocardiogram Cardiologist: Dina Anaya MD Right groin access. Manual pressure hold Tolerated well 60 cc contrast 447 cGy, 5.3 mins Left subclavian and LIMA patent    PENDING TEST RESULTS:   At the time of discharge the following test results are still pending:none    FOLLOW UP APPOINTMENTS:    Follow-up Information     Follow up With Specialties Details Why Contact Info Additional Information    [address] Cardiac Rehabilitation Call AFTER DISCHARGE TO DISCUSS ENROLLMENT IN OP EXERCISE PROGRAM [address] [place], [address]. Please arrive 15 minutes prior to your appointment time and you will register in the [facility], [address], on the first floor of the [address]. Telephone: [phone] Fax: [phone] Driving directions To [facility] [place]. Building: Driving WEST on [address], take exit [address] to [employer]. Turn left onto [address], then turn right into [employer] lot Driving EAST on [address], take [address] [facility]. Turn right at the end of the exit ramp. Turn left onto [address], then turn right into [employer] lot. Adelfo Coppola MD Family Medicine Go on 1/11/2021 Hospital follow up appt has been scheduled for Jan 11 @ 3:00PM [place] Dela Cruz  [phone]       Kevin Reese MD Cardiology In 2 weeks  [address] [place]  [address] [id number] [phone]       [facility], In-Home Clinical Assessments On 1/10/2021 [facility] health appt has been scheduled for Keith 10. The office will call you to confirm an appt time. [facility] [address]  [phone]            ADDITIONAL CARE RECOMMENDATIONS:   -follow up with PCP,cardiologist  -we added lisinopril and changed to long acting metoprolol for systolic heart failure and as you had a heart attack  -Take aspirin daily  -CBC,BMP in 1 week  -if you have severe chest pain or worsening symptoms -return to the hospital.  -check BP every day  -Lisinopril is a new BP medication - if you notice any swelling or allergic reaction -seek medical attention. DIET: Regular Diet      ACTIVITY: Activity as tolerated    WOUND CARE: na    EQUIPMENT needed: na        DISCHARGE MEDICATIONS:  Current Discharge Medication List      START taking these medications    Details   metoprolol succinate (TOPROL-XL) 25 mg XL tablet Take 1 Tab by mouth every evening. Qty: 30 Tab, Refills: 0      lisinopriL (PRINIVIL, ZESTRIL) 2.5 mg tablet Take 1 Tab by mouth daily. Qty: 30 Tab, Refills: 0         CONTINUE these medications which have CHANGED    Details   aspirin 81 mg chewable tablet Take 1 Tab by mouth daily. Qty: 30 Tab, Refills: 0         CONTINUE these medications which have NOT CHANGED    Details   amLODIPine (NORVASC) 5 mg tablet TAKE ONE TABLET BY MOUTH DAILY  Qty: 30 Tab, Refills: 0      nitroglycerin (NITROSTAT) 0.4 mg SL tablet 1 Tab by SubLINGual route every five (5) minutes as needed for Chest Pain. Qty: 1 Bottle, Refills: 1      antiox #8/om3/dha/epa/lut/zeax (PRESERVISION AREDS 2, OMEGA-3, PO) Take 1 Cap by mouth two (2) times a day. ipratropium (ATROVENT) 0.03 % nasal spray 2 Sprays by Both Nostrils route two (2) times daily as needed for Rhinitis. mometasone (NASONEX) 50 mcg/actuation nasal spray 2 Sprays by Both Nostrils route daily. furosemide (LASIX) 20 mg tablet Take 10 mg by mouth daily. metroNIDAZOLE (METROCREAM) 0.75 % topical cream Apply  to affected area nightly. Use a thin layer to face after washing  Indications: a skin condition on the cheeks and nose with a reddish rash and acne called acne rosacea      apixaban (ELIQUIS) 2.5 mg tablet Take 1 Tab by mouth two (2) times a day.   Qty: 60 Tab, Refills: 6    Associated Diagnoses: Paroxysmal atrial fibrillation ([address] tamsulosin (FLOMAX) 0.4 mg capsule Take 0.4 mg by mouth nightly. levothyroxine (LEVOTHROID) 50 mcg tablet Take 50 mcg by mouth Daily (before breakfast). omeprazole (PRILOSEC) 20 mg capsule Take 20 mg by mouth daily. simvastatin (ZOCOR) 40 mg tablet Take 20 mg by mouth nightly. Multivits with Min-FA-Lycopene (MEN'S DAILY MULTIVIT-MINERAL) 0.4-600 mg-mcg tab Take 1 Tab by mouth daily. STOP taking these medications       metoprolol tartrate (LOPRESSOR) 25 mg tablet Comments:   Reason for Stopping:                 NOTIFY YOUR PHYSICIAN FOR ANY OF THE FOLLOWING:   Fever over 101 degrees for 24 hours. Chest pain, shortness of breath, fever, chills, nausea, vomiting, diarrhea, change in mentation, falling, weakness, bleeding. Severe pain or pain not relieved by medications. Or, any other signs or symptoms that you may have questions about. DISPOSITION:  x  Home With:   OT  PT  HH  RN       Long term SNF/Inpatient Rehab    Independent/assisted living    Hospice    Other:       PATIENT CONDITION AT DISCHARGE:     Functional status    Poor     Deconditioned    x Independent      Cognition   x  Lucid     Forgetful     Dementia      Catheters/lines (plus indication)    [place]blood     PICC     PEG    x None      Code status     Full code    x DNR      PHYSICAL EXAMINATION AT DISCHARGE:  Constitutional:  No acute distress, cooperative, pleasant   ENT:  Oral mucosa moist, oropharynx benign. Resp:  CTA bilaterally. No wheezing/rhonchi/rales. No accessory muscle use   CV:  Regular rhythm, normal rate, no murmurs, gallops, rubs    GI:  Soft, non distended, non tender. normoactive bowel sounds, no hepatosplenomegaly     Musculoskeletal:  No edema, warm, 2+ pulses throughout    Neurologic:  Moves all extremities.   AAOx3, CN II-XII reviewed, follows commands                         Psych:  Good insight, Not anxious nor agitated        CHRONIC MEDICAL DIAGNOSES:  Problem List as of 1/8/2021 Date Reviewed: 5/13/2020          Codes Class Noted - Resolved    Acute pancreatitis ICD-10-CM: K85.90  ICD-9-CM: 894.4  2/9/2020 - Present        Elevated lipase ICD-10-CM: R74.8  ICD-9-CM: 790.5  2/9/2020 - Present        NSTEMI (non-ST elevated myocardial infarction) [facility]) ICD-10-CM: I21.4  ICD-9-CM: 410.70  7/17/2019 - Present        Chest pain ICD-10-CM: R07.9  ICD-9-CM: 786.50  7/3/2019 - Present        Sepsis ([facility] ICD-10-CM: A41.9  ICD-9-CM: 038.9, 995.91  1/27/2019 - Present        Chronic obstructive pulmonary disease ([facility] ICD-10-CM: J44.9  ICD-9-CM: 496  1/27/2019 - Present        Hyperlipidemia ICD-10-CM: E78.5  ICD-9-CM: 272.4  1/27/2019 - Present        Essential hypertension ICD-10-CM: I10  ICD-9-CM: 401.9  1/27/2019 - Present        Pacemaker ICD-10-CM: Z95.0  ICD-9-CM: V45.01  1/27/2019 - Present        Fever ICD-10-CM: R50.9  ICD-9-CM: 780.60  1/27/2019 - Present        Leukocytosis ICD-10-CM: D72.829  ICD-9-CM: 288.60  1/27/2019 - Present        CKD (chronic kidney disease) stage 3, GFR 30-59 ml/min ICD-10-CM: N18.30  ICD-9-CM: 585.3  1/27/2019 - Present        Cough ICD-10-CM: R05  ICD-9-CM: 786.2  1/27/2019 - Present        Dyspnea ICD-10-CM: R06.00  ICD-9-CM: 786.09  1/27/2019 - Present        Atrial fibrillation ([facility] ICD-10-CM: I48.91  ICD-9-CM: 427.31  2/17/2016 - Present        Encounter to establish care ICD-10-CM: Z76.89  ICD-9-CM: V65.8  8/5/2015 - Present              45 minutes were spent with the patient on counseling and coordination of care    Signed:   Bon Pérez MD  1/8/2021  9:35 AM    Cc;Tono Karimi MD

## 2021-01-08 NOTE — ADT AUTH CERT NOTES
Utilization Reviews 
 
  
COVID negative by Live Shah 
 
  
Review Entered Review Status 1/8/2021 10:48 In Primary  
  
Criteria Review Is the illness suspected to be related to the Coronavirus (COVID-19)? No  
Has the member been tested for the COVID-19? Yes If Yes, what are the results of the COVID-19? Negative What is the severity of the members condition (i.e. Isolation, Ventilator use)? No   
  
Myocardial Infarction - Care Day 4 (1/7/2021) by Live Shah 
 
  
Review Entered Review Status 1/8/2021 10:41 Completed  
  
Criteria Review Care Day: 4 Care Date: 1/7/2021 Level of Care: Telemetry Guideline Day 3 Clinical Status   
(X) * Hemodynamic stability 1/8/2021 10:41:38 EST by Live Shah   
  137/77-  61-  97.8-  18-  100% RA (X) * Chest pain, dyspnea, or anginal equivalent absent 1/8/2021 10:41:38 EST by Live Shah   
  no distress, no cp, no dyspnea   
(X) * No evidence of bleeding or recurrent myocardial ischemia 1/8/2021 10:41:38 EST by Live Shah   
  no evidence of bleeding   
(X) * Dangerous arrhythmia absent 1/8/2021 10:41:38 EST by Live Shah   
  no dangerous arrhythmias   
(X) * Vascular access site without evidence of infection, aneurysm, or growing hematoma 1/8/2021 10:41:38 EST by Live Shah   
  no s/sx infection or hematoma   
(X) * Renal function at baseline or acceptable for next level of care 1/8/2021 10:41:38 EST by Live Shah   
  renal function at baseline. labs attached ( ) * Discharge plans and education understood Activity   
(X) * Ambulatory or acceptable for next level of care 1/8/2021 10:41:38 EST by Bao Naylor PT/OT, OOB in chair Routes ( ) * Oral hydration, medications, and diet 1/8/2021 10:41:38 EST by Live Shah   
  tolerating cardiac diet, meds in summary below Medications   
(X) * Anticoagulants absent 1/8/2021 10:41:38 EST by Live Shah   eliquis to be resumed (X) Antiplatelet agents (eg, aspirin, clopidogrel, ticagrelor)   
1/8/2021 10:41:38 EST by Elvia Song   
  plavix, asa   
(X) Beta-blocker 1/8/2021 10:41:38 EST by Elvai Song   
  metoprolol (X) Statin 1/8/2021 10:41:38 EST by Emy Barkley * Milestone Additional Notes 1/7/21 Hospitalist note Seen and examined patient . Daughter at bedside.   
   
Denied any chest pain,SOB today  
   
Assessment & Plan:  
   
1. .  NSTEMI:   
- History of CAD, atrial fibrillation, AV node dysfunction status post PPM  
- Cardiac cath 01/06- Showing severe left main  Stenosis 70%. There is further high-grade narrowing of 70% in the proximal LAD.  Occluded D1.  Severe 70% narrowing in OM1 and OM 2.  RCA is heavily calcified with noncritical stenosis.  He has known mild LV dysfunction by echocardiogram.  
-conservative medical management -cardiology following  
-on aspirin,statin,metoprolol  
-Monitor for any symptoms today per cards  
   
2.  Atrial fibrillation, AV node dysfunction, CAD, PPM, POA  
- will resume  Eliquis at discharge,   
- Cardiology following   
   
3.  COPD  
- No recent increase in cough, sputum production  
   
   
4.  Hypertension, hyperlipidemia, POA  
- Continue meds as above  
   
5. CKD   
- Stable - Creatinine 1.34  
  
  
  
137/77 (BP 1 Location: Left arm, BP Patient Position: At rest;Supine) Pulse 61 Temp 97.8 °F (36.6 °C) Resp 18 SpO2 100% Constitutional: No acute distress, cooperative, pleasant, answers questions ENT: Oral mucosa moist, oropharynx benign. Resp: CTA bilaterally. No wheezing/rhonchi/rales. No accessory muscle use CV: Regular rhythm, normal rate, no murmurs, gallops, rubs  
 GI: Soft, non distended, non tender. normoactive bowel sounds, no hepatosplenomegaly   
 Musculoskeletal: No edema, warm, 2+ pulses throughout  
 Neurologic: Moves all extremities.  AAOx3, CN II-XII reviewed, follows commands                       Psych:  Good insight, Not anxious nor agitated.  
   
  
  
  
Ref. Range 1/7/2021 03:54 WBC Latest Ref Range: 4.1 - 11.1 K/uL 8.0  
NRBC Latest Ref Range: 0  WBC 0.0  
RBC Latest Ref Range: 4.10 - 5.70 M/uL 3.96 (L) HGB Latest Ref Range: 12.1 - 17.0 g/dL 12.5 HCT Latest Ref Range: 36.6 - 50.3 % 38.4 MCV Latest Ref Range: 80.0 - 99.0 FL 97.0 MCH Latest Ref Range: 26.0 - 34.0 PG 31.6 MCHC Latest Ref Range: 30.0 - 36.5 g/dL 32.6 RDW Latest Ref Range: 11.5 - 14.5 % 13.5 PLATELET Latest Ref Range: 150 - 400 K/uL 116 (L) MPV Latest Ref Range: 8.9 - 12.9 FL 10.3 NEUTROPHILS Latest Ref Range: 32 - 75 % 72 LYMPHOCYTES Latest Ref Range: 12 - 49 % 14 MONOCYTES Latest Ref Range: 5 - 13 % 9 EOSINOPHILS Latest Ref Range: 0 - 7 % 5  
BASOPHILS Latest Ref Range: 0 - 1 % 0 IMMATURE GRANULOCYTES Latest Ref Range: 0.0 - 0.5 % 0  
DF Latest Units:   AUTOMATED ABSOLUTE NRBC Latest Ref Range: 0.00 - 0.01 K/uL 0.00  
ABS. NEUTROPHILS Latest Ref Range: 1.8 - 8.0 K/UL 5.7  
ABS. IMM. GRANS. Latest Ref Range: 0.00 - 0.04 K/UL 0.0  
ABS. LYMPHOCYTES Latest Ref Range: 0.8 - 3.5 K/UL 1.1  
ABS. MONOCYTES Latest Ref Range: 0.0 - 1.0 K/UL 0.7 ABS. EOSINOPHILS Latest Ref Range: 0.0 - 0.4 K/UL 0.4  
ABS. BASOPHILS Latest Ref Range: 0.0 - 0.1 K/UL 0.0 Sodium Latest Ref Range: 136 - 145 mmol/L 141 Potassium Latest Ref Range: 3.5 - 5.1 mmol/L 4.1 Chloride Latest Ref Range: 97 - 108 mmol/L 109 (H) CO2 Latest Ref Range: 21 - 32 mmol/L 26 Anion gap Latest Ref Range: 5 - 15 mmol/L 6 Glucose Latest Ref Range: 65 - 100 mg/dL 77 BUN Latest Ref Range: 6 - 20 MG/DL 23 (H) Creatinine Latest Ref Range: 0.70 - 1.30 MG/DL 1.33 (H) BUN/Creatinine ratio Latest Ref Range: 12 - 20   17 Calcium Latest Ref Range: 8.5 - 10.1 MG/DL 8.8 Magnesium Latest Ref Range: 1.6 - 2.4 mg/dL 1.9 GFR est non-AA Latest Ref Range: >60 ml/min/1.73m2 50 (L) GFR est AA Latest Ref Range: >60 ml/min/1.73m2 >60 Bilirubin, total Latest Ref Range: 0.2 - 1.0 MG/DL 1.0 Protein, total Latest Ref Range: 6.4 - 8.2 g/dL 5.4 (L) Albumin Latest Ref Range: 3.5 - 5.0 g/dL 3.0 (L) Globulin Latest Ref Range: 2.0 - 4.0 g/dL 2.4 A-G Ratio Latest Ref Range: 1.1 - 2.2   1.3 ALT Latest Ref Range: 12 - 78 U/L 20 AST Latest Ref Range: 15 - 37 U/L 30 Alk. phosphatase Latest Ref Range: 45 - 117 U/L 91 COVID Not detected   
  
  
  
  
Myocardial Infarction - Care Day 3 (1/6/2021) by Venice Tucker 
 
  
Review Entered Review Status 1/8/2021 10:33 Completed  
  
Criteria Review Care Day: 3 Care Date: 1/6/2021 Level of Care: Telemetry Guideline Day 2 Clinical Status   
(X) * Hemodynamic stability 1/8/2021 10:33:24 EST by Venice Tucker   
  108/61-  61-  97.7-  16-  98% RA   
(X) * Chest pain, dyspnea, or anginal equivalent absent 1/8/2021 10:33:24 EST by Livier Sands denies chest pain Activity (X) Activity as tolerated 1/8/2021 10:33:24 EST by Livier Sands as tolerated Routes   
(X) * Oral hydration, medications Interventions   
(X) * Oxygen absent   
(X) ECG, cardiac biomarkers (X) PT/PTT and platelet count   
(X) Possible cardiac angiography, with PCI if indicated [M]   
1/8/2021 10:33:24 EST by Venice Tucker   
  cardiac cath was done Medications   
(X) * IV nitroglycerin absent 1/8/2021 10:33:24 EST by Venice Tucker   
  no IV nitroglycerin (X) Antiplatelet agents (eg, aspirin, clopidogrel, ticagrelor)   
1/8/2021 10:33:24 EST by Venice Tucekr   
  plavix   
(X) Beta-blocker 1/8/2021 10:33:24 EST by Venice Tucker   
  metoprolol (X) Statin 1/8/2021 10:33:24 EST by Livier Sands is on statin : zocor * Milestone Additional Notes 1/6/21 Continued Stay review Asha Connell is a 80 y.o. male who was referred by Dr. Mani Gonzalez for CAD/NSTEMI. [facility] has a PMH significant for CAD s/p stenting, Atrial fibrillation on Eliquis, COPD, HTN, HLD, PPM.   
   
He was admitted on 1/4 after having chest pain in the middle of the night which woke him from sleep. He describes this pain as severe, tightness with throbbing with radiation into his left arm. The pain was associated with shortness of breath and weakness. He denies nausea, vomiting, dizziness, syncope, or diaphoresis. This was the only recent time he reports chest pain. He does endorse some recent fatigue but felt that was due to his advancing age. He was still actively participating in PT/OT at his assisted living facility.   
   
POD 1 cardiac cath: [facility] CAD with severe LAD and LCx stenosis Mild LV systolic dysfunction by echocardiogram  
 Diagnostic Dominance: Right Left Main The vessel was visualized by angiography and is large. The vessel is severely calcified. Ost LM lesion, 70% stenosed. Left Anterior Descending The vessel was visualized by angiography and is moderate in size. The vessel is severely calcified. Prox LAD to Mid LAD lesion, 70% stenosed. Mid LAD lesion, 50% stenosed. The lesion was previously treated using a stent (unknown type). First Diagonal Branch 1st Diag lesion, 100% stenosed. Left Circumflex The vessel was visualized by angiography and is moderate in size. The vessel is severely calcified. First Obtuse Marginal Branch 1st Mrg lesion, 70% stenosed. Second Obtuse Marginal Branch 2nd Mrg lesion, 70% stenosed. Right Coronary Artery The vessel was visualized by angiography and is moderate in size. There is moderate disease. The diseased area appears to be diffuse. Disease is noted to be 40%. The vessel is severely calcified. Intervention  
   
No interventions have been documented. ECHO: 1/4/21 Interpretation Summary  
  Contrast used: DEFINITY. LV: Estimated LVEF is 40 - 45% with anteroapical hypokinesis.  
   
Echo Findings  
  Left Ventricle Normal cavity size and wall thickness. The estimated EF is 40 - 45%. Mildly reduced systolic function. There is mild (grade 1) left ventricular diastolic dysfunction. Wall Scoring The following segments are hypokinetic: apical anterior, apical septal, apical inferior, apical lateral and apex. All other segments are normal.  
   
  
  
Left Atrium Mildly dilated left atrium. Right Ventricle Normal cavity size, wall thickness and global systolic function. Right Atrium Normal cavity size. Aortic Valve Normal valve structure, trileaflet valve structure and no stenosis. Trace aortic valve regurgitation. Mitral Valve Normal valve structure and no stenosis. Trace regurgitation. Tricuspid Valve Normal valve structure and no stenosis. Trace regurgitation. Pulmonic Valve The pulmonic valve was not assessed. Pulmonic valve not well visualized. Aorta Normal aortic root, ascending aortic, and aortic arch. Pericardium Insignificant pericardial effusion or fat.  
   
   
Medication Sig Start Date End Date Taking? Authorizing Provider  
metoprolol tartrate (LOPRESSOR) 25 mg tablet Take 50 mg by mouth two (2) times a day. Yes Provider, Historical  
amLODIPine (NORVASC) 5 mg tablet TAKE ONE TABLET BY MOUTH DAILY 6/11/20 Yes Tabitha Haines NP  
nitroglycerin (NITROSTAT) 0.4 mg SL tablet 1 Tab by SubLINGual route every five (5) minutes as needed for Chest Pain. 7/5/19 Yes Viet Persaud NP  
antiox #8/om3/dha/epa/lut/zeax (PRESERVISION AREDS 2, OMEGA-3, PO) Take 1 Cap by mouth two (2) times a day. Yes Provider, Historical  
ipratropium (ATROVENT) 0.03 % nasal spray 2 Sprays by Both Nostrils route two (2) times daily as needed for Rhinitis. Yes Provider, Historical  
mometasone (NASONEX) 50 mcg/actuation nasal spray 2 Sprays by Both Nostrils route daily.  Yes Provider, Historical  
 furosemide (LASIX) 20 mg tablet Take 10 mg by mouth daily. Yes Other, MD Di  
metroNIDAZOLE (METROCREAM) 0.75 % topical cream Apply  to affected area nightly. Use a thin layer to face after washing  Indications: a skin condition on the cheeks and nose with a reddish rash and acne called acne rosacea Yes Provider, Historical  
apixaban (ELIQUIS) 2.5 mg tablet Take 1 Tab by mouth two (2) times a day. 1/25/17 Yes Zoe Gonzalez MD  
[facility]) 0.4 mg capsule Take 0.4 mg by mouth nightly. Yes Provider, Historical  
levothyroxine (LEVOTHROID) 50 mcg tablet Take 50 mcg by mouth Daily (before breakfast). Yes Provider, Historical  
omeprazole (PRILOSEC) 20 mg capsule Take 20 mg by mouth daily. Yes Provider, Historical  
simvastatin (ZOCOR) 40 mg tablet Take 20 mg by mouth nightly. Yes Provider, Historical  
Multivits with Min-FA-Lycopene (MEN'S DAILY MULTIVIT-MINERAL) 0.4-600 mg-mcg tab Take 1 Tab by mouth daily. Yes Provider, Historical  
    
No Known Allergies  
   
   
Review of Systems:   
Consititutional: Denies fever or chills. +fatigue Eyes:  +Glasses ENT:  +[place]  
CV: +CP, + HLD, +HTN Resp: +dyspnea, denies productive cough. : Denies dialysis or kidney problems. GI: Denies ulcers, esophageal strictures, liver problems. M/S: Denies joint or bone problems, or implanted artificial hardware. Skin: Denies varicose veins, edema. Neuro: Denies strokes, or TIAs. Psych: Denies anxiety or depression. Endocrine: Denies thyroid problems or diabetes. Heme/Lymphatic: Denies easy bruising or lymphedema.   
   
Objective:  
   
VS:   
Visit Vitals /61 (BP 1 Location: Left arm, BP Patient Position: Sitting) Pulse 61 Temp 97.7 °F (36.5 °C) Resp 16 SpO2 98% CXR:  Increased bilateral interstitial and patchy airspace opacities can be seen with  
atypical/viral infection or pulmonary edema.  
    
   
Physical Exam:    
General appearance: alert, cooperative, no distress Head: normocephalic, without obvious abnormality; atraumatic Eyes: conjunctivae/corneas clear; EOM's intact. Nose: nares normal; no drainage. Neck: no carotid bruit and no JVD Lungs: clear to auscultation bilaterally Heart: regular rate and rhythm; no murmur Abdomen: soft, non-tender; bowel sounds normal  
Extremities: moves all extremities; no weakness. Skin: Skin color normal; No varicose veins. +Trace LE edema Treatment Plan:    
   
1. CAD/NSTEMI: LM  CAD with severe LAD and LCx stenosis. On ASA, Statin, BB, PRN NTG. Currently Pain free. Discussed options of Medical Tx v. Impella assisted PCI v. CABG. High-prohibitive surgical risk. Patient is still discussing with his family but feels they are not considering CABG.  
   
2. HTN: Controlled on current medications. On BB, Norvasc, Lasix  
   
3. HLD: On Statin  
   
4. Atrial Fibrillation: Currently paced. On BB, Eliquis  
   
5. COPD: Not currently on any treatment  
   
6. Hypothyroid: On synthroid  
   
7. PPM in place Cardio Note Saw and evaluated pt and agree with above assessment and plan. Had NSTEMI, apical hypokinesis on echo and found to have severe CAD involving LAD/diagonal. Not a good candidate for CABG given age and appreciate CT surgery input. Will have our interventionalist Dr. Peter Silver review his case in detail and discuss options going forward; high risk PCI, medical tx, etc.  
 Cardiac testin[address] [place] [address] 2020. Findings show severe left main stenosis 70%.  There is further high-grade narrowing of 70% in the proximal LAD.  Occluded D1.  Severe 70% narrowing in OM1 and OM 2.  RCA is heavily calcified with noncritical stenosis Results for Slime Rivera (MRN [id number]) as of 2021 10:21 Ref. Range 2021 04:52 WBC Latest Ref Range: 4.1 - 11.1 K/uL 8.1 NRBC Latest Ref Range: 0  WBC 0.0  
RBC Latest Ref Range: 4.10 - 5.70 M/uL 4.24 HGB Latest Ref Range: 12.1 - 17.0 g/dL 13.3 HCT Latest Ref Range: 36.6 - 50.3 % 40.8 MCV Latest Ref Range: 80.0 - 99.0 FL 96.2 MCH Latest Ref Range: 26.0 - 34.0 PG 31.4 MCHC Latest Ref Range: 30.0 - 36.5 g/dL 32.6 RDW Latest Ref Range: 11.5 - 14.5 % 13.8 PLATELET Latest Ref Range: 150 - 400 K/uL 130 (L) MPV Latest Ref Range: 8.9 - 12.9 FL 10.6 NEUTROPHILS Latest Ref Range: 32 - 75 % 73 LYMPHOCYTES Latest Ref Range: 12 - 49 % 15 MONOCYTES Latest Ref Range: 5 - 13 % 8 EOSINOPHILS Latest Ref Range: 0 - 7 % 4 BASOPHILS Latest Ref Range: 0 - 1 % 0 IMMATURE GRANULOCYTES Latest Ref Range: 0.0 - 0.5 % 0  
DF Latest Units:   AUTOMATED ABSOLUTE NRBC Latest Ref Range: 0.00 - 0.01 K/uL 0.00  
ABS. NEUTROPHILS Latest Ref Range: 1.8 - 8.0 K/UL 5.9  
ABS. IMM. GRANS. Latest Ref Range: 0.00 - 0.04 K/UL 0.0  
ABS. LYMPHOCYTES Latest Ref Range: 0.8 - 3.5 K/UL 1.2  
ABS. MONOCYTES Latest Ref Range: 0.0 - 1.0 K/UL 0.6  
ABS. EOSINOPHILS Latest Ref Range: 0.0 - 0.4 K/UL 0.4  
ABS. BASOPHILS Latest Ref Range: 0.0 - 0.1 K/UL 0.0 Sodium Latest Ref Range: 136 - 145 mmol/L 140 Potassium Latest Ref Range: 3.5 - 5.1 mmol/L 4.1 Chloride Latest Ref Range: 97 - 108 mmol/L 109 (H) CO2 Latest Ref Range: 21 - 32 mmol/L 25 Anion gap Latest Ref Range: 5 - 15 mmol/L 6 Glucose Latest Ref Range: 65 - 100 mg/dL 91 BUN Latest Ref Range: 6 - 20 MG/DL 24 (H) Creatinine Latest Ref Range: 0.70 - 1.30 MG/DL 1.34 (H) BUN/Creatinine ratio Latest Ref Range: 12 - 20   18 Calcium Latest Ref Range: 8.5 - 10.1 MG/DL 8.9 Magnesium Latest Ref Range: 1.6 - 2.4 mg/dL 1.9 GFR est non-AA Latest Ref Range: >60 ml/min/1.73m2 49 (L) GFR est AA Latest Ref Range: >60 ml/min/1.73m2 60 (L) Bilirubin, total Latest Ref Range: 0.2 - 1.0 MG/DL 1.0 Protein, total Latest Ref Range: 6.4 - 8.2 g/dL 6.1 (L) Albumin Latest Ref Range: 3.5 - 5.0 g/dL 3.1 (L) Globulin Latest Ref Range: 2.0 - 4.0 g/dL 3.0 A-G Ratio Latest Ref Range: 1.1 - 2.2   1.0 (L) ALT Latest Ref Range: 12 - 78 U/L 21 AST Latest Ref Range: 15 - 37 U/L 34 Alk.  phosphatase Latest Ref Range: 45 - 117 U/L 94

## 2021-01-08 NOTE — PROGRESS NOTES
Problem: Pressure Injury - Risk of  Goal: *Prevention of pressure injury  Description: Document Thanh Scale and appropriate interventions in the flowsheet. Outcome: Resolved/Met     Problem: Patient Education: Go to Patient Education Activity  Goal: Patient/Family Education  Outcome: Resolved/Met     Problem: Falls - Risk of  Goal: *Absence of Falls  Description: Document Jaqui Fall Risk and appropriate interventions in the flowsheet.   Outcome: Resolved/Met     Problem: Patient Education: Go to Patient Education Activity  Goal: Patient/Family Education  Outcome: Resolved/Met     Problem: Patient Education: Go to Patient Education Activity  Goal: Patient/Family Education  Outcome: Resolved/Met     Problem: Patient Education: Go to Patient Education Activity  Goal: Patient/Family Education  Outcome: Resolved/Met

## 2021-01-08 NOTE — PROGRESS NOTES
Cardiology Progress Note            Admit Date: 1/4/2021  Admit Diagnosis: NSTEMI (non-ST elevated myocardial infarction) [facility]) [I21.4]  Date: 1/8/2021     Time: 9:45 AM    HPI: Nigel Shell is a 80 y.o. male admitted on 1/4/2021  for NSTEMI (non-ST elevated myocardial infarction) ([facility] [address] [I21.4]. Has PMH of PAF on eliquis, HTN, PPM COPD,CKD,  HLD. Has history of NSTEMI in 7/2019 with mildly elevated troponin peak of 0.39 and pt had no further chest pain, thus medical management was recommended. He does have prior history of CAD with stent to LAD several years prior to 2019 (done in [place]). . Presented 1/4/20 with chief c/o midsternal chest pain radiating to left arm. Ruled in for NSTEMI and has new reduced LV function with anteriorapical hypokinesis. [facility] 1/6/20 with  LM stenosis, high-grade narrowing of 70% in the proximal LAD. Occluded D1. Severe 70% narrowing in OM1 and OM 2. RCA is heavily calcified with noncritical stenosis.         Subjective: Denies any further chest pain or SOB. Walked with PT again yesterday around nurses station twice per pt report and he said he did fine, no chest pain or SOB, just felt some fatigue. Assessment and Plan     1. NSTEMI/CAD:    -[facility] with severe LM disease, LAD and OM1, OM2 disease.               -Echo EF 40-45%, anteroapical hypokinesis.               -No further chest pain. Some Fatigue with ambulation yesterday with PT.    -Not good candidate for CABG, high risk. -Medical management    -If symptoms worsen, may consider PCI to mid LAD and ostial LM       -Continue ASA, statin, Toprol. Add Ace-I              2. Cardiomyopathy, ischemic vs stress CM   -EF 40-45%   -Appears compensated on exam.   -Lopressor changed to Toprol XL 25   -Add Lisinopril 2.5 mg daily   -Lasix 10 mg daily po.     4. CKD:    -Creatinine acceptable 1.46           5.  HLD              -Lipitor 40 mg daily     5. HTN:    -Mildly elevated this a.m.   -Toprol XL, Amlodipine, Lisinopril             6. Hx  of PAF:   -currently v-paced rhythm   -Eliquis 2.5 mg BID    7. History of PPM   -follows with Dr. Chauncey Valentino. 8. Advanced directives:   -DNR    80 y.o. male with cardiomyopathy and multivessel CAD invollving LAD/diagnoal NSTEMI. He is not a good candidate for catheterization. No further chest pain. Plan to continue medical management as he has had no further chest pain. Has fatigue with ambulation but tolerable. Will continue medical management. If pt has worsened symptoms, will reevalute for PCI at that time. Follow up with Dr. Renaldo Alarcon arranged in 2 weeks. Will obtain repeat labs at that time. Future Appointments   Date Time Provider Luann Peck   1/19/2021 10:40 AM MD ANSLEY Carpio BS AMB   2/23/2021  3:30 PM [place] [id number], [facility]AV[facility] BS AMB   5/28/2021 10:00 AM PACEMAKER, STFRANCES CAVSF BS AMB   5/28/2021 10:20 AM Melony Ramirez NP CAVSF BS AMB          Robby Madden. BRIAN Everett      Cardiac testing:  [facility] 1/5/2020. Findings show severe left main stenosis 70%. There is further high-grade narrowing of 70% in the proximal LAD. Occluded D1. Severe 70% narrowing in OM1 and OM 2. RCA is heavily calcified with noncritical stenosis  01/04/21   ECHO ADULT COMPLETE 01/04/2021 1/4/2021    Narrative · Contrast used: DEFINITY. · LV: Estimated LVEF is 40 - 45% with anteroapical hypokinesis. Signed by: Richard Noguera MD     2/3/20 echo:  · Image quality for this study was technically difficult. · Normal cavity size and systolic function (ejection fraction normal). Mildly increased wall thickness. Estimated left ventricular ejection fraction is 55 - 60%. Visually measured ejection fraction. Abnormal left ventricular wall motion. · Mild aortic valve sclerosis. Mild aortic valve regurgitation is present. · Mild ascending aorta dilatation.        PMH  Past Medical History:   Diagnosis Date    Atrial fibrillation ([facility] [address]     Chronic obstructive pulmonary disease ([facility] [address]     Essential hypertension     Hyperlipidemia     Long term (current) use of anticoagulants     Pacemaker     Rosacea 2016      Social Hx  Social History     Socioeconomic History    Marital status:      Spouse name: Not on file    Number of children: Not on file    Years of education: Not on file    Highest education level: Not on file   Occupational History    Not on file   Social Needs    Financial resource strain: Not on file    Food insecurity     Worry: Not on file     Inability: Not on file    Transportation needs     Medical: Not on file     Non-medical: Not on file   Tobacco Use    Smoking status: Never Smoker    Smokeless tobacco: Never Used   Substance and Sexual Activity    Alcohol use: No     Alcohol/week: 0.0 standard drinks    Drug use: No    Sexual activity: Not on file   Lifestyle    Physical activity     Days per week: Not on file     Minutes per session: Not on file    Stress: Not on file   Relationships    Social connections     Talks on phone: Not on file     Gets together: Not on file     Attends Baptism service: Not on file     Active member of club or organization: Not on file     Attends meetings of clubs or organizations: Not on file     Relationship status: Not on file    Intimate partner violence     Fear of current or ex partner: Not on file     Emotionally abused: Not on file     Physically abused: Not on file     Forced sexual activity: Not on file   Other Topics Concern    Not on file   Social History Narrative    Not on file   Objective:      Physical Exam:                Visit Vitals  BP (!) 152/95 (BP 1 Location: Left arm, BP Patient Position: At rest)   Pulse 60   Temp 98.4 °F (36.9 °C)   Resp 16   Ht 5' 9\" (1.753 m)   Wt 172 lb 13.5 oz (78.4 kg)   SpO2 99%   BMI 25.52 kg/m²          General Appearance:   Well developed, well nourished,alert and oriented x 3, and   individual in no acute distress. Ears/Nose/Mouth/Throat:    [place]. Neck:  Supple. No JVD   Chest:    Lungs clear bilaterally. No use of accessory muscles. .   Cardiovascular:   Regular rate and rhythm, S1, S2 normal, no murmur. Abdomen:    Soft, non-tender, bowel sounds are active. Extremities:  no BLE edema. Skin:  Warm and dry. Telemetry:           Data Review:    Labs:    Recent Results (from the past 24 hour(s))   SARS-COV-2    Collection Time: 01/07/21  1:22 PM   Result Value Ref Range    Specimen source Nasopharyngeal      SARS-CoV-2 Not detected NOTD      Specimen source Nasopharyngeal      Specimen type NP Swab      Health status Symptomatic Testing     CBC WITH AUTOMATED DIFF    Collection Time: 01/08/21  3:29 AM   Result Value Ref Range    WBC 8.3 4.1 - 11.1 K/uL    RBC 4.12 4.10 - 5.70 M/uL    HGB 12.8 12.1 - 17.0 g/dL    HCT 39.5 36.6 - 50.3 %    MCV 95.9 80.0 - 99.0 FL    MCH 31.1 26.0 - 34.0 PG    MCHC 32.4 30.0 - 36.5 g/dL    RDW 13.9 11.5 - 14.5 %    PLATELET 561 (L) 789 - 400 K/uL    MPV 10.7 8.9 - 12.9 FL    NRBC 0.0 0  WBC    ABSOLUTE NRBC 0.00 0.00 - 0.01 K/uL    NEUTROPHILS 71 32 - 75 %    LYMPHOCYTES 16 12 - 49 %    MONOCYTES 8 5 - 13 %    EOSINOPHILS 5 0 - 7 %    BASOPHILS 1 0 - 1 %    IMMATURE GRANULOCYTES 1 (H) 0.0 - 0.5 %    ABS. NEUTROPHILS 5.9 1.8 - 8.0 K/UL    ABS. LYMPHOCYTES 1.3 0.8 - 3.5 K/UL    ABS. MONOCYTES 0.6 0.0 - 1.0 K/UL    ABS. EOSINOPHILS 0.4 0.0 - 0.4 K/UL    ABS. BASOPHILS 0.0 0.0 - 0.1 K/UL    ABS. IMM.  GRANS. 0.0 0.00 - 0.04 K/UL    DF AUTOMATED     METABOLIC PANEL, COMPREHENSIVE    Collection Time: 01/08/21  3:29 AM   Result Value Ref Range    Sodium 140 136 - 145 mmol/L    Potassium 4.0 3.5 - 5.1 mmol/L    Chloride 108 97 - 108 mmol/L    CO2 26 21 - 32 mmol/L    Anion gap 6 5 - 15 mmol/L    Glucose 91 65 - 100 mg/dL    BUN 26 (H) 6 - 20 MG/DL    Creatinine 1.46 (H) 0.70 - 1.30 MG/DL    BUN/Creatinine ratio 18 12 - 20      GFR est AA 54 (L) >60 ml/min/1.73m2 GFR est non-AA 45 (L) >60 ml/min/1.73m2    Calcium 8.7 8.5 - 10.1 MG/DL    Bilirubin, total 0.7 0.2 - 1.0 MG/DL    ALT (SGPT) 20 12 - 78 U/L    AST (SGOT) 25 15 - 37 U/L    Alk. phosphatase 89 45 - 117 U/L    Protein, total 5.7 (L) 6.4 - 8.2 g/dL    Albumin 2.9 (L) 3.5 - 5.0 g/dL    Globulin 2.8 2.0 - 4.0 g/dL    A-G Ratio 1.0 (L) 1.1 - 2.2            Radiology:        Current Facility-Administered Medications   Medication Dose Route Frequency    lisinopriL (PRINIVIL, ZESTRIL) tablet 2.5 mg  2.5 mg Oral DAILY    metoprolol succinate (TOPROL-XL) XL tablet 25 mg  25 mg Oral QPM    apixaban (ELIQUIS) tablet 2.5 mg  2.5 mg Oral BID    sodium chloride (NS) flush 5-40 mL  5-40 mL IntraVENous Q8H    acetaminophen (TYLENOL) tablet 650 mg  650 mg Oral Q6H PRN    Or    acetaminophen (TYLENOL) suppository 650 mg  650 mg Rectal Q6H PRN    polyethylene glycol (MIRALAX) packet 17 g  17 g Oral DAILY PRN    promethazine (PHENERGAN) tablet 12.5 mg  12.5 mg Oral Q6H PRN    Or    ondansetron (ZOFRAN) injection 4 mg  4 mg IntraVENous Q6H PRN    nicotine (NICODERM CQ) 14 mg/24 hr patch 1 Patch  1 Patch TransDERmal DAILY PRN    sodium chloride (NS) flush 5-40 mL  5-40 mL IntraVENous PRN    nitroglycerin (NITROSTAT) tablet 0.4 mg  0.4 mg SubLINGual Q5MIN PRN    aspirin chewable tablet 81 mg  81 mg Oral DAILY    amLODIPine (NORVASC) tablet 5 mg  5 mg Oral DAILY    furosemide (LASIX) tablet 10 mg  10 mg Oral DAILY    levothyroxine (SYNTHROID) tablet 50 mcg  50 mcg Oral ACB    pantoprazole (PROTONIX) tablet 20 mg  20 mg Oral ACB    atorvastatin (LIPITOR) tablet 40 mg  40 mg Oral DAILY    tamsulosin (FLOMAX) capsule 0.4 mg  0.4 mg Oral QHS    morphine injection 2 mg  2 mg IntraVENous Q4H PRN     Prior to Admission Medications   Prescriptions Last Dose Informant Patient Reported? Taking? Multivits with Min-FA-Lycopene (MEN'S DAILY MULTIVIT-MINERAL) 0.4-600 mg-mcg tab  Child Yes Yes   Sig: Take 1 Tab by mouth daily. amLODIPine (NORVASC) 5 mg tablet   No Yes   Sig: TAKE ONE TABLET BY MOUTH DAILY   antiox #8/om3/dha/epa/lut/zeax (PRESERVISION AREDS 2, OMEGA-3, PO)  Child Yes Yes   Sig: Take 1 Cap by mouth two (2) times a day. apixaban (ELIQUIS) 2.5 mg tablet  Child No Yes   Sig: Take 1 Tab by mouth two (2) times a day. furosemide (LASIX) 20 mg tablet  Child Yes Yes   Sig: Take 10 mg by mouth daily. ipratropium (ATROVENT) 0.03 % nasal spray  Child Yes Yes   Si Sprays by Both Nostrils route two (2) times daily as needed for Rhinitis. levothyroxine (LEVOTHROID) 50 mcg tablet  Child Yes Yes   Sig: Take 50 mcg by mouth Daily (before breakfast). metoprolol tartrate (LOPRESSOR) 25 mg tablet   Yes Yes   Sig: Take 50 mg by mouth two (2) times a day. metroNIDAZOLE (METROCREAM) 0.75 % topical cream  Child Yes Yes   Sig: Apply  to affected area nightly. Use a thin layer to face after washing  Indications: a skin condition on the cheeks and nose with a reddish rash and acne called acne rosacea   mometasone (NASONEX) 50 mcg/actuation nasal spray  Child Yes Yes   Si Sprays by Both Nostrils route daily. nitroglycerin (NITROSTAT) 0.4 mg SL tablet   No Yes   Si Tab by SubLINGual route every five (5) minutes as needed for Chest Pain. omeprazole (PRILOSEC) 20 mg capsule  Child Yes Yes   Sig: Take 20 mg by mouth daily. simvastatin (ZOCOR) 40 mg tablet  Child Yes Yes   Sig: Take 20 mg by mouth nightly. tamsulosin (FLOMAX) 0.4 mg capsule  Child Yes Yes   Sig: Take 0.4 mg by mouth nightly. Facility-Administered Medications: None          Ashia Everett NP     [facility] [address] [place] [address], [address] [place] [address] [address]   [address]   [phone]

## 2021-01-10 NOTE — DISCHARGE INSTRUCTIONS
Discharge Instructions       PATIENT ID: Nigel Shell  MRN: [id number]   YOB: 1922    DATE OF ADMISSION: 1/4/2021  2:25 AM    DATE OF DISCHARGE: 1/10/2021    PRIMARY CARE PROVIDER: Wendy Marquez MD     ATTENDING PHYSICIAN: No att. providers found  DISCHARGING PROVIDER: Ricardo Barry MD    To contact this individual call [phone] and ask the  to page. If unavailable ask to be transferred the Adult Hospitalist Department. DISCHARGE DIAGNOSES -NSTEMI    CONSULTATIONS: IP CONSULT TO CARDIOLOGY  IP CONSULT TO CARDIAC SURGERY    PROCEDURES/SURGERIES: Procedure(s):  LEFT HEART CATH / CORONARY ANGIOGRAPHY    PENDING TEST RESULTS:   At the time of discharge the following test results are still pending: none    FOLLOW UP APPOINTMENTS:   Follow-up Information     Follow up With Specialties Details Why Contact Info Additional Information    [address] Cardiac Rehabilitation Call AFTER DISCHARGE TO DISCUSS ENROLLMENT IN OP EXERCISE PROGRAM [facility] [address] [place] [address],[address] [place] [address] Gloria Duarte, [address]. Please arrive 15 minutes prior to your appointment time and you will register in the [facility], [address], on the first floor of the [address] [place] [address]. Telephone: [phone] Fax: [phone] Driving directions To [facility] [place]. Building: Driving WEST on [address], take exit [address] to [employer]. Turn left onto [address], then turn right into [place] Parking lot Driving EAST [address], [address] [facility]. Turn right at the end of the exit ramp. Turn left onto [address], then turn right into CLEAR lot.     Wendy Marquez MD Family Medicine Go on 1/11/2021 Hospital follow up appt has been scheduled for Jan 11 @ 3:00PM Vernon Dela Cruz  [phone]       [facility], In-Home Clinical Assessments On 1/10/2021 [employer] [facility] appt has been scheduled for Keith 10. The office will call you to confirm an appt time. [facility] That Comes To [address] [facility]. [facility]  [address]     Jigna Mccormick MD Cardiology On 1/19/2021 10:40AM [address] Devine Macey Luis Alberto Yahaira [facility]  [phone]              ADDITIONAL CARE RECOMMENDATIONS:   -follow up with PCP,cardiologist  -we added lisinopril and changed to long acting metoprolol for systolic heart failure and as you had a heart attack  -Take aspirin daily  -CBC,BMP in 1 week  -if you have severe chest pain or worsening symptoms -return to the hospital.  -check BP every day  -Lisinopril is a new BP medication - if you notice any swelling or allergic reaction -seek medical attention. DIET: Regular Diet      ACTIVITY: Activity as tolerated    WOUND CARE: na    EQUIPMENT needed: na      Radiology      DISCHARGE MEDICATIONS:   See Medication Reconciliation Form    · It is important that you take the medication exactly as they are prescribed. · Keep your medication in the bottles provided by the pharmacist and keep a list of the medication names, dosages, and times to be taken in your wallet. · Do not take other medications without consulting your doctor. NOTIFY YOUR PHYSICIAN FOR ANY OF THE FOLLOWING:   Fever over 101 degrees for 24 hours. Chest pain, shortness of breath, fever, chills, nausea, vomiting, diarrhea, change in mentation, falling, weakness, bleeding. Severe pain or pain not relieved by medications. Or, any other signs or symptoms that you may have questions about. DISPOSITION:   x Home With:   OT  PT  HH  RN       SNF/Inpatient Rehab/LTAC    Independent/assisted living    Hospice    Other:           Signed:   Giuseppe Clemons MD  1/10/2021  7:32 AM    Weigh daily and notify cardiology if weight increases by 3 lbs in 24 hours or 5 lbs in 1 week.

## 2021-01-11 ENCOUNTER — TELEPHONE (OUTPATIENT)
Dept: CARDIAC REHAB | Age: 86
End: 2021-01-11

## 2021-01-11 NOTE — TELEPHONE ENCOUNTER
1/11/2021 Cardiac Rehab: Called Mr. Betty Reyes' s daughter Danilo Cruz  to discuss participation in the Cardiac Rehab Program following NSTEMI on 1/3/2021 Left voicemail message.  Jyoti Álvarez RN

## 2021-01-12 ENCOUNTER — TELEPHONE (OUTPATIENT)
Dept: CARDIAC REHAB | Age: 86
End: 2021-01-12

## 2021-01-12 ENCOUNTER — PATIENT OUTREACH (OUTPATIENT)
Dept: CASE MANAGEMENT | Age: 86
End: 2021-01-12

## 2021-01-12 NOTE — TELEPHONE ENCOUNTER
1/12/2021 Cardiac Rehab: Called Mr. Josh Paige daughter Arsenio Severance back after her returned call  to discuss participation in the Cardiac Rehab . At this time due to safety concerns with transporting Edwar Goff to an OP program as well as concerns with ongoing covid pandemic will defer enrollment in the [facility] and instead look to continue PT/OT therapies at [facility]. Edwar Goff 's daughter is without questions. Iris Lee RN        1/11/2021 Cardiac Rehab: Sima Landaverde Mr. Edwar Goff' s daughter Arsenio Severance  to discuss participation in the Cardiac Rehab Program following NSTEMI on 1/3/2021 Left voicemail message.  Iris Lee RN

## 2021-01-12 NOTE — ACP (ADVANCE CARE PLANNING)
1-12-21: Patient states his Living Will, that is dated 6-5-2013 and that is currently scanned into [facility], is a current ACP document.  Elie Santiago

## 2021-01-12 NOTE — PROGRESS NOTES
Patient was admitted to [facility] on 1-4-21 and discharged on 1-8-21 for:    DISCHARGE DIAGNOSES / PLAN:   NSTEMI  History of CAD, atrial fibrillation, AV node dysfunction status post PPM  - Cardiac cath 01/06- Showing severe left main  Stenosis 70%. There is further high-grade narrowing of 70% in the proximal LAD.  Occluded D1.  Severe 70% narrowing in OM1 and OM   RCA is heavily calcified with noncritical stenosis.  He has known mild LV dysfunction by echocardiogram.  Stress or ischemic cardiomyopathy:  -EF is 40 - 45%  Atrial fibrillation, AV node dysfunction, CAD, PPM, POA  - on  Eliquis ,rate controlled  COPD  Hypertension, hyperlipidemia, POA  CKD   - Stable  - Creatinine 1.34     Cardiac cath:  · LM CAD with severe LAD and LCx stenosis  · Mild LV systolic dysfunction by echocardiogram    Outreach made within 2 business days of discharge: Yes    Discharge Challenges to be reviewed by the provider:   Additional needs identified to be addressed with provider:  no  - Patient denies chest pain and/or shortness of breath, denies fever/chills, and denies nausea/vomiting since discharge on 1-8-21. Patient has no red flags to report at this time. Patient states, \"I'm feeling stronger, I'm feeling better. \"  - Patient states he continues to reside in independent living at [facility].   - Patient states he has not had any falls in the last six months; however he has had two falls in the last twelve months. Patient states he did not sustain any injury with either of the two falls. - Patient reports he is utilizing a regular diet at home, no added salt, and states his appetite is \"very good. \"    Discussed COVID-19 related testing which was pending at this time. Test results were negative. Patient informed of results, if available? yes   Method of communication with provider : none, Patient has no red flags to report at this time and PCP, Dr. Magdalena Omalley, is a [facility]. Provider. Component      Latest Ref Rng & Units 1/8/2021 1/7/2021 1/6/2021           3:29 AM  3:54 AM  4:52 AM   PLATELET      193 - 994 K/uL 132 (L) 116 (L) 130 (L)     Component      Latest Ref Rng & Units 1/8/2021 1/7/2021 1/6/2021           3:29 AM  3:54 AM  4:52 AM   IMMATURE GRANULOCYTES      0.0 - 0.5 % 1 (H) 0 0     Component      Latest Ref Rng & Units 1/4/2021           2:42 AM   Prothrombin time      9.0 - 11.1 sec 11.7 (H)     Component      Latest Ref Rng & Units 1/5/2021 1/4/2021 1/4/2021 1/4/2021           4:44 AM 10:24 PM 10:24 PM  4:41 PM   Troponin-I, Qt.      <0.05 ng/mL   4.51 (H) 6.79 (H)   aPTT      22.1 - 31.0 sec 100.2 (HH) 76.9 (H)       Component      Latest Ref Rng & Units 1/4/2021 1/4/2021 1/4/2021           4:41 PM  9:54 AM  6:05 AM   Troponin-I, Qt.      <0.05 ng/mL  5.94 (H) 2.45 (H)   aPTT      22.1 - 31.0 sec 63.0 (H)       Component      Latest Ref Rng & Units 1/8/2021 1/7/2021 1/7/2021 1/6/2021           3:29 AM  3:54 AM  3:54 AM  4:52 AM   BUN      6 - 20 MG/DL 26 (H)  23 (H)    Creatinine      0.70 - 1.30 MG/DL 1.46 (H)  1.33 (H)    BUN/Creatinine ratio      12 - 20   18  17    GFR est AA      >60 ml/min/1.73m2 54 (L)  >60    GFR est non-AA      >60 ml/min/1.73m2 45 (L)  50 (L)      Component      Latest Ref Rng & Units 1/6/2021           4:52 AM   BUN      6 - 20 MG/DL 24 (H)   Creatinine      0.70 - 1.30 MG/DL 1.34 (H)   BUN/Creatinine ratio      12 - 20   18   GFR est AA      >60 ml/min/1.73m2 60 (L)   GFR est non-AA      >60 ml/min/1.73m2 49 (L)     Component      Latest Ref Rng & Units 1/8/2021 1/7/2021 1/7/2021 1/6/2021           3:29 AM  3:54 AM  3:54 AM  4:52 AM   Protein, total      6.4 - 8.2 g/dL 5.7 (L)  5.4 (L)    Albumin      3.5 - 5.0 g/dL 2.9 (L)  3.0 (L)      Component      Latest Ref Rng & Units 1/6/2021           4:52 AM   Protein, total      6.4 - 8.2 g/dL 6.1 (L)   Albumin      3.5 - 5.0 g/dL 3.1 (L)     Component      Latest Ref Rng & Units 1/8/2021 1/7/2021 2021           3:29 AM  3:54 AM  3:54 AM  4:52 AM   A-G Ratio      1.1 - 2.2   1.0 (L)  1.3      Component      Latest Ref Rng & Units 2021           4:52 AM   A-G Ratio      1.1 - 2.2   1.0 (L)     Advance Care Planning:   Does patient have an Advance Directive:  yes; reviewed and current . Patient states his Living Will, that is dated 2013 and that is currently scanned into [facility], is a current ACP document. Was this a readmission? no   Patient stated reason for the admission: \"During the night, I was awakened by chest pain. \"   Patients top risk factors for readmission: medical condition  Interventions to address risk factors: Education provided regarding signs/symptoms of NSTEMI, patient verbalized an understanding. Care Transition Nurse (CTN) contacted the patient by telephone to perform post hospital discharge assessment. Verified name and  with patient as identifiers. Provided introduction to self, and explanation of the CTN role. CTN reviewed discharge instructions, medical action plan and red flags with patient who verbalized understanding. Patient given an opportunity to ask questions and does not have any further questions or concerns at this time. The patient agrees to contact the PCP office for questions related to their healthcare. Medication reconciliation was not performed with patient during this phone conversation. Patient states he is not familiar with the names of his medications and his daughter prepares his medications for him weekly. Patient requested that this CTN not call his daughter for medication reconciliation, and states his medications were reviewed during his [facility] appointment with Dr. Hany Corrales. Sachi/PCP on 21. Advised obtaining a 90-day supply of all daily and as-needed medications.    Referral to Pharm D needed: no     Home Health/Outpatient orders at discharge: [address]: n/a  Date of initial visit: 1732 Equipment ordered at discharge: none  [address] received: n/a    Covid Risk Education    Patient has following risk factors of: COPD, chronic kidney disease and recent NSTEMI per chart. Education provided regarding infection prevention, and signs and symptoms of COVID-19 and when to seek medical attention with patient who verbalized understanding. Discussed exposure protocols and quarantine From CDC: Are you at higher risk for severe illness?  and given an opportunity for questions and concerns. The patient agrees to contact the COVID-19 hotline 919-585-9131 or PCP office for questions related to COVID-19. For more information on steps you can take to protect yourself, see CDC's How to Protect Yourself     Patient/family/caregiver given information for [employer] and agrees to enroll no  Patient's preferred e-mail: declines  Patient's preferred phone number: declines    Discussed follow-up appointments. If no appointment was previously scheduled, appointment scheduling offered: yes  [facility] follow up appointment(s):   Future Appointments   Date Time Provider Luann Peck   1/19/2021 10:40 AM Kalli Sanchez MD [facility] BS AMB   2/23/2021  3:30 PM [address] [facility] AMB   5/28/2021 10:00 AM PACEMAKER, STFRANCES [facility] BS AMB   5/28/2021 10:20 AM Silvana Stern NP [facility] AMB     Non-BS follow up appointment(s): Please see below for appointments. Plan for follow-up call in 10-14 days based on severity of symptoms and risk factors. CTN provided contact information for future needs. Goals Addressed                 This Visit's Progress     Attends follow-up appointments as directed. 1-12-21: Patient states he attended [facility] appointment with Dr. Randy Rojo. Trevor/PCP with Critical access hospital ([facility]) on 1-11-21.  Patient is aware that he has a cardio follow-up scheduled with Dr. Britany Owen on 1-19-21 and states his daughter will provide transportation to/from appointments. Care Transitions Nurse spoke with [facility] who states Filomena Barron, RN/Inpatient Cardiac Wellness Team left a message for patient's daughter, Neftali Mehta on 1-11-21. Calista Taylor Understands red flags post discharge. 1-12-21: Red flags of NSTEMI reviewed with patient and patient verbalized an understanding. Patient denies chest pain and/or shortness of breath, denies fever/chills, and denies nausea/vomiting since discharge on 1-8-21. Patient has no red flags to report at this time. Patient states, \"I'm feeling stronger, I'm feeling better. \" Care Transitions Nurse will review red flags again on next phone conversation with patient.  Kim Feliciano

## 2021-01-12 NOTE — TELEPHONE ENCOUNTER
1/12/2021 [facility]:I spoke with a transition of care nurse/CM for Mr. Kip Ba to discuss about his participation in CRP. I saw a note from SHAYY/Kaycee that she left a voicemail for his daughter Lakshmi Leggett yesterday, so hopefully she will call back to speak with Bill Padilla or myself.  Siobhan Ma

## 2021-02-02 ENCOUNTER — OFFICE VISIT (OUTPATIENT)
Dept: CARDIOLOGY CLINIC | Age: 86
End: 2021-02-02
Payer: COMMERCIAL

## 2021-02-02 VITALS
WEIGHT: 180 LBS | BODY MASS INDEX: 26.58 KG/M2 | DIASTOLIC BLOOD PRESSURE: 66 MMHG | HEART RATE: 58 BPM | SYSTOLIC BLOOD PRESSURE: 120 MMHG

## 2021-02-02 DIAGNOSIS — I21.4 NSTEMI (NON-ST ELEVATED MYOCARDIAL INFARCTION) (HCC): ICD-10-CM

## 2021-02-02 DIAGNOSIS — I48.91 ATRIAL FIBRILLATION, UNSPECIFIED TYPE (HCC): Primary | ICD-10-CM

## 2021-02-02 DIAGNOSIS — Z09 HOSPITAL DISCHARGE FOLLOW-UP: ICD-10-CM

## 2021-02-02 PROCEDURE — 1111F DSCHRG MED/CURRENT MED MERGE: CPT | Performed by: INTERNAL MEDICINE

## 2021-02-02 PROCEDURE — 99214 OFFICE O/P EST MOD 30 MIN: CPT | Performed by: INTERNAL MEDICINE

## 2021-02-02 NOTE — PROGRESS NOTES
Room: 2    Identified pt with two pt identifiers(name and ). Reviewed record in preparation for visit and have obtained necessary documentation. All patient medications has been reviewed. Chief Complaint   Patient presents with   [facility] Follow Up       Health Maintenance Due   Topic    COVID-19 Vaccine (1 of 2)    Shingrix Vaccine Age 50> (1 of 2)    GLAUCOMA SCREENING Q2Y     Pneumococcal 65+ years (1 of 1 - PPSV23)    Medicare Yearly Exam     Flu Vaccine (1)    Lipid Screen        Vitals:    21 1102   BP: 120/66   Pulse: (!) 58   Weight: 180 lb (81.6 kg)   PainSc:   0 - No pain       4. Have you been to the ER, urgent care clinic since your last visit? Hospitalized since your last visit? No    5. Have you seen or consulted any other health care providers outside of the [address] [place] [address] since your last visit? Include any pap smears or colon screening. No      Patient is accompanied by self I have received verbal consent from Helen Mejia to discuss any/all medical information while they are present in the room.

## 2021-02-02 NOTE — LETTER
2/4/2021 Patient: Shante Decker YOB: 1922 Date of Visit: 2/2/2021 Guido Dancer, MD 
[facility] [phone] [address] Fax: [phone] Dear Guido Dancer, MD, Thank you for referring Mr. Keaton Nguyễn to [address]  [facility] for evaluation. My notes for this consultation are attached. If you have questions, please do not hesitate to call me. I look forward to following your patient along with you.  
 
 
Sincerely, 
 
Willis Gill MD

## 2021-02-04 NOTE — PROGRESS NOTES
Cardiology Progress Note            Admit Date: (Not on file)  Admit Diagnosis: No admission diagnoses are documented for this encounter. Date: 2/4/2021     Time: 9:45 AM    HPI: Alyce Caro is a 80 y.o. male admitted to [facility] on 1/4/2021  for NSTEMI (non-ST elevated myocardial infarction) ([facility] [address] [I21.4]. Has PMH of PAF on eliquis, HTN, PPM COPD,CKD,  HLD. Has history of NSTEMI in 7/2019 with mildly elevated troponin peak of 0.39 and pt had no further chest pain, thus medical management was recommended. He does have prior history of CAD with stent to LAD several years prior(done in [place]). His initial echocardiogram demonstrated mid and apical anterior wall hypokinesis. He underwent a cardiac catheterization which demonstrated moderate to severe left main disease, LAD disease with OM1 and OM 2 bifurcation disease. He was managed conservatively initially since there was a suspicion that his echocardiogram wall motion abnormalities could be related to stress cardiomyopathy. Moreover his peak troponin was more consistent with stress cardiomyopathy rather than acute anterior myocardial infarction. PCI discussion is on the table assuming patient continues to have repeated chest discomfort. However after optimizing his medications including initiation of antianginal therapy in the form of amlodipine, Imdur he has not had any further symptoms. Today he reports no residual chest discomfort in the past 1 month. He has been trying to ambulate within his home without any limiting symptoms feel that his shortness of breath is back to his baseline from 6 months ago. Clearly he does not feel the same as he felt about 2 years ago. Assessment and Plan     1.  NSTEMI/CAD:    -[facility] with severe LM disease, LAD and OM1, OM2 disease.               -Echo EF 40-45%, anteroapical hypokinesis.               -No further chest pain. Some Fatigue with ambulation yesterday with PT.    -Not good candidate for CABG, high risk. -Medical management    -If symptoms worsen, may consider PCI to mid LAD and ostial LM       -Continue ASA, statin, Toprol. Add Ace-I              2. Cardiomyopathy, ischemic vs stress CM   -EF 40-45%   -Appears compensated on exam.   -Lopressor changed to Toprol XL 25   -Add Lisinopril 2.5 mg daily   -Lasix 10 mg daily po.     4. CKD:    -Creatinine acceptable 1.46           5. HLD              -Zocor 40 mg daily     5. HTN:    -Controlled   -Toprol XL, Amlodipine, Lisinopril             6. Hx  of PAF:   -currently v-paced rhythm   -Eliquis 2.5 mg BID    7. History of PPM   -follows with Dr. Cristhian Ellis. 8. Advanced directives:   -DNR    80 y.o. male with cardiomyopathy and multivessel CAD invollving LAD/diagnoal NSTEMI. He was managed conservatively and has done well. He is tolerating his medications well without any concerns. Because of now borderline low blood pressures I recommend discontinuation of Imdur and reduction in amlodipine from 10 to 5 mg daily. I also recommend performing an echocardiogram to see if his wall motion abnormalities have improved which would support a diagnosis of stress cardiomyopathy since the improvement would have occurred in the absence of any coronary intervention. In that case I would suggest continued conservative management with medical therapy alone. However if his LV function does not improve or appears to be progressively worsening and he reports worsening symptoms, we may reconsider interventional strategy. In the interim should continue apixaban for his history of A. fib along with aspirin. Low-dose Lasix should be adequate for managing his heart failure symptoms. Additionally lisinopril metoprolol and amlodipine may be continued and low-dose for antianginal benefits as well as therapy for suspected ischemic heart disease.   I have ordered another echocardiogram and will be happy to see him back in 2 months in follow-up. Cardiac testing:  [facility] 1/5/2020. Findings show severe left main stenosis 70%. There is further high-grade narrowing of 70% in the proximal LAD. Occluded D1. Severe 70% narrowing in OM1 and OM 2. RCA is heavily calcified with noncritical stenosis  01/04/21   ECHO ADULT COMPLETE 01/04/2021 1/4/2021    Narrative · Contrast used: DEFINITY. · LV: Estimated LVEF is 40 - 45% with anteroapical hypokinesis. Signed by: Fransico Bartholomew MD     2/3/20 echo:  · Image quality for this study was technically difficult. · Normal cavity size and systolic function (ejection fraction normal). Mildly increased wall thickness. Estimated left ventricular ejection fraction is 55 - 60%. Visually measured ejection fraction. Abnormal left ventricular wall motion. · Mild aortic valve sclerosis. Mild aortic valve regurgitation is present. · Mild ascending aorta dilatation. Echocardiogram January 2021: Apical, mid anterior wall hypokinesis which could suggest possible stress or myopathy. Overall EF about 40%. Mild ascending aortic dilatation.        PMH  Past Medical History:   Diagnosis Date    Atrial fibrillation ([address]     Chronic obstructive pulmonary disease ([address]     Essential hypertension     Hyperlipidemia     Long term (current) use of anticoagulants     Pacemaker     Rosacea 2016      Social Hx  Social History     Socioeconomic History    Marital status:      Spouse name: Not on file    Number of children: Not on file    Years of education: Not on file    Highest education level: Not on file   Occupational History    Not on file   Social Needs    Financial resource strain: Not on file    Food insecurity     Worry: Not on file     Inability: Not on file    Transportation needs     Medical: Not on file     Non-medical: Not on file   Tobacco Use    Smoking status: Never Smoker    Smokeless tobacco: Never Used   Substance and Sexual Activity    Alcohol use: No     Alcohol/week: 0.0 standard drinks    Drug use: No    Sexual activity: Not on file   Lifestyle    Physical activity     Days per week: Not on file     Minutes per session: Not on file    Stress: Not on file   Relationships    Social connections     Talks on phone: Not on file     Gets together: Not on file     Attends Church service: Not on file     Active member of club or organization: Not on file     Attends meetings of clubs or organizations: Not on file     Relationship status: Not on file    Intimate partner violence     Fear of current or ex partner: Not on file     Emotionally abused: Not on file     Physically abused: Not on file     Forced sexual activity: Not on file   Other Topics Concern    Not on file   Social History Narrative    Not on file   Objective:      Physical Exam:                Visit Vitals  /66 (BP 1 Location: Left upper arm, BP Patient Position: Sitting)   Pulse (!) 58   Wt 180 lb (81.6 kg)   BMI 26.58 kg/m²          General Appearance:   Well developed, well nourished,alert and oriented x 3, and   individual in no acute distress. Ears/Nose/Mouth/Throat:    [place]. Neck:  Supple. No JVD   Chest:    Lungs clear bilaterally. No use of accessory muscles. .   Cardiovascular:   Regular rate and rhythm, S1, S2 normal, no murmur. Abdomen:    Soft, non-tender, bowel sounds are active. Extremities:  no BLE edema. Skin:  Warm and dry. Telemetry:           Data Review:    Labs:    No results found for this or any previous visit (from the past 24 hour(s)). Radiology:        Current Outpatient Medications   Medication Sig    metoprolol succinate (TOPROL-XL) 25 mg XL tablet Take 1 Tab by mouth every evening.  lisinopriL (PRINIVIL, ZESTRIL) 2.5 mg tablet Take 1 Tab by mouth daily.  aspirin 81 mg chewable tablet Take 1 Tab by mouth daily.     amLODIPine (NORVASC) 5 mg tablet TAKE ONE TABLET BY MOUTH DAILY (Patient taking differently: Take 2.5 mg by mouth daily.)    nitroglycerin (NITROSTAT) 0.4 mg SL tablet 1 Tab by SubLINGual route every five (5) minutes as needed for Chest Pain.  antiox #8/om3/dha/epa/lut/zeax (PRESERVISION AREDS 2, OMEGA-3, PO) Take 1 Cap by mouth two (2) times a day.  ipratropium (ATROVENT) 0.03 % nasal spray 2 Sprays by Both Nostrils route two (2) times daily as needed for Rhinitis.  mometasone (NASONEX) 50 mcg/actuation nasal spray 2 Sprays by Both Nostrils route daily.  furosemide (LASIX) 20 mg tablet Take 10 mg by mouth daily.  metroNIDAZOLE (METROCREAM) 0.75 % topical cream Apply  to affected area nightly. Use a thin layer to face after washing  Indications: a skin condition on the cheeks and nose with a reddish rash and acne called acne rosacea    apixaban (ELIQUIS) 2.5 mg tablet Take 1 Tab by mouth two (2) times a day.  tamsulosin (FLOMAX) 0.4 mg capsule Take 0.4 mg by mouth nightly.  levothyroxine (LEVOTHROID) 50 mcg tablet Take 50 mcg by mouth Daily (before breakfast).  omeprazole (PRILOSEC) 20 mg capsule Take 20 mg by mouth daily.  simvastatin (ZOCOR) 40 mg tablet Take 20 mg by mouth nightly.  Multivits with Min-FA-Lycopene (MEN'S DAILY MULTIVIT-MINERAL) 0.4-600 mg-mcg tab Take 1 Tab by mouth daily. No current facility-administered medications for this visit. Prior to Admission Medications   Prescriptions Last Dose Informant Patient Reported? Taking? Multivits with Min-FA-Lycopene (MEN'S DAILY MULTIVIT-MINERAL) 0.4-600 mg-mcg tab  Child Yes Yes   Sig: Take 1 Tab by mouth daily. amLODIPine (NORVASC) 5 mg tablet   No Yes   Sig: TAKE ONE TABLET BY MOUTH DAILY   antiox #8/om3/dha/epa/lut/zeax (PRESERVISION AREDS 2, OMEGA-3, PO)  Child Yes Yes   Sig: Take 1 Cap by mouth two (2) times a day. apixaban (ELIQUIS) 2.5 mg tablet  Child No Yes   Sig: Take 1 Tab by mouth two (2) times a day. furosemide (LASIX) 20 mg tablet  Child Yes Yes   Sig: Take 10 mg by mouth daily.    ipratropium (ATROVENT) 0.03 % nasal spray  Child Yes Yes   Si Sprays by Both Nostrils route two (2) times daily as needed for Rhinitis. levothyroxine (LEVOTHROID) 50 mcg tablet  Child Yes Yes   Sig: Take 50 mcg by mouth Daily (before breakfast). metoprolol tartrate (LOPRESSOR) 25 mg tablet   Yes Yes   Sig: Take 50 mg by mouth two (2) times a day. metroNIDAZOLE (METROCREAM) 0.75 % topical cream  Child Yes Yes   Sig: Apply  to affected area nightly. Use a thin layer to face after washing  Indications: a skin condition on the cheeks and nose with a reddish rash and acne called acne rosacea   mometasone (NASONEX) 50 mcg/actuation nasal spray  Child Yes Yes   Si Sprays by Both Nostrils route daily. nitroglycerin (NITROSTAT) 0.4 mg SL tablet   No Yes   Si Tab by SubLINGual route every five (5) minutes as needed for Chest Pain. omeprazole (PRILOSEC) 20 mg capsule  Child Yes Yes   Sig: Take 20 mg by mouth daily. simvastatin (ZOCOR) 40 mg tablet  Child Yes Yes   Sig: Take 20 mg by mouth nightly. tamsulosin (FLOMAX) 0.4 mg capsule  Child Yes Yes   Sig: Take 0.4 mg by mouth nightly.       Facility-Administered Medications: None          Jose Luis Jenkins MD     [facility] [address] [place] [address], [address] [place] [address],[address]   [facility]   [phone]

## 2021-02-23 ENCOUNTER — OFFICE VISIT (OUTPATIENT)
Dept: CARDIOLOGY CLINIC | Age: 86
End: 2021-02-23
Payer: COMMERCIAL

## 2021-02-23 DIAGNOSIS — Z95.0 CARDIAC PACEMAKER IN SITU: Primary | ICD-10-CM

## 2021-02-23 PROCEDURE — 93296 REM INTERROG EVL PM/IDS: CPT | Performed by: INTERNAL MEDICINE

## 2021-03-23 ENCOUNTER — OFFICE VISIT (OUTPATIENT)
Dept: CARDIOLOGY CLINIC | Age: 86
End: 2021-03-23

## 2021-03-23 ENCOUNTER — ANCILLARY PROCEDURE (OUTPATIENT)
Dept: CARDIOLOGY CLINIC | Age: 86
End: 2021-03-23
Payer: COMMERCIAL

## 2021-03-23 VITALS
RESPIRATION RATE: 16 BRPM | WEIGHT: 172 LBS | BODY MASS INDEX: 26.07 KG/M2 | SYSTOLIC BLOOD PRESSURE: 120 MMHG | HEIGHT: 68 IN | OXYGEN SATURATION: 98 % | HEART RATE: 62 BPM | DIASTOLIC BLOOD PRESSURE: 70 MMHG

## 2021-03-23 VITALS — BODY MASS INDEX: 25.48 KG/M2 | WEIGHT: 172 LBS | HEIGHT: 69 IN

## 2021-03-23 DIAGNOSIS — I25.119 CORONARY ARTERY DISEASE INVOLVING NATIVE CORONARY ARTERY OF NATIVE HEART WITH ANGINA PECTORIS (HCC): ICD-10-CM

## 2021-03-23 DIAGNOSIS — I21.4 NSTEMI (NON-ST ELEVATED MYOCARDIAL INFARCTION) (HCC): ICD-10-CM

## 2021-03-23 DIAGNOSIS — I51.81 STRESS-INDUCED CARDIOMYOPATHY: ICD-10-CM

## 2021-03-23 DIAGNOSIS — I34.0 NONRHEUMATIC MITRAL VALVE REGURGITATION: Primary | ICD-10-CM

## 2021-03-23 DIAGNOSIS — I10 ESSENTIAL HYPERTENSION: ICD-10-CM

## 2021-03-23 DIAGNOSIS — I48.91 ATRIAL FIBRILLATION, UNSPECIFIED TYPE (HCC): ICD-10-CM

## 2021-03-23 PROCEDURE — 99214 OFFICE O/P EST MOD 30 MIN: CPT | Performed by: INTERNAL MEDICINE

## 2021-03-23 PROCEDURE — 93308 TTE F-UP OR LMTD: CPT | Performed by: INTERNAL MEDICINE

## 2021-03-23 RX ORDER — LISINOPRIL 20 MG/1
20 TABLET ORAL DAILY
Qty: 90 TAB | Refills: 1 | Status: SHIPPED | OUTPATIENT
Start: 2021-03-23

## 2021-03-23 RX ORDER — ATORVASTATIN CALCIUM 20 MG/1
20 TABLET, FILM COATED ORAL DAILY
Qty: 90 TAB | Refills: 1 | Status: SHIPPED | OUTPATIENT
Start: 2021-03-23

## 2021-03-23 NOTE — LETTER
3/27/2021 Patient: Prudence Osman YOB: 1922 Date of Visit: 3/23/2021 David Hurtado MD 
[address] [facility] [phone] [address] Fax: [phone] Dear David Hurtado MD, Thank you for referring Mr. Cely Szymanski to [facility] [address] [place] [address] for evaluation. My notes for this consultation are attached. If you have questions, please do not hesitate to call me. I look forward to following your patient along with you.  
 
 
Sincerely, 
 
Clara Goff MD

## 2021-03-23 NOTE — PROGRESS NOTES
Cardiology Progress Note         Referring physician: Dr. Vance Cordova  Reason for follow-up: Coronary artery disease    HPI: Camille Crawford is a 80 y.o. male admitted to [facility] on 1/4/2021  for suspected NSTEMI (non-ST elevated myocardial infarction) ([facility] [address] [I21.4]. Has PMH of PAF on eliquis, HTN, PPM COPD,CKD,  HLD. Has history of NSTEMI in 7/2019 with mildly elevated troponin peak of 0.39 and pt had no further chest pain, thus medical management was recommended. He does have prior history of CAD with stent to LAD several years prior(done in [place]). His initial echocardiogram demonstrated mid and apical anterior wall hypokinesis. He underwent a cardiac catheterization which demonstrated moderate to severe left main disease, LAD disease with OM1 and OM 2 bifurcation disease. He was managed conservatively initially since there was a suspicion that his echocardiogram wall motion abnormalities could be related to stress cardiomyopathy. Moreover his peak troponin was more consistent with stress cardiomyopathy rather than acute anterior myocardial infarction. PCI discussion is on the table assuming patient continues to have repeated chest discomfort. However after optimizing his medications including initiation of antianginal therapy in the form of amlodipine, Imdur he has not had any further symptoms. Today he reports no residual chest discomfort in the past 1 month. He has been trying to ambulate within his home without any limiting symptoms feel that his shortness of breath is back to his baseline from 6 months ago. Clearly he does not feel the same as he felt about 2 years ago. Assessment and Plan     1. Coronary artery disease manifesting the form of left main, LAD, marginal disease         2. Cardiomyopathy initially suspected from coronary disease but glucose more consistent with stress cardiomyopathy  3. History of recurrent paroxysmal atrial fibrillation  4. CKD:   5. HLD  6. HTN:   7. History of dual-chamber pacemaker      80 y.o. male with cardiomyopathy and multivessel CAD invollving LAD/diagnoal/ Marginals. He was managed conservatively and has done well. Initially he was suspected to have LV systolic dysfunction secondary to cardiomyopathy but his clinical course is more consistent with stress cardiomyopathy. He underwent an echocardiogram today which is now showing complete normalization of LV function suggesting that the LV dysfunction was most likely from stress cardiomyopathy rather than CAD. He does not report of any significant chest discomfort. And septic and elevated will take him off Imdur and amlodipine. Only lisinopril and metoprolol should be enough for his blood pressure control. Anticoagulation with 2.5 mg twice a day of apixaban should be continued for recurrent paroxysmal atrial fibrillation. In the absence of any recurrent angina, coronary disease will be managed conservatively. Statins however should be continued if tolerated. If he does not have any symptoms of heart failure, will plan to discontinue Lasix on future visits. Cardiac testing/imaging personally reviewed:  [facility] 1/5/2021. Findings show severe left main stenosis 70%. There is further high-grade narrowing of 70% in the proximal LAD. Occluded D1. Severe 70% narrowing in OM1 and OM 2. RCA is heavily calcified with noncritical stenosis  Echo: January 2021: Apical wall motion abnormality with overall EF about 40%  March 2021: Normalization of LV function with EF of 60 to 85%.        PMH  Past Medical History:   Diagnosis Date    Atrial fibrillation ([address]     Chronic obstructive pulmonary disease ([address]     Essential hypertension     Hyperlipidemia     Long term (current) use of anticoagulants     Pacemaker     Rosacea 2016      Social Hx  Social History     Socioeconomic History    Marital status:  Spouse name: Not on file    Number of children: Not on file    Years of education: Not on file    Highest education level: Not on file   Occupational History    Not on file   Social Needs    Financial resource strain: Not on file    Food insecurity     Worry: Not on file     Inability: Not on file    Transportation needs     Medical: Not on file     Non-medical: Not on file   Tobacco Use    Smoking status: Never Smoker    Smokeless tobacco: Never Used   Substance and Sexual Activity    Alcohol use: No     Alcohol/week: 0.0 standard drinks    Drug use: No    Sexual activity: Not on file   Lifestyle    Physical activity     Days per week: Not on file     Minutes per session: Not on file    Stress: Not on file   Relationships    Social connections     Talks on phone: Not on file     Gets together: Not on file     Attends Congregation service: Not on file     Active member of club or organization: Not on file     Attends meetings of clubs or organizations: Not on file     Relationship status: Not on file    Intimate partner violence     Fear of current or ex partner: Not on file     Emotionally abused: Not on file     Physically abused: Not on file     Forced sexual activity: Not on file   Other Topics Concern    Not on file   Social History Narrative    Not on file   Objective:      Physical Exam:                Visit Vitals  /70 (BP 1 Location: Left upper arm, BP Patient Position: Sitting, BP Cuff Size: Adult)   Pulse 62   Resp 16   Ht 5' 8\" (1.727 m)   Wt 172 lb (78 kg)   SpO2 98%   BMI 26.15 kg/m²     /70 (BP 1 Location: Left upper arm, BP Patient Position: Sitting, BP Cuff Size: Adult)   Pulse 62   Resp 16   Ht 5' 8\" (1.727 m)   Wt 172 lb (78 kg)   SpO2 98%   BMI 26.15 kg/m²   General:    Alert, cooperative, no distress. Psychiatric:    Normal Mood and affect    Eye/ENT:      Pupils equal, No asymmetry, Conjunctival pink.  Able to hear voice at normal amplitude   Lungs: Visibly symmetric chest expansion, No palpable tenderness. Clear to auscultation bilaterally. Heart[de-identified]    Regular rate and rhythm, S1, S2 normal, no murmur, click, rub or gallop. No JVD, Normal palpable peripheral pulses. No cyanosis   Abdomen:     Soft, non-tender. Bowel sounds normal. No masses,  No      organomegaly. Extremities:   Extremities normal, atraumatic, no edema. Neurologic:   CN II-XII grossly intact.  No gross focal deficits       Current Outpatient Medications   Medication Instructions    antiox #8/om3/dha/epa/lut/zeax (PRESERVISION AREDS 2, OMEGA-3, PO) 1 Cap, Oral, 2 TIMES DAILY    apixaban (ELIQUIS) 2.5 mg, Oral, 2 TIMES DAILY    atorvastatin (LIPITOR) 20 mg, Oral, DAILY    furosemide (LASIX) 10 mg, Oral, DAILY    ipratropium (ATROVENT) 0.03 % nasal spray 2 Sprays, Both Nostrils, 2 TIMES DAILY AS NEEDED    levothyroxine (LEVOTHROID) 50 mcg, Oral, DAILY BEFORE BREAKFAST    lisinopriL (PRINIVIL, ZESTRIL) 20 mg, Oral, DAILY    metoprolol succinate (TOPROL-XL) 25 mg, Oral, EVERY EVENING    metroNIDAZOLE (METROCREAM) 0.75 % topical cream Topical, EVERY BEDTIME, Use a thin layer to face after washing    mometasone (NASONEX) 50 mcg/actuation nasal spray 2 Sprays, Both Nostrils, DAILY    Multivits with Min-FA-Lycopene (MEN'S DAILY MULTIVIT-MINERAL) 0.4-600 mg-mcg tab 1 Tab, Oral, DAILY    omeprazole (PRILOSEC) 20 mg, Oral, DAILY    tamsulosin (FLOMAX) 0.4 mg, Oral, EVERY BEDTIME            Clara Goff MD     [facility] [address] [place] [address] 13 [address] [facility] [address]   [facility]   [phone]

## 2021-03-25 LAB
ECHO LV EDV A2C: 97.9 ML
ECHO LV EDV A4C: 104.55 ML
ECHO LV EDV BP: 101.09 ML (ref 67–155)
ECHO LV EDV INDEX A4C: 54.5 ML/M2
ECHO LV EDV INDEX BP: 52.7 ML/M2
ECHO LV EDV NDEX A2C: 51.1 ML/M2
ECHO LV EJECTION FRACTION A2C: 55 PERCENT
ECHO LV EJECTION FRACTION A4C: 58 PERCENT
ECHO LV EJECTION FRACTION BIPLANE: 56 PERCENT (ref 55–100)
ECHO LV ESV A2C: 44.39 ML
ECHO LV ESV A4C: 43.84 ML
ECHO LV ESV BP: 44.52 ML (ref 22–58)
ECHO LV ESV INDEX A2C: 23.2 ML/M2
ECHO LV ESV INDEX A4C: 22.9 ML/M2
ECHO LV ESV INDEX BP: 23.2 ML/M2
ECHO LV INTERNAL DIMENSION DIASTOLIC: 4.21 CM (ref 4.2–5.9)
ECHO LV INTERNAL DIMENSION SYSTOLIC: 3.02 CM
ECHO LV IVSD: 1.43 CM (ref 0.6–1)
ECHO LV MASS 2D: 272.9 G (ref 88–224)
ECHO LV MASS INDEX 2D: 142.4 G/M2 (ref 49–115)
ECHO LV POSTERIOR WALL DIASTOLIC: 1.74 CM (ref 0.6–1)

## 2021-03-27 PROBLEM — I51.81 STRESS-INDUCED CARDIOMYOPATHY: Status: ACTIVE | Noted: 2021-03-27

## 2021-03-27 PROBLEM — I25.119 CORONARY ARTERY DISEASE INVOLVING NATIVE CORONARY ARTERY OF NATIVE HEART WITH ANGINA PECTORIS (HCC): Status: ACTIVE | Noted: 2021-03-27

## 2021-04-26 ENCOUNTER — APPOINTMENT (OUTPATIENT)
Dept: GENERAL RADIOLOGY | Age: 86
End: 2021-04-26
Attending: EMERGENCY MEDICINE
Payer: MEDICARE

## 2021-04-26 ENCOUNTER — HOSPITAL ENCOUNTER (EMERGENCY)
Age: 86
Discharge: HOME | End: 2021-04-26
Attending: EMERGENCY MEDICINE
Payer: MEDICARE

## 2021-04-26 VITALS
WEIGHT: 171.74 LBS | DIASTOLIC BLOOD PRESSURE: 92 MMHG | TEMPERATURE: 98 F | RESPIRATION RATE: 18 BRPM | BODY MASS INDEX: 26.03 KG/M2 | HEIGHT: 68 IN | OXYGEN SATURATION: 98 % | SYSTOLIC BLOOD PRESSURE: 157 MMHG | HEART RATE: 72 BPM

## 2021-04-26 DIAGNOSIS — R53.1 GENERALIZED WEAKNESS: Primary | ICD-10-CM

## 2021-04-26 LAB
ALBUMIN SERPL-MCNC: 3.4 G/DL (ref 3.5–5)
ALBUMIN/GLOB SERPL: 1.2 {RATIO} (ref 1.1–2.2)
ALP SERPL-CCNC: 79 U/L (ref 45–117)
ALT SERPL-CCNC: 20 U/L (ref 12–78)
ANION GAP SERPL CALC-SCNC: 11 MMOL/L (ref 5–15)
APPEARANCE UR: CLEAR
AST SERPL-CCNC: 25 U/L (ref 15–37)
ATRIAL RATE: 68 BPM
BACTERIA URNS QL MICRO: NEGATIVE /HPF
BASOPHILS # BLD: 0 K/UL (ref 0–0.1)
BASOPHILS NFR BLD: 0 % (ref 0–1)
BILIRUB SERPL-MCNC: 0.7 MG/DL (ref 0.2–1)
BILIRUB UR QL: NEGATIVE
BUN SERPL-MCNC: 31 MG/DL (ref 6–20)
BUN/CREAT SERPL: 16 (ref 12–20)
CALCIUM SERPL-MCNC: 8.8 MG/DL (ref 8.5–10.1)
CALCULATED R AXIS, ECG10: -77 DEGREES
CALCULATED T AXIS, ECG11: 75 DEGREES
CHLORIDE SERPL-SCNC: 107 MMOL/L (ref 97–108)
CO2 SERPL-SCNC: 26 MMOL/L (ref 21–32)
COLOR UR: ABNORMAL
COMMENT, HOLDF: NORMAL
CREAT SERPL-MCNC: 1.88 MG/DL (ref 0.7–1.3)
DIAGNOSIS, 93000: NORMAL
DIFFERENTIAL METHOD BLD: ABNORMAL
EOSINOPHIL # BLD: 0.3 K/UL (ref 0–0.4)
EOSINOPHIL NFR BLD: 4 % (ref 0–7)
EPITH CASTS URNS QL MICRO: ABNORMAL /LPF
ERYTHROCYTE [DISTWIDTH] IN BLOOD BY AUTOMATED COUNT: 14.5 % (ref 11.5–14.5)
GLOBULIN SER CALC-MCNC: 2.9 G/DL (ref 2–4)
GLUCOSE SERPL-MCNC: 83 MG/DL (ref 65–100)
GLUCOSE UR STRIP.AUTO-MCNC: NEGATIVE MG/DL
HCT VFR BLD AUTO: 40 % (ref 36.6–50.3)
HGB BLD-MCNC: 13 G/DL (ref 12.1–17)
HGB UR QL STRIP: ABNORMAL
HYALINE CASTS URNS QL MICRO: ABNORMAL /LPF (ref 0–5)
IMM GRANULOCYTES # BLD AUTO: 0 K/UL (ref 0–0.04)
IMM GRANULOCYTES NFR BLD AUTO: 1 % (ref 0–0.5)
KETONES UR QL STRIP.AUTO: NEGATIVE MG/DL
LEUKOCYTE ESTERASE UR QL STRIP.AUTO: NEGATIVE
LYMPHOCYTES # BLD: 1.4 K/UL (ref 0.8–3.5)
LYMPHOCYTES NFR BLD: 20 % (ref 12–49)
MCH RBC QN AUTO: 31.1 PG (ref 26–34)
MCHC RBC AUTO-ENTMCNC: 32.5 G/DL (ref 30–36.5)
MCV RBC AUTO: 95.7 FL (ref 80–99)
MONOCYTES # BLD: 0.7 K/UL (ref 0–1)
MONOCYTES NFR BLD: 10 % (ref 5–13)
NEUTS SEG # BLD: 4.8 K/UL (ref 1.8–8)
NEUTS SEG NFR BLD: 65 % (ref 32–75)
NITRITE UR QL STRIP.AUTO: NEGATIVE
NRBC # BLD: 0 K/UL (ref 0–0.01)
NRBC BLD-RTO: 0 PER 100 WBC
PH UR STRIP: 6.5 [PH] (ref 5–8)
PLATELET # BLD AUTO: 148 K/UL (ref 150–400)
PMV BLD AUTO: 11.2 FL (ref 8.9–12.9)
POTASSIUM SERPL-SCNC: 4.5 MMOL/L (ref 3.5–5.1)
PROT SERPL-MCNC: 6.3 G/DL (ref 6.4–8.2)
PROT UR STRIP-MCNC: NEGATIVE MG/DL
Q-T INTERVAL, ECG07: 462 MS
QRS DURATION, ECG06: 168 MS
QTC CALCULATION (BEZET), ECG08: 498 MS
RBC # BLD AUTO: 4.18 M/UL (ref 4.1–5.7)
RBC #/AREA URNS HPF: ABNORMAL /HPF (ref 0–5)
SAMPLES BEING HELD,HOLD: NORMAL
SODIUM SERPL-SCNC: 144 MMOL/L (ref 136–145)
SP GR UR REFRACTOMETRY: 1.02 (ref 1–1.03)
UR CULT HOLD, URHOLD: NORMAL
UROBILINOGEN UR QL STRIP.AUTO: 0.2 EU/DL (ref 0.2–1)
VENTRICULAR RATE, ECG03: 70 BPM
WBC # BLD AUTO: 7.3 K/UL (ref 4.1–11.1)
WBC URNS QL MICRO: ABNORMAL /HPF (ref 0–4)

## 2021-04-26 PROCEDURE — 96360 HYDRATION IV INFUSION INIT: CPT

## 2021-04-26 PROCEDURE — 99285 EMERGENCY DEPT VISIT HI MDM: CPT

## 2021-04-26 PROCEDURE — 93005 ELECTROCARDIOGRAM TRACING: CPT

## 2021-04-26 PROCEDURE — 74011250636 HC RX REV CODE- 250/636: Performed by: EMERGENCY MEDICINE

## 2021-04-26 PROCEDURE — 81001 URINALYSIS AUTO W/SCOPE: CPT

## 2021-04-26 PROCEDURE — 71045 X-RAY EXAM CHEST 1 VIEW: CPT

## 2021-04-26 PROCEDURE — 85025 COMPLETE CBC W/AUTO DIFF WBC: CPT

## 2021-04-26 PROCEDURE — 80053 COMPREHEN METABOLIC PANEL: CPT

## 2021-04-26 PROCEDURE — 36415 COLL VENOUS BLD VENIPUNCTURE: CPT

## 2021-04-26 RX ADMIN — SODIUM CHLORIDE 500 ML: 9 INJECTION, SOLUTION INTRAVENOUS at 15:16

## 2021-04-26 NOTE — ED NOTES
Spoke with Mamadou Albright., pt's daughter and updated on plan of care. Pt's daughter coming to pick pt up from ER to take back to [facility].

## 2021-04-26 NOTE — ED NOTES
Attempted to call [facility] but no one available at this time to take report. Agnes Mclaughlin, , stated she would have someone call me back.

## 2021-04-26 NOTE — DISCHARGE INSTRUCTIONS
Continue close management of medications. You should only take the medicine occasions when prescribed. Hold the Eliquis dose for tomorrow. Do not take it tomorrow if you took 2 doses today.

## 2021-04-26 NOTE — ED NOTES
Pt wheelchaired out of ED with discharge instructions in hand given by Dr. Johann Bergman; pt verbalized understanding of discharge paperwork and time allotted for questions. VSS. Pt alert. Pt picked up from ER by daughter Lenin Martinez.

## 2021-04-26 NOTE — ED PROVIDER NOTES
HPI     Please note that this dictation was completed with Ven[employer], the computer voice recognition software. Quite often unanticipated grammatical, syntax, homophones, and other interpretive errors are inadvertently transcribed by the computer software. Please disregard these errors. Please excuse any errors that have escaped final proofreading. 80-year-old male with a history of A. fib, COPD, hyperlipidemia, pacemaker, others transported by EMS for lethargy x1 week. Nursing home reported to EMS that he had taken today morning meds as well as tomorrow morning. EMS states that they talked him through and he did not take both. Denies chest pain, shortness of breath, abdominal pain, nausea, vomiting, fevers, chills, focal weakness or numbness or other complaints. Social history: Resident of independent living. Non-smoker.         Past Medical History:   Diagnosis Date    Atrial fibrillation ([address]     Chronic obstructive pulmonary disease ([facility] [address]     Essential hypertension     Hyperlipidemia     Long term (current) use of anticoagulants     Pacemaker     Rosacea 2016       Past Surgical History:   Procedure Laterality Date    HX CORONARY STENT PLACEMENT      HX HEART CATHETERIZATION      HX PACEMAKER      OR REMVL PERM PM PLS GEN W/REPL PLSE GEN 2 LEAD SYS N/A 2/6/2020    REMOVE & REPLACE PPM GEN DUAL LEAD performed by Paz Rodriguez MD at [address] CATH LAB         Family History:   Problem Relation Age of Onset   Deandre Graham Stroke Mother     Hypertension Mother     Kidney Disease Father        Social History     Socioeconomic History    Marital status:      Spouse name: Not on file    Number of children: Not on file    Years of education: Not on file    Highest education level: Not on file   Occupational History    Not on file   Social Needs    Financial resource strain: Not on file    Food insecurity     Worry: Not on file     Inability: Not on file    Transportation needs     Medical: Not on file     Non-medical: Not on file   Tobacco Use    Smoking status: Never Smoker    Smokeless tobacco: Never Used   Substance and Sexual Activity    Alcohol use: No     Alcohol/week: 0.0 standard drinks    Drug use: No    Sexual activity: Not on file   Lifestyle    Physical activity     Days per week: Not on file     Minutes per session: Not on file    Stress: Not on file   Relationships    Social connections     Talks on phone: Not on file     Gets together: Not on file     Attends Jehovah's witness service: Not on file     Active member of club or organization: Not on file     Attends meetings of clubs or organizations: Not on file     Relationship status: Not on file    Intimate partner violence     Fear of current or ex partner: Not on file     Emotionally abused: Not on file     Physically abused: Not on file     Forced sexual activity: Not on file   Other Topics Concern    Not on file   Social History Narrative    Not on file         ALLERGIES: Patient has no known allergies. Review of Systems   Constitutional: Positive for fatigue. Negative for chills and fever. HENT: Negative for congestion. Respiratory: Negative for cough and shortness of breath. Gastrointestinal: Negative for abdominal pain, nausea and vomiting. Psychiatric/Behavioral: Positive for confusion. All other systems reviewed and are negative. Vitals:    04/26/21 1411   Pulse: 71   Resp: 16   SpO2: 98%   Weight: 77.9 kg (171 lb 11.8 oz)   Height: 5' 8\" (1.727 m)            Physical Exam  Vitals signs and nursing note reviewed. Constitutional:       Appearance: He is well-developed. HENT:      Head: Normocephalic and atraumatic. Nose: No congestion or rhinorrhea. Mouth/Throat:      Mouth: Mucous membranes are moist.      Pharynx: No oropharyngeal exudate. Eyes:      General: No scleral icterus. Right eye: No discharge. Left eye: No discharge.       Conjunctiva/sclera: Conjunctivae normal. Pupils: Pupils are equal, round, and reactive to light. Neck:      Musculoskeletal: Normal range of motion and neck supple. Cardiovascular:      Rate and Rhythm: Normal rate and regular rhythm. Heart sounds: Normal heart sounds. No murmur. No friction rub. No gallop. Pulmonary:      Effort: Pulmonary effort is normal. No respiratory distress. Breath sounds: Normal breath sounds. No wheezing or rales. Abdominal:      General: Bowel sounds are normal. There is no distension. Palpations: Abdomen is soft. Tenderness: There is no abdominal tenderness. There is no guarding. Musculoskeletal: Normal range of motion. General: No tenderness. Comments: 1+ bilateral edema. Lymphadenopathy:      Cervical: No cervical adenopathy. Skin:     General: Skin is warm and dry. Coloration: Skin is not pale. Findings: No rash. Neurological:      Mental Status: He is alert. Cranial Nerves: No cranial nerve deficit. Coordination: Coordination normal.      Comments: Oriented to place and person not time (2013). MARLOW. Hard of hearing              [facility]  ED Course as of Apr 26 1734   Mon Apr 26, 2021   1501 Previous was about 1.4 in 1/2020- will give small fluid bolus. Creatinine(!): 1.88 [RG]      ED Course User Index  [RG] Lawrence Capellan MD     80year-old male with generalized weakness. Nonfocal neurologic exam.  No pain anywhere. Differential diagnosis broad given his age and chief complaint of generalized weakness. Will check labs, chest x-ray, UA. Procedures        ED EKG interpretation:  Rhythm: paced; and regular . Rate (approx.): 70; Axis: left; P wave: prolonged; QRS interval: prolonged; ST/T wave: T wave inverted in I AVL. This EKG was interpreted by Cirilo Stanford MD,ED Provider. 2:46 PM  Daughter Brooke Maciel called ER. Pt has Monday morning and Tuesday morning medications missing.   Monday night medications are still present. Pt in independent living. Morning pills include:  Omeprazole, flomax, lasix, detrol, lipitor, eliquis, levothyroxine. I explained that we are just starting his evaluation. 5:34 PM  Hematuria on UA likely related to the catheter. Nurse updated family member. Patient ambulated without difficulty. 5:34 PM  Patient's results have been reviewed with them. Patient and/or family have verbally conveyed their understanding and agreement of the patient's signs, symptoms, diagnosis, treatment and prognosis and additionally agree to follow up as recommended or return to the Emergency Room should their condition change prior to follow-up. Discharge instructions have also been provided to the patient with some educational information regarding their diagnosis as well a list of reasons why they would want to return to the ER prior to their follow-up appointment should their condition change. Recent Results (from the past 24 hour(s))   EKG, 12 LEAD, INITIAL    Collection Time: 04/26/21  2:12 PM   Result Value Ref Range    Ventricular Rate 70 BPM    Atrial Rate 68 BPM    QRS Duration 168 ms    Q-T Interval 462 ms    QTC Calculation (Bezet) 498 ms    Calculated R Axis -77 degrees    Calculated T Axis 75 degrees    Diagnosis       Ventricular-paced rhythm  Abnormal ECG  When compared with ECG of 04-JAN-2021 13:38,  premature ventricular complexes are no longer present  Vent. rate has increased BY   7 BPM  Wandering baseline makes interpretation difficult  Confirmed by Evelyn He MD, Ronan Rodgers ([id number]) on 4/26/2021 4:59:06 PM     SAMPLES BEING HELD    Collection Time: 04/26/21  2:17 PM   Result Value Ref Range    SAMPLES BEING HELD 1RED,1BLU     COMMENT        Add-on orders for these samples will be processed based on acceptable specimen integrity and analyte stability, which may vary by analyte.    METABOLIC PANEL, COMPREHENSIVE    Collection Time: 04/26/21  2:17 PM   Result Value Ref Range    Sodium 144 136 - 145 mmol/L    Potassium 4.5 3.5 - 5.1 mmol/L    Chloride 107 97 - 108 mmol/L    CO2 26 21 - 32 mmol/L    Anion gap 11 5 - 15 mmol/L    Glucose 83 65 - 100 mg/dL    BUN 31 (H) 6 - 20 MG/DL    Creatinine 1.88 (H) 0.70 - 1.30 MG/DL    BUN/Creatinine ratio 16 12 - 20      GFR est AA 40 (L) >60 ml/min/1.73m2    GFR est non-AA 33 (L) >60 ml/min/1.73m2    Calcium 8.8 8.5 - 10.1 MG/DL    Bilirubin, total 0.7 0.2 - 1.0 MG/DL    ALT (SGPT) 20 12 - 78 U/L    AST (SGOT) 25 15 - 37 U/L    Alk. phosphatase 79 45 - 117 U/L    Protein, total 6.3 (L) 6.4 - 8.2 g/dL    Albumin 3.4 (L) 3.5 - 5.0 g/dL    Globulin 2.9 2.0 - 4.0 g/dL    A-G Ratio 1.2 1.1 - 2.2     URINALYSIS W/MICROSCOPIC    Collection Time: 04/26/21  2:17 PM   Result Value Ref Range    Color YELLOW/STRAW      Appearance CLEAR CLEAR      Specific gravity 1.020 1.003 - 1.030      pH (UA) 6.5 5.0 - 8.0      Protein Negative NEG mg/dL    Glucose Negative NEG mg/dL    Ketone Negative NEG mg/dL    Bilirubin Negative NEG      Blood LARGE (A) NEG      Urobilinogen 0.2 0.2 - 1.0 EU/dL    Nitrites Negative NEG      Leukocyte Esterase Negative NEG      WBC 0-4 0 - 4 /hpf    RBC  0 - 5 /hpf    Epithelial cells FEW FEW /lpf    Bacteria Negative NEG /hpf    Hyaline cast 2-5 0 - 5 /lpf   URINE CULTURE HOLD SAMPLE    Collection Time: 04/26/21  2:17 PM    Specimen: Serum; Urine   Result Value Ref Range    Urine culture hold        Urine on hold in Microbiology dept for 2 days. If unpreserved urine is submitted, it cannot be used for addtional testing after 24 hours, recollection will be required.    CBC WITH AUTOMATED DIFF    Collection Time: 04/26/21  2:17 PM   Result Value Ref Range    WBC 7.3 4.1 - 11.1 K/uL    RBC 4.18 4.10 - 5.70 M/uL    HGB 13.0 12.1 - 17.0 g/dL    HCT 40.0 36.6 - 50.3 %    MCV 95.7 80.0 - 99.0 FL    MCH 31.1 26.0 - 34.0 PG    MCHC 32.5 30.0 - 36.5 g/dL    RDW 14.5 11.5 - 14.5 %    PLATELET 850 (L) 446 - 400 K/uL    MPV 11.2 8.9 - 12.9 FL    NRBC 0.0 0  WBC    ABSOLUTE NRBC 0.00 0.00 - 0.01 K/uL    NEUTROPHILS 65 32 - 75 %    LYMPHOCYTES 20 12 - 49 %    MONOCYTES 10 5 - 13 %    EOSINOPHILS 4 0 - 7 %    BASOPHILS 0 0 - 1 %    IMMATURE GRANULOCYTES 1 (H) 0.0 - 0.5 %    ABS. NEUTROPHILS 4.8 1.8 - 8.0 K/UL    ABS. LYMPHOCYTES 1.4 0.8 - 3.5 K/UL    ABS. MONOCYTES 0.7 0.0 - 1.0 K/UL    ABS. EOSINOPHILS 0.3 0.0 - 0.4 K/UL    ABS. BASOPHILS 0.0 0.0 - 0.1 K/UL    ABS. IMM. GRANS. 0.0 0.00 - 0.04 K/UL    DF AUTOMATED         Xr Chest Port    Result Date: 4/26/2021  INDICATION:  generalized fatigue COMPARISON: January 2021 FINDINGS: Single AP portable view of the chest obtained at 1446 demonstrates a stable cardiomediastinal silhouette. There is a right-sided pacer device and chronic cardiomegaly. Interstitial lung disease has improved. There is no new airspace disease. No osseous abnormalities are seen. Interstitial lung disease has improved, compatible with improving edema or atypical/viral pneumonia.

## 2021-04-27 ENCOUNTER — PATIENT OUTREACH (OUTPATIENT)
Dept: CASE MANAGEMENT | Age: 86
End: 2021-04-27

## 2021-04-27 NOTE — PROGRESS NOTES
Patient contacted regarding recent discharge and COVID-19 risk. Discussed COVID-19 related testing which was not done at this time. Test results were not done. Patient informed of results, if available? no    Outreach made within 2 business days of discharge: Yes    Care Transition Nurse/ Ambulatory Care Manager/ LPN Care Coordinator contacted the family by telephone to perform post discharge assessment. Verified name and  with family as identifiers. Patient has following risk factors of: COPD and chronic kidney disease. CTN/ACM/LPN reviewed discharge instructions, medical action plan and red flags related to discharge diagnosis. Reviewed and educated them on any new and changed medications related to discharge diagnosis. Advised obtaining a 90-day supply of all daily and as-needed medications. Advance Care Planning:   Does patient have an Advance Directive: yes; reviewed and current     Education provided regarding infection prevention, and signs and symptoms of COVID-19 and when to seek medical attention with family who verbalized understanding. Discussed exposure protocols and quarantine from [address] you at higher risk for severe illness  and given an opportunity for questions and concerns. The family agrees to contact the COVID-19 hotline [phone] or PCP office for questions related to their healthcare. CTN/ACM/LPN provided contact information for future reference. From CDC: Are you at higher risk for severe illness?  Wash your hands often.  Avoid close contact (6 feet, which is about two arm lengths) with people who are sick.  Put distance between yourself and other people if COVID-19 is spreading in your community.  Clean and disinfect frequently touched surfaces.  Avoid all cruise travel and non-essential air travel.  Call your healthcare professional if you have concerns about COVID-19 and your underlying condition or if you are sick.     For more information on steps you can take to protect yourself, see CDC's How to Protect Yourself      Patients daughter reports he seems \"\" and is up alert, oriented and seems to be well today. After review he did not take duplicate meds yesterday as they had thought. He has had no complaints today. Discussed that per CXR results he had improvement of interstitial lung disease and improvement of PNA , encouraged to ensure follow up closely with PCP to ensure continued improvement and daughter verbalized understanding reporting that his PCP Dr Galdino Sainz is seeing him for follow up.  No concerns at this time

## 2021-05-27 ENCOUNTER — HOSPITAL ENCOUNTER (EMERGENCY)
Age: 86
Discharge: HOME | End: 2021-05-27
Attending: STUDENT IN AN ORGANIZED HEALTH CARE EDUCATION/TRAINING PROGRAM
Payer: MEDICARE

## 2021-05-27 VITALS
BODY MASS INDEX: 27.32 KG/M2 | WEIGHT: 179.68 LBS | HEART RATE: 64 BPM | SYSTOLIC BLOOD PRESSURE: 143 MMHG | DIASTOLIC BLOOD PRESSURE: 87 MMHG | TEMPERATURE: 98 F | RESPIRATION RATE: 18 BRPM | OXYGEN SATURATION: 100 %

## 2021-05-27 DIAGNOSIS — I95.9 HYPOTENSION, UNSPECIFIED HYPOTENSION TYPE: ICD-10-CM

## 2021-05-27 DIAGNOSIS — R53.83 FATIGUE, UNSPECIFIED TYPE: Primary | ICD-10-CM

## 2021-05-27 LAB
ALBUMIN SERPL-MCNC: 3.3 G/DL (ref 3.5–5)
ALBUMIN/GLOB SERPL: 1.2 {RATIO} (ref 1.1–2.2)
ALP SERPL-CCNC: 86 U/L (ref 45–117)
ALT SERPL-CCNC: 19 U/L (ref 12–78)
ANION GAP SERPL CALC-SCNC: 9 MMOL/L (ref 5–15)
AST SERPL-CCNC: 21 U/L (ref 15–37)
BASOPHILS # BLD: 0 K/UL (ref 0–0.1)
BASOPHILS NFR BLD: 0 % (ref 0–1)
BILIRUB SERPL-MCNC: 0.8 MG/DL (ref 0.2–1)
BUN SERPL-MCNC: 34 MG/DL (ref 6–20)
BUN/CREAT SERPL: 17 (ref 12–20)
CALCIUM SERPL-MCNC: 8.8 MG/DL (ref 8.5–10.1)
CHLORIDE SERPL-SCNC: 107 MMOL/L (ref 97–108)
CO2 SERPL-SCNC: 28 MMOL/L (ref 21–32)
CREAT SERPL-MCNC: 1.98 MG/DL (ref 0.7–1.3)
DIFFERENTIAL METHOD BLD: ABNORMAL
EOSINOPHIL # BLD: 0.2 K/UL (ref 0–0.4)
EOSINOPHIL NFR BLD: 3 % (ref 0–7)
ERYTHROCYTE [DISTWIDTH] IN BLOOD BY AUTOMATED COUNT: 14.5 % (ref 11.5–14.5)
GLOBULIN SER CALC-MCNC: 2.7 G/DL (ref 2–4)
GLUCOSE SERPL-MCNC: 144 MG/DL (ref 65–100)
HCT VFR BLD AUTO: 40.9 % (ref 36.6–50.3)
HGB BLD-MCNC: 12.8 G/DL (ref 12.1–17)
IMM GRANULOCYTES # BLD AUTO: 0 K/UL (ref 0–0.04)
IMM GRANULOCYTES NFR BLD AUTO: 0 % (ref 0–0.5)
LYMPHOCYTES # BLD: 1.6 K/UL (ref 0.8–3.5)
LYMPHOCYTES NFR BLD: 22 % (ref 12–49)
MCH RBC QN AUTO: 30.3 PG (ref 26–34)
MCHC RBC AUTO-ENTMCNC: 31.3 G/DL (ref 30–36.5)
MCV RBC AUTO: 96.9 FL (ref 80–99)
MONOCYTES # BLD: 0.6 K/UL (ref 0–1)
MONOCYTES NFR BLD: 8 % (ref 5–13)
NEUTS SEG # BLD: 4.9 K/UL (ref 1.8–8)
NEUTS SEG NFR BLD: 66 % (ref 32–75)
NRBC # BLD: 0 K/UL (ref 0–0.01)
NRBC BLD-RTO: 0 PER 100 WBC
PLATELET # BLD AUTO: 120 K/UL (ref 150–400)
PMV BLD AUTO: 10.2 FL (ref 8.9–12.9)
POTASSIUM SERPL-SCNC: 4.1 MMOL/L (ref 3.5–5.1)
PROT SERPL-MCNC: 6 G/DL (ref 6.4–8.2)
RBC # BLD AUTO: 4.22 M/UL (ref 4.1–5.7)
SODIUM SERPL-SCNC: 144 MMOL/L (ref 136–145)
TROPONIN I SERPL-MCNC: <0.05 NG/ML
WBC # BLD AUTO: 7.3 K/UL (ref 4.1–11.1)

## 2021-05-27 PROCEDURE — 93005 ELECTROCARDIOGRAM TRACING: CPT

## 2021-05-27 PROCEDURE — 99285 EMERGENCY DEPT VISIT HI MDM: CPT

## 2021-05-27 PROCEDURE — 80053 COMPREHEN METABOLIC PANEL: CPT

## 2021-05-27 PROCEDURE — 85025 COMPLETE CBC W/AUTO DIFF WBC: CPT

## 2021-05-27 PROCEDURE — 84484 ASSAY OF TROPONIN QUANT: CPT

## 2021-05-27 PROCEDURE — 36415 COLL VENOUS BLD VENIPUNCTURE: CPT

## 2021-05-27 NOTE — ED TRIAGE NOTES
TRIAGE NOTE: Pt arrives via EMS from [facility] for general fatigue x \"1 year or 2\". Patient has no complaints. Staff at [facility] were concerned about hypotension. Pt is 142/78 in triage.

## 2021-05-27 NOTE — ED PROVIDER NOTES
Patient is a 51-year-old male history of sick sinus syndrome status post pacemaker placement, atrial fibrillation with long-term use of anticoagulation, hyperlipidemia, hypertension and COPD presenting today with fatigue. Patient tells me that he feels well and the staff at his nursing home was concerned so they sent him here. He says he really feels fine other than being fatigued for 1 to 2 years and then today he went to his doctor's office and when he returned home he had to urinate, it took him about 45 minutes to successfully zip his pants up and he says that after that he felt tired. At that time he checked his blood pressure and it was low in the 80s so he was sent here. Here his blood pressures have been persistently normotensive if not elevated. He denies any chest pain, shortness of breath, focal weakness, numbness or any complaints whatsoever (other than chronic weakness).            Past Medical History:   Diagnosis Date    Atrial fibrillation ([facility] [address]     Chronic obstructive pulmonary disease (HCC)     Essential hypertension     GERD (gastroesophageal reflux disease)     Hyperlipidemia     Hypothyroidism     Long term (current) use of anticoagulants     Pacemaker     Rosacea 2016    SSS (sick sinus syndrome) ([facility])        Past Surgical History:   Procedure Laterality Date    HX CORONARY STENT PLACEMENT      HX HEART CATHETERIZATION      HX PACEMAKER      RI REMVL PERM PM PLS GEN W/REPL PLSE GEN 2 LEAD SYS N/A 2/6/2020    REMOVE & REPLACE PPM GEN DUAL LEAD performed by Zahira Michelle MD at [address] [address] CATH LAB         Family History:   Problem Relation Age of Onset    Stroke Mother     Hypertension Mother     Kidney Disease Father        Social History     Socioeconomic History    Marital status:      Spouse name: Not on file    Number of children: Not on file    Years of education: Not on file    Highest education level: Not on file   Occupational History    Not on file Tobacco Use    Smoking status: Never Smoker    Smokeless tobacco: Never Used   Vaping Use    Vaping Use: Never used   Substance and Sexual Activity    Alcohol use: No     Alcohol/week: 0.0 standard drinks    Drug use: No    Sexual activity: Not on file   Other Topics Concern    Not on file   Social History Narrative    Not on file     Social Determinants of Health     Financial Resource Strain:     Difficulty of Paying Living Expenses:    Food Insecurity:     Worried About Running Out of Food in the Last Year:     Ran Out of Food in the Last Year:    Transportation Needs:     Lack of Transportation (Medical):  Lack of Transportation (Non-Medical):    Physical Activity:     Days of Exercise per Week:     Minutes of Exercise per Session:    Stress:     Feeling of Stress :    Social Connections:     Frequency of Communication with Friends and Family:     Frequency of Social Gatherings with Friends and Family:     Attends [employer] Services:     Active Member of Clubs or Organizations:     Attends Club or Organization Meetings:     Marital Status:    Intimate Partner Violence:     Fear of Current or Ex-Partner:     Emotionally Abused:     Physically Abused:     Sexually Abused: ALLERGIES: Patient has no known allergies. Review of Systems   Constitutional: Positive for fatigue. Negative for chills and fever. HENT: Negative for congestion and sore throat. Eyes: Negative for pain and redness. Respiratory: Negative for cough and shortness of breath. Cardiovascular: Negative for chest pain and palpitations. Gastrointestinal: Negative for abdominal pain, diarrhea, nausea and vomiting. Genitourinary: Negative for frequency and hematuria. Musculoskeletal: Negative for back pain and neck pain. Skin: Negative for rash and wound. Neurological: Negative for dizziness and headaches. Hematological: Does not bruise/bleed easily.        Vitals:    05/27/21 1658   BP: (!) (P) 142/78   Pulse: (P) 64   Resp: (P) 18   Temp: (P) 97.5 °F (36.4 °C)   SpO2: (P) 100%   Weight: (P) 81.5 kg (179 lb 10.8 oz)            Physical Exam  Vitals and nursing note reviewed. Constitutional:       General: He is not in acute distress. Appearance: He is well-developed. HENT:      Head: Normocephalic and atraumatic. Eyes:      Conjunctiva/sclera: Conjunctivae normal.      Pupils: Pupils are equal, round, and reactive to light. Cardiovascular:      Rate and Rhythm: Normal rate and regular rhythm. Heart sounds: Normal heart sounds. No murmur heard. No friction rub. No gallop. Pulmonary:      Effort: Pulmonary effort is normal. No respiratory distress. Breath sounds: Normal breath sounds. No wheezing or rales. Abdominal:      General: Bowel sounds are normal. There is no distension. Palpations: Abdomen is soft. Tenderness: There is no abdominal tenderness. There is no guarding or rebound. Musculoskeletal:         General: Normal range of motion. Cervical back: Normal range of motion and neck supple. Skin:     General: Skin is warm and dry. Capillary Refill: Capillary refill takes less than 2 seconds. Findings: No rash. Neurological:      Mental Status: He is alert and oriented to person, place, and time.       Comments: CN II-XII tested and intact  Speech is clear and fluid  Tongue protrusion normal  5/5 b/l strength with shoulder/elbow flexion and extension  Equal  strength  5/5 b/l strength with hip extension, knee flexion/extension  Symmetric dorsi and plantar-flexion of feet  Sensation intact in face and throughout all 4 extremities            EKG Interpretation:   ED Physician interpretation  Ventricular paced rhythm, no criteria for STEMI    Labs Reviewed:   No leukocytosis or anemia  Creatinine baseline at 1.98 with overall normal electrolytes  Troponin negative    Imaging Reviewed:   N/A      Course:      6:19 PM I discussed with pt's granddaughter on the phone. She says that he was having episodes of low blood pressure and ?chest pain at the nursing home earlier. He had recent LHC and was told there was nothing they could do regarding his heart and it was \"end stage\". I discussed admission vs dc and she feels (similar to my thoughts) that he would not likely benefit from hospitalization at this time and it is more important that he is comfortable at home. I discussed with patient's daughter at bedside who also agrees with discharge home. Patient has no complaints whatsoever, does not recall having any chest pain today like to go back. MDM:  Very well-appearing 49-year-old male presenting today with fatigue and questionably low blood pressures. Here he never had low blood pressure, most recent 142/78. He is not tachycardic nor is he having any respiratory distress. His exam is unremarkable and nonfocal.  His work-up is grossly benign. He feels well and would prefer to go home. There is question of some chest pain however he had a recent heart cath and was told that there is no further treatment that they could do for his coronary disease and that it was \"end-stage\" per his granddaughter. At this time I do not feel that hospitalization would be beneficial to the patient and he prefers to go home as well. Clinical Impression:     ICD-10-CM ICD-9-CM    1. Fatigue, unspecified type  R53.83 780.79    2.  Hypotension, unspecified hypotension type  I95.9 458.9            Disposition: KEYUR Cheung, DO

## 2021-05-28 LAB
ATRIAL RATE: 58 BPM
CALCULATED R AXIS, ECG10: -74 DEGREES
CALCULATED T AXIS, ECG11: 76 DEGREES
DIAGNOSIS, 93000: NORMAL
Q-T INTERVAL, ECG07: 494 MS
QRS DURATION, ECG06: 168 MS
QTC CALCULATION (BEZET), ECG08: 497 MS
VENTRICULAR RATE, ECG03: 61 BPM

## 2021-05-29 ENCOUNTER — HOSPITAL ENCOUNTER (EMERGENCY)
Age: 86
Discharge: HOME | End: 2021-05-29
Attending: EMERGENCY MEDICINE
Payer: MEDICARE

## 2021-05-29 ENCOUNTER — APPOINTMENT (OUTPATIENT)
Dept: GENERAL RADIOLOGY | Age: 86
End: 2021-05-29
Attending: EMERGENCY MEDICINE
Payer: MEDICARE

## 2021-05-29 ENCOUNTER — APPOINTMENT (OUTPATIENT)
Dept: CT IMAGING | Age: 86
End: 2021-05-29
Attending: EMERGENCY MEDICINE
Payer: MEDICARE

## 2021-05-29 VITALS
SYSTOLIC BLOOD PRESSURE: 136 MMHG | HEART RATE: 62 BPM | DIASTOLIC BLOOD PRESSURE: 96 MMHG | OXYGEN SATURATION: 99 % | TEMPERATURE: 98.6 F | RESPIRATION RATE: 14 BRPM

## 2021-05-29 DIAGNOSIS — M54.6 ACUTE RIGHT-SIDED THORACIC BACK PAIN: ICD-10-CM

## 2021-05-29 DIAGNOSIS — M54.2 NECK PAIN: ICD-10-CM

## 2021-05-29 DIAGNOSIS — T14.8XXA ABRASION: ICD-10-CM
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
## 2021-05-29 PROCEDURE — 74011250637 HC RX REV CODE- 250/637: Performed by: EMERGENCY MEDICINE

## 2021-05-29 PROCEDURE — 71250 CT THORAX DX C-: CPT

## 2021-05-29 PROCEDURE — 99284 EMERGENCY DEPT VISIT MOD MDM: CPT

## 2021-05-29 PROCEDURE — 70450 CT HEAD/BRAIN W/O DYE: CPT

## 2021-05-29 PROCEDURE — 72125 CT NECK SPINE W/O DYE: CPT

## 2021-05-29 PROCEDURE — 72170 X-RAY EXAM OF PELVIS: CPT

## 2021-05-29 RX ORDER — ACETAMINOPHEN 500 MG
1000 TABLET ORAL
Status: COMPLETED | OUTPATIENT
Start: 2021-05-29 | End: 2021-05-29

## 2021-05-29 RX ADMIN — ACETAMINOPHEN 1000 MG: 500 TABLET ORAL at 05:39

## 2021-05-29 NOTE — ED PROVIDER NOTES
69-year-old male with significant past medical history of A. fib on Eliquis, COPD, hypertension, hyperlipidemia, sick sinus syndrome presenting to the ER after having a mechanical trip and fall. Patient reports that he was told not to get up by himself and the cause staff however patient felt like he could not make it to the bathroom. Typically uses a walker or wheelchair however got up on his own to try to ambulate to the bathroom when he fell landing on his right back/chest.  Patient also having some right-sided neck pain. Patient denies any headache. Unknown head trauma. However patient is on Eliquis. No numbness Jacqueline weakness. Patient denies any  shortness of breath no abdominal pain. No nausea or vomiting. No pain with urination. Patient denies any pain in the legs or in the hips. Patient has an abrasion on the right elbow but no pain or tenderness with normal range of motion.   Patient has an abrasion on the left knee but no pain or tenderness normal range of motion           Past Medical History:   Diagnosis Date    Atrial fibrillation ([address]     Chronic obstructive pulmonary disease (HCC)     Essential hypertension     GERD (gastroesophageal reflux disease)     Hyperlipidemia     Hypothyroidism     Long term (current) use of anticoagulants     Pacemaker     Rosacea 2016    [facility]SS [facility]) ([facility])        Past Surgical History:   Procedure Laterality Date    HX CORONARY STENT PLACEMENT      HX HEART CATHETERIZATION      HX PACEMAKER      MS REMVL PERM PM PLS GEN W/REPL PLSE GEN 2 LEAD SYS N/A 2/6/2020    REMOVE & REPLACE PPM GEN DUAL LEAD performed by Katty Flynn MD at [address] [address] CATH LAB         Family History:   Problem Relation Age of Onset    Stroke Mother     Hypertension Mother     Kidney Disease Father        Social History     Socioeconomic History    Marital status:      Spouse name: Not on file    Number of children: Not on file    Years of education: Not on file    Highest education level: Not on file   Occupational History    Not on file   Tobacco Use    Smoking status: Never Smoker    Smokeless tobacco: Never Used   Vaping Use    Vaping Use: Never used   Substance and Sexual Activity    Alcohol use: No     Alcohol/week: 0.0 standard drinks    Drug use: No    Sexual activity: Not on file   Other Topics Concern    Not on file   Social History Narrative    Not on file     Social Determinants of Health     Financial Resource Strain:     Difficulty of Paying Living Expenses:    Food Insecurity:     Worried About Running Out of Food in the Last Year:     Ran Out of Food in the Last Year:    Transportation Needs:     Lack of Transportation (Medical):  Lack of Transportation (Non-Medical):    Physical Activity:     Days of Exercise per Week:     Minutes of Exercise per Session:    Stress:     Feeling of Stress :    Social Connections:     Frequency of Communication with Friends and Family:     Frequency of Social Gatherings with Friends and Family:     Attends [employer] Services:     Active Member of Clubs or Organizations:     Attends Club or Organization Meetings:     Marital Status:    Intimate Partner Violence:     Fear of Current or Ex-Partner:     Emotionally Abused:     Physically Abused:     Sexually Abused: ALLERGIES: Patient has no known allergies. Review of Systems    Vitals:    05/29/21 0430 05/29/21 0623 05/29/21 0627   BP: (!) 156/84 (!) 159/86    Pulse:   60   Resp:   18   Temp:   97.6 °F (36.4 °C)   SpO2: 99% 99%             Physical Exam  Constitutional:       Appearance: Normal appearance. HENT:      Head: Normocephalic and atraumatic. No raccoon eyes, Vaca's sign, abrasion, contusion or laceration. Nose: Nose normal.      Mouth/Throat:      Pharynx: Oropharynx is clear.    Eyes:      Conjunctiva/sclera: Conjunctivae normal.   Cardiovascular:      Rate and Rhythm: Normal rate and regular rhythm. Pulses: Normal pulses. Pulmonary:      Effort: Pulmonary effort is normal. No respiratory distress. Breath sounds: Normal breath sounds. Chest:      Chest wall: Tenderness present. No deformity, swelling, crepitus or edema. Abdominal:      General: Abdomen is flat. Bowel sounds are normal.      Palpations: Abdomen is soft. Tenderness: There is no abdominal tenderness. Hernia: A hernia is present. Hernia is present in the umbilical area (Nontender and easily reducible). Musculoskeletal:         General: Signs of injury present. No swelling, tenderness or deformity. Normal range of motion. Right elbow: No swelling, deformity, effusion or lacerations. Normal range of motion. No tenderness. Cervical back: No swelling or bony tenderness. No pain with movement. Back:       Left knee: Normal. No swelling, deformity, effusion, erythema, ecchymosis, lacerations or crepitus. No tenderness. Comments: Right elbow abrasion  Left knee abrasion   Skin:     General: Skin is warm. Capillary Refill: Capillary refill takes less than 2 seconds. Neurological:      General: No focal deficit present. Mental Status: He is alert. Mental status is at baseline. MDM  Number of Diagnoses or Management Options  Abrasion  Acute right-sided thoracic back pain  Fall on same level from slipping, tripping or stumbling, initial encounter  Neck pain  Diagnosis management comments: Patient had a mechanical trip and fall. Complaining of back pain and having some chest wall tenderness. Has abrasions of right elbow and left knee however no pain. Patient on anticoagulation, Eliquis. CAT scan head neck unremarkable due to his back pain and chest wall tenderness obtain CAT scan to rule out fractures which was negative. Some bronchial thickening consistent with bronchiolitis. Patient is lungs are clear. Satting well. Patient stable for discharge.        Amount and/or Complexity of Data Reviewed  Tests in the radiology section of CPT®: reviewed           Procedures      No results found for this or any previous visit (from the past 24 hour(s)). CT HEAD WO CONT    Result Date: 5/29/2021  EXAM: CT HEAD WO CONT INDICATION: fall, head trauma? Eliquis COMPARISON: December 12, 2020. CONTRAST: None. TECHNIQUE: Unenhanced CT of the head was performed using 5 mm images. Brain and bone windows were generated. Coronal and sagittal reformats. CT dose reduction was achieved through use of a standardized protocol tailored for this examination and automatic exposure control for dose modulation. FINDINGS: The ventricles and sulci are enlarged. There is periventricular hypoattenuation compatible with chronic small vessel ischemic changes. There is no intracranial hemorrhage, extra-axial collection, or mass effect. The basilar cisterns are open. No CT evidence of acute infarct. The bone windows demonstrate no abnormalities. Mucosal thickening left maxillary sinus. No acute intracranial findings. CT CHEST WO CONT    Result Date: 5/29/2021  INDICATION: Fall, back and right chest and rib pain COMPARISON: 2019 CONTRAST: None. TECHNIQUE:  5 mm axial images were obtained through the thorax. Coronal and sagittal reformats were generated. CT dose reduction was achieved through use of a standardized protocol tailored for this examination and automatic exposure control for dose modulation. The absence of intravenous contrast reduces the sensitivity for evaluation of the mediastinum, alexander, vasculature, and upper abdominal organs. FINDINGS: CHEST WALL: No mass or axillary lymphadenopathy. THYROID: No nodule. MEDIASTINUM: No mass or lymphadenopathy. ALEXANDER: No mass or lymphadenopathy. THORACIC AORTA: 4.9 cm ascending aorta is stable MAIN PULMONARY ARTERY: Normal in caliber. TRACHEA/BRONCHI: Patent. ESOPHAGUS: No wall thickening or dilatation. HEART: Coronary artery calcifications.  PLEURA: No effusion or pneumothorax. LUNGS: Unchanged 9 mm right upper lobe pulmonary nodule. Bronchial wall thickening in the lower lobes. INCIDENTALLY IMAGED UPPER ABDOMEN: Cholelithiasis. BONES: No destructive bone lesion. 1. No fracture. 2. Bilateral lower lobe bronchial wall thickening compatible with bronchitis. 3. Cholelithiasis. CT SPINE CERV WO CONT    Result Date: 5/29/2021  EXAM:  CT CERVICAL SPINE WITHOUT CONTRAST INDICATION: fall, neck pain. COMPARISON: None. CONTRAST:  None. TECHNIQUE: Multislice helical CT of the cervical spine was performed without intravenous contrast administration. Sagittal and coronal reformats were generated. CT dose reduction was achieved through use of a standardized protocol tailored for this examination and automatic exposure control for dose modulation. FINDINGS: The alignment is within normal limits. There is no fracture or compression deformity. The odontoid process is intact. The craniocervical junction is within normal limits. Calcification of the atlantoaxial ligament. The incidentally imaged soft tissues are within normal limits. C2-C3: There is no spinal canal or neural foraminal stenosis. C3-C4: Disc space narrowing. Facet arthropathy and uncinate process hypertrophy with bilateral neural foraminal narrowing, greater on the left. C4-C5: Disc space narrowing. Facet arthropathy and uncinate process hypertrophy with bilateral foraminal narrowing, greater on the right. C5-C6: Bilateral uncinate process hypertrophy and facet arthropathy. C6-C7: Disc space narrowing. Uncinate process hypertrophy with right neural foraminal narrowing. C7-T1: There is no spinal canal or neural foraminal stenosis. Degenerative changes. No acute findings. XR PELV 1 OR 2 V    Result Date: 5/29/2021  History: Fall. An AP radiograph of the pelvis was performed. There are vascular calcifications. A fracture is not identified. There are phleboliths in the pelvis. No fracture.

## 2021-05-29 NOTE — ED TRIAGE NOTES
GLF. Denies LOC. Denies head strike. Cmplains of pain in the right upper back/shoudler area, skin tear to right elbow and abrasion to the leftt knee. Hilda Talbot

## 2021-05-29 NOTE — ED NOTES
Report received from [facility], Pt resting comfortably on stretcher awaiting [facility] transport back to his facility.

## 2021-05-29 NOTE — ED NOTES
Called and spoke with Amira from [facility] and gave report about pt coming back.

## 2021-06-17 ENCOUNTER — CLINICAL SUPPORT (OUTPATIENT)
Dept: CARDIOLOGY CLINIC | Age: 86
End: 2021-06-17
Payer: MEDICARE

## 2021-06-17 ENCOUNTER — OFFICE VISIT (OUTPATIENT)
Dept: CARDIOLOGY CLINIC | Age: 86
End: 2021-06-17

## 2021-06-17 VITALS
SYSTOLIC BLOOD PRESSURE: 122 MMHG | RESPIRATION RATE: 16 BRPM | OXYGEN SATURATION: 98 % | DIASTOLIC BLOOD PRESSURE: 70 MMHG | HEART RATE: 72 BPM | BODY MASS INDEX: 27.32 KG/M2 | HEIGHT: 68 IN

## 2021-06-17 DIAGNOSIS — Z95.0 PACEMAKER: Primary | ICD-10-CM

## 2021-06-17 DIAGNOSIS — I48.91 ATRIAL FIBRILLATION, UNSPECIFIED TYPE (HCC): ICD-10-CM

## 2021-06-17 DIAGNOSIS — Z95.0 CARDIAC PACEMAKER IN SITU: Primary | ICD-10-CM

## 2021-06-17 PROCEDURE — 93280 PM DEVICE PROGR EVAL DUAL: CPT | Performed by: INTERNAL MEDICINE

## 2021-06-17 PROCEDURE — G8427 DOCREV CUR MEDS BY ELIG CLIN: HCPCS | Performed by: NURSE PRACTITIONER

## 2021-06-17 PROCEDURE — G8432 DEP SCR NOT DOC, RNG: HCPCS | Performed by: NURSE PRACTITIONER

## 2021-06-17 PROCEDURE — G8417 CALC BMI ABV UP PARAM F/U: HCPCS | Performed by: NURSE PRACTITIONER

## 2021-06-17 PROCEDURE — G8536 NO DOC ELDER MAL SCRN: HCPCS | Performed by: NURSE PRACTITIONER

## 2021-06-17 PROCEDURE — 99214 OFFICE O/P EST MOD 30 MIN: CPT | Performed by: NURSE PRACTITIONER

## 2021-06-17 PROCEDURE — 1101F PT FALLS ASSESS-DOCD LE1/YR: CPT | Performed by: NURSE PRACTITIONER

## 2021-06-17 NOTE — PROGRESS NOTES
Room EP 3    PCP Eliquis in April due to frequent falls  Visit Vitals  /70 (BP 1 Location: Left upper arm, BP Patient Position: Sitting)   Pulse 72   Resp 16   Ht 5' 8\" (1.727 m)   SpO2 98%   BMI 27.32 kg/m²       Chest pain: no  Shortness of breath: no  Edema: yes  Palpitations, Skipped beats, Rapid heartbeat: no  Dizziness: no  Fatigue:yes  New diagnosis/Surgeries: no    ER/Hospitalizations: [facility] ED: 4/29/21-Fall    Refills:no

## 2021-06-17 NOTE — PROGRESS NOTES
HISTORY OF PRESENT ILLNESS      Hugh Srivastava is a 80 y.o. male with PAF, HTN, PPM, COPD, dyslipidemia, pacemaker s/p CTI ablation for left AT. He is on metoprolol for rate control and has been on eliquis for CVA risk reduction in the past although this was discontinued by PCP due to fall. He presents for device follow up. He has fatigue which is increased from last year. Echocardiogram in 3/2021 demonstrated preserved LV function.        PAST MEDICAL HISTORY     Past Medical History:   Diagnosis Date    [facility]rial [facility]     Chronic obstructive pulmonary disease ([facility]     Essential hypertension     GERD (gastroesophageal reflux disease)     Hyperlipidemia     Hypothyroidism     Long term (current) use of anticoagulants     Pacemaker     Rosacea 2016    SSS (sick sinus syndrome) ([facility]            PAST SURGICAL HISTORY     Past Surgical History:   Procedure Laterality Date    HX CORONARY STENT PLACEMENT      HX HEART CATHETERIZATION      HX PACEMAKER      NM REMVL PERM PM PLS GEN W/REPL PLSE GEN 2 LEAD SYS N/A 2/6/2020    REMOVE & REPLACE PPM GEN DUAL LEAD performed by Amada Girard MD at [facility] CATH LAB          ALLERGIES     No Known Allergies       FAMILY HISTORY     Family History   Problem Relation Age of Onset   Hayde Sit Stroke Mother     Hypertension Mother     Kidney Disease Father     negative for cardiac disease       SOCIAL HISTORY     Social History     Socioeconomic History    Marital status:      Spouse name: Not on file    Number of children: Not on file    Years of education: Not on file    Highest education level: Not on file   Tobacco Use    Smoking status: Never Smoker    Smokeless tobacco: Never Used   Vaping Use    Vaping Use: Never used   Substance and Sexual Activity    Alcohol use: Never     Alcohol/week: 0.0 standard drinks    Drug use: No     Social Determinants of Health     Financial Resource Strain:     Difficulty of Paying Living Expenses:    Food Insecurity:     Worried About Running Out of Food in the Last Year:     [address] in the Last Year:    Transportation Needs:     Lack of Transportation (Medical):  Lack of Transportation (Non-Medical):    Physical Activity:     Days of Exercise per Week:     Minutes of Exercise per Session:    Stress:     Feeling of Stress :    Social Connections:     Frequency of Communication with Friends and Family:     Frequency of Social Gatherings with Friends and Family:     Attends Hinduism Services:     Active Member of Clubs or Organizations:     Attends Club or Organization Meetings:     Marital Status:          MEDICATIONS     Current Outpatient Medications   Medication Sig    lisinopriL (PRINIVIL, ZESTRIL) 20 mg tablet Take 1 Tab by mouth daily.  atorvastatin (LIPITOR) 20 mg tablet Take 1 Tab by mouth daily.  metoprolol succinate (TOPROL-XL) 25 mg XL tablet Take 1 Tab by mouth every evening.  antiox #8/om3/dha/epa/lut/zeax (PRESERVISION AREDS 2, OMEGA-3, PO) Take 1 Cap by mouth two (2) times a day.  ipratropium (ATROVENT) 0.03 % nasal spray 2 Sprays by Both Nostrils route two (2) times daily as needed for Rhinitis.  mometasone (NASONEX) 50 mcg/actuation nasal spray 2 Sprays by Both Nostrils route daily.  furosemide (LASIX) 20 mg tablet Take 20 mg by mouth daily.  tamsulosin (FLOMAX) 0.4 mg capsule Take 0.4 mg by mouth nightly.  levothyroxine (LEVOTHROID) 50 mcg tablet Take 50 mcg by mouth Daily (before breakfast).  omeprazole (PRILOSEC) 20 mg capsule Take 20 mg by mouth daily.  Multivits with Min-FA-Lycopene (MEN'S DAILY MULTIVIT-MINERAL) 0.4-600 mg-mcg tab Take 1 Tab by mouth daily.  metroNIDAZOLE (METROCREAM) 0.75 % topical cream Apply  to affected area nightly.  Use a thin layer to face after washing  Indications: a skin condition on the cheeks and nose with a reddish rash and acne called acne rosacea (Patient not taking: Reported on 5/27/2021)  apixaban (ELIQUIS) 2.5 mg tablet Take 1 Tab by mouth two (2) times a day. (Patient not taking: Reported on 6/17/2021)     No current facility-administered medications for this visit. I have reviewed the nurses notes, vitals, problem list, allergy list, medical history, family, social history and medications. REVIEW OF SYMPTOMS      General: Pt denies excessive weight gain or loss. Pt is able to conduct ADL's  HEENT: Denies blurred vision, headaches, hearing loss, epistaxis and difficulty swallowing. Respiratory: Denies cough, congestion, shortness of breath, GARCIA, wheezing or stridor. Cardiovascular: Denies precordial pain, palpitations, edema or PND  Gastrointestinal: Denies poor appetite, indigestion, abdominal pain or blood in stool  Genitourinary: Denies hematuria, dysuria, increased urinary frequency  Musculoskeletal: Denies joint pain or swelling from muscles or joints  Neurologic: Denies tremor, paresthesias, headache, or sensory motor disturbance  Psychiatric: Denies confusion, insomnia, depression  Integumentray: Denies rash, itching or ulcers. Hematologic: Denies easy bruising, bleeding       PHYSICAL EXAMINATION      Vitals: see vitals section  General: Well developed, in no acute distress. HEENT: No jaundice, oral mucosa moist, no oral ulcers  Neck: Supple, no stiffness, no lymphadenopathy, supple  Heart:  Normal S1/S2 negative S3 or S4. Regular, no murmur, gallop or rub, no jugular venous distention  Respiratory: Clear bilaterally x 4, no wheezing or rales  Abdomen:   Soft, non-tender, bowel sounds are active. Extremities:  No edema, normal cap refill, no cyanosis. Musculoskeletal: No clubbing, no deformities  Neuro: A&Ox3, speech clear, gait stable, cooperative, no focal neurologic deficits  Skin: Skin color is normal. No rashes or lesions.  Non diaphoretic, moist.  Vascular: 2+ pulses symmetric in all extremities       DIAGNOSTIC DATA      EKG:     Visit Vitals  /70 (BP 1 Location: Left upper arm, BP Patient Position: Sitting)   Pulse 72   Resp 16   Ht 5' 8\" (1.727 m)   SpO2 98%   BMI 27.32 kg/m²          LABORATORY DATA      Lab Results   Component Value Date/Time    WBC 7.3 05/27/2021 04:59 PM    HGB 12.8 05/27/2021 04:59 PM    HCT 40.9 05/27/2021 04:59 PM    PLATELET 693 (L) 64/73/2468 04:59 PM    MCV 96.9 05/27/2021 04:59 PM      Lab Results   Component Value Date/Time    Sodium 144 05/27/2021 04:59 PM    Potassium 4.1 05/27/2021 04:59 PM    Chloride 107 05/27/2021 04:59 PM    CO2 28 05/27/2021 04:59 PM    Anion gap 9 05/27/2021 04:59 PM    Glucose 144 (H) 05/27/2021 04:59 PM    BUN 34 (H) 05/27/2021 04:59 PM    Creatinine 1.98 (H) 05/27/2021 04:59 PM    BUN/Creatinine ratio 17 05/27/2021 04:59 PM    GFR est AA 38 (L) 05/27/2021 04:59 PM    GFR est non-AA 31 (L) 05/27/2021 04:59 PM    Calcium 8.8 05/27/2021 04:59 PM    Bilirubin, total 0.8 05/27/2021 04:59 PM    Alk. phosphatase 86 05/27/2021 04:59 PM    Protein, total 6.0 (L) 05/27/2021 04:59 PM    Albumin 3.3 (L) 05/27/2021 04:59 PM    Globulin 2.7 05/27/2021 04:59 PM    A-G Ratio 1.2 05/27/2021 04:59 PM    ALT (SGPT) 19 05/27/2021 04:59 PM         ASSESSMENT/PLAN      1. Pacemaker               A. [employer]              B. Dependent  - Device interrogation demonstrates normal functioning with dependency. Occasional VHR, seems to have coincided with hospitalization. Continue to monitor device interrogations remotely every 3 months and once per year in office    2. Atrial Flutter  - s/p AV iztel ablation. Eliquis 2.5mg BID was recently discontinued due to mechanical fall. He is in a facility where he has assistance for mobility, but there is still concern for fall risk versus stroke risk without the Eliquis. We had a long discussion regarding this during the visit today with his family. They will further discuss once they are home and notify us if they wish to restart the Eliquis.     3. Atrial Tachycardia              A. Left atrial tachycardia   - s/p AV itzel ablation    4. Hypertension  - normotensive during today's visit. Continue to monitor BP and medical therapy as tolerated. FOLLOW UP    1 year    Thank you, Ninfa Jean MD for allowing me to participate in the care of this extraordinarily pleasant male. Please do not hesitate to contact me for further questions/concerns.        Brendia Chester, NP    [address]  [facility], [address]  [phone] / [phone] Fax   [phone] / [phone] Fax

## 2021-08-31 ENCOUNTER — OFFICE VISIT (OUTPATIENT)
Dept: CARDIOLOGY CLINIC | Age: 86
End: 2021-08-31
Payer: MEDICARE

## 2021-08-31 DIAGNOSIS — Z95.0 CARDIAC PACEMAKER IN SITU: Primary | ICD-10-CM

## 2021-08-31 PROCEDURE — 93294 REM INTERROG EVL PM/LDLS PM: CPT | Performed by: INTERNAL MEDICINE

## 2021-09-22 ENCOUNTER — OFFICE VISIT (OUTPATIENT)
Dept: CARDIOLOGY CLINIC | Age: 86
End: 2021-09-22
Payer: MEDICARE

## 2021-09-22 VITALS
SYSTOLIC BLOOD PRESSURE: 120 MMHG | BODY MASS INDEX: 27.32 KG/M2 | OXYGEN SATURATION: 97 % | DIASTOLIC BLOOD PRESSURE: 60 MMHG | RESPIRATION RATE: 16 BRPM | HEART RATE: 70 BPM | HEIGHT: 68 IN

## 2021-09-22 DIAGNOSIS — I42.9 CARDIOMYOPATHY, UNSPECIFIED TYPE (HCC): Primary | ICD-10-CM

## 2021-09-22 DIAGNOSIS — E78.2 MIXED HYPERLIPIDEMIA: ICD-10-CM

## 2021-09-22 PROCEDURE — 99214 OFFICE O/P EST MOD 30 MIN: CPT | Performed by: INTERNAL MEDICINE

## 2021-09-22 PROCEDURE — G8536 NO DOC ELDER MAL SCRN: HCPCS | Performed by: INTERNAL MEDICINE

## 2021-09-22 PROCEDURE — G8417 CALC BMI ABV UP PARAM F/U: HCPCS | Performed by: INTERNAL MEDICINE

## 2021-09-22 PROCEDURE — 1101F PT FALLS ASSESS-DOCD LE1/YR: CPT | Performed by: INTERNAL MEDICINE

## 2021-09-22 PROCEDURE — G8427 DOCREV CUR MEDS BY ELIG CLIN: HCPCS | Performed by: INTERNAL MEDICINE

## 2021-09-22 PROCEDURE — G8510 SCR DEP NEG, NO PLAN REQD: HCPCS | Performed by: INTERNAL MEDICINE

## 2021-09-22 NOTE — LETTER
9/22/2021    Patient: Rick Aaron   YOB: 1922   Date of Visit: 9/22/2021     MD [phone]  Via Fax: [phone]    Dear Reji Alvarez MD,      Thank you for referring Mr. Tony Edwards to [address] for evaluation. My notes for this consultation are attached. If you have questions, please do not hesitate to call me. I look forward to following your patient along with you.       Sincerely,    Oly Morrell MD

## 2021-09-22 NOTE — PROGRESS NOTES
Cardiology Progress Note         Referring physician: Dr. Denise Dunlap  Reason for follow-up: Coronary artery disease    HPI: Jim Conway is a 80 y.o. male admitted to [facility] on 1/4/2021  for suspected NSTEMI (non-ST elevated myocardial infarction) ([facility] [facility] [I21.4]. Has PMH of PAF on eliquis, HTN, PPM COPD,CKD,  HLD. Has history of NSTEMI in 7/2019 with mildly elevated troponin peak of 0.39 and pt had no further chest pain, thus medical management was recommended. He does have prior history of CAD with stent to LAD several years prior(done in [place]). His initial echocardiogram demonstrated mid and apical anterior wall hypokinesis. He underwent a cardiac catheterization which demonstrated moderate to severe left main disease, LAD disease with OM1 and OM 2 bifurcation disease. He was managed conservatively initially since there was a suspicion that his echocardiogram wall motion abnormalities could be related to stress cardiomyopathy. Moreover his peak troponin was more consistent with stress cardiomyopathy rather than acute anterior myocardial infarction. PCI discussion is on the table assuming patient continues to have repeated chest discomfort. However after optimizing his medications including initiation of antianginal therapy in the form of amlodipine, Imdur he has not had any further symptoms. Today he reports no residual chest discomfort. .  He has been trying to ambulate within his home without any limiting symptoms feel that his shortness of breath is back to his baseline. Clearly he does not feel the same as he felt about 2 years ago. Assessment and Plan     1. Coronary artery disease manifesting the form of left main, LAD, marginal disease         2. Acute Cardiomyopathy initially suspected from coronary disease but overall clinical picture more consistent with stress cardiomyopathy in March 2021  3. History of recurrent paroxysmal atrial fibrillation  4. CKD:   5. HLD  6. HTN:   7. History of dual-chamber pacemaker    80 y.o. male with multivessel CAD invollving LAD/diagnoal/ Marginals. He was admitted to the hospital early 2021 with what appears to be chronic cardiomyopathy but eventually turned out to be acute stress cardiomyopathy with subsequent normalization of LV function. Gayla Adams He does not report of any significant chest discomfort. Amlodipine and Imdur has been discontinued. Due to his gait instability anticoagulation has been stopped. I did not discuss watchman implant with him. Currently is only on lisinopril and metoprolol for blood pressure control but if his blood pressure is low or or there is concern regarding intermittent hypotension, lisinopril can also be discontinued. Tolerating atorvastatin 20 mg well. In the absence of any recurrent angina, coronary disease will be managed conservatively. I do not feel that he needs Lasix for heart failure symptoms but does have chronic lower extremity edema which is helped by Lasix along with compression stockings. We will see him back in 1 year or as needed. Cardiac testing/imaging personally reviewed:  [facility] 1/5/2021. Findings show severe left main stenosis 70%. There is further high-grade narrowing of 70% in the proximal LAD. Occluded D1. Severe 70% narrowing in OM1 and OM 2. RCA is heavily calcified with noncritical stenosis  Echo: January 2021: Apical wall motion abnormality with overall EF about 40%  March 2021: Normalization of LV function with EF of 60 to 85%.        PMH  Past Medical History:   Diagnosis Date    Atrial fibrillation ([address]     Chronic obstructive pulmonary disease ([address]     Essential hypertension     GERD (gastroesophageal reflux disease)     Hyperlipidemia     Hypothyroidism     Long term (current) use of anticoagulants     Pacemaker     Rosacea 2016    SSS (sick sinus syndrome) ([address]       Social Hx  Social History     Socioeconomic History    Marital status:      Spouse name: Not on file    Number of children: Not on file    Years of education: Not on file    Highest education level: Not on file   Occupational History    Not on file   Tobacco Use    Smoking status: Never Smoker    Smokeless tobacco: Never Used   Vaping Use    Vaping Use: Never used   Substance and Sexual Activity    Alcohol use: Never     Alcohol/week: 0.0 standard drinks    Drug use: No    Sexual activity: Not on file   Other Topics Concern    Not on file   Social History Narrative    Not on file     Social Determinants of Health     Financial Resource Strain:     Difficulty of Paying Living Expenses:    Food Insecurity:     Worried About [address] in the Last Year:     [address] [employer] in the Last Year:    Transportation Needs:     Lack of Transportation (Medical):  Lack of Transportation (Non-Medical):    Physical Activity:     Days of Exercise per Week:     Minutes of Exercise per Session:    Stress:     Feeling of Stress :    Social Connections:     Frequency of Communication with Friends and Family:     Frequency of Social Gatherings with Friends and Family:     Attends Shinto Services:     Active Member of Clubs or Organizations:     Attends Club or Organization Meetings:     Marital Status:    Intimate Partner Violence:     Fear of Current or Ex-Partner:     Emotionally Abused:     Physically Abused:     Sexually Abused:    Objective:      Physical Exam:                Visit Vitals  /60   Pulse 70   Resp 16   Ht 5' 8\" (1.727 m)   SpO2 97%   BMI 27.32 kg/m²     /60   Pulse 70   Resp 16   Ht 5' 8\" (1.727 m)   SpO2 97%   BMI 27.32 kg/m²   General:    Alert, cooperative, no distress. Psychiatric:    Normal Mood and affect    Eye/ENT:      Pupils equal, No asymmetry, Conjunctival pink.  Able to hear voice at normal amplitude   Lungs:      Visibly symmetric chest expansion, No palpable tenderness. Clear to auscultation bilaterally. Heart[de-identified]    Regular rate and rhythm, S1, S2 normal, no murmur, click, rub or gallop. No JVD, Normal palpable peripheral pulses. No cyanosis   Abdomen:     Soft, non-tender. Bowel sounds normal. No masses,  No      organomegaly. Extremities:   Extremities normal, atraumatic, no edema. Neurologic:   CN II-XII grossly intact.  No gross focal deficits       Current Outpatient Medications   Medication Instructions    antiox #8/om3/dha/epa/lut/zeax (PRESERVISION AREDS 2, OMEGA-3, PO) 1 Capsule, Oral, 2 TIMES DAILY    atorvastatin (LIPITOR) 20 mg, Oral, DAILY    furosemide (LASIX) 20 mg, Oral, DAILY    ipratropium (ATROVENT) 0.03 % nasal spray 2 Sprays, 2 TIMES DAILY AS NEEDED    levothyroxine (LEVOTHROID) 50 mcg, Oral, DAILY BEFORE BREAKFAST    lisinopriL (PRINIVIL, ZESTRIL) 20 mg, Oral, DAILY    metoprolol succinate (TOPROL-XL) 25 mg, Oral, EVERY EVENING    mometasone (NASONEX) 50 mcg/actuation nasal spray 2 Sprays, DAILY    Multivits with Min-FA-Lycopene (MEN'S DAILY MULTIVIT-MINERAL) 0.4-600 mg-mcg tab 1 Tablet, Oral, DAILY    omeprazole (PRILOSEC) 20 mg, Oral, DAILY    psyllium husk (METAMUCIL PO) Oral    tamsulosin (FLOMAX) 0.4 mg, Oral, EVERY BEDTIME            Alejandrina Max MD     [facility] [address] [place] [address] [address] [facility] [address]   [facility]   [phone]

## 2021-12-06 PROCEDURE — 93294 REM INTERROG EVL PM/LDLS PM: CPT | Performed by: NURSE PRACTITIONER

## 2021-12-06 PROCEDURE — 93296 REM INTERROG EVL PM/IDS: CPT | Performed by: NURSE PRACTITIONER

## 2021-12-07 ENCOUNTER — OFFICE VISIT (OUTPATIENT)
Dept: CARDIOLOGY CLINIC | Age: 86
End: 2021-12-07
Payer: MEDICARE

## 2021-12-07 DIAGNOSIS — Z95.0 CARDIAC PACEMAKER IN SITU: Primary | ICD-10-CM

## 2022-03-15 ENCOUNTER — OFFICE VISIT (OUTPATIENT)
Dept: CARDIOLOGY CLINIC | Age: 87
End: 2022-03-15
Payer: MEDICARE

## 2022-03-15 DIAGNOSIS — Z95.0 CARDIAC PACEMAKER IN SITU: Primary | ICD-10-CM

## 2022-03-15 PROCEDURE — 93296 REM INTERROG EVL PM/IDS: CPT | Performed by: INTERNAL MEDICINE

## 2022-03-15 PROCEDURE — 93294 REM INTERROG EVL PM/LDLS PM: CPT | Performed by: INTERNAL MEDICINE

## 2022-03-18 PROBLEM — Z95.0 PACEMAKER: Status: ACTIVE | Noted: 2019-01-27

## 2022-03-18 PROBLEM — A41.9 SEPSIS (HCC): Status: ACTIVE | Noted: 2019-01-27

## 2022-03-18 PROBLEM — I25.119 CORONARY ARTERY DISEASE INVOLVING NATIVE CORONARY ARTERY OF NATIVE HEART WITH ANGINA PECTORIS (HCC): Status: ACTIVE | Noted: 2021-03-27

## 2022-03-18 PROBLEM — R50.9 FEVER: Status: ACTIVE | Noted: 2019-01-27

## 2022-03-18 PROBLEM — R06.00 DYSPNEA: Status: ACTIVE | Noted: 2019-01-27

## 2022-03-18 PROBLEM — I10 ESSENTIAL HYPERTENSION: Status: ACTIVE | Noted: 2019-01-27

## 2022-03-18 PROBLEM — I51.81 STRESS-INDUCED CARDIOMYOPATHY: Status: ACTIVE | Noted: 2021-03-27

## 2022-03-18 PROBLEM — R05.9 COUGH: Status: ACTIVE | Noted: 2019-01-27

## 2022-03-19 PROBLEM — D72.829 LEUKOCYTOSIS: Status: ACTIVE | Noted: 2019-01-27

## 2022-03-19 PROBLEM — K85.90 ACUTE PANCREATITIS: Status: ACTIVE | Noted: 2020-02-09

## 2022-03-19 PROBLEM — N18.30 CKD (CHRONIC KIDNEY DISEASE) STAGE 3, GFR 30-59 ML/MIN (HCC): Status: ACTIVE | Noted: 2019-01-27

## 2022-03-19 PROBLEM — R07.9 CHEST PAIN: Status: ACTIVE | Noted: 2019-07-03

## 2022-03-20 PROBLEM — E78.5 HYPERLIPIDEMIA: Status: ACTIVE | Noted: 2019-01-27

## 2022-03-20 PROBLEM — R74.8 ELEVATED LIPASE: Status: ACTIVE | Noted: 2020-02-09

## 2022-03-20 PROBLEM — J44.9 CHRONIC OBSTRUCTIVE PULMONARY DISEASE (HCC): Status: ACTIVE | Noted: 2019-01-27

## 2022-03-20 PROBLEM — I21.4 NSTEMI (NON-ST ELEVATED MYOCARDIAL INFARCTION) (HCC): Status: ACTIVE | Noted: 2019-07-17

## 2022-04-21 ENCOUNTER — TELEPHONE (OUTPATIENT)
Dept: CARDIOLOGY CLINIC | Age: 87
End: 2022-04-21

## 2022-04-21 NOTE — TELEPHONE ENCOUNTER
Patients daughter calling to rs 6/21 appt. Rescheduled pacemaker check to 6/14 - I wanted to make sure that was okay b/c there was no doctor/np appt scheduled the same day with Katelynn Gonsales being gone. Daughter had asked about the Catalina Vincent follow up, let her know someone would be in touch.      [phone]

## 2022-04-22 NOTE — TELEPHONE ENCOUNTER
Returned call to patient's daughter, Delma Escobedo verified using two patient identifiers   Rescheduled annual Pacer check and appt with Jason England NP, to 5/25/22 at 1040 and 1100 to keep both appts on same date. Appreciative of call.

## 2022-05-24 ENCOUNTER — TELEPHONE (OUTPATIENT)
Dept: CARDIOLOGY CLINIC | Age: 87
End: 2022-05-24

## 2022-05-24 NOTE — TELEPHONE ENCOUNTER
Patient was scheduled for a pacemaker check and appt with Jason England, cancelled appt due to patient being in a facility, the daughter would like him rescheduled to to come to the [place] location but the patient has not seen Dr. Edie Desai, please advise        Future Appointments   Date Time Provider Luann Peck   7/13/2022 10:20 AM PACEMAKER, VIKRAM ROBERTO AMB   7/13/2022 11:00 AM BRIAN Aaron AMB   9/14/2022  1:40 PM MD ANSLEY Mandujano AMB   9/20/2022  8:45 AM [place] [id number], [facility] FILOMENA BS AMB

## 2022-05-24 NOTE — TELEPHONE ENCOUNTER
Mariana Molina- Please call and r/s patient for 7/13/22 with Moreno Lerner. I am unsure why that was cancelled, but her schedule is wide open on this day. This is an annual follow-up with device check that has been seen by previous EP NP. Thank you!    Sarahi Serrano

## 2022-05-24 NOTE — TELEPHONE ENCOUNTER
There was no availability for the patient to have a pacemaker check and NP as the patient can only come to the office on Mondays or Wednesdays per the daughter, had to cancel appts, not sure where to put the patient, please contact the daughter to reschedule the patient, please advise        Johnna Franco   [phone]

## 2022-07-10 NOTE — PROGRESS NOTES
Cardiac Electrophysiology OFFICE Note     Subjective:      Timothy Frye is a 80 y.o. patient who is seen for follow up, is s/p Medtronic dual chamber pacemaker (gen change 02/06/2020, leads 05/12/2010). He reports doing well, but does note bilateral lower extremity edema. Denies SOB with ADLs. Echo in 03/2021 showed normal LVEF. LHC in 01/2021 showed LM CAD with severe LAD & LCx stenosis, but left subclavian & LIMA patent. BP controlled. [address] discontinued by PCP due to fall. Previous:  Medtronic dual chamber pacemaker (gen change 02/06/2020, leads 05/12/2010). LVEF 40-45% in 01/2021. S/p CTI ablation, also with left atrial PAT. Previously followed by Dr. Ruslan England.       Problem List  Date Reviewed: 9/22/2021          Codes Class Noted    Stress-induced cardiomyopathy ICD-10-CM: I51.81  ICD-9-CM: 429.83  3/27/2021        Coronary artery disease involving native coronary artery of native heart with angina pectoris [facility]) ICD-10-CM: I25.119  ICD-9-CM: 414.01, 413.9  3/27/2021        Acute pancreatitis ICD-10-CM: K85.90  ICD-9-CM: 577.0  2/9/2020        Elevated lipase ICD-10-CM: R74.8  ICD-9-CM: 790.5  2/9/2020        NSTEMI (non-ST elevated myocardial infarction) [facility]) ICD-10-CM: I21.4  ICD-9-CM: 410.70  7/17/2019        Chest pain ICD-10-CM: R07.9  ICD-9-CM: 786.50  7/3/2019        Sepsis ([facility] ICD-10-CM: A41.9  ICD-9-CM: 038.9, 995.91  1/27/2019        Chronic obstructive pulmonary disease ([facility] ICD-10-CM: J44.9  ICD-9-CM: 496  1/27/2019        Hyperlipidemia ICD-10-CM: E78.5  ICD-9-CM: 272.4  1/27/2019        Essential hypertension ICD-10-CM: I10  ICD-9-CM: 401.9  1/27/2019        Pacemaker ICD-10-CM: Z95.0  ICD-9-CM: V45.01  1/27/2019        Fever ICD-10-CM: R50.9  ICD-9-CM: 780.60  1/27/2019        Leukocytosis ICD-10-CM: O01.274  ICD-9-CM: 288.60  1/27/2019        CKD (chronic kidney disease) stage 3, GFR 30-59 ml/min ([facility]) ICD-10-CM: N18.30  ICD-9-CM: 585.3  1/27/2019 Cough ICD-10-CM: R05.9  ICD-9-CM: 786.2  1/27/2019        Dyspnea ICD-10-CM: R06.00  ICD-9-CM: 786.09  1/27/2019        Atrial fibrillation ([facility] ICD-10-CM: I48.91  ICD-9-CM: 427.31  2/17/2016        Encounter to establish care ICD-10-CM: Z76.89  ICD-9-CM: V65.8  8/5/2015              Current Outpatient Medications   Medication Sig Dispense Refill    psyllium husk (METAMUCIL PO) Take  by mouth.  lisinopriL (PRINIVIL, ZESTRIL) 20 mg tablet Take 1 Tab by mouth daily. 90 Tab 1    atorvastatin (LIPITOR) 20 mg tablet Take 1 Tab by mouth daily. 90 Tab 1    metoprolol succinate (TOPROL-XL) 25 mg XL tablet Take 1 Tab by mouth every evening. 30 Tab 0    antiox #8/om3/dha/epa/lut/zeax (PRESERVISION AREDS 2, OMEGA-3, PO) Take 1 Cap by mouth two (2) times a day.  ipratropium (ATROVENT) 0.03 % nasal spray 2 Sprays by Both Nostrils route two (2) times daily as needed for Rhinitis.  mometasone (NASONEX) 50 mcg/actuation nasal spray 2 Sprays by Both Nostrils route daily.  furosemide (LASIX) 20 mg tablet Take 20 mg by mouth daily.  tamsulosin (FLOMAX) 0.4 mg capsule Take 0.4 mg by mouth nightly.  levothyroxine (LEVOTHROID) 50 mcg tablet Take 50 mcg by mouth Daily (before breakfast).  omeprazole (PRILOSEC) 20 mg capsule Take 20 mg by mouth daily.  Multivits with Min-FA-Lycopene (MEN'S DAILY MULTIVIT-MINERAL) 0.4-600 mg-mcg tab Take 1 Tab by mouth daily.        No Known Allergies  Past Medical History:   Diagnosis Date    Atrial fibrillation ([facility]     Chronic obstructive pulmonary disease (HCC)     Essential hypertension     GERD (gastroesophageal reflux disease)     Hyperlipidemia     Hypothyroidism     Long term (current) use of anticoagulants     Pacemaker     Rosacea 2016    SSS (sick sinus syndrome) ([facility])      Past Surgical History:   Procedure Laterality Date    HX CORONARY STENT PLACEMENT      HX HEART CATHETERIZATION      HX PACEMAKER      HI REMVL PERM PM PLS GEN W/REPL PLSE GEN 2 LEAD SYS N/A 2/6/2020    REMOVE & REPLACE PPM GEN DUAL LEAD performed by Chano Callahan MD at [address], [facility]     Family History   Problem Relation Age of Onset   Derik Schrader Stroke Mother     Hypertension Mother     Kidney Disease Father      Social History     Tobacco Use    Smoking status: Never Smoker    Smokeless tobacco: Never Used   Substance Use Topics    Alcohol use: Never     Alcohol/week: 0.0 standard drinks        Review of Systems: Review of all other systems otherwise negative. Constitutional: Negative for fever, chills, weight loss, + malaise/fatigue. HEENT: Negative for nosebleeds, vision changes. Respiratory: Negative for cough, hemoptysis  Cardiovascular: Negative for chest pain, palpitations, orthopnea, claudication, + leg swelling, syncope, and PND. Gastrointestinal: Negative for nausea, vomiting, diarrhea, blood in stool and melena. Genitourinary: Negative for dysuria, and hematuria. Musculoskeletal: Negative for myalgias, arthralgia. Skin: Negative for rash. Heme: Does not bleed or bruise easily. Neurological: Negative for speech change and focal weakness     Objective:     Visit Vitals  /70 (BP 1 Location: Left upper arm, BP Patient Position: Sitting, BP Cuff Size: Adult)   Pulse 70   Resp 18   Ht 5' 8\" (1.727 m)   SpO2 100%   BMI 27.32 kg/m²        Physical Exam:   Constitutional: Well-developed and well-nourished. No respiratory distress. Head: Normocephalic and atraumatic. Eyes: Pupils are equal, round. ENT: [place]. Neck: Supple. No JVD present. Cardiovascular: Normal rate, regular rhythm. Exam reveals no gallop and no friction rub. No murmur heard. Pulmonary/Chest: Effort normal and breath sounds normal. No wheezes. Abdominal: Soft, no tenderness. Musculoskeletal: Moves extremities independently. Normal gait. Vasc/lymphatic: 1+ bilateral lower extremity edema. Neurological: Alert,oriented. Skin: Skin is warm and dry.   Left chest pacemaker site well healed. Psychiatric: Normal mood and affect. Behavior is normal. Judgment and thought content normal.        Assessment/Plan:     Imaging/Studies:  Limited echo (03/23/2021): LVEF 55-60%, mod concentric LVH. LHC (01/05/2021): LM CAD with severe LAD & LCx stenosis. Left subclavian & LIMA patent. Echo (01/04/2021): LVEF 40-45%, grade 1 LV diastolic dysfunction. Trace MR, AR, & TR. ICD-10-CM ICD-9-CM    1. Atrial fibrillation, unspecified type ([facility])  I48.91 427.31 AMB POC EKG ROUTINE W/ 12 LEADS, INTER & REP      ECHO ADULT FOLLOW-UP OR LIMITED   2. Cardiac pacemaker in situ  Z95.0 V45.01 ECHO ADULT FOLLOW-UP OR LIMITED   3. Essential hypertension  I10 401.9 ECHO ADULT FOLLOW-UP OR LIMITED   4. NSTEMI (non-ST elevated myocardial infarction) ([facility]  I21.4 410.70 ECHO ADULT FOLLOW-UP OR LIMITED   5. PAT (paroxysmal atrial tachycardia) ([facility])  I47.1 427.0 ECHO ADULT FOLLOW-UP OR LIMITED       Medtronic dual chamber pacemaker (gen change 02/06/2020, leads 05/12/2010): Device check today shows proper lead & generator function. Generator longevity estimated 9 yrs, 11 mos. RV 98.31%, VVIR due to chronic atrial flutter. Since 03/15/2022, no episodes noted on device. CAD: S/p CABG. No angina. Cardiac cath in 01/2021 showed patent LIMA, severe LAD & LCx disease. Continue statin. Lower extremity edema: Ongoing despite furosemide. OK to use additional furosemide 20 mg po daily prn. Will recheck echo at next visit with Dr. Fabricio Lizama. HTN: BP controlled. Continue current medication regimen. AFL/PAT: Chronic atrial flutter. Continue Toprol XL. No longer on [address] [place] [address] Road due to mechanical fall. Remote pacer checks q 3 months. Follow up with Dr. Loreto Sprague in 1 year. Follow up with Dr. Fabricio Lizama as previously scheduled.     Future Appointments   Date Time Provider Luann Peck   9/14/2022  1:00 PM VERA ESPAÑA   9/14/2022  1:40 PM MD ANSLEY Benoit [facility]   10/24/2022  4:30 PM REMOTE1, VERA ROBERTO AMB   2/1/2023  8:30 AM REMOTE1, VERA ROBERTO AMB   5/8/2023 12:30 PM REMOTE1, VERA ROBERTO AMB   8/8/2023 10:20 AM PACEMAKER3, VERA ROBERTO AMB   8/8/2023 10:40 AM MD ANSLEY Taylor BS AMB     Thank you for involving me in this patient's care and please call with further concerns or questions.     Cally Chang, ALESIA ZarateAngel  [facility] [place]  07/13/22

## 2022-07-13 ENCOUNTER — OFFICE VISIT (OUTPATIENT)
Dept: CARDIOLOGY CLINIC | Age: 87
End: 2022-07-13
Payer: MEDICARE

## 2022-07-13 ENCOUNTER — OFFICE VISIT (OUTPATIENT)
Dept: CARDIOLOGY CLINIC | Age: 87
End: 2022-07-13

## 2022-07-13 VITALS
HEART RATE: 70 BPM | BODY MASS INDEX: 27.32 KG/M2 | HEIGHT: 68 IN | DIASTOLIC BLOOD PRESSURE: 70 MMHG | OXYGEN SATURATION: 100 % | SYSTOLIC BLOOD PRESSURE: 120 MMHG | RESPIRATION RATE: 18 BRPM

## 2022-07-13 DIAGNOSIS — I10 ESSENTIAL HYPERTENSION: ICD-10-CM

## 2022-07-13 DIAGNOSIS — Z95.0 CARDIAC PACEMAKER IN SITU: Primary | ICD-10-CM

## 2022-07-13 DIAGNOSIS — I48.91 ATRIAL FIBRILLATION, UNSPECIFIED TYPE (HCC): Primary | ICD-10-CM

## 2022-07-13 DIAGNOSIS — I47.1 PAT (PAROXYSMAL ATRIAL TACHYCARDIA) (HCC): ICD-10-CM

## 2022-07-13 DIAGNOSIS — Z95.0 CARDIAC PACEMAKER IN SITU: ICD-10-CM

## 2022-07-13 DIAGNOSIS — I21.4 NSTEMI (NON-ST ELEVATED MYOCARDIAL INFARCTION) (HCC): ICD-10-CM

## 2022-07-13 PROCEDURE — G8417 CALC BMI ABV UP PARAM F/U: HCPCS | Performed by: NURSE PRACTITIONER

## 2022-07-13 PROCEDURE — 1123F ACP DISCUSS/DSCN MKR DOCD: CPT | Performed by: NURSE PRACTITIONER

## 2022-07-13 PROCEDURE — G8536 NO DOC ELDER MAL SCRN: HCPCS | Performed by: NURSE PRACTITIONER

## 2022-07-13 PROCEDURE — 93288 INTERROG EVL PM/LDLS PM IP: CPT | Performed by: INTERNAL MEDICINE

## 2022-07-13 PROCEDURE — 93000 ELECTROCARDIOGRAM COMPLETE: CPT | Performed by: NURSE PRACTITIONER

## 2022-07-13 PROCEDURE — 1101F PT FALLS ASSESS-DOCD LE1/YR: CPT | Performed by: NURSE PRACTITIONER

## 2022-07-13 PROCEDURE — 99214 OFFICE O/P EST MOD 30 MIN: CPT | Performed by: NURSE PRACTITIONER

## 2022-07-13 PROCEDURE — G8432 DEP SCR NOT DOC, RNG: HCPCS | Performed by: NURSE PRACTITIONER

## 2022-07-13 PROCEDURE — G8427 DOCREV CUR MEDS BY ELIG CLIN: HCPCS | Performed by: NURSE PRACTITIONER

## 2022-09-14 ENCOUNTER — OFFICE VISIT (OUTPATIENT)
Dept: CARDIOLOGY CLINIC | Age: 87
End: 2022-09-14

## 2022-09-14 ENCOUNTER — ANCILLARY PROCEDURE (OUTPATIENT)
Dept: CARDIOLOGY CLINIC | Age: 87
End: 2022-09-14
Payer: MEDICARE

## 2022-09-14 VITALS
BODY MASS INDEX: 27.13 KG/M2 | HEIGHT: 68 IN | SYSTOLIC BLOOD PRESSURE: 106 MMHG | WEIGHT: 179 LBS | DIASTOLIC BLOOD PRESSURE: 82 MMHG

## 2022-09-14 VITALS
WEIGHT: 179 LBS | OXYGEN SATURATION: 98 % | SYSTOLIC BLOOD PRESSURE: 106 MMHG | DIASTOLIC BLOOD PRESSURE: 82 MMHG | HEIGHT: 68 IN | RESPIRATION RATE: 16 BRPM | BODY MASS INDEX: 27.13 KG/M2 | HEART RATE: 70 BPM

## 2022-09-14 DIAGNOSIS — I51.81 STRESS-INDUCED CARDIOMYOPATHY: ICD-10-CM

## 2022-09-14 DIAGNOSIS — I48.91 ATRIAL FIBRILLATION, UNSPECIFIED TYPE (HCC): ICD-10-CM

## 2022-09-14 DIAGNOSIS — I42.9 CARDIOMYOPATHY, UNSPECIFIED TYPE (HCC): Primary | ICD-10-CM

## 2022-09-14 DIAGNOSIS — I10 ESSENTIAL HYPERTENSION: ICD-10-CM

## 2022-09-14 DIAGNOSIS — Z95.0 CARDIAC PACEMAKER IN SITU: ICD-10-CM

## 2022-09-14 DIAGNOSIS — I47.1 PAT (PAROXYSMAL ATRIAL TACHYCARDIA) (HCC): ICD-10-CM

## 2022-09-14 DIAGNOSIS — I21.4 NSTEMI (NON-ST ELEVATED MYOCARDIAL INFARCTION) (HCC): ICD-10-CM

## 2022-09-14 LAB
ECHO AO ROOT DIAM: 3.8 CM
ECHO AO ROOT INDEX: 1.95 CM/M2
ECHO LA DIAMETER INDEX: 2.31 CM/M2
ECHO LA DIAMETER: 4.5 CM
ECHO LA TO AORTIC ROOT RATIO: 1.18
ECHO LA VOL 2C: 88 ML (ref 18–58)
ECHO LA VOL 4C: 93 ML (ref 18–58)
ECHO LA VOL BP: 96 ML (ref 18–58)
ECHO LA VOL/BSA BIPLANE: 49 ML/M2 (ref 16–34)
ECHO LA VOLUME AREA LENGTH: 100 ML
ECHO LA VOLUME INDEX A2C: 45 ML/M2 (ref 16–34)
ECHO LA VOLUME INDEX A4C: 48 ML/M2 (ref 16–34)
ECHO LA VOLUME INDEX AREA LENGTH: 51 ML/M2 (ref 16–34)
ECHO LV EJECTION FRACTION A2C: 52 %
ECHO LV EJECTION FRACTION A4C: 40 %
ECHO LV EJECTION FRACTION BIPLANE: 47 % (ref 55–100)
ECHO LV FRACTIONAL SHORTENING: 29 % (ref 28–44)
ECHO LV INTERNAL DIMENSION DIASTOLE INDEX: 2.31 CM/M2
ECHO LV INTERNAL DIMENSION DIASTOLIC: 4.5 CM (ref 4.2–5.9)
ECHO LV INTERNAL DIMENSION SYSTOLIC INDEX: 1.64 CM/M2
ECHO LV INTERNAL DIMENSION SYSTOLIC: 3.2 CM
ECHO LV IVSD: 1.2 CM (ref 0.6–1)
ECHO LV MASS 2D: 198.1 G (ref 88–224)
ECHO LV MASS INDEX 2D: 101.6 G/M2 (ref 49–115)
ECHO LV POSTERIOR WALL DIASTOLIC: 1.2 CM (ref 0.6–1)
ECHO LV RELATIVE WALL THICKNESS RATIO: 0.53
ECHO LVOT CARDIAC OUTPUT: 2.5 LITER/MINUTE
ECHO LVOT CARDIAC OUTPUT: 2.5 LITER/MINUTE
ECHO LVOT CARDIAC OUTPUT: 3.1 LITER/MINUTE
ECHO LVOT CARDIAC OUTPUT: 3.1 LITER/MINUTE
ECHO LVOT CARDIAC OUTPUT: 3.7 LITER/MINUTE
ECHO LVOT CARDIAC OUTPUT: 3.7 LITER/MINUTE

## 2022-09-14 PROCEDURE — 93308 TTE F-UP OR LMTD: CPT | Performed by: INTERNAL MEDICINE

## 2022-09-14 PROCEDURE — 99214 OFFICE O/P EST MOD 30 MIN: CPT | Performed by: INTERNAL MEDICINE

## 2022-09-14 PROCEDURE — 93325 DOPPLER ECHO COLOR FLOW MAPG: CPT | Performed by: INTERNAL MEDICINE

## 2022-09-14 PROCEDURE — G8417 CALC BMI ABV UP PARAM F/U: HCPCS | Performed by: INTERNAL MEDICINE

## 2022-09-14 PROCEDURE — G8536 NO DOC ELDER MAL SCRN: HCPCS | Performed by: INTERNAL MEDICINE

## 2022-09-14 PROCEDURE — 1123F ACP DISCUSS/DSCN MKR DOCD: CPT | Performed by: INTERNAL MEDICINE

## 2022-09-14 PROCEDURE — G8510 SCR DEP NEG, NO PLAN REQD: HCPCS | Performed by: INTERNAL MEDICINE

## 2022-09-14 PROCEDURE — 1101F PT FALLS ASSESS-DOCD LE1/YR: CPT | Performed by: INTERNAL MEDICINE

## 2022-09-14 PROCEDURE — G8427 DOCREV CUR MEDS BY ELIG CLIN: HCPCS | Performed by: INTERNAL MEDICINE

## 2022-09-14 NOTE — PROGRESS NOTES
Cardiology Progress Note         Referring physician: Dr. Nav Tomas    Reason for follow-up: Coronary artery disease    HPI: Ashkan Taylor is a 80 y.o. male admitted to [facility] on 1/4/2021  for suspected NSTEMI (non-ST elevated myocardial infarction) ([facility] [I21.4]. Has PMH of PAF on eliquis, HTN, PPM COPD,CKD,  HLD. Has history of NSTEMI in 7/2019 with mildly elevated troponin peak of 0.39 and pt had no further chest pain, thus medical management was recommended. He does have prior history of CAD with stent to LAD several years prior(done in [place]). His initial echocardiogram demonstrated mid and apical anterior wall hypokinesis. He underwent a cardiac catheterization which demonstrated moderate to severe left main disease, LAD disease with OM1 and OM 2 bifurcation disease. He was managed conservatively initially since there was a suspicion that his echocardiogram wall motion abnormalities could be related to stress cardiomyopathy. Moreover his peak troponin was more consistent with stress cardiomyopathy rather than acute anterior myocardial infarction. PCI discussion is on the table assuming patient continues to have repeated chest discomfort. However after optimizing his medications including initiation of antianginal therapy in the form of amlodipine, Imdur he has not had any further symptoms. Today he reports no residual chest discomfort. .  He has been trying to ambulate within his home without any limiting symptoms feel that his shortness of breath is back to his baseline. Clearly he does not feel the same as he felt about 2 years ago. Assessment and Plan     1. Coronary artery disease manifesting the form of left main, LAD, marginal disease         2. Acute Cardiomyopathy initially suspected from coronary disease but overall clinical picture more consistent with stress cardiomyopathy in March 2021  3. History of recurrent paroxysmal atrial fibrillation  4. CKD:   5. HLD  6. HTN:   7. History of dual-chamber pacemaker    80 y.o. male with multivessel CAD invollving LAD/diagnoal/ Marginals. He was admitted to the hospital early 2021 with what appears to be chronic cardiomyopathy but eventually turned out to be acute stress cardiomyopathy with subsequent normalization of LV function. [facility] He does not report of any significant chest discomfort. Amlodipine and Imdur has been discontinued. Due to his gait instability anticoagulation has been stopped. I did not discuss watchman implant with him. Currently is only on lisinopril and metoprolol for blood pressure control but if his blood pressure is low or or there is concern regarding intermittent hypotension, lisinopril can also be discontinued. Tolerating atorvastatin 20 mg well. In the absence of any recurrent angina, coronary disease will be managed conservatively. He is unable to get in and out of bed on his own. Daughter states he will not elevate his legs at rest. His assisted living does not apply compression socks. I do not feel that he needs Lasix for heart failure symptoms but does have chronic lower extremity edema which is helped by Lasix along with compression stockings. Echo today confirmed low normal EF. We will see him back in 1 year or as needed. Cardiac testing/imaging personally reviewed:  [facility] 1/5/[facility]. Findings show severe left main stenosis 70%. There is further high-grade narrowing of 70% in the proximal LAD. Occluded D1. Severe 70% narrowing in OM1 and OM 2. RCA is heavily calcified with noncritical stenosis  Echo: January 2021: Apical wall motion abnormality with overall EF about 40%  March 2021: Normalization of LV function with EF of 60 to 85%.        PMH  Past Medical History:   Diagnosis Date    Atrial fibrillation ([address]     Chronic obstructive pulmonary disease ([facility]     Essential hypertension     GERD (gastroesophageal reflux disease)     Hyperlipidemia     Hypothyroidism     Long term (current) use of anticoagulants     Pacemaker     Rosacea 2016    SSS (sick sinus syndrome) ([facility])       Social Hx  Social History     Socioeconomic History    Marital status:      Spouse name: Not on file    Number of children: Not on file    Years of education: Not on file    Highest education level: Not on file   Occupational History    Not on file   Tobacco Use    Smoking status: Never    Smokeless tobacco: Never   Vaping Use    Vaping Use: Never used   Substance and Sexual Activity    Alcohol use: Never     Alcohol/week: 0.0 standard drinks    Drug use: No    Sexual activity: Not on file   Other Topics Concern    Not on file   Social History Narrative    Not on file     Social Determinants of Health     Financial Resource Strain: Not on file   Food Insecurity: Not on file   Transportation Needs: Not on file   Physical Activity: Not on file   Stress: Not on file   Social Connections: Not on file   Intimate Partner Violence: Not on file   Housing Stability: Not on file   Objective:      Physical Exam:                Visit Vitals  /82 (BP 1 Location: Left upper arm, BP Patient Position: Sitting, BP Cuff Size: Adult)   Pulse 70   Resp 16   Ht 5' 8\" (1.727 m)   Wt 179 lb (81.2 kg)   SpO2 98%   BMI 27.22 kg/m²     /82 (BP 1 Location: Left upper arm, BP Patient Position: Sitting, BP Cuff Size: Adult)   Pulse 70   Resp 16   Ht 5' 8\" (1.727 m)   Wt 179 lb (81.2 kg)   SpO2 98%   BMI 27.22 kg/m²   General:    Alert, cooperative, no distress. Psychiatric:    Normal Mood and affect    Eye/ENT:      Pupils equal, No asymmetry, Conjunctival pink. Able to hear voice at normal amplitude   Lungs:      Visibly symmetric chest expansion, No palpable tenderness. Clear to auscultation bilaterally. Heart[de-identified]    Regular rate and rhythm, S1, S2 normal, no murmur, click, rub or gallop. No JVD, Normal palpable peripheral pulses.  No cyanosis Abdomen:     Soft, non-tender. Bowel sounds normal. No masses,  No      organomegaly. Extremities:   Extremities normal, atraumatic, ++ edema. Neurologic:   CN II-XII grossly intact. No gross focal deficits       Current Outpatient Medications   Medication Instructions    antiox #8/om3/dha/epa/lut/zeax (PRESERVISION AREDS 2, OMEGA-3, PO) 1 Capsule, 2 TIMES DAILY    atorvastatin (LIPITOR) 20 mg, Oral, DAILY    furosemide (LASIX) 20 mg, Oral, DAILY    ipratropium (ATROVENT) 0.03 % nasal spray 2 Sprays, Both Nostrils, 2 TIMES DAILY AS NEEDED    levothyroxine (SYNTHROID) 50 mcg, Oral, DAILY BEFORE BREAKFAST, Patient takes 75 mcg every day. lisinopriL (PRINIVIL, ZESTRIL) 20 mg, Oral, DAILY    metoprolol succinate (TOPROL-XL) 25 mg, Oral, EVERY EVENING    mometasone (NASONEX) 50 mcg/actuation nasal spray 2 Sprays, Both Nostrils, DAILY    Multivits with Min-FA-Lycopene 0.4-600 mg-mcg tab 1 Tablet, Oral, DAILY    omeprazole (PRILOSEC) 20 mg, Oral, DAILY    psyllium husk (METAMUCIL PO) Take  by mouth.     tamsulosin (FLOMAX) 0.4 mg, Oral, EVERY BEDTIME            ACOSTA Fermin     [facility] [address] [place] [address], [address] [place] [address],[address]   [place], [facility]   [phone]

## 2022-09-14 NOTE — PROGRESS NOTES
Cardiology Progress Note         Referring physician: Dr. Emma Wilson    Reason for follow-up: Coronary artery disease    HPI: Shante Decker is a 80 y.o. male admitted to [facility] on 1/4/2021  for suspected NSTEMI (non-ST elevated myocardial infarction) ([facility] [I21.4]. Has PMH of PAF on eliquis, HTN, PPM COPD,CKD,  HLD. Has history of NSTEMI in 7/2019 with mildly elevated troponin peak of 0.39 and pt had no further chest pain, thus medical management was recommended. He does have prior history of CAD with stent to LAD several years prior(done in [place]). His initial echocardiogram demonstrated mid and apical anterior wall hypokinesis. He underwent a cardiac catheterization which demonstrated moderate to severe left main disease, LAD disease with OM1 and OM 2 bifurcation disease. He was managed conservatively initially since there was a suspicion that his echocardiogram wall motion abnormalities could be related to stress cardiomyopathy. Moreover his peak troponin was more consistent with stress cardiomyopathy rather than acute anterior myocardial infarction. PCI discussion is on the table assuming patient continues to have repeated chest discomfort. However after optimizing his medications including initiation of antianginal therapy in the form of amlodipine, Imdur he has not had any further symptoms. Today he reports no residual chest discomfort. .  He has been trying to ambulate within his home but now complains of difficulty moving in and out of the bed, balance issues. Daughter reports that he has short stepped gait. Assessment and Plan     1. Coronary artery disease manifesting the form of left main, LAD, marginal disease         2. Acute Cardiomyopathy initially suspected from coronary disease but overall clinical picture more consistent with stress cardiomyopathy in March 2021  3.   History of recurrent paroxysmal atrial fibrillation  4. CKD:   5. HLD  6. HTN:   7. History of dual-chamber pacemaker    89y. o. male with multivessel CAD invollving LAD/diagnoal/ Marginals. He was admitted to the hospital early 2021 with what appears to be chronic cardiomyopathy but eventually turned out to be acute stress cardiomyopathy with subsequent normalization of LV function. .  Currently is only on lisinopril and metoprolol for blood pressure control but if his blood pressure is low or or there is concern regarding intermittent hypotension, lisinopril can also be discontinued. Tolerating atorvastatin 20 mg well. In the absence of any recurrent angina, coronary disease will be managed conservatively. He is unable to get in and out of bed on his own. Daughter states he will not elevate his legs at rest. His assisted living does not apply compression socks. Examination indicate that he has some cogwheeling in tremor. Tremor could be essential but cogwheeling and his gait changes indicate possibility of parkinsonism which might be causing difficulty in ambulation. His EF today demonstrates mildly reduced LV function most likely secondary to paced rhythm. I do not think most of his presentation is cardiac and we can continue to see him on an as-needed basis. Cardiac testing/imaging personally reviewed:  [facility] 1/5/2021. Findings show severe left main stenosis 70%. There is further high-grade narrowing of 70% in the proximal LAD. Occluded D1. Severe 70% narrowing in OM1 and OM 2. RCA is heavily calcified with noncritical stenosis  Echo: January 2021: Apical wall motion abnormality with overall EF about 40%  March 2021: Normalization of LV function with EF of 60 to 65%. Echocardiogram September 2022: Slightly reduced EF around 50% with abnormal septal motion secondary to pacing.        [facility]  Past Medical History:   Diagnosis Date    Atrial fibrillation ([address]     Chronic obstructive pulmonary disease ([address]     Essential hypertension     GERD (gastroesophageal reflux disease)     Hyperlipidemia     Hypothyroidism     Long term (current) use of anticoagulants     Pacemaker     Rosacea 2016    SSS (sick sinus syndrome) ([facility])       Social Hx  Social History     Socioeconomic History    Marital status:      Spouse name: Not on file    Number of children: Not on file    Years of education: Not on file    Highest education level: Not on file   Occupational History    Not on file   Tobacco Use    Smoking status: Never    Smokeless tobacco: Never   Vaping Use    Vaping Use: Never used   Substance and Sexual Activity    Alcohol use: Never     Alcohol/week: 0.0 standard drinks    Drug use: No    Sexual activity: Not on file   Other Topics Concern    Not on file   Social History Narrative    Not on file     Social Determinants of Health     Financial Resource Strain: Not on file   Food Insecurity: Not on file   Transportation Needs: Not on file   Physical Activity: Not on file   Stress: Not on file   Social Connections: Not on file   Intimate Partner Violence: Not on file   Housing Stability: Not on file   Objective:      Physical Exam:                Visit Vitals  /82 (BP 1 Location: Left upper arm, BP Patient Position: Sitting, BP Cuff Size: Adult)   Pulse 70   Resp 16   Ht 5' 8\" (1.727 m)   Wt 179 lb (81.2 kg)   SpO2 98%   BMI 27.22 kg/m²     /82 (BP 1 Location: Left upper arm, BP Patient Position: Sitting, BP Cuff Size: Adult)   Pulse 70   Resp 16   Ht 5' 8\" (1.727 m)   Wt 179 lb (81.2 kg)   SpO2 98%   BMI 27.22 kg/m²   General:    Alert, cooperative, no distress. Psychiatric:    Normal Mood and affect    Eye/ENT:      Pupils equal, No asymmetry, Conjunctival pink. Able to hear voice at normal amplitude   Lungs:      Visibly symmetric chest expansion, No palpable tenderness. Clear to auscultation bilaterally. Heart[de-identified]    Regular rate and rhythm, S1, S2 normal, no murmur, click, rub or gallop. No JVD, Normal palpable peripheral pulses. No cyanosis   Abdomen:     Soft, non-tender. Bowel sounds normal. No masses,  No      organomegaly. Extremities:   Extremities normal, atraumatic, ++ edema. Neurologic:   CN II-XII grossly intact. No gross focal deficits       Current Outpatient Medications   Medication Instructions    atorvastatin (LIPITOR) 20 mg, Oral, DAILY    furosemide (LASIX) 20 mg, Oral, DAILY    ipratropium (ATROVENT) 0.03 % nasal spray 2 Sprays, Both Nostrils, 2 TIMES DAILY AS NEEDED    levothyroxine (SYNTHROID) 50 mcg, Oral, DAILY BEFORE BREAKFAST, Patient takes 75 mcg every day.     lisinopriL (PRINIVIL, ZESTRIL) 20 mg, Oral, DAILY    metoprolol succinate (TOPROL-XL) 25 mg, Oral, EVERY EVENING    mometasone (NASONEX) 50 mcg/actuation nasal spray 2 Sprays, Both Nostrils, DAILY    Multivits with Min-FA-Lycopene 0.4-600 mg-mcg tab 1 Tablet, Oral, DAILY    omeprazole (PRILOSEC) 20 mg, Oral, DAILY    tamsulosin (FLOMAX) 0.4 mg, Oral, EVERY BEDTIME            Liyah Sepulveda MD     [facility] [address] [place] [address] Drive, [address] [place] [address],[address]   [facility], [facility]   [phone]

## 2022-09-14 NOTE — LETTER
9/14/2022    Patient: Timothy Frye   YOB: 1922   Date of Visit: 9/14/2022     Bernabe Cockayne, MD  [address] [place] [id number]  Via Fax: [phone]    Dear Bernabe Cockayne, MD,      Thank you for referring Mr. Chaya Faulkner to [address] [place] [address] for evaluation. My notes for this consultation are attached. If you have questions, please do not hesitate to call me. I look forward to following your patient along with you.       Sincerely,    Sai Alexandre MD

## 2022-09-15 NOTE — PROGRESS NOTES
LVEF WNL, mild LVH. LA severely dilated, RA mod dilated. Mildly dilated aortic root. No change in treatment plan based on results.

## 2022-09-24 ENCOUNTER — HOSPITAL ENCOUNTER (EMERGENCY)
Age: 87
Discharge: HOME | End: 2022-09-24
Attending: EMERGENCY MEDICINE | Admitting: EMERGENCY MEDICINE
Payer: MEDICARE

## 2022-09-24 ENCOUNTER — APPOINTMENT (OUTPATIENT)
Dept: GENERAL RADIOLOGY | Age: 87
End: 2022-09-24
Attending: EMERGENCY MEDICINE
Payer: MEDICARE

## 2022-09-24 VITALS
BODY MASS INDEX: 28.37 KG/M2 | HEIGHT: 68 IN | WEIGHT: 187.17 LBS | DIASTOLIC BLOOD PRESSURE: 84 MMHG | RESPIRATION RATE: 16 BRPM | HEART RATE: 70 BPM | OXYGEN SATURATION: 100 % | SYSTOLIC BLOOD PRESSURE: 153 MMHG | TEMPERATURE: 97.4 F

## 2022-09-24 DIAGNOSIS — R53.83 FATIGUE, UNSPECIFIED TYPE: Primary | ICD-10-CM

## 2022-09-24 DIAGNOSIS — R60.0 BILATERAL LOWER EXTREMITY EDEMA: ICD-10-CM

## 2022-09-24 LAB
ALBUMIN SERPL-MCNC: 3 G/DL (ref 3.5–5)
ALBUMIN/GLOB SERPL: 0.9 {RATIO} (ref 1.1–2.2)
ALP SERPL-CCNC: 114 U/L (ref 45–117)
ALT SERPL-CCNC: 10 U/L (ref 12–78)
ANION GAP SERPL CALC-SCNC: 9 MMOL/L (ref 5–15)
APPEARANCE UR: ABNORMAL
AST SERPL-CCNC: 19 U/L (ref 15–37)
BACTERIA URNS QL MICRO: ABNORMAL /HPF
BASOPHILS # BLD: 0 K/UL (ref 0–0.1)
BASOPHILS NFR BLD: 0 % (ref 0–1)
BILIRUB SERPL-MCNC: 0.7 MG/DL (ref 0.2–1)
BILIRUB UR QL: NEGATIVE
BNP SERPL-MCNC: 4189 PG/ML (ref 0–450)
BUN SERPL-MCNC: 18 MG/DL (ref 6–20)
BUN/CREAT SERPL: 11 (ref 12–20)
CALCIUM SERPL-MCNC: 8.2 MG/DL (ref 8.5–10.1)
CHLORIDE SERPL-SCNC: 106 MMOL/L (ref 97–108)
CO2 SERPL-SCNC: 26 MMOL/L (ref 21–32)
COLOR UR: ABNORMAL
CREAT SERPL-MCNC: 1.68 MG/DL (ref 0.7–1.3)
DIFFERENTIAL METHOD BLD: ABNORMAL
EOSINOPHIL # BLD: 0.3 K/UL (ref 0–0.4)
EOSINOPHIL NFR BLD: 4 % (ref 0–7)
EPITH CASTS URNS QL MICRO: ABNORMAL /LPF
ERYTHROCYTE [DISTWIDTH] IN BLOOD BY AUTOMATED COUNT: 14.6 % (ref 11.5–14.5)
GLOBULIN SER CALC-MCNC: 3.2 G/DL (ref 2–4)
GLUCOSE SERPL-MCNC: 124 MG/DL (ref 65–100)
GLUCOSE UR STRIP.AUTO-MCNC: NEGATIVE MG/DL
HCT VFR BLD AUTO: 37.7 % (ref 36.6–50.3)
HGB BLD-MCNC: 11.9 G/DL (ref 12.1–17)
HGB UR QL STRIP: NEGATIVE
IMM GRANULOCYTES # BLD AUTO: 0 K/UL (ref 0–0.04)
IMM GRANULOCYTES NFR BLD AUTO: 0 % (ref 0–0.5)
KETONES UR QL STRIP.AUTO: NEGATIVE MG/DL
LEUKOCYTE ESTERASE UR QL STRIP.AUTO: NEGATIVE
LYMPHOCYTES # BLD: 1.3 K/UL (ref 0.8–3.5)
LYMPHOCYTES NFR BLD: 18 % (ref 12–49)
MCH RBC QN AUTO: 29.5 PG (ref 26–34)
MCHC RBC AUTO-ENTMCNC: 31.6 G/DL (ref 30–36.5)
MCV RBC AUTO: 93.3 FL (ref 80–99)
MONOCYTES # BLD: 0.6 K/UL (ref 0–1)
MONOCYTES NFR BLD: 9 % (ref 5–13)
NEUTS SEG # BLD: 4.9 K/UL (ref 1.8–8)
NEUTS SEG NFR BLD: 69 % (ref 32–75)
NITRITE UR QL STRIP.AUTO: NEGATIVE
NRBC # BLD: 0 K/UL (ref 0–0.01)
NRBC BLD-RTO: 0 PER 100 WBC
PH UR STRIP: 7 [PH] (ref 5–8)
PLATELET # BLD AUTO: 144 K/UL (ref 150–400)
PMV BLD AUTO: 10.5 FL (ref 8.9–12.9)
POTASSIUM SERPL-SCNC: 4.1 MMOL/L (ref 3.5–5.1)
PROT SERPL-MCNC: 6.2 G/DL (ref 6.4–8.2)
PROT UR STRIP-MCNC: NEGATIVE MG/DL
RBC # BLD AUTO: 4.04 M/UL (ref 4.1–5.7)
RBC #/AREA URNS HPF: ABNORMAL /HPF (ref 0–5)
SODIUM SERPL-SCNC: 141 MMOL/L (ref 136–145)
SP GR UR REFRACTOMETRY: 1.01 (ref 1–1.03)
UR CULT HOLD, URHOLD: NORMAL
UROBILINOGEN UR QL STRIP.AUTO: 1 EU/DL (ref 0.2–1)
WBC # BLD AUTO: 7.2 K/UL (ref 4.1–11.1)
WBC URNS QL MICRO: ABNORMAL /HPF (ref 0–4)

## 2022-09-24 PROCEDURE — 71045 X-RAY EXAM CHEST 1 VIEW: CPT

## 2022-09-24 PROCEDURE — 36415 COLL VENOUS BLD VENIPUNCTURE: CPT

## 2022-09-24 PROCEDURE — 81001 URINALYSIS AUTO W/SCOPE: CPT

## 2022-09-24 PROCEDURE — 83880 ASSAY OF NATRIURETIC PEPTIDE: CPT

## 2022-09-24 PROCEDURE — 85025 COMPLETE CBC W/AUTO DIFF WBC: CPT

## 2022-09-24 PROCEDURE — 99284 EMERGENCY DEPT VISIT MOD MDM: CPT | Performed by: EMERGENCY MEDICINE

## 2022-09-24 PROCEDURE — 80053 COMPREHEN METABOLIC PANEL: CPT

## 2022-09-24 RX ORDER — ASCORBIC ACID 500 MG
TABLET ORAL
COMMUNITY

## 2022-09-24 RX ORDER — ONDANSETRON 4 MG/1
4 TABLET, FILM COATED ORAL
COMMUNITY

## 2022-09-24 RX ORDER — LANOLIN ALCOHOL/MO/W.PET/CERES
400 CREAM (GRAM) TOPICAL DAILY
COMMUNITY

## 2022-09-24 RX ORDER — NITROGLYCERIN 400 UG/1
1 SPRAY ORAL
COMMUNITY

## 2022-09-24 RX ORDER — FUROSEMIDE 20 MG/1
40 TABLET ORAL DAILY
Qty: 10 TABLET | Refills: 0 | Status: SHIPPED | OUTPATIENT
Start: 2022-09-24

## 2022-09-24 RX ORDER — NITROFURANTOIN 25; 75 MG/1; MG/1
100 CAPSULE ORAL 2 TIMES DAILY
Qty: 10 CAPSULE | Refills: 0 | Status: SHIPPED | OUTPATIENT
Start: 2022-09-24 | End: 2022-09-29

## 2022-09-24 NOTE — ED PROVIDER NOTES
8-year-old male with history of A. fib, BPH, COPD, GERD, pacemaker for sick sinus syndrome presents to the emergency department EMS with chief complaint of generalized weakness today. Typically is ambulatory but today he is having more weakness than normal so he called EMS to bring him to the emergency department for further evaluation. Denies chest pain, nausea, vomiting, headache, trauma. He endorses chronic shortness of breath. He endorses lower extremity edema of unknown chronicity. The history is provided by the patient and medical records. Extremity Weakness   This is a new problem. The problem occurs constantly. The problem has not changed since onset. The patient is experiencing no pain. Pertinent negatives include no numbness and full range of motion. He has tried nothing for the symptoms. There has been no history of extremity trauma.       Past Medical History:   Diagnosis Date    Angina pectoris (HCC)     Atrial fibrillation (HCC)     BPH (benign prostatic hyperplasia)     Chronic obstructive pulmonary disease (HCC)     COVID-19     Elevated PSA     Essential hypertension     GERD (gastroesophageal reflux disease)     GERD without esophagitis     Hyperlipidemia     Hypothyroidism     Long term (current) use of anticoagulants     Pacemaker     Rosacea 2016    Rosacea     SSS (sick sinus syndrome) ([facility])        Past Surgical History:   Procedure Laterality Date    HX CORONARY STENT PLACEMENT      HX HEART CATHETERIZATION      HX PACEMAKER      LA REMVL PERM PM PLS GEN W/REPL PLSE GEN 2 LEAD SYS N/A 2/6/2020    REMOVE & REPLACE PPM GEN DUAL LEAD performed by Simeon Jesus MD at [address] CATH LAB         Family History:   Problem Relation Age of Onset    Stroke Mother     Hypertension Mother     Kidney Disease Father        Social History     Socioeconomic History    Marital status:      Spouse name: Not on file    Number of children: Not on file    Years of education: Not on file Highest education level: Not on file   Occupational History    Not on file   Tobacco Use    Smoking status: Never    Smokeless tobacco: Never   Vaping Use    Vaping Use: Never used   Substance and Sexual Activity    Alcohol use: Never     Alcohol/week: 0.0 standard drinks    Drug use: No    Sexual activity: Not on file   Other Topics Concern    Not on file   Social History Narrative    Not on file     Social Determinants of Health     Financial Resource Strain: Not on file   Food Insecurity: Not on file   Transportation Needs: Not on file   Physical Activity: Not on file   Stress: Not on file   Social Connections: Not on file   Intimate Partner Violence: Not on file   Housing Stability: Not on file         ALLERGIES: Patient has no known allergies. Review of Systems   Constitutional:  Positive for fatigue. Negative for fever. HENT:  Negative for sneezing and sore throat. Respiratory:  Positive for shortness of breath. Negative for cough. Cardiovascular:  Negative for chest pain and leg swelling. Gastrointestinal:  Negative for abdominal pain, diarrhea, nausea and vomiting. Genitourinary:  Negative for difficulty urinating and dysuria. Musculoskeletal:  Negative for arthralgias and myalgias. Skin:  Negative for color change and rash. Neurological:  Positive for weakness. Negative for numbness and headaches. Psychiatric/Behavioral:  Negative for agitation and behavioral problems. Vitals:    09/24/22 1455   BP: (!) 156/92   Pulse: 70   Resp: 24   Temp: 97.4 °F (36.3 °C)   SpO2: 100%   Weight: 84.9 kg (187 lb 2.7 oz)   Height: 5' 8\" (1.727 m)            Physical Exam  Vitals and nursing note reviewed. Constitutional:       General: He is not in acute distress. Appearance: Normal appearance. He is well-developed. He is not ill-appearing, toxic-appearing or diaphoretic. HENT:      Head: Normocephalic and atraumatic.       Nose: Nose normal.      Mouth/Throat:      Mouth: Mucous membranes are moist.      Pharynx: Oropharynx is clear. Eyes:      Extraocular Movements: Extraocular movements intact. Conjunctiva/sclera: Conjunctivae normal.      Pupils: Pupils are equal, round, and reactive to light. Cardiovascular:      Rate and Rhythm: Normal rate and regular rhythm. Pulses: Normal pulses. Heart sounds: No murmur heard. Pulmonary:      Effort: Pulmonary effort is normal. No respiratory distress. Breath sounds: Normal breath sounds. No wheezing. Chest:      Chest wall: No mass or tenderness. Abdominal:      General: There is no distension. Palpations: Abdomen is soft. Tenderness: There is no abdominal tenderness. There is no guarding or rebound. Musculoskeletal:         General: No swelling, tenderness, deformity or signs of injury. Normal range of motion. Cervical back: Normal range of motion and neck supple. No rigidity. No muscular tenderness. Right lower leg: No tenderness. Edema present. Left lower leg: No tenderness. Edema present. Skin:     General: Skin is warm and dry. Capillary Refill: Capillary refill takes less than 2 seconds. Neurological:      General: No focal deficit present. Mental Status: He is alert and oriented to person, place, and time. Psychiatric:         Mood and Affect: Mood normal.         Behavior: Behavior normal.        MDM  Number of Diagnoses or Management Options  Diagnosis management comments: 8-year-old male presents as above with generalized fatigue over the last several days with chronic shortness of breath and lower extremity edema. His BNP is up compared to prior, otherwise it is work-up is reassuring. He has bacteria without evidence of inflammation. Plan to increase diuretic, short course of antibiotics for potential UTI, follow-up with primary care, return if needed.        Amount and/or Complexity of Data Reviewed  Clinical lab tests: reviewed  Tests in the radiology section of CPT®: reviewed  Decide to obtain previous medical records or to obtain history from someone other than the patient: yes      ED Course as of 09/24/22 1602   Sat Sep 24, 2022   1452   ED EKG interpretation:  Rhythm: V paced at a rate of approximately 71 with expected post pacing repolarization changes. ST segment:  No concerning ST elevations or depressions. This EKG was interpreted by Marilou Layton MD,ED Provider.  [JM]      ED Course User Index  [JM] Tristan Hall MD       Procedures

## 2022-09-24 NOTE — ED NOTES
Pain assessment on discharge was   Condition Stable  Patient discharged to [address]   Patient education was completed  Education taught to daughter  Teaching method used was handout and verbal  Understanding of teaching was good  Patient was discharged via hospital to home stretcher service  Discharged with see above  Valuables were given to: patient/daughter remained in possession of belongings during stay.

## 2022-09-24 NOTE — DISCHARGE INSTRUCTIONS
Please increase your furosemide to 40 mg daily (double your current dose) for the next 7 days and then go back to the previous dose. Please follow-up with primary care in about 1 week to recheck your kidney function. Thank you for allowing us to provide you with medical care today. We realize that you have many choices for your emergency care needs. We thank you for choosing [address] [place] [address]. Please choose us in the future for any continued health care needs. We hope we addressed all of your medical concerns. We strive to provide excellent quality care in the Emergency Department. Anything less than excellent does not meet our expectations. The exam and treatment you received in the Emergency Department were for an emergent problem and are not intended as complete care. It is important that you follow up with a doctor, nurse practitioner, or physician's assistant for ongoing care. If your symptoms worsen or you do not improve as expected and you are unable to reach your usual health care provider, you should return to the Emergency Department. We are available 24 hours a day. Take this sheet with you when you go to your follow-up visit. If you have any problem arranging the follow-up visit, contact the Emergency Department immediately. Make an appointment your family doctor for follow up of this visit. Return to the ER if you are unable to be seen in a timely manner.

## 2022-09-24 NOTE — ED TRIAGE NOTES
[facility] reports staff at [facility] reported patient seemed more weak than normal today and that [facility] the patient anxious so he called 911 to be transferred to the ED to be evaluated. \"

## 2022-10-24 ENCOUNTER — OFFICE VISIT (OUTPATIENT)
Dept: CARDIOLOGY CLINIC | Age: 87
End: 2022-10-24
Payer: MEDICARE

## 2022-10-24 DIAGNOSIS — Z95.0 CARDIAC PACEMAKER IN SITU: Primary | ICD-10-CM

## 2022-10-24 PROCEDURE — 93296 REM INTERROG EVL PM/IDS: CPT | Performed by: INTERNAL MEDICINE

## 2022-10-24 PROCEDURE — 93294 REM INTERROG EVL PM/LDLS PM: CPT | Performed by: INTERNAL MEDICINE

## 2022-11-26 ENCOUNTER — APPOINTMENT (OUTPATIENT)
Dept: CT IMAGING | Age: 87
DRG: 177 | End: 2022-11-26
Attending: FAMILY MEDICINE
Payer: MEDICARE

## 2022-11-26 ENCOUNTER — APPOINTMENT (OUTPATIENT)
Dept: GENERAL RADIOLOGY | Age: 87
DRG: 177 | End: 2022-11-26
Attending: EMERGENCY MEDICINE
Payer: MEDICARE

## 2022-11-26 ENCOUNTER — HOSPITAL ENCOUNTER (INPATIENT)
Age: 87
LOS: 9 days | Discharge: HOSPICE | DRG: 177 | End: 2022-12-05
Attending: EMERGENCY MEDICINE | Admitting: FAMILY MEDICINE
Payer: MEDICARE

## 2022-11-26 DIAGNOSIS — Z51.5 PALLIATIVE CARE ENCOUNTER: ICD-10-CM

## 2022-11-26 DIAGNOSIS — I50.9 CONGESTIVE HEART FAILURE, UNSPECIFIED HF CHRONICITY, UNSPECIFIED HEART FAILURE TYPE (HCC): Primary | ICD-10-CM

## 2022-11-26 DIAGNOSIS — J69.0 ASPIRATION PNEUMONIA, UNSPECIFIED ASPIRATION PNEUMONIA TYPE, UNSPECIFIED LATERALITY, UNSPECIFIED PART OF LUNG (HCC): ICD-10-CM

## 2022-11-26 DIAGNOSIS — R06.02 SHORTNESS OF BREATH: ICD-10-CM

## 2022-11-26 DIAGNOSIS — R41.0 DELIRIUM: ICD-10-CM

## 2022-11-26 PROBLEM — E87.20 LACTIC ACIDOSIS: Status: ACTIVE | Noted: 2022-11-26

## 2022-11-26 PROBLEM — R41.82 AMS (ALTERED MENTAL STATUS): Status: ACTIVE | Noted: 2022-11-26

## 2022-11-26 PROBLEM — R77.8 ELEVATED TROPONIN: Status: ACTIVE | Noted: 2022-11-26

## 2022-11-26 LAB
ALBUMIN SERPL-MCNC: 3.2 G/DL (ref 3.5–5)
ALBUMIN/GLOB SERPL: 0.9 {RATIO} (ref 1.1–2.2)
ALP SERPL-CCNC: 110 U/L (ref 45–117)
ALT SERPL-CCNC: 21 U/L (ref 12–78)
ANION GAP SERPL CALC-SCNC: 5 MMOL/L (ref 5–15)
APPEARANCE UR: CLEAR
AST SERPL-CCNC: 28 U/L (ref 15–37)
BACTERIA URNS QL MICRO: NEGATIVE /HPF
BASOPHILS # BLD: 0 K/UL (ref 0–0.1)
BASOPHILS NFR BLD: 0 % (ref 0–1)
BILIRUB SERPL-MCNC: 1.2 MG/DL (ref 0.2–1)
BILIRUB UR QL: NEGATIVE
BNP SERPL-MCNC: 6575 PG/ML
BUN SERPL-MCNC: 28 MG/DL (ref 6–20)
BUN/CREAT SERPL: 17 (ref 12–20)
CALCIUM SERPL-MCNC: 8.6 MG/DL (ref 8.5–10.1)
CHLORIDE SERPL-SCNC: 106 MMOL/L (ref 97–108)
CO2 SERPL-SCNC: 28 MMOL/L (ref 21–32)
COLOR UR: ABNORMAL
COMMENT, HOLDF: NORMAL
COVID-19 RAPID TEST, COVR: NOT DETECTED
CREAT SERPL-MCNC: 1.67 MG/DL (ref 0.7–1.3)
DIFFERENTIAL METHOD BLD: ABNORMAL
EOSINOPHIL # BLD: 0.1 K/UL (ref 0–0.4)
EOSINOPHIL NFR BLD: 1 % (ref 0–7)
EPITH CASTS URNS QL MICRO: ABNORMAL /LPF
ERYTHROCYTE [DISTWIDTH] IN BLOOD BY AUTOMATED COUNT: 14.9 % (ref 11.5–14.5)
FLUAV AG NPH QL IA: NEGATIVE
FLUBV AG NOSE QL IA: NEGATIVE
GLOBULIN SER CALC-MCNC: 3.4 G/DL (ref 2–4)
GLUCOSE SERPL-MCNC: 123 MG/DL (ref 65–100)
GLUCOSE UR STRIP.AUTO-MCNC: NEGATIVE MG/DL
HCT VFR BLD AUTO: 39.5 % (ref 36.6–50.3)
HGB BLD-MCNC: 12.5 G/DL (ref 12.1–17)
HGB UR QL STRIP: NEGATIVE
IMM GRANULOCYTES # BLD AUTO: 0 K/UL (ref 0–0.04)
IMM GRANULOCYTES NFR BLD AUTO: 0 % (ref 0–0.5)
KETONES UR QL STRIP.AUTO: NEGATIVE MG/DL
LACTATE BLD-SCNC: 2.37 MMOL/L (ref 0.4–2)
LACTATE SERPL-SCNC: 2 MMOL/L (ref 0.4–2)
LEUKOCYTE ESTERASE UR QL STRIP.AUTO: NEGATIVE
LYMPHOCYTES # BLD: 0.6 K/UL (ref 0.8–3.5)
LYMPHOCYTES NFR BLD: 7 % (ref 12–49)
MCH RBC QN AUTO: 29.9 PG (ref 26–34)
MCHC RBC AUTO-ENTMCNC: 31.6 G/DL (ref 30–36.5)
MCV RBC AUTO: 94.5 FL (ref 80–99)
MONOCYTES # BLD: 0.7 K/UL (ref 0–1)
MONOCYTES NFR BLD: 8 % (ref 5–13)
NEUTS SEG # BLD: 7.8 K/UL (ref 1.8–8)
NEUTS SEG NFR BLD: 84 % (ref 32–75)
NITRITE UR QL STRIP.AUTO: NEGATIVE
NRBC # BLD: 0 K/UL (ref 0–0.01)
NRBC BLD-RTO: 0 PER 100 WBC
PH UR STRIP: 6.5 [PH] (ref 5–8)
PLATELET # BLD AUTO: 142 K/UL (ref 150–400)
PMV BLD AUTO: 10.3 FL (ref 8.9–12.9)
POTASSIUM SERPL-SCNC: 4.6 MMOL/L (ref 3.5–5.1)
PROT SERPL-MCNC: 6.6 G/DL (ref 6.4–8.2)
PROT UR STRIP-MCNC: 30 MG/DL
RBC # BLD AUTO: 4.18 M/UL (ref 4.1–5.7)
RBC #/AREA URNS HPF: ABNORMAL /HPF (ref 0–5)
RBC MORPH BLD: ABNORMAL
SAMPLES BEING HELD,HOLD: NORMAL
SODIUM SERPL-SCNC: 139 MMOL/L (ref 136–145)
SOURCE, COVRS: NORMAL
SP GR UR REFRACTOMETRY: 1.02 (ref 1–1.03)
TROPONIN-HIGH SENSITIVITY: 96 NG/L (ref 0–76)
UR CULT HOLD, URHOLD: NORMAL
UROBILINOGEN UR QL STRIP.AUTO: 0.2 EU/DL (ref 0.2–1)
WBC # BLD AUTO: 9.2 K/UL (ref 4.1–11.1)
WBC URNS QL MICRO: ABNORMAL /HPF (ref 0–4)

## 2022-11-26 PROCEDURE — 87040 BLOOD CULTURE FOR BACTERIA: CPT

## 2022-11-26 PROCEDURE — 84484 ASSAY OF TROPONIN QUANT: CPT

## 2022-11-26 PROCEDURE — 83605 ASSAY OF LACTIC ACID: CPT

## 2022-11-26 PROCEDURE — 87804 INFLUENZA ASSAY W/OPTIC: CPT

## 2022-11-26 PROCEDURE — 70450 CT HEAD/BRAIN W/O DYE: CPT

## 2022-11-26 PROCEDURE — 96361 HYDRATE IV INFUSION ADD-ON: CPT

## 2022-11-26 PROCEDURE — 74011000250 HC RX REV CODE- 250: Performed by: FAMILY MEDICINE

## 2022-11-26 PROCEDURE — 81001 URINALYSIS AUTO W/SCOPE: CPT

## 2022-11-26 PROCEDURE — 87150 DNA/RNA AMPLIFIED PROBE: CPT

## 2022-11-26 PROCEDURE — 85025 COMPLETE CBC W/AUTO DIFF WBC: CPT

## 2022-11-26 PROCEDURE — 99285 EMERGENCY DEPT VISIT HI MDM: CPT

## 2022-11-26 PROCEDURE — 74011250636 HC RX REV CODE- 250/636: Performed by: EMERGENCY MEDICINE

## 2022-11-26 PROCEDURE — 74011250637 HC RX REV CODE- 250/637: Performed by: FAMILY MEDICINE

## 2022-11-26 PROCEDURE — 87086 URINE CULTURE/COLONY COUNT: CPT

## 2022-11-26 PROCEDURE — 83880 ASSAY OF NATRIURETIC PEPTIDE: CPT

## 2022-11-26 PROCEDURE — 87077 CULTURE AEROBIC IDENTIFY: CPT

## 2022-11-26 PROCEDURE — 65270000046 HC RM TELEMETRY

## 2022-11-26 PROCEDURE — 87186 SC STD MICRODIL/AGAR DIL: CPT

## 2022-11-26 PROCEDURE — 94761 N-INVAS EAR/PLS OXIMETRY MLT: CPT

## 2022-11-26 PROCEDURE — 87635 SARS-COV-2 COVID-19 AMP PRB: CPT

## 2022-11-26 PROCEDURE — 71045 X-RAY EXAM CHEST 1 VIEW: CPT

## 2022-11-26 PROCEDURE — 96374 THER/PROPH/DIAG INJ IV PUSH: CPT

## 2022-11-26 PROCEDURE — 74011000250 HC RX REV CODE- 250: Performed by: EMERGENCY MEDICINE

## 2022-11-26 PROCEDURE — 80053 COMPREHEN METABOLIC PANEL: CPT

## 2022-11-26 RX ORDER — IPRATROPIUM BROMIDE AND ALBUTEROL SULFATE 2.5; .5 MG/3ML; MG/3ML
3 SOLUTION RESPIRATORY (INHALATION)
Status: DISCONTINUED | OUTPATIENT
Start: 2022-11-26 | End: 2022-12-05 | Stop reason: HOSPADM

## 2022-11-26 RX ORDER — MICONAZOLE NITRATE 2 %
POWDER (GRAM) TOPICAL 2 TIMES DAILY
Status: DISCONTINUED | OUTPATIENT
Start: 2022-11-26 | End: 2022-12-05 | Stop reason: HOSPADM

## 2022-11-26 RX ADMIN — SODIUM CHLORIDE 500 ML: 9 INJECTION, SOLUTION INTRAVENOUS at 21:13

## 2022-11-26 RX ADMIN — MICONAZOLE NITRATE 2 % TOPICAL POWDER: at 23:48

## 2022-11-26 RX ADMIN — IPRATROPIUM BROMIDE AND ALBUTEROL SULFATE 3 ML: .5; 3 SOLUTION RESPIRATORY (INHALATION) at 23:47

## 2022-11-26 RX ADMIN — CEFTRIAXONE SODIUM 1 G: 1 INJECTION, POWDER, FOR SOLUTION INTRAMUSCULAR; INTRAVENOUS at 21:02

## 2022-11-26 NOTE — ED PROVIDER NOTES
Jameson Logan is a Barry Meka [age] yo M with h/o sick sinus syndrome, atrial fibrillation and hypothyroidism who recently completed a 2 week course of Cipro for UTI 2 weeks ago, presents to the ED with altered mental status and fever. Staff at his residential facility noted that he appeared dyspneic but patient denies shortness of breath.            Past Medical History:   Diagnosis Date    Angina pectoris (HCC)     Atrial fibrillation (HCC)     BPH (benign prostatic hyperplasia)     Chronic obstructive pulmonary disease (HCC)     COVID-19     Elevated PSA     Essential hypertension     GERD (gastroesophageal reflux disease)     GERD without esophagitis     Hyperlipidemia     Hypothyroidism     Long term (current) use of anticoagulants     Pacemaker     Rosacea 2016    Rosacea     SSS (sick sinus syndrome) (HCC)        Past Surgical History:   Procedure Laterality Date    HX CORONARY STENT PLACEMENT      HX HEART CATHETERIZATION      HX PACEMAKER      DC REMVL PERM PM PLS GEN W/REPL PLSE GEN 2 LEAD SYS N/A 2020    REMOVE & REPLACE PPM GEN DUAL LEAD performed by Cristo Romero MD at [address] CATH LAB         Family History:   Problem Relation Age of Onset    Stroke Mother     Hypertension Mother     Kidney Disease Father        Social History     Socioeconomic History    Marital status:      Spouse name: Not on file    Number of children: Not on file    Years of education: Not on file    Highest education level: Not on file   Occupational History    Not on file   Tobacco Use    Smoking status: Never    Smokeless tobacco: Never   Vaping Use    Vaping Use: Never used   Substance and Sexual Activity    Alcohol use: Never     Alcohol/week: 0.0 standard drinks    Drug use: No    Sexual activity: Not on file   Other Topics Concern    Not on file   Social History Narrative    Not on file     Social Determinants of Health     Financial Resource Strain: Not on file   Food Insecurity: Not on file   Transportation Needs: Not on file   Physical Activity: Not on file   Stress: Not on file   Social Connections: Not on file   Intimate Partner Violence: Not on file   Housing Stability: Not on file         ALLERGIES: Patient has no known allergies. Review of Systems   Constitutional:  Positive for fever. HENT:  Negative for sore throat. Eyes:  Negative for visual disturbance. Respiratory:  Positive for shortness of breath. Negative for cough. Cardiovascular:  Negative for chest pain. Gastrointestinal:  Negative for abdominal pain. Genitourinary:  Negative for dysuria. Musculoskeletal:  Negative for back pain. Skin:  Negative for rash. Neurological:  Negative for headaches. Psychiatric/Behavioral:  Positive for confusion. Vitals:    11/26/22 1824   BP: (!) 153/70   Pulse: 87   Resp: 22   Temp: 100.1 °F (37.8 °C)   SpO2: 97%   Weight: 84.8 kg (187 lb)   Height: 5' 8\" (1.727 m)            Physical Exam  Vitals and nursing note reviewed. Constitutional:       General: He is not in acute distress. Appearance: He is well-developed. HENT:      Head: Normocephalic and atraumatic. Mouth/Throat:      Mouth: Mucous membranes are moist.   Eyes:      Extraocular Movements: Extraocular movements intact. Conjunctiva/sclera: Conjunctivae normal.   Neck:      Trachea: Phonation normal.   Cardiovascular:      Rate and Rhythm: Normal rate. Pulmonary:      Effort: Pulmonary effort is normal. No respiratory distress. Breath sounds: No wheezing. Abdominal:      General: There is no distension. Tenderness: There is no abdominal tenderness. Musculoskeletal:         General: No tenderness. Normal range of motion. Cervical back: Normal range of motion. Skin:     General: Skin is warm and dry. Neurological:      General: No focal deficit present. Mental Status: He is alert. He is not disoriented. Motor: No abnormal muscle tone. ED EKG interpretation:  Rhythm: paced; and regular . Rate (approx.): 72; Other findings: abnormal ekg. This EKG was interpreted by Shalini Siu MD,ED Provider. MDM         Perfect Serve Consult for Admission  9:26 PM    ED Room Number: AP91/62  Patient Name and age:  Elicia Sep 80 y.o.  male  Working Diagnosis:   1. Congestive heart failure, unspecified HF chronicity, unspecified heart failure type ([address]    2. Delirium        COVID-19 Suspicion:  no  Sepsis present:  yes  Reassessment needed: no  Code Status:  Do Not Resuscitate  Readmission: no  Isolation Requirements:  no  Recommended Level of Care:  telemetry  Department:[facility] Adult ED - 21   Other:  increased confusion today and tachypnea and low grade fever, temp 100.1. Recently was treated with Cipro for UTI but completed antibiotics about 2 weeks ago. CXR stable. Trop 96, BNP 6575. Rapid COVID and Flu negative. POC Lactic 2.37 but in lab 2.0.  ordered 500ml NS bolus due to h/o CHF. Had suspected recurrent UTI and rocephin IV ordered but UA appears normal.  Sent for culture.        Procedures

## 2022-11-27 LAB
ALBUMIN SERPL-MCNC: 3 G/DL (ref 3.5–5)
ALBUMIN/GLOB SERPL: 1 {RATIO} (ref 1.1–2.2)
ALP SERPL-CCNC: 101 U/L (ref 45–117)
ALT SERPL-CCNC: 18 U/L (ref 12–78)
AMMONIA PLAS-SCNC: 20 UMOL/L
ANION GAP SERPL CALC-SCNC: 6 MMOL/L (ref 5–15)
AST SERPL-CCNC: 27 U/L (ref 15–37)
BACTERIA SPEC CULT: NORMAL
BASOPHILS # BLD: 0 K/UL (ref 0–0.1)
BASOPHILS NFR BLD: 0 % (ref 0–1)
BILIRUB SERPL-MCNC: 0.9 MG/DL (ref 0.2–1)
BUN SERPL-MCNC: 28 MG/DL (ref 6–20)
BUN/CREAT SERPL: 17 (ref 12–20)
CALCIUM SERPL-MCNC: 8.5 MG/DL (ref 8.5–10.1)
CHLORIDE SERPL-SCNC: 106 MMOL/L (ref 97–108)
CHOLEST SERPL-MCNC: 123 MG/DL
CO2 SERPL-SCNC: 29 MMOL/L (ref 21–32)
CREAT SERPL-MCNC: 1.69 MG/DL (ref 0.7–1.3)
DIFFERENTIAL METHOD BLD: ABNORMAL
EOSINOPHIL # BLD: 0.1 K/UL (ref 0–0.4)
EOSINOPHIL NFR BLD: 1 % (ref 0–7)
ERYTHROCYTE [DISTWIDTH] IN BLOOD BY AUTOMATED COUNT: 14.9 % (ref 11.5–14.5)
GLOBULIN SER CALC-MCNC: 3 G/DL (ref 2–4)
GLUCOSE SERPL-MCNC: 126 MG/DL (ref 65–100)
HCT VFR BLD AUTO: 36 % (ref 36.6–50.3)
HDLC SERPL-MCNC: 52 MG/DL
HDLC SERPL: 2.4 {RATIO} (ref 0–5)
HGB BLD-MCNC: 11.4 G/DL (ref 12.1–17)
IMM GRANULOCYTES # BLD AUTO: 0 K/UL (ref 0–0.04)
IMM GRANULOCYTES NFR BLD AUTO: 0 % (ref 0–0.5)
LACTATE BLD-SCNC: 1.96 MMOL/L (ref 0.4–2)
LACTATE SERPL-SCNC: 2.5 MMOL/L (ref 0.4–2)
LDLC SERPL CALC-MCNC: 54.6 MG/DL (ref 0–100)
LYMPHOCYTES # BLD: 0.7 K/UL (ref 0.8–3.5)
LYMPHOCYTES NFR BLD: 9 % (ref 12–49)
MAGNESIUM SERPL-MCNC: 1.9 MG/DL (ref 1.6–2.4)
MCH RBC QN AUTO: 29.3 PG (ref 26–34)
MCHC RBC AUTO-ENTMCNC: 31.7 G/DL (ref 30–36.5)
MCV RBC AUTO: 92.5 FL (ref 80–99)
MONOCYTES # BLD: 1 K/UL (ref 0–1)
MONOCYTES NFR BLD: 13 % (ref 5–13)
NEUTS SEG # BLD: 5.8 K/UL (ref 1.8–8)
NEUTS SEG NFR BLD: 77 % (ref 32–75)
NRBC # BLD: 0 K/UL (ref 0–0.01)
NRBC BLD-RTO: 0 PER 100 WBC
PLATELET # BLD AUTO: 115 K/UL (ref 150–400)
PMV BLD AUTO: 10 FL (ref 8.9–12.9)
POTASSIUM SERPL-SCNC: 4.3 MMOL/L (ref 3.5–5.1)
PROT SERPL-MCNC: 6 G/DL (ref 6.4–8.2)
RBC # BLD AUTO: 3.89 M/UL (ref 4.1–5.7)
RBC MORPH BLD: ABNORMAL
SALICYLATES SERPL-MCNC: <1.7 MG/DL (ref 2.8–20)
SERVICE CMNT-IMP: NORMAL
SODIUM SERPL-SCNC: 141 MMOL/L (ref 136–145)
T4 FREE SERPL-MCNC: 1.4 NG/DL (ref 0.8–1.5)
TRIGL SERPL-MCNC: 82 MG/DL (ref ?–150)
TROPONIN-HIGH SENSITIVITY: 114 NG/L (ref 0–76)
TROPONIN-HIGH SENSITIVITY: 99 NG/L (ref 0–76)
TSH SERPL DL<=0.05 MIU/L-ACNC: 1.53 UIU/ML (ref 0.36–3.74)
VLDLC SERPL CALC-MCNC: 16.4 MG/DL
WBC # BLD AUTO: 7.6 K/UL (ref 4.1–11.1)

## 2022-11-27 PROCEDURE — 74011000250 HC RX REV CODE- 250: Performed by: FAMILY MEDICINE

## 2022-11-27 PROCEDURE — 74011250637 HC RX REV CODE- 250/637: Performed by: PHYSICIAN ASSISTANT

## 2022-11-27 PROCEDURE — 80053 COMPREHEN METABOLIC PANEL: CPT

## 2022-11-27 PROCEDURE — 74011250637 HC RX REV CODE- 250/637: Performed by: FAMILY MEDICINE

## 2022-11-27 PROCEDURE — 80179 DRUG ASSAY SALICYLATE: CPT

## 2022-11-27 PROCEDURE — 85025 COMPLETE CBC W/AUTO DIFF WBC: CPT

## 2022-11-27 PROCEDURE — 65270000046 HC RM TELEMETRY

## 2022-11-27 PROCEDURE — 83605 ASSAY OF LACTIC ACID: CPT

## 2022-11-27 PROCEDURE — 84443 ASSAY THYROID STIM HORMONE: CPT

## 2022-11-27 PROCEDURE — 74011250636 HC RX REV CODE- 250/636: Performed by: PHYSICIAN ASSISTANT

## 2022-11-27 PROCEDURE — 82140 ASSAY OF AMMONIA: CPT

## 2022-11-27 PROCEDURE — 84439 ASSAY OF FREE THYROXINE: CPT

## 2022-11-27 PROCEDURE — 36415 COLL VENOUS BLD VENIPUNCTURE: CPT

## 2022-11-27 PROCEDURE — 84484 ASSAY OF TROPONIN QUANT: CPT

## 2022-11-27 PROCEDURE — 80061 LIPID PANEL: CPT

## 2022-11-27 PROCEDURE — 74011250636 HC RX REV CODE- 250/636: Performed by: FAMILY MEDICINE

## 2022-11-27 PROCEDURE — 83735 ASSAY OF MAGNESIUM: CPT

## 2022-11-27 RX ORDER — ACETAMINOPHEN 325 MG/1
650 TABLET ORAL
Status: DISCONTINUED | OUTPATIENT
Start: 2022-11-27 | End: 2022-12-05 | Stop reason: HOSPADM

## 2022-11-27 RX ORDER — SODIUM CHLORIDE 0.9 % (FLUSH) 0.9 %
5-40 SYRINGE (ML) INJECTION EVERY 8 HOURS
Status: DISCONTINUED | OUTPATIENT
Start: 2022-11-27 | End: 2022-12-05 | Stop reason: HOSPADM

## 2022-11-27 RX ORDER — ONDANSETRON 4 MG/1
4 TABLET, ORALLY DISINTEGRATING ORAL
Status: DISCONTINUED | OUTPATIENT
Start: 2022-11-27 | End: 2022-12-05 | Stop reason: HOSPADM

## 2022-11-27 RX ORDER — IPRATROPIUM BROMIDE 21 UG/1
2 SPRAY, METERED NASAL
Status: DISCONTINUED | OUTPATIENT
Start: 2022-11-27 | End: 2022-12-05 | Stop reason: HOSPADM

## 2022-11-27 RX ORDER — FUROSEMIDE 20 MG/1
20 TABLET ORAL DAILY
Status: DISCONTINUED | OUTPATIENT
Start: 2022-11-27 | End: 2022-12-05 | Stop reason: HOSPADM

## 2022-11-27 RX ORDER — ASCORBIC ACID 500 MG
500 TABLET ORAL
Status: DISCONTINUED | OUTPATIENT
Start: 2022-11-29 | End: 2022-12-05 | Stop reason: HOSPADM

## 2022-11-27 RX ORDER — ATORVASTATIN CALCIUM 20 MG/1
20 TABLET, FILM COATED ORAL DAILY
Status: DISCONTINUED | OUTPATIENT
Start: 2022-11-27 | End: 2022-12-05 | Stop reason: HOSPADM

## 2022-11-27 RX ORDER — LEVOTHYROXINE SODIUM 75 UG/1
75 TABLET ORAL
Status: DISCONTINUED | OUTPATIENT
Start: 2022-11-28 | End: 2022-12-05 | Stop reason: HOSPADM

## 2022-11-27 RX ORDER — TAMSULOSIN HYDROCHLORIDE 0.4 MG/1
0.4 CAPSULE ORAL
Status: DISCONTINUED | OUTPATIENT
Start: 2022-11-27 | End: 2022-12-05 | Stop reason: HOSPADM

## 2022-11-27 RX ORDER — TRIAMCINOLONE ACETONIDE 5 MG/G
CREAM TOPICAL 2 TIMES DAILY
COMMUNITY

## 2022-11-27 RX ORDER — METOPROLOL SUCCINATE 25 MG/1
25 TABLET, EXTENDED RELEASE ORAL EVERY EVENING
Status: DISCONTINUED | OUTPATIENT
Start: 2022-11-27 | End: 2022-12-05 | Stop reason: HOSPADM

## 2022-11-27 RX ORDER — THERA TABS 400 MCG
1 TAB ORAL DAILY
COMMUNITY

## 2022-11-27 RX ORDER — PANTOPRAZOLE SODIUM 20 MG/1
20 TABLET, DELAYED RELEASE ORAL DAILY
Status: DISCONTINUED | OUTPATIENT
Start: 2022-11-27 | End: 2022-11-30

## 2022-11-27 RX ORDER — FUROSEMIDE 20 MG/1
20 TABLET ORAL DAILY
COMMUNITY

## 2022-11-27 RX ORDER — LEVOTHYROXINE SODIUM 75 UG/1
75 TABLET ORAL
COMMUNITY

## 2022-11-27 RX ORDER — SODIUM CHLORIDE 0.9 % (FLUSH) 0.9 %
5-40 SYRINGE (ML) INJECTION AS NEEDED
Status: DISCONTINUED | OUTPATIENT
Start: 2022-11-27 | End: 2022-12-05 | Stop reason: HOSPADM

## 2022-11-27 RX ORDER — LANOLIN ALCOHOL/MO/W.PET/CERES
400 CREAM (GRAM) TOPICAL DAILY
Status: DISCONTINUED | OUTPATIENT
Start: 2022-11-27 | End: 2022-12-05 | Stop reason: HOSPADM

## 2022-11-27 RX ORDER — LISINOPRIL 20 MG/1
20 TABLET ORAL DAILY
Status: DISCONTINUED | OUTPATIENT
Start: 2022-11-27 | End: 2022-12-05 | Stop reason: HOSPADM

## 2022-11-27 RX ADMIN — LISINOPRIL 20 MG: 20 TABLET ORAL at 09:53

## 2022-11-27 RX ADMIN — Medication 10 ML: at 06:00

## 2022-11-27 RX ADMIN — ATORVASTATIN CALCIUM 20 MG: 20 TABLET, FILM COATED ORAL at 09:52

## 2022-11-27 RX ADMIN — MICONAZOLE NITRATE 2 % TOPICAL POWDER: at 21:23

## 2022-11-27 RX ADMIN — PANTOPRAZOLE SODIUM 20 MG: 20 TABLET, DELAYED RELEASE ORAL at 09:53

## 2022-11-27 RX ADMIN — Medication 10 ML: at 21:22

## 2022-11-27 RX ADMIN — Medication 10 ML: at 17:32

## 2022-11-27 RX ADMIN — IPRATROPIUM BROMIDE AND ALBUTEROL SULFATE 3 ML: .5; 3 SOLUTION RESPIRATORY (INHALATION) at 20:47

## 2022-11-27 RX ADMIN — SODIUM CHLORIDE 500 ML: 9 INJECTION, SOLUTION INTRAVENOUS at 12:30

## 2022-11-27 RX ADMIN — IPRATROPIUM BROMIDE AND ALBUTEROL SULFATE 3 ML: .5; 3 SOLUTION RESPIRATORY (INHALATION) at 04:23

## 2022-11-27 RX ADMIN — TAMSULOSIN HYDROCHLORIDE 0.4 MG: 0.4 CAPSULE ORAL at 21:22

## 2022-11-27 RX ADMIN — WATER 1 G: 1 INJECTION INTRAMUSCULAR; INTRAVENOUS; SUBCUTANEOUS at 04:23

## 2022-11-27 RX ADMIN — Medication 400 MG: at 09:52

## 2022-11-27 RX ADMIN — IPRATROPIUM BROMIDE AND ALBUTEROL SULFATE 3 ML: .5; 3 SOLUTION RESPIRATORY (INHALATION) at 14:07

## 2022-11-27 RX ADMIN — FUROSEMIDE 20 MG: 40 TABLET ORAL at 09:53

## 2022-11-27 RX ADMIN — MICONAZOLE NITRATE 2 % TOPICAL POWDER: at 12:12

## 2022-11-27 RX ADMIN — METOPROLOL SUCCINATE 25 MG: 25 TABLET, EXTENDED RELEASE ORAL at 17:26

## 2022-11-27 RX ADMIN — WATER 1 G: 1 INJECTION INTRAMUSCULAR; INTRAVENOUS; SUBCUTANEOUS at 17:26

## 2022-11-27 NOTE — H&P
[facility]  Hospitalist Group  History and Physical    Date of Service:  11/26/2022  Primary Care Provider: Matt Felton MD  Source of information: The patient, Chart review, and Spouse/family member    Chief Complaint: Altered mental status and Shortness of Breath      History of Presenting Illness:   Chhaya Matthew is a 80 y.o. male with past medical history of paroxysmal atrial fibrillation, sick sinus syndrome, status post cardiac pacemaker mutation, NSTEMI, CAD, cardiomyopathy unspecified, BPH, COPD, prior COVID-19 virus infection, CKD, hypertension, GERD, hyperlipidemia, hypothyroidism presented to the emergency department with reported fever, confusion. Patient reportedly resides at a senior living facility. He is a poor historian. He is accompanied by his granddaughter who is a nurse practitioner and his daughter who provide additional history. Remainder of history was obtained per review of ED electronic medical records. Per reports patient recent been diagnosed with E. coli UTI approximately 2 weeks ago and been treated with ciprofloxacin. .  Proximal week ago his daughter notes that she noticed some blisters on patient's scrotum. His granddaughter notes that the patient had become confused and delirious with altered mental status and had fever at the nursing home today. Per reports he has some shortness of breath  Exertion but patient had not reported the same. On arrival emergency department, initial ported vital signs temperature 100.1 °F, /70, heart rate 87, respirate 22, O2 saturation 97% room air. Abnormal labs included BUN 20, creatinine 1.67, GFR 36, blood glucose 123, total bili 1.2, albumin 3.2, high-sensitivity opponent 96, proBNP 6576, lactic acid 2.37. Chest ray portable showed no acute cardiopulmonary process or pulmonary edema. ED ordered ceftriaxone 1 g IV x1 dose.   0.9% normal saline 500 mL IV for bolus x1 was ordered per ED but had not been given yet. ED request admission to the hospital service. REVIEW OF SYSTEMS:  Unable to obtain review of systems as patient is not answering questions appropriately. Past Medical History:   Diagnosis Date    Angina pectoris (HCC)     Atrial fibrillation (HCC)     BPH (benign prostatic hyperplasia)     Chronic obstructive pulmonary disease (HCC)     COVID-19     Elevated PSA     Essential hypertension     GERD (gastroesophageal reflux disease)     GERD without esophagitis     Hyperlipidemia     Hypothyroidism     Long term (current) use of anticoagulants     Pacemaker     Rosacea 2016    Rosacea     SSS (sick sinus syndrome) (HCC)       Past Surgical History:   Procedure Laterality Date    HX CORONARY STENT PLACEMENT      HX HEART CATHETERIZATION      HX PACEMAKER      KS REMVL PERM PM PLS GEN W/REPL PLSE GEN 2 LEAD SYS N/A 2/6/2020    REMOVE & REPLACE PPM GEN DUAL LEAD performed [place] Kyle Nuñez [place] [id number], [facility] CATH LAB     Medications:  Prior to Admission medications    Medication Sig Start Date End Date Taking? Authorizing Provider   magnesium oxide (MAG-OX) 400 mg tablet Take 400 mg by mouth daily. OtherDi MD   nitroglycerin (NITROLINGUAL) 400 mcg/spray spray 1 Spray by SubLINGual route every five (5) minutes as needed for Chest Pain. Di Vera MD   ondansetron hcl (ZOFRAN) 4 mg tablet Take 4 mg by mouth every eight (8) hours as needed for Nausea or Vomiting. Di Vera MD   ascorbic acid, vitamin C, (Vitamin C) 500 mg tablet Take  by mouth. Di Vera MD   furosemide (LASIX) 20 mg tablet Take 2 Tablets by mouth daily. 9/24/22   Andrea Claudio MD   lisinopriL (PRINIVIL, ZESTRIL) 20 mg tablet Take 1 Tab by mouth daily. 3/23/21   Dayron Leary MD   atorvastatin (LIPITOR) 20 mg tablet Take 1 Tab by mouth daily. 3/23/21   Dayron Leary MD   metoprolol succinate (TOPROL-XL) 25 mg XL tablet Take 1 Tab by mouth every evening.  1/8/21   Lucila Milligan MD   ipratropium (ATROVENT) 0.03 % nasal spray 2 Sprays by Both Nostrils route two (2) times daily as needed for Rhinitis. Provider, Historical   mometasone (NASONEX) 50 mcg/actuation nasal spray 2 Sprays by Both Nostrils route daily. Provider, Historical   tamsulosin (FLOMAX) 0.4 mg capsule Take 0.4 mg by mouth nightly. Provider, Historical   levothyroxine (SYNTHROID) 50 mcg tablet Take 50 mcg by mouth Daily (before breakfast). Patient takes 75 mcg every day. Provider, Historical   omeprazole (PRILOSEC) 20 mg capsule Take 20 mg by mouth daily. Provider, Historical   Multivits with Min-FA-Lycopene 0.4-600 mg-mcg tab Take 1 Tab by mouth daily. Provider, Historical     Allergies:  No known drug allergies    Family history:  Family History   Problem Relation Age of Onset    Stroke Mother     Hypertension Mother     Kidney Disease Father       Social History:    Smoking: None reported  Alcohol: None reported    Lives at SNF  Requires total assist with walking        Objective:   Visit Vitals  BP (!) 157/73   Pulse 72   Temp 100.1 °F (37.8 °C)   Resp 25   Ht 5' 8\" (1.727 m)   Wt 84.8 kg (187 lb)   SpO2 97%   BMI 28.43 kg/m²      O2 Device: None (Room air)    PHYSICAL EXAM:   General:  Patient is in no acute respiratory distress. Head:  Normocephalic, without obvious abnormality, atraumatic   Eyes:  Conjunctivae/corneas clear. Pupils 2 mm reactive bilateral.  Some clear drainage from both eyes right greater than left. E/N/M/T: Nares normal. Septum midline.  No nasal drainage or sinus tenderness  Tongue midline/ non-edematous  Clear oropharynx   Neck: Normal appearance and movements, symmetrical, trachea midline  No palpable adenopathy  No thyroid enlargement, tenderness or nodules  No carotid bruit   No JVD  Trachea midline   Lungs:   Symmetrical chest expansion and respiratory effort  Clear to auscultation bilaterally   Chest wall:  No tenderness or deformity   Heart:  Regular rate and rhythm   Normal S1 and S2; no murmur, click, rub or gallop   Abdomen:   Soft, no tenderness  No rebound, guarding, or rigidity  Non-distended   Bowel sounds normal  No masses or hepatosplenomegaly  No hernias present   Back: No costovertebral angle tenderness  No step-off deformity   Extremities: Extremities normal, atraumatic  No cyanosis or edema     Vascular/  Pulses: 2+ radial/ DP bilateral pulses   Integument/  Skin: No rashes or ulcers  Warm and dry   Musculo-      skeletal: Gait not tested  No calf tenderness   Neuro: GCS 15. Moves all extremities x 4. No slurred speech. No facial droop. Sensation grossly intact. Psych: Alert, oriented x 3     Geniturinary: Extensive, diffuse erythema, induration and rash involving scrotum and penis. He has multiple comedones with bumpy appearance diffusely located on scrotum. Erythema of bilateral groin  Perineum no open lesions. Data Review: All diagnostic labs and studies have been reviewed. Abnormal Labs Reviewed   CBC WITH AUTOMATED DIFF - Abnormal; Notable for the following components:       Result Value    RDW 14.9 (*)     PLATELET 074 (*)     NEUTROPHILS 84 (*)     LYMPHOCYTES 7 (*)     ABS.  LYMPHOCYTES 0.6 (*)     All other components within normal limits   METABOLIC PANEL, COMPREHENSIVE - Abnormal; Notable for the following components:    Glucose 123 (*)     BUN 28 (*)     Creatinine 1.67 (*)     eGFR 36 (*)     Bilirubin, total 1.2 (*)     Albumin 3.2 (*)     A-G Ratio 0.9 (*)     All other components within normal limits   NT-PRO BNP - Abnormal; Notable for the following components:    NT pro-BNP 6,575 (*)     All other components within normal limits   URINALYSIS W/MICROSCOPIC - Abnormal; Notable for the following components:    Protein 30 (*)     All other components within normal limits   TROPONIN-HIGH SENSITIVITY - Abnormal; Notable for the following components:    Troponin-High Sensitivity 96 (*)     All other components within normal limits   POC LACTIC ACID - Abnormal; Notable for the following components:    Lactic Acid (POC) 2.37 (*)     All other components within normal limits       All Micro Results       Procedure Component Value Units Date/Time    CULTURE, URINE [[id number]] Collected: 11/26/22 2101    Order Status: Sent Specimen: Cath Urine Updated: 11/26/22 2150    URINE CULTURE HOLD SAMPLE [[id number]] Collected: 11/26/22 2101    Order Status: Completed Specimen: Urine from Serum Updated: 11/26/22 2111     Urine culture hold       Urine on hold in Microbiology dept for 2 days. If unpreserved urine is submitted, it cannot be used for addtional testing after 24 hours, recollection will be required. CULTURE, BLOOD, PAIRED [[id number]] Collected: 11/26/22 1839    Order Status: Sent Specimen: Blood Updated: 11/26/22 2005    COVID-19 RAPID TEST [[id number]] Collected: 11/26/22 1845    Order Status: Completed Specimen: Nasopharyngeal Updated: 11/26/22 1951     Specimen source Nasopharyngeal        COVID-19 rapid test Not detected        Comment: Rapid Abbott ID Now       Rapid NAAT:  The specimen is NEGATIVE for SARS-CoV-2, the novel coronavirus associated with COVID-19. Negative results should be treated as presumptive and, if inconsistent with clinical signs and symptoms or necessary for patient management, should be tested with an alternative molecular assay. Negative results do not preclude SARS-CoV-2 infection and should not be used as the sole basis for patient management decisions. This test has been authorized by the FDA under an Emergency Use Authorization (EUA) for use by authorized laboratories. Fact sheet for Healthcare Providers:  http://www.nadeem-kenyatta.catherine/  Fact sheet for Patients: http://www.nadeem-kenyatta.biz/       Methodology: Isothermal Nucleic Acid Amplification                 IMAGING:   CT HEAD WO CONT   Final Result   No acute intracranial process.             XR CHEST PORT   Final Result   No Acute Disease. ECG/ECHO:    Results for orders placed or performed during the hospital encounter of 05/27/21   EKG, 12 LEAD, INITIAL   Result Value Ref Range    Ventricular Rate 61 BPM    Atrial Rate 58 BPM    QRS Duration 168 ms    Q-T Interval 494 ms    QTC Calculation (Bezet) 497 ms    Calculated R Axis -74 degrees    Calculated T Axis 76 degrees    Diagnosis       Ventricular-paced rhythm  Abnormal ECG  When compared with ECG of 26-APR-2021 14:12,  Vent. rate has decreased BY   9 BPM  Confirmed by Osorio Dela Cruz M.D., Candido Lassiter ([id number]) on 5/28/2021 5:53:18 AM          Assessment:   Given the patient's current clinical presentation, there is a high level of concern for decompensation if discharged from the emergency department. Complex decision making was performed, which includes reviewing the patient's available past medical records, laboratory results, and imaging studies. Active Problems:    Acute metabolic encephalopathy  Altered mental status-etiology unknown  -Order CT head without IV contrast without any acute process  -Placed on neurochecks and fall precautions  -N.p.o. until patient passes nursing dysphagia screen or speech therapy evaluation which prevent any aspiration. If passes dysphagia screen then may start cardiac diet. 2.  Lactic acidosis  -Based on underlying dehydration/hypovolemia with BUN 28, creatinine 1.67-with creatinine essentially unchanged from 1.68 on 9/24/2022  -May proceed with 0.9% normal saline 500 mL IV fluid bolus x1   -Hold on additional IV fluid boluses due to concern for underlying CHF with elevated proBNP  -Repeat lactic acid level. 3.  Elevated troponin   -Repeat/trend high-sensitivity troponin level  -Continue with telemetry monitoring    4. Congestive heart failure, unspecified chronicity  - last 2 echocardiogram 9/14/2022 showed left ventricular ejection fraction 50% to 55%  -Placed on strict I's and O's and daily weights    5.   Rash (tinea )  Cellulitis scrotum  -Patient has rash with superimposed cellulitis/infection, likely secondary to urinary and fecal incontinence with irritation of skin causing noted irritation and comedones  -Order Ancef 1 gram IV q 8 hours  -check blood cultures  -Order Mycostatin powder to be applied to scrotal rash and groin/perineum  -Keep area pain and dry; may apply dry dressings to this area to prevent moisture buildup  -May have condom catheter for urinary diversion  -Consult wound care team    6. VALERIO (acute kidney injury)  -BUN 20, creatinine 1.67, GFR 36  -repeat renal panel in a.m.    7.  Fever  -Low-grade temperature 100.1 on arrival  -Tylenol as needed    8. Paroxysmal atrial fibrillation  -Status post cardiac pacemaker plantation  -Continue telemetry monitoring-ordered twelve-lead EKG     9. Acute COPD exacerbation  -order Duoneb nebulizer treatments every 4 hours as needed    10. Generalized weakness  -Placed on fall precautions    11. Hypothyroidism  -Continue levothyroxine        DIET: DIET NPO   ISOLATION PRECAUTIONS: There are currently no Active Isolations  CODE STATUS: DNR   DVT PROPHYLAXIS: SCDs  FUNCTIONAL STATUS PRIOR TO HOSPITALIZATION: Ambulatory and capable of self-care but relies on assistive devices (rolling walker/cane). Ambulatory status/function: Ambulates with assistance:  Unable to ambulate without person assistance  EARLY MOBILITY ASSESSMENT: Recommend an assessment from physical therapy and/or occupational therapy  ANTICIPATED DISCHARGE: Greater than 48 hours. ANTICIPATED DISPOSITION: SNF  EMERGENCY CONTACT/SURROGATE DECISION MAKER: Jackson Thao, granddaughter [phone]. Johanna Diaz [phone]. CRITICAL CARE WAS PERFORMED FOR THIS ENCOUNTER: NO.    ADVANCED DIRECTIVE/  CODE STATUS:  DNR (DO NOT RESUSCITATE) as per discussion with patient's next of kin - his daughter Johanna Diaz and granddaughterKaylie Argueta.   Patient ultimately status and did not have a medical capacity make his own decisions. Per family request, DNR orders placed on chart. Signed By: Zoran Brady MD     November 26, 2022         Please note that this dictation may have been completed with Dragon, the computer voice recognition software. Quite often unanticipated grammatical, syntax, homophones, and other interpretive errors are inadvertently transcribed by the computer software. Please disregard these errors. Please excuse any errors that have escaped final proofreading.

## 2022-11-27 NOTE — ROUTINE PROCESS
TRANSFER - OUT REPORT:    Verbal report given to Mallory (name) on Sola Dutton  being transferred to [phone] (unit) for routine progression of care       Report consisted of patients Situation, Background, Assessment and   Recommendations(SBAR). Information from the following report(s) SBAR, ED Summary, Intake/Output, MAR, and Recent Results was reviewed with the receiving nurse. Lines:   Peripheral IV 11/27/22 Right Forearm (Active)   Site Assessment Clean, dry, & intact 11/27/22 1047   Phlebitis Assessment 0 11/27/22 1047   Infiltration Assessment 0 11/27/22 1047   Dressing Status Clean, dry, & intact 11/27/22 1047        Opportunity for questions and clarification was provided.       Patient transported with:   Nutrino

## 2022-11-27 NOTE — ED NOTES
Bedside, Verbal, and Written shift change report given to [facility], RN (oncoming nurse) by Rupali Jimenez RN (offgoing nurse). Report included the following information SBAR, Kardex, and ED Summary.

## 2022-11-27 NOTE — PROGRESS NOTES
Admission Medication Reconciliation:      Chart notes and RX Query and [facility] [place] transfer documents were used to update medication list. Transfer documents were located in the media section of the eMR. Medication changes (since last review): Added:  Ascorbic acid  Triamcinolone cream  AREDS 2 Preservision    Revised:  Furosemide  Ascorbic acid    Removed:  Mometasone nasal spray    Thank you for allowing me to participate in the care of your patient. Lily Mcmanus PharmD, RN # [phone]       [facility] pharmacy benefit data reflects medications filled and processed through the patient's insurance, however   this data does NOT capture whether the medication was picked up or is currently being taken by the patient. Allergies:  Patient has no known allergies. Significant PMH/Disease States:   Past Medical History:   Diagnosis Date    Angina pectoris (HCC)     Atrial fibrillation (HCC)     BPH (benign prostatic hyperplasia)     Chronic obstructive pulmonary disease (HCC)     COVID-19     Elevated PSA     Essential hypertension     GERD (gastroesophageal reflux disease)     GERD without esophagitis     Hyperlipidemia     Hypothyroidism     Long term (current) use of anticoagulants     Pacemaker     Rosacea     Rosacea     SSS (sick sinus syndrome) [facility])      Chief Complaint for this Admission:    Chief Complaint   Patient presents with    Altered mental status    Shortness of Breath     Prior to Admission Medications:   Prior to Admission Medications   Prescriptions Last Dose Informant Taking?   ascorbic acid, vitamin C, (VITAMIN C) 500 mg tablet   No   Sig: Take 500 mg by mouth every , Saturday. atorvastatin (LIPITOR) 20 mg tablet   No   Sig: Take 1 Tab by mouth daily. furosemide (LASIX) 20 mg tablet   No   Sig: Take 20 mg by mouth daily.    ipratropium (ATROVENT) 0.03 % nasal spray  Child No   Si Sprays by Both Nostrils route two (2) times daily as needed for Rhinitis. levothyroxine (SYNTHROID) 75 mcg tablet   No   Sig: Take 75 mcg by mouth Daily (before breakfast). lisinopriL (PRINIVIL, ZESTRIL) 20 mg tablet   No   Sig: Take 1 Tab by mouth daily. magnesium oxide (MAG-OX) 400 mg tablet   No   Sig: Take 400 mg by mouth daily. metoprolol succinate (TOPROL-XL) 25 mg XL tablet   No   Sig: Take 1 Tab by mouth every evening. nitroglycerin (NITROLINGUAL) 400 mcg/spray spray   No   Si Spray by SubLINGual route every five (5) minutes as needed for Chest Pain. omeprazole (PRILOSEC) 20 mg capsule  Child No   Sig: Take 20 mg by mouth daily. ondansetron hcl (ZOFRAN) 4 mg tablet   No   Sig: Take 4 mg by mouth every eight (8) hours as needed for Nausea or Vomiting.   tamsulosin (FLOMAX) 0.4 mg capsule  Child No   Sig: Take 0.4 mg by mouth nightly. therapeutic multivitamin (THERAGRAN) tablet   No   Sig: Take 1 Tablet by mouth daily. triamcinolone (ARISTOCORT) 0.5 % topical cream   Yes   Sig: Apply  to affected area two (2) times a day. use thin layer- apply to affected area for skin irritation   vit C/E/Zn/coppr/lutein/zeaxan (PRESERVISION AREDS-2 PO)   Yes   Sig: Take 1 Capsule by mouth daily. Facility-Administered Medications: None     Please contact the main inpatient pharmacy with any questions or concerns at [phone] and we will direct you to the clinical pharmacist covering this patient's care while in-house.    RIN Metzgre

## 2022-11-27 NOTE — PROGRESS NOTES
Day #1 of Cefazolin  Indication:  SSTI  Current regimen:  1 gram q8h  Abx regimen: Cefazolin  Recent Labs     22  1839   WBC 9.2   CREA 1.67*   BUN 28*     Est CrCl: 25 ml/min  Temp (24hrs), Av.1 °F (37.8 °C), Min:100.1 °F (37.8 °C), Max:100.1 °F (37.8 °C)    Cultures: blood, urine on hold    Plan: Change to 1 gram q12h

## 2022-11-27 NOTE — PROGRESS NOTES
[facility]  Hospitalist Group                                                                                          Hospitalist Progress Note  Oscar Scherer PA-C  Answering service: [phone] from in house phone        Date of Service:  2022  NAME:  Sonali Padron  :  1922  MRN:  [id number]      Admission Summary:   Sonali Padron is a Barry Meka [age] y.o. male with PMHx of A-Fib with SSS s/p cardiac pacemaker, BPH, COPD, CKD, HTN, GERD, HLD, and hypothyroidism who presented from SNF with low grade fever and confusion. Interval history / Subjective:   Patient was seen and examined independently this morning and then alongside her daughter and granddaughter in the morning. They report that approximately 2 weeks ago he was treated for an E coli UTI with 2 weeks of Ciprofloxacin. More recently he has become more confused and delirious and due to the temperature of 100.1F at home they wanted him to be assessed. They note overnight when arrived he had significant erythema in the groin for which they were very concerned but that it is much better today. They note that his mental status can wax and wane and that on his best days he spends the day in a recliner but able to have full conversations with individuals but is minimally mobile. On his worst days he can be very lethargic. He currently resides at [facility] in [facility]. The family notes that they work with [facility] as an outpatient  (last seen about 2 weeks ago) and that he likely is chronically aspirating. However, food is one of his major joys in life so they have accepted the risks of potential aspiration for him to continue enjoying the things he likes. Discussed with them this is very reasonable based on age/comorbidities. He eats a regular diet and medicine in pudding/applesauce. They family were concerned that he was a Full Code when in EMS in transport.  They note that he is a DNR/DNI and has previously completed Durable DNR paperwork. We re-completed the paperwork together to ensure has up to date records. A copy was provided for the medical chart and a copy was given to the daughter. We reviewed his labs, vital signs, and imaging. All questions and concerns addressed to best of ability    I reviewed the records from his care facility and updated medications to reflect current medications    I returned in the afternoon to check patient and help answer any questions and concerns. They are hoping to meet with the Care Manager as there is concern for the ability to [facility] to care for him. The granddaughter is hoping to meet with [facility]. Consult to our Case Management was placed. Assessment & Plan:     Acute metabolic encephalopathy, etiology unknown  Concern for fungal rash in groin with possible scrotal cellulitis  Lactic Acidosis, resolved  Low grade fever  -- Notable imaging: CT Head 11/26: No acute intracranial process  --- Scrotal rash: Likely fungal in etiology as incontinent at baseline. Started on Ancef (D1 = 11/27) for concern for potential scrotal cellulitis or SSTI. Will continue for now. Continue Mycostatin powder. Wound care consult  -- Lactic acid: Initially elevated 2.37 --> 2.5 --> 1.96. No signs of EOD or hypoperfusion. Received NS 500cc bolus x 2. Hold off additional fluid resuscitation for now with hx of CHF  -- Notable labs: UA on 11/26: negative for nitrites/LE's, WBC's. Urine culture was ordered and pending. BCx 11/26: NGTD. Influenza A/B + COVID negative.  TSH 1.53, Free T4 1.4, Ammonia 20     Elevated troponin   -- Troponin 96 --> 114 --> 99.  -- No chest pain or concern for ACS  -- Cardiology reviewed chart      Congestive heart failure, unspecified chronicity  HTN  - last 2 echocardiogram 9/14/2022 showed left ventricular ejection fraction 50% to 55%  -Placed on strict I's and O's and daily weights  - Continue Lasix 20mg daily   -- Continue Lisinopril     CKD  -- Renal function at baseline. Continue to trend. Paroxysmal atrial fibrillation  SSS  -Status post cardiac pacemaker plantation  -Continue Tele  -- continue Metoprolol XL 25mg nightly      Acute COPD exacerbation  -order Duoneb nebulizer treatments every 4 hours as needed     Generalized weakness  -Placed on fall precautions     Hypothyroidism  -Continue levothyroxine     GERD  -- Continue hospital formulary PPI    BPH  -- Continue Flomax nightly    Essential Tremor  -- At baseline per family    Vitamin Deficiency  -- Continue home Ascorbic Acid and Magnesium     Code status: DNR/DNI. Durable DNR recompleted 11/27  Prophylaxis: SCDs. Care Plan discussed with: Pt, RN, family  Anticipated Disposition: TBD. Care Manager consulted     Hospital Problems  Date Reviewed: 7/13/2022            Codes Class Noted POA    Lactic acidosis ICD-10-CM: E87.20  ICD-9-CM: 276.2  11/26/2022 Unknown        Elevated troponin ICD-10-CM: R77.8  ICD-9-CM: 790.6  11/26/2022 Unknown        AMS (altered mental status) ICD-10-CM: R41.82  ICD-9-CM: 780.97  11/26/2022 Unknown        Fever ICD-10-CM: R50.9  ICD-9-CM: 780.60  1/27/2019 Unknown             Review of Systems:   A comprehensive review of systems was negative except for that written in the HPI. Vital Signs:    Last 24hrs VS reviewed since prior progress note. Most recent are:  Visit Vitals  BP (!) 140/87   Pulse 71   Temp 98.4 °F (36.9 °C)   Resp 19   Ht 5' 8\" (1.727 m)   Wt 84.8 kg (187 lb)   SpO2 98%   BMI 28.43 kg/m²         Intake/Output Summary (Last 24 hours) at 11/27/2022 1516  Last data filed at 11/27/2022 1430  Gross per 24 hour   Intake 500 ml   Output 100 ml   Net 400 ml        Physical Examination:     I had a face to face encounter with this patient and independently examined them on 11/27/2022 as outlined below:          Constitutional:  No acute distress. Cooperative. Frail. BUE tremulous at rest.     ENT:  Oral mucosa moist, oropharynx benign.     Resp:  CTA bilaterally. No wheezing/rhonchi/rales but with upper airway crackling   CV:  Paced rhythm on monitor. Irregular rhythm    GI:  Soft, non distended, non tender. : Minimal erythema in the groin. No pain with palpation or elevation of testicles. Small follicular raised lesions on scrotum. Musculoskeletal:  No edema, warm and palpable LE pulses    Neurologic:  Moves all extremities to command. Extremities with elevated tone. Oriented to name, location being hospital, but not year. PERRL. Face symmetric, tongue midline.  [place]            Data Review:    Review and/or order of clinical lab test  Review and/or order of tests in the radiology section of CPT  Review and/or order of tests in the medicine section of CPT      Labs:     Recent Labs     11/27/22 0430 11/26/22 1839   WBC 7.6 9.2   HGB 11.4* 12.5   HCT 36.0* 39.5   * 142*     Recent Labs     11/27/22 0430 11/26/22 1839    139   K 4.3 4.6    106   CO2 29 28   BUN 28* 28*   CREA 1.69* 1.67*   * 123*   CA 8.5 8.6   MG 1.9  --      Recent Labs     11/27/22 0430 11/26/22 1839   ALT 18 21    110   TBILI 0.9 1.2*   TP 6.0* 6.6   ALB 3.0* 3.2*   GLOB 3.0 3.4       Lab Results   Component Value Date/Time    Cholesterol, total 123 11/27/2022 04:30 AM    HDL Cholesterol 52 11/27/2022 04:30 AM    LDL, calculated 54.6 11/27/2022 04:30 AM    Triglyceride 82 11/27/2022 04:30 AM    CHOL/HDL Ratio 2.4 11/27/2022 04:30 AM     Lab Results   Component Value Date/Time    Glucose (POC) 90 02/11/2020 06:19 AM    Glucose (POC) 121 (H) 02/10/2020 09:45 PM    Glucose (POC) 91 02/10/2020 04:41 PM    Glucose (POC) 135 (H) 02/10/2020 12:00 PM    Glucose (POC) 93 02/10/2020 06:31 AM     Lab Results   Component Value Date/Time    Color YELLOW/STRAW 11/26/2022 09:01 PM    Appearance CLEAR 11/26/2022 09:01 PM    Specific gravity 1.018 11/26/2022 09:01 PM    Specific gravity 1.015 09/24/2022 02:57 PM    pH (UA) 6.5 11/26/2022 09:01 PM    Protein 30 (A) 11/26/2022 09:01 PM    Glucose Negative 11/26/2022 09:01 PM    Ketone Negative 11/26/2022 09:01 PM    Bilirubin Negative 11/26/2022 09:01 PM    Urobilinogen 0.2 11/26/2022 09:01 PM    Nitrites Negative 11/26/2022 09:01 PM    Leukocyte Esterase Negative 11/26/2022 09:01 PM    Epithelial cells FEW 11/26/2022 09:01 PM    Bacteria Negative 11/26/2022 09:01 PM    WBC 0-4 11/26/2022 09:01 PM    RBC 0-5 11/26/2022 09:01 PM         Medications Reviewed:     Current Facility-Administered Medications   Medication Dose Route Frequency    sodium chloride (NS) flush 5-40 mL  5-40 mL IntraVENous Q8H    sodium chloride (NS) flush 5-40 mL  5-40 mL IntraVENous PRN    ceFAZolin (ANCEF) 1 g in sterile water (preservative free) 10 mL IV syringe  1 g IntraVENous Q12H    acetaminophen (TYLENOL) tablet 650 mg  650 mg Oral Q6H PRN    atorvastatin (LIPITOR) tablet 20 mg  20 mg Oral DAILY    furosemide (LASIX) tablet 20 mg  20 mg Oral DAILY    [START ON 11/28/2022] levothyroxine (SYNTHROID) tablet 75 mcg  75 mcg Oral 6am    lisinopriL (PRINIVIL, ZESTRIL) tablet 20 mg  20 mg Oral DAILY    magnesium oxide (MAG-OX) tablet 400 mg  400 mg Oral DAILY    metoprolol succinate (TOPROL-XL) XL tablet 25 mg  25 mg Oral QPM    ipratropium (ATROVENT) 21 mcg (0.03 %) nasal spray 2 Spray  2 Spray Both Nostrils BID PRN    pantoprazole (PROTONIX) tablet 20 mg  20 mg Oral DAILY    tamsulosin (FLOMAX) capsule 0.4 mg  0.4 mg Oral QHS    [START ON 11/29/2022] ascorbic acid (vitamin C) (VITAMIN C) tablet 500 mg  500 mg Oral Once per day on Tue Thu Sat    ondansetron (ZOFRAN ODT) tablet 4 mg  4 mg Oral Q8H PRN    albuterol-ipratropium (DUO-NEB) 2.5 MG-0.5 MG/3 ML  3 mL Nebulization Q4H PRN    miconazole (MICOTIN) 2 % powder   Topical BID     Current Outpatient Medications   Medication Sig    furosemide (LASIX) 20 mg tablet Take 20 mg by mouth daily. levothyroxine (SYNTHROID) 75 mcg tablet Take 75 mcg by mouth Daily (before breakfast).     therapeutic multivitamin SUNDANCE [facility]) tablet Take 1 Tablet by mouth daily. vit C/E/Zn/coppr/lutein/zeaxan (PRESERVISION AREDS-2 PO) Take 1 Capsule by mouth daily. triamcinolone (ARISTOCORT) 0.5 % topical cream Apply  to affected area two (2) times a day. use thin layer- apply to affected area for skin irritation    magnesium oxide (MAG-OX) 400 mg tablet Take 400 mg by mouth daily. nitroglycerin (NITROLINGUAL) 400 mcg/spray spray 1 Spray by SubLINGual route every five (5) minutes as needed for Chest Pain. ondansetron hcl (ZOFRAN) 4 mg tablet Take 4 mg by mouth every eight (8) hours as needed for Nausea or Vomiting. ascorbic acid, vitamin C, (VITAMIN C) 500 mg tablet Take 500 mg by mouth every Tuesday Thursday, Saturday. lisinopriL (PRINIVIL, ZESTRIL) 20 mg tablet Take 1 Tab by mouth daily. atorvastatin (LIPITOR) 20 mg tablet Take 1 Tab by mouth daily. metoprolol succinate (TOPROL-XL) 25 mg XL tablet Take 1 Tab by mouth every evening. ipratropium (ATROVENT) 0.03 % nasal spray 2 Sprays by Both Nostrils route two (2) times daily as needed for Rhinitis. tamsulosin (FLOMAX) 0.4 mg capsule Take 0.4 mg by mouth nightly. omeprazole (PRILOSEC) 20 mg capsule Take 20 mg by mouth daily.      ______________________________________________________________________  EXPECTED LENGTH OF STAY: - - -  ACTUAL LENGTH OF STAY:          1                 Oscar Scherer PA-C

## 2022-11-27 NOTE — PROGRESS NOTES
Came to see pt in ER, but could not as pt had BM and was in middle of care by nurses  Chart reviewed pt with SOB, AMS, fever, UTI   Not clear in chart if any CP so not sure why troponin checked, they are low high sensitivity troponins  Have left message with Dr Pascual Argueta and Walt if cards consult is needed, please let us know, team to see tomorrow if needed  Call me directly if any acute issues until then

## 2022-11-27 NOTE — ED NOTES
Bedside shift change report given to [facility] RN  (oncoming nurse) by Clary Yi RN  (offgoing nurse). Report included the following information SBAR, Kardex, and ED Summary.

## 2022-11-28 ENCOUNTER — DOCUMENTATION ONLY (OUTPATIENT)
Dept: CASE MANAGEMENT | Age: 87
End: 2022-11-28

## 2022-11-28 ENCOUNTER — APPOINTMENT (OUTPATIENT)
Dept: GENERAL RADIOLOGY | Age: 87
DRG: 177 | End: 2022-11-28
Attending: NURSE PRACTITIONER
Payer: MEDICARE

## 2022-11-28 ENCOUNTER — HOSPICE ADMISSION (OUTPATIENT)
Dept: HOSPICE | Facility: HOSPICE | Age: 87
End: 2022-11-28

## 2022-11-28 LAB
ACC. NO. FROM MICRO ORDER, ACCP: ABNORMAL
ACINETOBACTER CALCOACETICUS-BAUMANII COMPLEX, ACBCX: NOT DETECTED
ALBUMIN SERPL-MCNC: 2.7 G/DL (ref 3.5–5)
ALBUMIN/GLOB SERPL: 0.9 {RATIO} (ref 1.1–2.2)
ALP SERPL-CCNC: 92 U/L (ref 45–117)
ALT SERPL-CCNC: 19 U/L (ref 12–78)
ANION GAP SERPL CALC-SCNC: 8 MMOL/L (ref 5–15)
AST SERPL-CCNC: 37 U/L (ref 15–37)
BACTEROIDES FRAGILIS, BFRA: NOT DETECTED
BASOPHILS # BLD: 0 K/UL (ref 0–0.1)
BASOPHILS NFR BLD: 0 % (ref 0–1)
BILIRUB SERPL-MCNC: 0.7 MG/DL (ref 0.2–1)
BIOFIRE COMMENT, BCIDPF: ABNORMAL
BUN SERPL-MCNC: 26 MG/DL (ref 6–20)
BUN/CREAT SERPL: 18 (ref 12–20)
C GLABRATA DNA VAG QL NAA+PROBE: NOT DETECTED
CALCIUM SERPL-MCNC: 8.5 MG/DL (ref 8.5–10.1)
CANDIDA ALBICANS: NOT DETECTED
CANDIDA AURIS, CAAU: NOT DETECTED
CANDIDA KRUSEI, CKRP: NOT DETECTED
CANDIDA PARAPSILOSIS, CPAUP: NOT DETECTED
CANDIDA TROPICALIS, CTROP: NOT DETECTED
CHLORIDE SERPL-SCNC: 108 MMOL/L (ref 97–108)
CO2 SERPL-SCNC: 25 MMOL/L (ref 21–32)
CREAT SERPL-MCNC: 1.43 MG/DL (ref 0.7–1.3)
CRYPTO NEOFORMANS/GATTII, CRYNEG: NOT DETECTED
DIFFERENTIAL METHOD BLD: ABNORMAL
ENTEROBACTER CLOACAE COMPLEX, ECCP: NOT DETECTED
ENTEROBACTERALES SP. , ENBLS: NOT DETECTED
ENTEROCOCCUS FAECALIS, ENFA: NOT DETECTED
ENTEROCOCCUS FAECIUM, ENFAM: NOT DETECTED
EOSINOPHIL # BLD: 0.1 K/UL (ref 0–0.4)
EOSINOPHIL NFR BLD: 2 % (ref 0–7)
ERYTHROCYTE [DISTWIDTH] IN BLOOD BY AUTOMATED COUNT: 14.9 % (ref 11.5–14.5)
ESCHERICHIA COLI: NOT DETECTED
GLOBULIN SER CALC-MCNC: 3.1 G/DL (ref 2–4)
GLUCOSE SERPL-MCNC: 101 MG/DL (ref 65–100)
HAEMOPHILUS INFLUENZAE, HMI: NOT DETECTED
HCT VFR BLD AUTO: 35.2 % (ref 36.6–50.3)
HGB BLD-MCNC: 11.3 G/DL (ref 12.1–17)
IMM GRANULOCYTES # BLD AUTO: 0 K/UL (ref 0–0.04)
IMM GRANULOCYTES NFR BLD AUTO: 0 % (ref 0–0.5)
KLEBSIELLA AEROGENES, KLAE: NOT DETECTED
KLEBSIELLA OXYTOCA: NOT DETECTED
KLEBSIELLA PNEUMONIAE GROUP, KPPG: NOT DETECTED
LISTERIA MONOCYTOGENES, LMONP: NOT DETECTED
LYMPHOCYTES # BLD: 0.7 K/UL (ref 0.8–3.5)
LYMPHOCYTES NFR BLD: 10 % (ref 12–49)
MAGNESIUM SERPL-MCNC: 1.9 MG/DL (ref 1.6–2.4)
MCH RBC QN AUTO: 30 PG (ref 26–34)
MCHC RBC AUTO-ENTMCNC: 32.1 G/DL (ref 30–36.5)
MCV RBC AUTO: 93.4 FL (ref 80–99)
MONOCYTES # BLD: 0.9 K/UL (ref 0–1)
MONOCYTES NFR BLD: 12 % (ref 5–13)
NEISSERIA MENINGITIDIS, NMNI: NOT DETECTED
NEUTS SEG # BLD: 5.7 K/UL (ref 1.8–8)
NEUTS SEG NFR BLD: 76 % (ref 32–75)
NRBC # BLD: 0 K/UL (ref 0–0.01)
NRBC BLD-RTO: 0 PER 100 WBC
PHOSPHATE SERPL-MCNC: 3.8 MG/DL (ref 2.6–4.7)
PLATELET # BLD AUTO: 104 K/UL (ref 150–400)
PMV BLD AUTO: 10.6 FL (ref 8.9–12.9)
POTASSIUM SERPL-SCNC: 3.8 MMOL/L (ref 3.5–5.1)
PROT SERPL-MCNC: 5.8 G/DL (ref 6.4–8.2)
PROTEUS, PRP: NOT DETECTED
PSEUDOMONAS AERUGINOSA: NOT DETECTED
RBC # BLD AUTO: 3.77 M/UL (ref 4.1–5.7)
RBC MORPH BLD: ABNORMAL
RESISTANT GENE SPACE, REGENE: ABNORMAL
SALMONELLA, SALMO: NOT DETECTED
SERRATIA MARCESCENS: NOT DETECTED
SODIUM SERPL-SCNC: 141 MMOL/L (ref 136–145)
STAPH EPIDERMIDIS, STEP: NOT DETECTED
STAPH LUGDUNENSIS, STALUG: NOT DETECTED
STAPHYLOCOCCUS AUREUS: NOT DETECTED
STAPHYLOCOCCUS, STAPP: DETECTED
STENO MALTOPHILIA, STMA: NOT DETECTED
STREPTOCOCCUS , STPSP: NOT DETECTED
STREPTOCOCCUS AGALACTIAE (GROUP B): NOT DETECTED
STREPTOCOCCUS PNEUMONIAE , SPNP: NOT DETECTED
STREPTOCOCCUS PYOGENES (GROUP A), SPYOP: NOT DETECTED
WBC # BLD AUTO: 7.4 K/UL (ref 4.1–11.1)

## 2022-11-28 PROCEDURE — 65270000046 HC RM TELEMETRY

## 2022-11-28 PROCEDURE — 87040 BLOOD CULTURE FOR BACTERIA: CPT

## 2022-11-28 PROCEDURE — 84100 ASSAY OF PHOSPHORUS: CPT

## 2022-11-28 PROCEDURE — 74011250636 HC RX REV CODE- 250/636: Performed by: NURSE PRACTITIONER

## 2022-11-28 PROCEDURE — 74011000250 HC RX REV CODE- 250: Performed by: FAMILY MEDICINE

## 2022-11-28 PROCEDURE — 85025 COMPLETE CBC W/AUTO DIFF WBC: CPT

## 2022-11-28 PROCEDURE — 74011250637 HC RX REV CODE- 250/637: Performed by: NURSE PRACTITIONER

## 2022-11-28 PROCEDURE — 74011250637 HC RX REV CODE- 250/637: Performed by: PHYSICIAN ASSISTANT

## 2022-11-28 PROCEDURE — 74011000250 HC RX REV CODE- 250: Performed by: NURSE PRACTITIONER

## 2022-11-28 PROCEDURE — 36415 COLL VENOUS BLD VENIPUNCTURE: CPT

## 2022-11-28 PROCEDURE — 74011250636 HC RX REV CODE- 250/636: Performed by: FAMILY MEDICINE

## 2022-11-28 PROCEDURE — 74011250637 HC RX REV CODE- 250/637: Performed by: HOSPITALIST

## 2022-11-28 PROCEDURE — 71045 X-RAY EXAM CHEST 1 VIEW: CPT

## 2022-11-28 PROCEDURE — 74011000258 HC RX REV CODE- 258: Performed by: NURSE PRACTITIONER

## 2022-11-28 PROCEDURE — 80053 COMPREHEN METABOLIC PANEL: CPT

## 2022-11-28 PROCEDURE — 74011250636 HC RX REV CODE- 250/636: Performed by: PHYSICIAN ASSISTANT

## 2022-11-28 PROCEDURE — 83735 ASSAY OF MAGNESIUM: CPT

## 2022-11-28 PROCEDURE — 74011250636 HC RX REV CODE- 250/636: Performed by: HOSPITALIST

## 2022-11-28 RX ORDER — KETOTIFEN FUMARATE 0.35 MG/ML
1 SOLUTION/ DROPS OPHTHALMIC EVERY 12 HOURS
Status: COMPLETED | OUTPATIENT
Start: 2022-11-28 | End: 2022-12-02

## 2022-11-28 RX ORDER — FUROSEMIDE 10 MG/ML
20 INJECTION INTRAMUSCULAR; INTRAVENOUS ONCE
Status: COMPLETED | OUTPATIENT
Start: 2022-11-28 | End: 2022-11-28

## 2022-11-28 RX ORDER — BALSAM PERU/CASTOR OIL
OINTMENT (GRAM) TOPICAL EVERY 12 HOURS
Status: DISCONTINUED | OUTPATIENT
Start: 2022-11-28 | End: 2022-12-05 | Stop reason: HOSPADM

## 2022-11-28 RX ORDER — CLINDAMYCIN PHOSPHATE 600 MG/50ML
600 INJECTION, SOLUTION INTRAVENOUS EVERY 8 HOURS
Status: DISCONTINUED | OUTPATIENT
Start: 2022-11-28 | End: 2022-12-02

## 2022-11-28 RX ORDER — NYSTATIN 100000 [USP'U]/ML
500000 SUSPENSION ORAL 4 TIMES DAILY
Status: DISCONTINUED | OUTPATIENT
Start: 2022-11-28 | End: 2022-12-05 | Stop reason: HOSPADM

## 2022-11-28 RX ORDER — VANCOMYCIN/0.9 % SOD CHLORIDE 1.5G/250ML
1500 PLASTIC BAG, INJECTION (ML) INTRAVENOUS ONCE
Status: COMPLETED | OUTPATIENT
Start: 2022-11-28 | End: 2022-11-28

## 2022-11-28 RX ORDER — ACETAMINOPHEN 650 MG/1
650 SUPPOSITORY RECTAL
Status: DISCONTINUED | OUTPATIENT
Start: 2022-11-28 | End: 2022-12-05 | Stop reason: HOSPADM

## 2022-11-28 RX ORDER — ONDANSETRON 2 MG/ML
4 INJECTION INTRAMUSCULAR; INTRAVENOUS
Status: DISCONTINUED | OUTPATIENT
Start: 2022-11-28 | End: 2022-12-05 | Stop reason: HOSPADM

## 2022-11-28 RX ADMIN — CASTOR OIL AND BALSAM, PERU: 788; 87 OINTMENT TOPICAL at 21:28

## 2022-11-28 RX ADMIN — Medication 10 ML: at 21:34

## 2022-11-28 RX ADMIN — VANCOMYCIN HYDROCHLORIDE 1500 MG: 10 INJECTION, POWDER, LYOPHILIZED, FOR SOLUTION INTRAVENOUS at 10:58

## 2022-11-28 RX ADMIN — FUROSEMIDE 20 MG: 10 INJECTION, SOLUTION INTRAMUSCULAR; INTRAVENOUS at 22:14

## 2022-11-28 RX ADMIN — IPRATROPIUM BROMIDE AND ALBUTEROL SULFATE 3 ML: .5; 3 SOLUTION RESPIRATORY (INHALATION) at 21:01

## 2022-11-28 RX ADMIN — MICONAZOLE NITRATE 2 % TOPICAL POWDER: at 09:49

## 2022-11-28 RX ADMIN — CEFEPIME 2 G: 2 INJECTION, POWDER, FOR SOLUTION INTRAVENOUS at 10:59

## 2022-11-28 RX ADMIN — IPRATROPIUM BROMIDE AND ALBUTEROL SULFATE 3 ML: .5; 3 SOLUTION RESPIRATORY (INHALATION) at 03:22

## 2022-11-28 RX ADMIN — KETOTIFEN FUMARATE 1 DROP: 0.35 SOLUTION/ DROPS OPHTHALMIC at 21:33

## 2022-11-28 RX ADMIN — NYSTATIN 500000 UNITS: 100000 SUSPENSION ORAL at 13:18

## 2022-11-28 RX ADMIN — ACETAMINOPHEN 650 MG: 650 SUPPOSITORY RECTAL at 21:33

## 2022-11-28 RX ADMIN — ACETAMINOPHEN 650 MG: 325 TABLET ORAL at 14:54

## 2022-11-28 RX ADMIN — MICONAZOLE NITRATE 2 % TOPICAL POWDER: at 18:00

## 2022-11-28 RX ADMIN — NYSTATIN 500000 UNITS: 100000 SUSPENSION ORAL at 18:17

## 2022-11-28 RX ADMIN — ONDANSETRON 4 MG: 2 INJECTION INTRAMUSCULAR; INTRAVENOUS at 21:16

## 2022-11-28 RX ADMIN — Medication 400 MG: at 09:49

## 2022-11-28 RX ADMIN — PANTOPRAZOLE SODIUM 20 MG: 20 TABLET, DELAYED RELEASE ORAL at 09:49

## 2022-11-28 RX ADMIN — LISINOPRIL 20 MG: 20 TABLET ORAL at 09:49

## 2022-11-28 RX ADMIN — KETOTIFEN FUMARATE 1 DROP: 0.35 SOLUTION/ DROPS OPHTHALMIC at 11:47

## 2022-11-28 RX ADMIN — CASTOR OIL AND BALSAM, PERU: 788; 87 OINTMENT TOPICAL at 10:58

## 2022-11-28 RX ADMIN — ATORVASTATIN CALCIUM 20 MG: 20 TABLET, FILM COATED ORAL at 09:49

## 2022-11-28 RX ADMIN — METOPROLOL SUCCINATE 25 MG: 25 TABLET, EXTENDED RELEASE ORAL at 18:17

## 2022-11-28 RX ADMIN — CEFEPIME 1 G: 1 INJECTION, POWDER, FOR SOLUTION INTRAMUSCULAR; INTRAVENOUS at 22:15

## 2022-11-28 RX ADMIN — WATER 1 G: 1 INJECTION INTRAMUSCULAR; INTRAVENOUS; SUBCUTANEOUS at 03:21

## 2022-11-28 RX ADMIN — CLINDAMYCIN IN 5 PERCENT DEXTROSE 600 MG: 12 INJECTION, SOLUTION INTRAVENOUS at 18:17

## 2022-11-28 RX ADMIN — Medication 10 ML: at 13:18

## 2022-11-28 RX ADMIN — CLINDAMYCIN IN 5 PERCENT DEXTROSE 600 MG: 12 INJECTION, SOLUTION INTRAVENOUS at 11:47

## 2022-11-28 NOTE — CONSULTS
Infectious Disease Consult    Today's Date: 11/28/2022   Admit Date: 11/26/2022    Impression:   Bacteremia  Acute respiratory failure secondary to aspiration PNA  - blood cx (11/26) 1/4 CoNS, (11/28) pending    CXR (11/28)  basilar airspace disease. CXR (11/26) The lungs show no acute finding or change. Heart is normal in size. There is no  pulmonary edema. There is no evident pneumothorax or pleural effusion. Pacemaker  is present. Allergic conjunctivitis  - zaditor 1 drop twice a day for 5 days    Oral candidasis  - nystatin swish and spit four times a day for 10 days  Plan:   Start on IV cefepime + clindamycin to treat asp PNA, recommend to complete total 7 days         Continue with IV vancomycin; monitor renal function closely         Recommend TTE         1/4 CoNS bacteremia - requested the organism to be identified; if staph lugdunensis then we recommend to complete total 2 weeks of  IV abx therapy. Otherwise, we will consider as contaminant sample. We will provide final abx recommendation after reviewed the final cx result         Check MRSA nare screen   Adjust abx prn   Ketotifen 1 drop to right eye twice a day for 5 days   Fever work up if temp >= 100.4   Hospice meeting tomorrow    Above plan of care discussed and agreed with Dr. Guru Salgado d/w pt's granddaughter who was at bedside    Anti-infectives:   IV vancomycin 11/28-    Subjective:   Date of Consultation:  November 28, 2022  Referring Physician: Miesha Layne    Patient is a 80 y.o. male with medical hx of paroxysmal atrial fibrillation, sick sinus syndrome, s/p cardiac pacemaker mutation, NSTEMI, CAD, cardiomyopathy unspecified, BPH, COPD, prior COVID-19 virus infection, CKD, hypertension, GERD, hyperlipidemia, hypothyroidism was admitted to the hospital on 11/26 from senior [facility] facility with  fever, cough, and confusion. CXR on admission revealed  The lungs show no acute finding or change. Heart is normal in size. There is no pulmonary edema. There is no evident pneumothorax or pleural effusion. Pacemaker is present. CXR was repeated during consultation visit which reveealed bibasilar ASDZ. Blood cx on admission grew staphlycoccus species. Pt was started on IV clindamycin, cefepime and while continuing vancomycin. Pt was recently treated with 2 weeks of cipro for UTI POA. U/A was negative on admission. No fever, nausea, or vomiting. Profound coughing shortly after po intake. Pt is very [place], poor historian. Pertinent information obtained from pt's granddaughter. No known antibiotic allergies    ID team was consulted for bacteremia evaluation and its treatment.      Patient Active Problem List   Diagnosis Code    Encounter to establish care Z76.89    Atrial fibrillation ([facility] I48.91    Sepsis ([facility] A41.9    Chronic obstructive pulmonary disease ([facility] J44.9    Hyperlipidemia E78.5    Essential hypertension I10    Pacemaker Z95.0    Fever R50.9    Leukocytosis D72.829    CKD (chronic kidney disease) stage 3, GFR 30-59 ml/min ([facility]) N18.30    Cough R05.9    Dyspnea R06.00    Chest pain R07.9    NSTEMI (non-ST elevated myocardial infarction) ([facility] I21.4    Acute pancreatitis K85.90    Elevated lipase R74.8    Stress-induced cardiomyopathy I51.81    Coronary artery disease involving native coronary artery of native heart with angina pectoris (HCC) I25.119    Lactic acidosis E87.20    Elevated troponin R77.8    AMS (altered mental status) R41.82     Past Medical History:   Diagnosis Date    Angina pectoris (HCC)     Atrial fibrillation (HCC)     BPH (benign prostatic hyperplasia)     Chronic obstructive pulmonary disease (HCC)     COVID-19     Elevated PSA     Essential hypertension     GERD (gastroesophageal reflux disease)     GERD without esophagitis     Hyperlipidemia     Hypothyroidism     Long term (current) use of anticoagulants     Pacemaker     Rosacea 2016    Rosacea     SSS (sick sinus syndrome) ([facility]       Family History   Problem Relation Age of Onset    Stroke Mother     Hypertension Mother     Kidney Disease Father       Social History     Tobacco Use    Smoking status: Never    Smokeless tobacco: Never   Substance Use Topics    Alcohol use: Never     Alcohol/week: 0.0 standard drinks     Past Surgical History:   Procedure Laterality Date    HX CORONARY STENT PLACEMENT      HX HEART CATHETERIZATION      HX PACEMAKER      FL REMVL PERM PM PLS GEN W/REPL PLSE GEN 2 LEAD SYS N/A 2/6/2020    REMOVE & REPLACE PPM GEN DUAL LEAD [facility] by [facility] Nu[facility] CATH LAB      Prior to Admission medications    Medication Sig Start Date End Date Taking? Authorizing Provider   furosemide (LASIX) 20 mg tablet Take 20 mg by mouth daily. Yes Provider, Historical   levothyroxine (SYNTHROID) 75 mcg tablet Take 75 mcg by mouth Daily (before breakfast). Yes Provider, Historical   therapeutic multivitamin (THERAGRAN) tablet Take 1 Tablet by mouth daily. Yes Provider, Historical   vit C/E/Zn/coppr/lutein/zeaxan (PRESERVISION AREDS-2 PO) Take 1 Capsule by mouth daily. Yes Provider, Historical   triamcinolone (ARISTOCORT) 0.5 % topical cream Apply  to affected area two (2) times a day. use thin layer- apply to affected area for skin irritation   Yes Provider, Historical   magnesium oxide (MAG-OX) 400 mg tablet Take 400 mg by mouth daily. Yes Other, MD Di   nitroglycerin (NITROLINGUAL) 400 mcg/spray spray 1 Spray by SubLINGual route every five (5) minutes as needed for Chest Pain. Yes Other, MD Di   ondansetron hcl (ZOFRAN) 4 mg tablet Take 4 mg by mouth every eight (8) hours as needed for Nausea or Vomiting. Yes Other, MD Di   ascorbic acid, vitamin C, (VITAMIN C) 500 mg tablet Take 500 mg by mouth every Tuesday Thursday, Saturday. Yes Other, MD Di   lisinopriL (PRINIVIL, ZESTRIL) 20 mg tablet Take 1 Tab by mouth daily.  3/23/21  Yes Dayron Leary MD   atorvastatin (LIPITOR) 20 mg tablet Take 1 Tab by mouth daily. 3/23/21  Yes Karen Mejia MD   metoprolol succinate (TOPROL-XL) 25 mg XL tablet Take 1 Tab by mouth every evening. 1/8/21  Yes Jose Manuel Chacon MD   ipratropium (ATROVENT) 0.03 % nasal spray 2 Sprays by Both Nostrils route two (2) times daily as needed for Rhinitis. Yes Provider, Historical   tamsulosin (FLOMAX) 0.4 mg capsule Take 0.4 mg by mouth nightly. Yes Provider, Historical   omeprazole (PRILOSEC) 20 mg capsule Take 20 mg by mouth daily. Yes Provider, Historical     a  No Known Allergies     REVIEW OF SYSTEMS:     Total of 12 systems reviewed as follows:   I am not able to complete the review of systems because:    The patient is intubated and sedated    The patient has altered mental status due to his acute medical problems    The patient has baseline aphasia from prior stroke(s)    The patient has baseline dementia and is not reliable historian                 POSITIVE= underlined text  Negative = text not underlined  General:  fever, chills, sweats, generalized weakness, weight loss/gain,      loss of appetite   Eyes:    blurred vision, eye pain, loss of vision, double vision  ENT:    rhinorrhea, pharyngitis Ione  Respiratory:   cough, sputum production, SOB, wheezing, GARCIA, pleuritic pain   Cardiology:   chest pain, palpitations, orthopnea, PND, edema, syncope   Gastrointestinal:  abdominal pain , N/V, dysphagia, diarrhea, constipation, bleeding   Genitourinary:  frequency, urgency, dysuria, hematuria, incontinence   Muskuloskeletal :  arthralgia, myalgia   Hematology:  easy bruising, nose or gum bleeding, lymphadenopathy   Dermatological: rash, ulceration, pruritis   Endocrine:   hot flashes or polydipsia   Neurological:  headache, dizziness, confusion, focal weakness, paresthesia,     Speech difficulties, memory loss, gait disturbance  Psychological: Feelings of anxiety, depression, agitation    Objective:     Visit Vitals  BP (!) 161/92   Pulse 85   Temp 99.2 °F (37.3 °C)   Resp 27 Ht 5' 8\" (1.727 m)   Wt 77.7 kg (171 lb 3.2 oz)   SpO2 94%   BMI 26.03 kg/m²     Temp (24hrs), Av.5 °F (36.9 °C), Min:97.9 °F (36.6 °C), Max:99.2 °F (37.3 °C)       Lines:  peripheral line    PHYSICAL EXAM:   General:    Chronically ill appearing. Alert, cooperative, no distress, appears stated age. HEENT: Atraumatic, anicteric sclerae, pink conjunctivae, diffuse erythema both upper and lower left eye lid with clear drainage     No oral ulcers, mucosa dry, throat clear, tongue coated it  Neck:  Supple  Lungs:   Clear in apex with decreased breath sounds at bases and Wheezing. Chest wall:  No tenderness  No Accessory muscle use. Pacemaker site; healed, no erythema  Heart:   Regular  rhythm,  No  murmur   No edema  Abdomen:   Soft, non-tender. Not distended. Bowel sounds normal  Extremities: No cyanosis. No clubbing  Skin:     Not pale. Not Jaundiced  No rashes   Psych:  Poor insight. Neurologic:  No facial asymmetry. Speech difficult to understand.  Alert and oriented X self and able to recognize granddaughter, follow commends at times       Data Review:     CBC:  Recent Labs     22   WBC 7.4 7.6 9.2   GRANS 76* 77* 84*   MONOS 12 13 8   EOS 2 1 1   ANEU 5.7 5.8 7.8   ABL 0.7* 0.7* 0.6*   HGB 11.3* 11.4* 12.5   HCT 35.2* 36.0* 39.5   * 115* 142*       BMP:  Recent Labs     220 22   CREA 1.43* 1.69* 1.67*   BUN 26* 28* 28*    141 139   K 3.8 4.3 4.6    106 106   CO2 25 29 28   AGAP 8 6 5   * 126* 123*       LFTS:  Recent Labs     220 22   TBILI 0.7 0.9 1.2*   ALT 19 18 21   AP 92 101 110   TP 5.8* 6.0* 6.6   ALB 2.7* 3.0* 3.2*       Microbiology:     All Micro Results       Procedure Component Value Units Date/Time    CULTURE, BLOOD, PAIRED [[id number]]     Order Status: Sent Specimen: Blood     BLOOD CULTURE ID PANEL [[id number]]  (Abnormal) Collected: 22 1849    Order Status: Completed Specimen: Blood Updated: 11/28/22 0625     Acc. no. from Micro Order [id number]     Enterococcus faecalis Not detected        Enterococcus faecium Not detected        Listeria monocytogenes Not detected        Staphylococcus Detected        Staphylococcus aureus Not detected        Staph epidermidis Not detected        Staph lugdunensis Not detected        Streptococcus Not detected        Streptococcus agalactiae (Group B) Not detected        Streptococcus pneumoniae Not detected        Streptococcus pyogenes (Group A) Not detected        Acinetobacter calcoaceticus-baumanii complex Not detected        Bacteroides fragilis Not detected        Enterobacterales species Not detected        Enterobacter cloacae complex Not detected        Escherichia coli Not detected        Klebsiella aerogenes Not detected        Klebsiella oxytoca Not detected        Klebsiella pneumoniae group Not detected        Proteus Not detected        Salmonella Not detected        Serratia marcescens Not detected        Haemophilus influenzae Not detected        Neisseria meningitidis Not detected        Pseudomonas aeruginosa Not detected        Steno maltophilia Not detected        Candida albicans Not detected        Candida auris Not detected        Candida glabrata Not detected        Candida krusei Not detected        Candida parapsilosis Not detected        Candida tropicalis Not detected        Crypto neoformans/gattii Not detected        RESISTANT GENES:            Comment       False positive results may rarely occur.  Correlate with clinical,epidemiologic, and other laboratory findings           Comment: Please see BCID Interpretation Guide in EPIC Links       CULTURE, BLOOD, PAIRED [[id number]]  (Abnormal) Collected: 11/26/22 1839    Order Status: Completed Specimen: Blood Updated: 11/27/22 2241     Special Requests: NO SPECIAL REQUESTS        Culture result:       PROBABLE STAPHYLOCOCCUS SPECIES GROWING IN 1 OF 4 BOTTLES DRAWN SITE=( R AC)                  REMAINING BOTTLE(S) HAS/HAVE NO GROWTH SO FAR            (NOTE) GRAM POSITIVE COCCI IN CLUSTERS GROWING IN 1 OF 4 BOTTLES CALLED TO READ BACK BY Davis Ramírez RN [facility] AT 2017 ON 11/27/22. Man[place] 1850    CULTURE, URINE [[id number]] Collected: 11/26/22 2101    Order Status: Completed Specimen: Cath Urine Updated: 11/27/22 1802     Special Requests: NO SPECIAL REQUESTS        Culture result: No growth (<1,000 CFU/ML)       URINE CULTURE HOLD SAMPLE [[id number]] Collected: 11/26/22 2101    Order Status: Completed Specimen: Urine from Serum Updated: 11/26/22 2111     Urine culture hold       Urine on hold in Microbiology dept for 2 days. If unpreserved urine is submitted, it cannot be used for addtional testing after 24 hours, recollection will be required. COVID-19 RAPID TEST [[id number]] Collected: 11/26/22 1845    Order Status: Completed Specimen: Nasopharyngeal Updated: 11/26/22 1951     Specimen source Nasopharyngeal        COVID-19 rapid test Not detected        Comment: Rapid Abbott ID Now       Rapid NAAT:  The specimen is NEGATIVE for SARS-CoV-2, the novel coronavirus associated with COVID-19. Negative results should be treated as presumptive and, if inconsistent with clinical signs and symptoms or necessary for patient management, should be tested with an alternative molecular assay. Negative results do not preclude SARS-CoV-2 infection and should not be used as the sole basis for patient management decisions. This test has been authorized by the FDA under an Emergency Use Authorization (EUA) for use by authorized laboratories.    Fact sheet for Healthcare Providers:  http://www.nadeem-kenyatta.biz/  Fact sheet for Patients: http://www.nadeem-you.biz/       Methodology: Isothermal Nucleic Acid Amplification                 Signed By: Christy Sacks, NP     November 28, 2022

## 2022-11-28 NOTE — HOSPICE
[facility]  [phone]    Patient Name: Mohini Ferrara  YOB: 1922  Age: Barry Meka [age] y.o. [address] [facility] Note:  Hospice consult noted. Chart reviewed. Plan of care discussed with patients nurse & care manager. This LCSW, oDni Clark MSW in to meet with pt, who is resting and has AMS, his daughter Erin Tobias ( MPOA), and granddaughter Rosalio Carrera, who is an NP. Discussed Hospice philosophy, general plan of care, levels of care, services and on call procedures. Pt has been living at [address] [place] [address] for the past 8 years. Family reports they believe pt needs an increased level of care. CM has sent referrals to several SNF's. LCSW completed DDNR, is on pts hard chart awaiting MD signature. LCSW made copy of AMD and placed on hard chart for scanning. LCSW updated CM re our meeting. Hospcie team will continue to follow as plan for disposition is being developed. Family information packet provided & reviewed. Thank you for the opportunity to be of service to Mr. Laura Bergeron and his family.      Izzy Perez [facility], [address] [place] [address] [place]  [phone]

## 2022-11-28 NOTE — PROGRESS NOTES
Problem: Pressure Injury - Risk of  Goal: *Prevention of pressure injury  Description: Document Thanh Scale and appropriate interventions in the flowsheet.   Outcome: Progressing Towards Goal  Note: Pressure Injury Interventions:  Sensory Interventions: Avoid rigorous massage over bony prominences, Float heels, Keep linens dry and wrinkle-free, Monitor skin under medical devices    Moisture Interventions: Assess need for specialty bed, Check for incontinence Q2 hours and as needed, Limit adult briefs    Activity Interventions: Increase time out of bed, Pressure redistribution bed/mattress(bed type), PT/OT evaluation    Mobility Interventions: Float heels, HOB 30 degrees or less, Pressure redistribution bed/mattress (bed type), PT/OT evaluation    Nutrition Interventions: Document food/fluid/supplement intake, Discuss nutritional consult with provider    Friction and Shear Interventions: Foam dressings/transparent film/skin sealants, HOB 30 degrees or less, Lift sheet, Lift team/patient mobility team, Minimize layers                Problem: Patient Education: Go to Patient Education Activity  Goal: Patient/Family Education  Outcome: Progressing Towards Goal     Problem: Aspiration - Risk of  Goal: *Absence of aspiration  Outcome: Progressing Towards Goal

## 2022-11-28 NOTE — HOSPICE
[address] [facility] Those in Need  [phone]    Patient Name: Allison Andrade  YOB: 1922  Age: Barry  [age] [facility] [facility]  H[facility] consult noted. Chart reviewed. Hospice team will be meeting with pt/family between 2 and 2:30 today. Thank you for the opportunity to be of service to Mr. Sarkis Diaz and his family.       Izzy Perez [facility], [address] [facility]  [phone]

## 2022-11-28 NOTE — WOUND CARE
Wound Care Note:     New consult placed by nurse request for pressure wound and scrotal blisters present on admission. Spoke with Elvie, pressure injury is on sacrum per report. Chart shows:  Admitted for fever, lactic acidosis, elevated troponin and AMS  Past Medical History:   Diagnosis Date    Angina pectoris (HCC)     Atrial fibrillation (HCC)     BPH (benign prostatic hyperplasia)     Chronic obstructive pulmonary disease ([facility])     COVID-19     Elevated PSA     Essential hypertension     GERD (gastroesophageal reflux disease)     GERD without esophagitis     Hyperlipidemia     Hypothyroidism     Long term (current) use of anticoagulants     Pacemaker     Rosacea 2016    Rosacea     SSS (sick sinus syndrome) ([facility])      WBC = 7.4 on 11/28/22  Admitted from [address]:   Patient is very groggy today, wakes up but goes back to sleep, incontinent with moderate assistance needed in repositioning. Bed: Cedar  Diet: Adult diet regular; mildly thick nectar  Patient reports no pain. 1. POA sacrum and bilateral heels with blanchable erythema, all areas blanched today. Venelex ointment to be ordered. 2.  POA scrotum with approximately 8 raised white bumps, appears to be sebaceous cysts, no drainage, scrotum is very red, do not think redness and cysts are related. Z guard paste applied. 3.  POA left heel with crusted area approximately 0.8 cm x 0.8 cm, some peeling noted, no open area, no drainage, klaudia-wound with blanchable erythema. 4.  POA right ear abrasion measures 0.5 cm x 2 cm x 0.1 cm, wound bed is pink, blanches, small sero/sang drainage after cleaning, wound edges are open, klaudia-wound intact. Spoke with Oscar, NP, wound care orders obtained. Patient repositioned on left side. Heels offloaded on pillows.      Recommendations:    Sacrum and bilateral heels- Every 12 hours liberally apply Venelex ointment (BPCO). Scrotum- Every 12 hours and as needed apply Z guard paste. Right ear- Every other day cleanse with normal saline, wipe wound bed clean and dry, apply Xeroform gauze that has been folded in half and cover. Skin Care & Pressure Prevention:  Minimize layers of linen/pads under patient to optimize support surface. Turn/reposition approximately every 2 hours and offload heels.   Manage incontinence / promote continence   Nourishing Skin Cream to dry skin, minimize use of briefs when able    Discussed above plan with patient & Megan Murray RN    Transition of Care: Plan to follow as needed while admitted to hospital.    Redgie Fraction" PARDEEP LandN, RN, [facility]  Certified Wound and Ostomy Nurse  office [phone]  Best way to contact me is through [address] [place] [address]

## 2022-11-28 NOTE — PROGRESS NOTES
LAITH- Pending referrals to [facility]    CM met with pt's daughter and grand-dtr again. The met with Hospice SW and are now thinking that they would like the pt to go to a SNF paying privately with hospice services. Per their request, CM called [facility] of [address] to inform them of the new plan. [facility] would be off the table, as they only accept skilled pt's from the community. Dtr and grand-dtr aware. Should  either of the above facilities not be able to accommodate this pt, they will consider him going back to [facility] with private duty help and hospice.  Shiloh Mahajan

## 2022-11-28 NOTE — HOSPICE
[address] [facility] Help to Those in Need  [phone]     Patient Name: Ellie Lau  YOB: 1922  Age: Barry Meka [age] y.o. [address] [facility] RN Note:  Hospice consult received, reviewing chart. Will follow up with Unit Nurse and Care Manager to discuss plan of care, patient status and discharge disposition within the next few hours. Plan to review with Hospice Medical Director for appropriate Hospice diagnosis and plan of care prior to making contact with patient and her family. Thank you for the opportunity to be of service to this patient.      Sara Mason RN, [place] [address] Nurse Liaison  [phone] Mobile  [phone] Office  Available on Perfect Serve

## 2022-11-28 NOTE — PROGRESS NOTES
Pharmacy Note     Cefepime 2 gm IV Q12H ordered for treatment of asipration pneumonia. Per [facility] Policy, Cefepime will be changed to 2 gm Iv load, followed by 1 gm IV Q12H. Estimated Creatinine Clearance: Estimated Creatinine Clearance: 26.6 mL/min (A) (based on SCr of 1.43 mg/dL (H)). BMI:  Body mass index is 26.03 kg/m². Rationale for Adjustment:  [facility] B-Lactam extended infusion policy    Pharmacy will continue to monitor and adjust dose as necessary. Please call with any questions.     Thank you,  Juan Luis Vincent, PHARMD

## 2022-11-28 NOTE — PROGRESS NOTES
LAITH-Pending referrals to [facility] and [facility]    Reason for Admission:   Altered mental status                    RUR Score:    16%              PCP: First and Last name:   Darylene Riddle, MD     Name of Practice:    Are you a current patient: Yes/No:    Approximate date of last visit:    Can you participate in a virtual visit if needed:     Do you (patient/family) have any concerns for transition/discharge? No              Plan for utilizing home health:  TBD     Current Advanced Directive/Advance Care Plan:  DNR      Healthcare Decision Maker:   Click here to complete [address] including selection of the Healthcare Decision Maker Relationship (ie \"Primary\")              Transition of Care Plan:     CM met with pt's grand-daughter, Marcus Cadet, and daughter, GRANT--[phone] (via speaker phone),  to introduce them to the role of CM and transition of care. This pt lives at the [facility] [place] in assisted living. Per the grand- daughter, he is receiving the highest level of care that is offered by the facility. The family is trying to make decisions about his care going forward. This pt has a Palliative care consult as well as a hospice consult. CM informed the daughter and grand dtr of this. They are considering SNF placement for  him, as it would be a more appropriate level of care for him than [facility]. MICHAEL informed them that he has [facility] Medicare and would need an auth, so he will need to work with PT and OT in order to obtain [place]. They would like to pursue this. Also, they understand that pt would remain private pay at the SNF after he has used his skilled days. SNF facilities discussed and choice offered. They would like referrals to be sent to [address] [place] [address], [facility] and [facility]. MICHAEL sent referrals to all of these facilities. MICHAEL also sent a perfectserve message to the attending asking for PT and OT orders.     [facility] Family is aware. . Will follow. Duane Paci, BSW,EARL    Care Management Interventions  PCP Verified by CM:  Yes  Palliative Care Criteria Met (RRAT>21 & CHF Dx)?: No  Transition of Care Consult (CM Consult): Discharge Planning  MyChart Signup: No  Discharge Durable Medical Equipment: No  Physical Therapy Consult: No  Occupational Therapy Consult: No  Speech Therapy Consult: No  Support Systems: Child(nunu)  Confirm Follow Up Transport: Family  The Plan for Transition of Care is Related to the Following Treatment Goals : safe d/c  The Patient and/or Patient Representative was Provided with a Choice of Provider and Agrees with the Discharge Plan?: Yes  Freedom of Choice List was Provided with Basic Dialogue that Supports the Patient's Individualized Plan of Care/Goals, Treatment Preferences and Shares the Quality Data Associated with the Providers?: Yes   Resource Information Provided?: No  Discharge Location  Patient Expects to be Discharged to[de-identified] Home

## 2022-11-28 NOTE — PROGRESS NOTES
Pharmacist Note - Vancomycin Dosing    Consult provided for this Barry Ackerman [age] y.o. male for indication of GPC in blood. Antibiotic regimen(s): vancomycin monotherapy  Patient on vancomycin PTA? NO     Recent Labs     22  0118 22  0430 22  1839   WBC 7.4 7.6 9.2   CREA 1.43* 1.69* 1.67*   BUN * 28* 28*     Frequency of BMP: tomorrow  Height: 172.7 cm  Weight: 77.7 kg  Est CrCl: 26 ml/min; UO: --- ml/kg/hr  Temp (24hrs), Av.5 °F (36.9 °C), Min:97.9 °F (36.6 °C), Max:99.2 °F (37.3 °C)    Cultures:   blood, probable staph in  bottles   urine, NG    MRSA Swab ordered (if applicable)? NO    The plan below is expected to result in a target range of AUC/FABIENNE 400-600    Therapy will be initiated with a loading dose of 1500 mg IV x 1 to be followed by a maintenance dose of 750 mg IV every 24 hours. Pharmacy to follow patient daily and order levels / make dose adjustments as appropriate. *Vancomycin has been dosed used Bayesian kinetics software to target an AUC/FABIENNE of 400-600, which provides adequate exposure for an assumed infection due to MRSA with an FABIENNE of 1 or less while reducing the risk of nephrotoxicity as seen with traditional trough based dosing goals.    G

## 2022-11-28 NOTE — PROGRESS NOTES
SSED/CM referral noted 11/27/22 patient is currently admitted to the hospital and followed by unit CM.

## 2022-11-28 NOTE — PROGRESS NOTES
Problem: Pressure Injury - Risk of  Goal: *Prevention of pressure injury  Description: Document Thanh Scale and appropriate interventions in the flowsheet.   Outcome: Progressing Towards Goal  Note: Pressure Injury Interventions:  Sensory Interventions: Assess changes in LOC, Assess need for specialty bed, Float heels    Moisture Interventions: Absorbent underpads, Apply protective barrier, creams and emollients, Minimize layers    Activity Interventions: Chair cushion, Increase time out of bed, PT/OT evaluation    Mobility Interventions: Assess need for specialty bed, PT/OT evaluation    Nutrition Interventions: Document food/fluid/supplement intake    Friction and Shear Interventions: Apply protective barrier, creams and emollients, Lift sheet                Problem: Aspiration - Risk of  Goal: *Absence of aspiration  Outcome: Progressing Towards Goal

## 2022-11-28 NOTE — PROGRESS NOTES
[facility]  Hospitalist Group                                                                                          Hospitalist Progress Note  Oscar Scherer PA-C  Answering service: [phone] from in house phone        Date of Service:  2022  NAME:  Isra Stiles  :  1922  MRN:  [id number]      Admission Summary:   Isra Stiles is a Barry  [age] y.o. male with PMHx of A-Fib with SSS s/p cardiac pacemaker, BPH, COPD, CKD, HTN, GERD, HLD, and hypothyroidism who presented from SNF with low grade fever and confusion. BCx  with  Coag negative staph (likely contaminant, ID consulted). ID concerns for aspiration PNA (on Vanc, Cefepime, Clindamycin). Hospice and Palliative Care consult. Interval history / Subjective:   Patient seen this morning and then seen again alongside granddaughter. He is sleepy but awakens to verbal command and says that he is feeling alright and denies any complaints to me including no chest pain, SOB, abdominal pain. While meeting with the granddaughter she is interested in meeting with Palliative Care and hospice for him. She is concerned that the things he was enjoying in life, such as eating, he is no longer pursuing and that his life expectancy is less than 6 months. I agree with the assessment for hospice and palliative care. They would be interested in potentially having hospice care at nursing facility. All questions and concerns addressed. Assessment & Plan:     Acute metabolic encephalopathy, etiology unknown  Concern for fungal rash in groin with possible scrotal cellulitis  Lactic Acidosis, resolved  Low grade fever  ? Aspiration with history of risk of aspiration  -- Notable imaging: CT Head : No acute intracranial process  --- Scrotal rash: Likely fungal in etiology as incontinent at baseline. Started on Ancef (D1 = ) for concern for potential scrotal cellulitis or SSTI. Will continue for now.  Continue Mycostatin powder. Wound care consult  -- Lactic acid: Initially elevated 2.37 --> 2.5 --> 1.96. No signs of EOD or hypoperfusion. Received NS 500cc bolus x 2. Hold off additional fluid resuscitation for now with hx of CHF  -- Notable labs: UA on 11/26: negative for nitrites/LE's, WBC's. Urine culture 11/26: negative. Influenza A/B and COVID negative. TSH 1.53, Free T4 1.4, Ammonia 20  -- Concern for aspiration: Per pt and family they work with SLP outpatient and chronically aspirating but family accepted the risks of aspiration as food was a major agueda in life. ID consulted for + BCx, but they had concerns for aspiration PNA and broadened to Cefepime + Vancomycin + Clindamycin. His CXR on 11/26 without concerns for PNA and repeat 11/28 awaiting radiology read but appears stable. MRSA IN swab pending. PCT 11/28. If aspirating and needing O2 may be aspiration pneumonitis and not from infection. Coag negative Staph in 1/4 in BCx from 11/26  -- Anticipate this is a contaminant, but while awaiting for speciation was broadened to Vancomycin. -- ID consulted  -- Surveillance cultures ordered 11/28 and pending  -- Follow BCx from 11/26: Coagulase negative Staph 1/4. Await sensitivities    Encounter for Hospice  -- Family interested in meeting with Palliative Care and Hospice. They note he is no longer enjoying previous joys (such as food) and that likely has < 6 months to live which I agree. Appreciate their consults and assistance    Nocturnal O2  -- Uses CPAP at home. Has hx of MARIAN. With current AMS do not think he can tolerate CPAP.  Can continue nocturnal O2 as needed     Elevated troponin   -- Troponin 96 --> 114 --> 99.  -- No chest pain or concern for ACS  -- Cardiology reviewed chart      Congestive heart failure, unspecified chronicity  HTN  - last 2 echocardiogram 9/14/2022 showed left ventricular ejection fraction 50% to 55%  -Placed on strict I's and O's and daily weights  -- Held Lasix 20mg daily 11/28 as dry on exam   -- Continue Lisinopril     CKD  -- Renal function at baseline. Continue to trend. Paroxysmal atrial fibrillation  SSS  -Status post cardiac pacemaker plantation  -Continue Tele  -- continue Metoprolol XL 25mg nightly      Acute COPD exacerbation  -order Duoneb nebulizer treatments every 4 hours as needed     Generalized weakness  -Placed on fall precautions     Hypothyroidism  -Continue levothyroxine     GERD  -- Continue hospital formulary PPI     BPH  -- Continue Flomax nightly     Essential Tremor  -- At baseline per family     Vitamin Deficiency  -- Continue home Ascorbic Acid and Magnesium     Code status: DNR/DNI. Durable DNR recompleted 11/27  Prophylaxis: SCDs. Care Plan discussed with: Pt, RN, family  Anticipated Disposition: TBD. Care Manager consulted. PT/OT consulted     Hospital Problems  Date Reviewed: 7/13/2022            Codes Class Noted POA    Lactic acidosis ICD-10-CM: E87.20  ICD-9-CM: 276.2  11/26/2022 Unknown        Elevated troponin ICD-10-CM: R77.8  ICD-9-CM: 790.6  11/26/2022 Unknown        AMS (altered mental status) ICD-10-CM: R41.82  ICD-9-CM: 780.97  11/26/2022 Unknown        Fever ICD-10-CM: R50.9  ICD-9-CM: 780.60  1/27/2019 Unknown             Review of Systems:   A comprehensive review of systems was negative except for that written in the HPI. Vital Signs:    Last 24hrs VS reviewed since prior progress note.  Most recent are:  Visit Vitals  /73 (BP 1 Location: Left upper arm, BP Patient Position: At rest)   Pulse 70   Temp 98.5 °F (36.9 °C)   Resp 29   Ht 5' 8\" (1.727 m)   Wt 77.7 kg (171 lb 3.2 oz)   SpO2 99%   BMI 26.03 kg/m²         Intake/Output Summary (Last 24 hours) at 11/28/2022 1514  Last data filed at 11/27/2022 2121  Gross per 24 hour   Intake 20 ml   Output --   Net 20 ml        Physical Examination:     I had a face to face encounter with this patient and independently examined them on 11/28/2022 as outlined below:          Constitutional: No acute distress. Sleeping on arrival. Briefly awakens for exam but talking briskly. ENT:  Oral mucosa dry   Resp:  Upper airway crackling. No wheezing or respiratory distress or accessory muscle use. CV:  Paced rhythm on monitor, irregular rhythm    GI:  Soft, non distended, non tender. Musculoskeletal:  No edema, warm    Neurologic:  Moves all extremities to command. Oriented to name, Locations says is in [place], [place]. Not oriented to year. : Minimal erythema in groin. Sebaceous cysts on scrotum without evidence of drainage.             Data Review:    Review and/or order of clinical lab test  Review and/or order of tests in the radiology section of CPT  Review and/or order of tests in the medicine section of CPT      Labs:     Recent Labs     11/28/22 0118 11/27/22 0430   WBC 7.4 7.6   HGB 11.3* 11.4*   HCT 35.2* 36.0*   * 115*     Recent Labs     11/28/22 0118 11/27/22 0430 11/26/22  1839    141 139   K 3.8 4.3 4.6    106 106   CO2 25 29 28   BUN 26* 28* 28*   CREA 1.43* 1.69* 1.67*   * 126* 123*   CA 8.5 8.5 8.6   MG 1.9 1.9  --    PHOS 3.8  --   --      Recent Labs     11/28/22 0118 11/27/22 0430 11/26/22  1839   ALT 19 18 21   AP 92 101 110   TBILI 0.7 0.9 1.2*   TP 5.8* 6.0* 6.6   ALB 2.7* 3.0* 3.2*   GLOB 3.1 3.0 3.4     Medications Reviewed:     Current Facility-Administered Medications   Medication Dose Route Frequency    Vancomycin- Pharmacy to Dose   Other Rx Dosing/Monitoring    [START ON 11/29/2022] vancomycin (VANCOCIN) 750 mg in 0.9% sodium chloride 250 mL ([id number])  750 mg IntraVENous Q24H    balsam peru-castor oiL (VENELEX) ointment   Topical Q12H    clindamycin (CLEOCIN) 600mg D5W 50mL IVPB (premix)  600 mg IntraVENous Q8H    ketotifen (ZADITOR) 0.025 % (0.035 %) ophthalmic solution 1 Drop  1 Drop Right Eye Q12H    nystatin (MYCOSTATIN) 100,000 unit/mL oral suspension 500,000 Units  500,000 Units Oral QID    cefepime (MAXIPIME) 1 g in 0.9% sodium chloride (MBP/ADV) 50 mL MBP  1 g IntraVENous Q12H    sodium chloride (NS) flush 5-40 mL  5-40 mL IntraVENous Q8H    sodium chloride (NS) flush 5-40 mL  5-40 mL IntraVENous PRN    acetaminophen (TYLENOL) tablet 650 mg  650 mg Oral Q6H PRN    atorvastatin (LIPITOR) tablet 20 mg  20 mg Oral DAILY    [Held by provider] furosemide (LASIX) tablet 20 mg  20 mg Oral DAILY    levothyroxine (SYNTHROID) tablet 75 mcg  75 mcg Oral 6am    lisinopriL (PRINIVIL, ZESTRIL) tablet 20 mg  20 mg Oral DAILY    magnesium oxide (MAG-OX) tablet 400 mg  400 mg Oral DAILY    metoprolol succinate (TOPROL-XL) XL tablet 25 mg  25 mg Oral QPM    ipratropium (ATROVENT) 21 mcg (0.03 %) nasal spray 2 Spray  2 Spray Both Nostrils BID PRN    pantoprazole (PROTONIX) tablet 20 mg  20 mg Oral DAILY    tamsulosin (FLOMAX) capsule 0.4 mg  0.4 mg Oral QHS    [START ON 11/29/2022] ascorbic acid (vitamin C) (VITAMIN C) tablet 500 mg  500 mg Oral Once per day on Tue Thu Sat    ondansetron (ZOFRAN ODT) tablet 4 mg  4 mg Oral Q8H PRN    albuterol-ipratropium (DUO-NEB) 2.5 MG-0.5 MG/3 ML  3 mL Nebulization Q4H PRN    miconazole (MICOTIN) 2 % powder   Topical BID     ______________________________________________________________________  EXPECTED LENGTH OF STAY: 3d 7h  ACTUAL LENGTH OF STAY:          2                 Oscar Scherer PA-C

## 2022-11-28 NOTE — HOSPICE
[facility]  [phone]    Patient Name: José Luis Ravi  YOB: 1922  Age: Barry Meka [age] y.o. [facility] RN Note:  Hospice consult noted. Chart reviewed. Plan of care discussed with patients nurse & care manager. In to meet with patient and his sister, Nannette Ng and Mirella's daughter, Keisha Cortes bedside. Moved to private consultation room and joined by Sherry Dobbs, [facility]  and [address] also Hospice social worker. Discussed Hospice philosophy, general plan of care, levels of care, services and on call procedures. Family information packet provided & reviewed with Mirella. Bedside assessment: Pt with eyes  closed, pink flushed face, some facial grimacing, warm to touch. Family report pt has been confused, however, able to communicate with confusion. Pt continues with treatments including IV antibiotics. Family state understanding that patient is high risk for continued aspiration, however, which to continue with oral foods per patient likes/dislikes. Patient is not meeting GIP for inpatient hospice services, however, he does have a Hospice Diagnosis that meets Routine Hospice criteria. Currently patient's family reviewing options for rehab vs long term care vs hospice philosophy and goals of care. Pt has clear advanced directives and living will provided to Hospice by family. PLAN: Hospice will continue to evaluate patient and assist with Hospice education and support as discharge planning being formed. Thank you for the opportunity to be of service to this patient.      Darius Esparza RN, [place] [address] Nurse Liaison  [phone] Mobile  [phone] Office  Available on Perfect Serve

## 2022-11-29 ENCOUNTER — APPOINTMENT (OUTPATIENT)
Dept: GENERAL RADIOLOGY | Age: 87
DRG: 177 | End: 2022-11-29
Attending: NURSE PRACTITIONER
Payer: MEDICARE

## 2022-11-29 LAB
ALBUMIN SERPL-MCNC: 2.9 G/DL (ref 3.5–5)
ALBUMIN/GLOB SERPL: 1.1 {RATIO} (ref 1.1–2.2)
ALP SERPL-CCNC: 90 U/L (ref 45–117)
ALT SERPL-CCNC: 18 U/L (ref 12–78)
ANION GAP SERPL CALC-SCNC: 9 MMOL/L (ref 5–15)
AST SERPL-CCNC: 44 U/L (ref 15–37)
BACTERIA SPEC CULT: NORMAL
BACTERIA SPEC CULT: NORMAL
BASOPHILS # BLD: 0 K/UL (ref 0–0.1)
BASOPHILS NFR BLD: 0 % (ref 0–1)
BILIRUB SERPL-MCNC: 1 MG/DL (ref 0.2–1)
BUN SERPL-MCNC: 30 MG/DL (ref 6–20)
BUN/CREAT SERPL: 20 (ref 12–20)
CALCIUM SERPL-MCNC: 8.2 MG/DL (ref 8.5–10.1)
CHLORIDE SERPL-SCNC: 107 MMOL/L (ref 97–108)
CO2 SERPL-SCNC: 27 MMOL/L (ref 21–32)
CREAT SERPL-MCNC: 1.51 MG/DL (ref 0.7–1.3)
DIFFERENTIAL METHOD BLD: ABNORMAL
EOSINOPHIL # BLD: 0 K/UL (ref 0–0.4)
EOSINOPHIL NFR BLD: 0 % (ref 0–7)
ERYTHROCYTE [DISTWIDTH] IN BLOOD BY AUTOMATED COUNT: 14.8 % (ref 11.5–14.5)
GLOBULIN SER CALC-MCNC: 2.7 G/DL (ref 2–4)
GLUCOSE SERPL-MCNC: 94 MG/DL (ref 65–100)
HCT VFR BLD AUTO: 35.7 % (ref 36.6–50.3)
HGB BLD-MCNC: 11.3 G/DL (ref 12.1–17)
IMM GRANULOCYTES # BLD AUTO: 0.1 K/UL (ref 0–0.04)
IMM GRANULOCYTES NFR BLD AUTO: 1 % (ref 0–0.5)
LYMPHOCYTES # BLD: 0.5 K/UL (ref 0.8–3.5)
LYMPHOCYTES NFR BLD: 4 % (ref 12–49)
MAGNESIUM SERPL-MCNC: 1.9 MG/DL (ref 1.6–2.4)
MCH RBC QN AUTO: 29.4 PG (ref 26–34)
MCHC RBC AUTO-ENTMCNC: 31.7 G/DL (ref 30–36.5)
MCV RBC AUTO: 92.7 FL (ref 80–99)
MONOCYTES # BLD: 0.9 K/UL (ref 0–1)
MONOCYTES NFR BLD: 7 % (ref 5–13)
NEUTS SEG # BLD: 11.7 K/UL (ref 1.8–8)
NEUTS SEG NFR BLD: 88 % (ref 32–75)
NRBC # BLD: 0 K/UL (ref 0–0.01)
NRBC BLD-RTO: 0 PER 100 WBC
PHOSPHATE SERPL-MCNC: 3.4 MG/DL (ref 2.6–4.7)
PLATELET # BLD AUTO: 101 K/UL (ref 150–400)
PMV BLD AUTO: 10.8 FL (ref 8.9–12.9)
POTASSIUM SERPL-SCNC: 3.6 MMOL/L (ref 3.5–5.1)
PROCALCITONIN SERPL-MCNC: 4.93 NG/ML
PROT SERPL-MCNC: 5.6 G/DL (ref 6.4–8.2)
RBC # BLD AUTO: 3.85 M/UL (ref 4.1–5.7)
RBC MORPH BLD: ABNORMAL
SERVICE CMNT-IMP: NORMAL
SODIUM SERPL-SCNC: 143 MMOL/L (ref 136–145)
WBC # BLD AUTO: 13.2 K/UL (ref 4.1–11.1)

## 2022-11-29 PROCEDURE — 97530 THERAPEUTIC ACTIVITIES: CPT

## 2022-11-29 PROCEDURE — 74011000250 HC RX REV CODE- 250: Performed by: FAMILY MEDICINE

## 2022-11-29 PROCEDURE — 97165 OT EVAL LOW COMPLEX 30 MIN: CPT

## 2022-11-29 PROCEDURE — 71045 X-RAY EXAM CHEST 1 VIEW: CPT

## 2022-11-29 PROCEDURE — 97161 PT EVAL LOW COMPLEX 20 MIN: CPT

## 2022-11-29 PROCEDURE — 74011000258 HC RX REV CODE- 258: Performed by: NURSE PRACTITIONER

## 2022-11-29 PROCEDURE — 65270000046 HC RM TELEMETRY

## 2022-11-29 PROCEDURE — 84145 PROCALCITONIN (PCT): CPT

## 2022-11-29 PROCEDURE — 74011250637 HC RX REV CODE- 250/637: Performed by: HOSPITALIST

## 2022-11-29 PROCEDURE — 36415 COLL VENOUS BLD VENIPUNCTURE: CPT

## 2022-11-29 PROCEDURE — 83735 ASSAY OF MAGNESIUM: CPT

## 2022-11-29 PROCEDURE — 74011250637 HC RX REV CODE- 250/637: Performed by: NURSE PRACTITIONER

## 2022-11-29 PROCEDURE — 80053 COMPREHEN METABOLIC PANEL: CPT

## 2022-11-29 PROCEDURE — 85025 COMPLETE CBC W/AUTO DIFF WBC: CPT

## 2022-11-29 PROCEDURE — 84100 ASSAY OF PHOSPHORUS: CPT

## 2022-11-29 PROCEDURE — 74011250636 HC RX REV CODE- 250/636: Performed by: PHYSICIAN ASSISTANT

## 2022-11-29 PROCEDURE — 77010033678 HC OXYGEN DAILY

## 2022-11-29 PROCEDURE — 74011250636 HC RX REV CODE- 250/636: Performed by: NURSE PRACTITIONER

## 2022-11-29 PROCEDURE — 74011250637 HC RX REV CODE- 250/637: Performed by: PHYSICIAN ASSISTANT

## 2022-11-29 PROCEDURE — 99223 1ST HOSP IP/OBS HIGH 75: CPT | Performed by: NURSE PRACTITIONER

## 2022-11-29 RX ADMIN — Medication 10 ML: at 22:17

## 2022-11-29 RX ADMIN — CEFEPIME 1 G: 1 INJECTION, POWDER, FOR SOLUTION INTRAMUSCULAR; INTRAVENOUS at 22:17

## 2022-11-29 RX ADMIN — ATORVASTATIN CALCIUM 20 MG: 20 TABLET, FILM COATED ORAL at 09:47

## 2022-11-29 RX ADMIN — OXYCODONE HYDROCHLORIDE AND ACETAMINOPHEN 500 MG: 500 TABLET ORAL at 09:47

## 2022-11-29 RX ADMIN — Medication 10 ML: at 05:57

## 2022-11-29 RX ADMIN — MICONAZOLE NITRATE 2 % TOPICAL POWDER: at 09:54

## 2022-11-29 RX ADMIN — CASTOR OIL AND BALSAM, PERU: 788; 87 OINTMENT TOPICAL at 20:23

## 2022-11-29 RX ADMIN — Medication 400 MG: at 09:47

## 2022-11-29 RX ADMIN — CLINDAMYCIN IN 5 PERCENT DEXTROSE 600 MG: 12 INJECTION, SOLUTION INTRAVENOUS at 20:23

## 2022-11-29 RX ADMIN — TAMSULOSIN HYDROCHLORIDE 0.4 MG: 0.4 CAPSULE ORAL at 20:29

## 2022-11-29 RX ADMIN — CLINDAMYCIN IN 5 PERCENT DEXTROSE 600 MG: 12 INJECTION, SOLUTION INTRAVENOUS at 11:57

## 2022-11-29 RX ADMIN — NYSTATIN 500000 UNITS: 100000 SUSPENSION ORAL at 09:46

## 2022-11-29 RX ADMIN — VANCOMYCIN HYDROCHLORIDE 750 MG: 750 INJECTION, POWDER, LYOPHILIZED, FOR SOLUTION INTRAVENOUS at 09:46

## 2022-11-29 RX ADMIN — ACETAMINOPHEN 650 MG: 325 TABLET ORAL at 18:58

## 2022-11-29 RX ADMIN — CASTOR OIL AND BALSAM, PERU: 788; 87 OINTMENT TOPICAL at 09:44

## 2022-11-29 RX ADMIN — METOPROLOL SUCCINATE 25 MG: 25 TABLET, EXTENDED RELEASE ORAL at 18:41

## 2022-11-29 RX ADMIN — NYSTATIN 500000 UNITS: 100000 SUSPENSION ORAL at 22:17

## 2022-11-29 RX ADMIN — CLINDAMYCIN IN 5 PERCENT DEXTROSE 600 MG: 12 INJECTION, SOLUTION INTRAVENOUS at 02:44

## 2022-11-29 RX ADMIN — LISINOPRIL 20 MG: 20 TABLET ORAL at 09:47

## 2022-11-29 RX ADMIN — Medication 10 ML: at 13:34

## 2022-11-29 RX ADMIN — MICONAZOLE NITRATE 2 % TOPICAL POWDER: at 18:42

## 2022-11-29 RX ADMIN — NYSTATIN 500000 UNITS: 100000 SUSPENSION ORAL at 18:41

## 2022-11-29 RX ADMIN — PANTOPRAZOLE SODIUM 20 MG: 20 TABLET, DELAYED RELEASE ORAL at 09:47

## 2022-11-29 RX ADMIN — CEFEPIME 1 G: 1 INJECTION, POWDER, FOR SOLUTION INTRAMUSCULAR; INTRAVENOUS at 11:57

## 2022-11-29 RX ADMIN — KETOTIFEN FUMARATE 1 DROP: 0.35 SOLUTION/ DROPS OPHTHALMIC at 20:24

## 2022-11-29 RX ADMIN — KETOTIFEN FUMARATE 1 DROP: 0.35 SOLUTION/ DROPS OPHTHALMIC at 09:54

## 2022-11-29 RX ADMIN — NYSTATIN 500000 UNITS: 100000 SUSPENSION ORAL at 12:11

## 2022-11-29 RX ADMIN — IPRATROPIUM BROMIDE AND ALBUTEROL SULFATE 3 ML: .5; 3 SOLUTION RESPIRATORY (INHALATION) at 02:47

## 2022-11-29 RX ADMIN — ACETAMINOPHEN 650 MG: 650 SUPPOSITORY RECTAL at 06:43

## 2022-11-29 NOTE — PROGRESS NOTES
Transition of Care Plan  RUR- Med 17%  DISPOSITION: LTC with/without Hospice vs [place] with Hospice  F/U with PCP/Specialist    Transport: [facility]     Emergency contact: Trip Subramanian [phone]    MICHAEL barriers to discharge: Family deciding between return to [facility] with hospice vs LTC    CM met with patient's daughter, Ketan Yousif and granddaughter, Travis Boyer at bedside to introduce self and discuss discharge plans. Patient's granddaughter indicated that they did not feel like SNF rehab would do much for patient at discharge, they are leaning towards a LTC placement. They asked for CM assist with identifying facility that has a bed available. Family still considering hospice option. [facility] [address] do not have bed availability. CM spoke with [phone], they do have a bed available in LTC. CM relayed this information to patient's granddaughter, Travis Boyer and she plans to reach out. MAY Maria.S.W.

## 2022-11-29 NOTE — PROGRESS NOTES
Orders received, chart reviewed and patient evaluated by occupational therapy. Pending progression with skilled acute occupational therapy, recommend:  Therapy up to 5 days/week in SNF setting vs. [facility] with hospice pending family wishes    Recommend with nursing ADLs with supervision/setup, bed to chair position 3x/day and toileting via bedpan with 2 assist.Thank you for completing as able in order to maintain patient strength, endurance and independence. Full evaluation to follow.

## 2022-11-29 NOTE — CONSULTS
PALLIATIVE MEDICINE        Consult appreciated      Family meeting today at 3:30pm.                Sanjuana Recinos.  [address] MSN, FNP-BC, [facility]

## 2022-11-29 NOTE — PROGRESS NOTES
CAT SCAN WITH CONTRAST DONE AND BC SHE IS DM SHE HAS TO HAVE THESE TEST DONE. PT IS NEEDING TO KNOW IF SHE CAN TAKE METFORMIN THAT MORNING BEFORE AS AFTER THE TEST SHE ISN'T ABLE TO TAKE FOR 2 DAYS.    Spiritual Care Assessment/Progress Note  [facility]      NAME: Sindy Dumas      MRN: [id number]  AGE: Barry  [age] y.o. SEX: male  Evangelical Affiliation: Sikhism   Language: English     2022     Total Time (in minutes): 53     Spiritual Assessment begun in [address] through conversation with:         []Patient        [x] Family    [] Friend(s)        Reason for Consult: Palliative Care, Initial/Spiritual Assessment     Spiritual beliefs: (Please include comment if needed)     [x] Identifies with a alanna tradition: Ladan Baumann      [x] Supported by a alanna community: [employer]          [] Claims no spiritual orientation:           [] Seeking spiritual identity:                [] Adheres to an individual form of spirituality:           [] Not able to assess:                           Identified resources for coping:      [] Prayer                               [] Music                  [] Guided Imagery     [x] Family/friends                 [] Pet visits     [] Devotional reading                         [] Unknown     [] Other:                                               Interventions offered during this visit: (See comments for more details)          Family/Friend(s):  Affirmation of emotions/emotional suffering, Affirmation of alanna, Catharsis/review of pertinent events in supportive environment, Coping skills reviewed/reinforced, Iconic (affirming the presence of God/Higher Power), Normalization of emotional/spiritual concerns     Plan of Care:     [] Support spiritual and/or cultural needs    [] Support AMD and/or advance care planning process      [] Support grieving process   [] Coordinate Rites and/or Rituals    [] Coordination with community clergy   [] No spiritual needs identified at this time   [] Detailed Plan of Care below (See Comments)  [] Make referral to Music Therapy  [] Make referral to Pet Therapy     [] Make referral to Addiction services  [] Make referral to [facility]  [] Make referral to Spiritual Care Partner  [] No future visits requested        [x] Follow up visits as needed     Visited patient for palliative initial spiritual assessment. He was sleeping and remained so for the duration of the visit. His daughter Dao Kelly and granddaughter Jailene Burgos (she is a [employer] ICU NP) were at bedside where they had been for the night. The received an information meeting with hospice yesterday but wish to hear from the Palliative Medicine team prior to making a decision on goals of care. He has a Palliative consult and will be seen today. He is a person of [place] alanna and derives a sense of hope and support from his alanna.    Chaplain Dang, MDiv, MS, [facility]

## 2022-11-29 NOTE — PROGRESS NOTES
ID Progress Note  2022    Subjective:     Resting  No verbal complaints  Review of Systems:            Symptom Y/N Comments   Symptom Y/N Comments   Fever/Chills       Chest Pain        Poor Appetite       Edema        Cough       Abdominal Pain        Sputum       Joint Pain        SOB/GARCIA       Pruritis/Rash        Nausea/vomit       Tolerating PT/OT        Diarrhea       Tolerating Diet        Constipation       Other           Could NOT obtain due to:       Objective:     Vitals: Visit Vitals  /69   Pulse 71   Temp 99.8 °F (37.7 °C)   Resp 24   Ht 5' 8\" (1.727 m)   Wt 77.7 kg (171 lb 3.2 oz)   SpO2 99%   BMI 26.03 kg/m²        Tmax:  Temp (24hrs), Av.7 °F (38.2 °C), Min:98.5 °F (36.9 °C), Max:103.1 °F (39.5 °C)      PHYSICAL EXAM:  General: Chronically ill appearing. WD, WN. Resting. no acute distress    EENT:  Eyes closed, no drainage from right eye, Dry mucous membrane  Resp:  Clear in apex with decreased breath sounds at bases. no wheezing or rales. No accessory muscle use  CV:  Regular  rhythm,  No edema  GI:  Soft, Non distended, Non tender. +Bowel sounds  Neurologic:  resting  Psych:   Unable to assess insight. Not anxious nor agitated  Skin:  No rashes.   No jaundice    Labs:   Lab Results   Component Value Date/Time    WBC 13.2 (H) 2022 02:52 AM    HGB 11.3 (L) 2022 02:52 AM    HCT 35.7 (L) 2022 02:52 AM    PLATELET 565 (L)  02:52 AM    MCV 92.7 2022 02:52 AM     Lab Results   Component Value Date/Time    Sodium 143 2022 02:52 AM    Potassium 3.6 2022 02:52 AM    Chloride 107 2022 02:52 AM    CO2 27 2022 02:52 AM    Anion gap 9 2022 02:52 AM    Glucose 94 2022 02:52 AM    BUN 30 (H) 2022 02:52 AM    Creatinine 1.51 (H) 2022 02:52 AM    BUN/Creatinine ratio 20 2022 02:52 AM    GFR est AA 46 (L) 2022 02:57 PM    GFR est non-AA 38 (L) 2022 02:57 PM    Calcium 8.2 (L) 2022 02:52 AM Bilirubin, total 1.0 11/29/2022 02:52 AM    Alk. phosphatase 90 11/29/2022 02:52 AM    Protein, total 5.6 (L) 11/29/2022 02:52 AM    Albumin 2.9 (L) 11/29/2022 02:52 AM    Globulin 2.7 11/29/2022 02:52 AM    A-G Ratio 1.1 11/29/2022 02:52 AM    ALT (SGPT) 18 11/29/2022 02:52 AM         Cultures:   Results       Procedure Component Value Units Date/Time    CULTURE, MRSA [[id number]] Collected: 11/28/22 1148    Order Status: Sent Specimen: Nasal from Nares Updated: 11/28/22 1310    CULTURE, BLOOD, PAIRED [[id number]] Collected: 11/28/22 1105    Order Status: Completed Specimen: Blood Updated: 11/29/22 0721     Special Requests: NO SPECIAL REQUESTS        Culture result: NO GROWTH AFTER 19 HOURS       URINE CULTURE HOLD SAMPLE [[id number]] Collected: 11/26/22 2101    Order Status: Completed Specimen: Urine from Serum Updated: 11/26/22 2111     Urine culture hold       Urine on hold in Microbiology dept for 2 days. If unpreserved urine is submitted, it cannot be used for addtional testing after 24 hours, recollection will be required. CULTURE, URINE [[id number]] Collected: 11/26/22 2101    Order Status: Completed Specimen: Cath Urine Updated: 11/27/22 1802     Special Requests: NO SPECIAL REQUESTS        Culture result: No growth (<1,000 CFU/ML)       COVID-19 RAPID TEST [[id number]] Collected: 11/26/22 1845    Order Status: Completed Specimen: Nasopharyngeal Updated: 11/26/22 1951     Specimen source Nasopharyngeal        COVID-19 rapid test Not detected        Comment: Rapid Abbott ID Now       Rapid NAAT:  The specimen is NEGATIVE for SARS-CoV-2, the novel coronavirus associated with COVID-19. Negative results should be treated as presumptive and, if inconsistent with clinical signs and symptoms or necessary for patient management, should be tested with an alternative molecular assay.   Negative results do not preclude SARS-CoV-2 infection and should not be used as the sole basis for patient management decisions. This test has been authorized by the FDA under an Emergency Use Authorization (EUA) for use by authorized laboratories.    Fact sheet for Healthcare Providers:  http://www.veena.catherine/  Fact sheet for Patients: http://www.veena.catherine/       Methodology: Isothermal Nucleic Acid Amplification         BLOOD CULTURE ID PANEL [[id number]]  (Abnormal) Collected: 11/26/22 1845    Order Status: Completed Specimen: Blood Updated: 11/28/22 0625     Acc. no. from Micro Order [id number]     Enterococcus faecalis Not detected        Enterococcus faecium Not detected        Listeria monocytogenes Not detected        Staphylococcus Detected        Staphylococcus aureus Not detected        Staph epidermidis Not detected        Staph lugdunensis Not detected        Streptococcus Not detected        Streptococcus agalactiae (Group B) Not detected        Streptococcus pneumoniae Not detected        Streptococcus pyogenes (Group A) Not detected        Acinetobacter calcoaceticus-baumanii complex Not detected        Bacteroides fragilis Not detected        Enterobacterales species Not detected        Enterobacter cloacae complex Not detected        Escherichia coli Not detected        Klebsiella aerogenes Not detected        Klebsiella oxytoca Not detected        Klebsiella pneumoniae group Not detected        Proteus Not detected        Salmonella Not detected        Serratia marcescens Not detected        Haemophilus influenzae Not detected        Neisseria meningitidis Not detected        Pseudomonas aeruginosa Not detected        Steno maltophilia Not detected        Candida albicans Not detected        Candida auris Not detected        Candida glabrata Not detected        Candida krusei Not detected        Candida parapsilosis Not detected        Candida tropicalis Not detected        Crypto neoformans/gattii Not detected        RESISTANT GENES:            Comment       False positive results may rarely occur. Correlate with clinical,epidemiologic, and other laboratory findings           Comment: Please see BCID Interpretation Guide in EPIC Links       CULTURE, BLOOD, PAIRED [[id number]]  (Abnormal) Collected: 11/26/22 1839    Order Status: Completed Specimen: Blood Updated: 11/28/22 2225     Special Requests: NO SPECIAL REQUESTS        Culture result:       STAPHYLOCOCCUS SPECIES, COAGULASE NEGATIVE GROWING IN 1 OF 4 BOTTLES DRAWN SITE=( R AC)                  PROBABLE STAPHYLOCOCCUS SPECIES, COAGULASE NEGATIVE GROWING IN 2ND OF 4 BOTTLES DRAWN R AC SITE                  REMAINING BOTTLE(S) HAS/HAVE NO GROWTH SO FAR            (NOTE) GRAM POSITIVE COCCI IN CLUSTERS GROWING IN 1 OF 4 BOTTLES CALLED TO READ BACK BY Fidencio Hutchinson RN [facility] AT 2017 ON 11/27/22. [place] 1850             Impression:   Bacteremia  Acute respiratory failure secondary to aspiration PNA  - T-max 103, wbc 13.2    blood cx (11/26) 1/4 CoNS, (11/28) no growth so far    Procal 4.93    CXR (11/28)  basilar airspace disease. Allergic conjunctivitis  - zaditor 1 drop twice a day for 5 days     Oral candidasis  - nystatin swish and spit four times a day for 10 days  Plan:   Continue withIV cefepime + clindamycin to treat asp PNA, recommend to complete total 7 days. Continue with IV vancomycin; monitor renal function closely         Recommend TTE when amendable         2/4 CoNS bacteremia - requested the organism to be identified; if staph lugdunensis then we recommend to complete total 2 weeks of  IV abx therapy. Otherwise, we will consider as contaminant sample. We will provide final abx recommendation after reviewed the final cx result         MRSA nare screen pending   Adjust abx prn   Ketotifen 1 drop to right eye twice a day for 5 days   Fever work up if temp >= 100.4   Pt's granddaughter who is a NP expressed no invasive work up at this time.          Palliative team following             Above plan of care discussed and agreed with Dr. Dc Rios d/w pt's granddaughter and pt's daughter who were at bedside    Marjorie Gutiérrez NP

## 2022-11-29 NOTE — HOSPICE
[facility]  [phone]    Hospice Liaison Nursing Note   Patient Name: James Elizabeth  YOB: 1922  Age: 80 y.o. Chart reviewed for updates in plan of care. Noted family plans to meet with palliative team today. Updated palliative team to hospice meeting yesterday 11/28/22. Plan: Hospice team will continue to provide support and education for family as goals of care are being established. Please call Hospice team to offer support for patient, family or staff. Thank you for your coordination with the hospice plan of care. 12:20: Update received from medical team NP of patient decline. Per NP, grand-daughter is bedside and seems very concerned over patient's presentation this morning. Rounded bedside; met with patient and his granddaughter Brandan May. Gena shared that she understands that she is not MPOA and does not have the medical knowledge that her sister has, however, she would like information about what Hospice is and how hospice is different from current medical treatments. Gena expressed her opinion that pt is not comfortable and she does not want him to \"suffer\". Educated Adilia Gutierrez about [employer] and goals of care. Encouraged Gena to speak with her mother, who is MPOA, and her sister who participated in Hospice bedside meeting yesterday. Adilia Gutierrez states that family are planning to meet with palliative team this afternoon at 15:00. Offered to visit with family members later today, and for Adilia Gutierrez to contact family to provide update and arrange time to meet with Hospice today. Hospice plan of care reviewed with Dr. Yuval Stanton, hospice medical director. If patient and family ready for focus on Hospice care, plan to offer hospice admission at [address] [place] [address] or the [facility] today. 16:30: Long meeting with family and palliative team, Ron Snyder NP.  Family in attendance; pt daughter, Marylen Neighbors, and her two daughters, Deann Vo and Adilia Gutierrez Edin Mac. On Video phone call was pt's son, Maddie Medina. Palliative offered review of medical condition with family and assisted with next step options. Hospice team offered support and continued hospice philosophy. Reviewed IV antibiotics would no longer be part of hospice plan of care, and reviewed hospice plan of care at [facility] vs [facility] vs returning to [facility] assisted living with hospice services. Returning to [facility] assisted living with hospice does not seem like a viable option due to patient's continued aspiration and difficulty with oral intake. All family members present agree goal is to keep patient comfortable. Each family member expresses different but similar ways to do this. MPOA is patient's daughter, Cecelia Christiansen, who after 50 minutes stated that she is not ready to make a decision for hospice this evening. Cecelia Christiansen seems to be open to hospice care at the [facility], however, would like to see it first.  Contacted hospice , who educated family that tours are not being offered. Encouraged family to go online and tour the [facility]. Family have decided to discuss options over night. Family request hospice verify if a bed will  be available at the [facility] on Wednesday. Encouraged family to consider if Hospice services are appropriate, not depending on location. Family request hospice team contact first Cecelia Kuldip Bajwa in the morning around 09:00 to plan next steps. Pt is on list for next available bed at the [place] [address].       Yolande Mcardle, RN, [place] [address] Nurse Liaison  [phone] Mobile  [phone] Office

## 2022-11-29 NOTE — CONSULTS
Palliative Medicine Consult  [place]: [phone] ([phone])    Patient Name: Deonte Peterson  YOB: 1922    Date of Initial Consult: 2022  Reason for Consult: Care Decisions  Requesting Provider: Dr. Ravi Pena  Primary Care Physician: Chaz Morris MD     SUMMARY:   Deonte Peterson is a Barry Meka [age] y.o. admitted on 2022 from [facility] with a diagnosis of  CHF, delirium, fever, aspiration pna, ?fungal rash in groin vs scrotal cellulitis, lactic acidosis (resolved), bacteremia, elevated troponin, acute COPD exacerbation, oral candidiasis  Head CT neg     PMH: SSS (pacemaker), afib, NSTEMI, CAD, CHF, CM, HTN, BPH, COPD, CKD, recent UTI, HLD, Rosacea, Hypothyroidism, GERD, hypothyroidism, aspiration risk, essential tremor, vit d def, [place]    Current medical issues leading to Palliative Medicine involvement include: support with care decisions. Pt prognosis is poor. DNR./DNI    Social: pt is a , lives n [facility], mentation waxes and wanes per notes but was able to have a lucid conversation. PALLIATIVE DIAGNOSES:   Shortness of breath  Delirium  Aspiration Pna  Feeding difficulties  Palliative Care Encounter      PLAN:   Family meeting with daughter, granddaughters Sascha Banuelos, and grandson Matthew Perez. Services introduced. Hospice Liason Nikki Brower also present  We discussed the patient's condition and inquired about understanding   Family have a clear understanding of the patient's poor prognosis and high mortality risk. Informed them of chronic aspiration and that this will be ongoing regardless of long term antibiotic therapy. Also explained that antibiotics are not without risk. We discussed hospice vs dc without hospice. Explained that without a service like hospice the pt will likely be in and out of the hospital with ongoing decline of health and poor quality of life. Explained the support hospice could provide and prevention of readmission.   Explained that clinically the pt is too ill to return to the facility at this time~with or without hospice. We also discussed feeding for comfort. Discussed symptom management in detail. Hospice offered GIP admission today with plans to transition to the hospice house  Decisions  Family would like more time to process the information received  They would also like to research the [facility]  Patient's daughter does not feel comfortable making a decision today  Continue current care  I will follow up tomorrow. Family has my contact information  Initial consult note routed to primary continuity provider and/or primary health care team members  Communicated plan of care with: Palliative [facility] Team     GOALS OF CARE / TREATMENT PREFERENCES:     GOALS OF CARE:  Patient/Health Care Proxy Stated Goals: Other (comment) (family still deciding on hospice)    TREATMENT PREFERENCES:   Code Status: DNR    Patient and family's personal goals include: comfort with secure disposition        The patient identifies the following as important for living well: eating       Advance Care Planning:  [] The [facility] Interdisciplinary Team has updated the ACP Navigator with [address] and Patient Capacity      Primary Decision Maker (Active): Mirella Ross - Daughter - [phone]     Advance Care Planning 1/5/2021   Confirm Advance Directive Yes, on file   Does the patient have other document types Do Not Resuscitate       Medical Interventions: Limited additional interventions       Other:    As far as possible, the palliative care team has discussed with patient / health care proxy about goals of care / treatment preferences for patient.      HISTORY:     History obtained from: chart, team, family    CHIEF COMPLAINT: Pt admitted with aforementioned history and issues    HPI/SUBJECTIVE:    The patient is:   [] Verbal and participatory  [x] Non-participatory due to: medical condition        Clinical Pain Assessment (nonverbal scale for severity on nonverbal patients):   Clinical Pain Assessment  Severity: 0     Activity (Movement): Lying quietly, normal position    Duration: for how long has pt been experiencing pain (e.g., 2 days, 1 month, years)  Frequency: how often pain is an issue (e.g., several times per day, once every few days, constant)     FUNCTIONAL ASSESSMENT:     Palliative Performance Scale (PPS):  PPS: 30       PSYCHOSOCIAL/SPIRITUAL SCREENING:     Palliative IDT has assessed this patient for cultural preferences / practices and a referral made as appropriate to needs (Cultural Services, Patient Advocacy, Ethics, etc.)    Any spiritual / Sikh concerns:  [] Yes /  [x] No  [] Unable to obtain this information  If \"Yes\" to discuss with pastoral care during IDT     Does caregiver feel burdened by caring for their loved one:   [] Yes /  [] No /  [] No Caregiver Present/Available [] No Caregiver [x] Pt Lives at Facility  If \"Yes\" to discuss with social work during IDT    Anticipatory grief assessment:   [x] Normal  / [] Maladaptive   [] Unable to obtain this information  [] n/a  If \"Maladaptive\" to discuss with social work during IDT    ESAS Anxiety: Anxiety: 0    ESAS Depression:          REVIEW OF SYSTEMS:     Positive and pertinent negative findings in ROS are noted above in HPI. The following systems were [] reviewed / [x] unable to be reviewed as noted in HPI  Other findings are noted below. Systems: constitutional, ears/nose/mouth/throat, respiratory, gastrointestinal, genitourinary, musculoskeletal, integumentary, neurologic, psychiatric, endocrine. Positive findings noted below.   Modified ESAS Completed by: provider   Fatigue: 7 Drowsiness: 1     Pain: 0   Anxiety: 0     Anorexia: 4 Dyspnea: 3           Stool Occurrence(s): 1        PHYSICAL EXAM:     From RN flowsheet:  Wt Readings from Last 3 Encounters:   11/29/22 172 lb 9.6 oz (78.3 kg)   09/24/22 187 lb 2.7 oz (84.9 kg)   09/14/22 179 lb (81.2 kg)     Blood pressure 119/66, pulse 70, temperature 98.8 °F (37.1 °C), resp. rate 20, height 5' 8\" (1.727 m), weight 172 lb 9.6 oz (78.3 kg), SpO2 97 %. Pain Scale 1: Numeric (0 - 10)  Pain Intensity 1: 0                 Last bowel movement, if known:     Constitutional: frail, alert at times  Eyes: pupils equal, anicteric  ENMT: no nasal discharge, dry mucous membranes  Cardiovascular:   Respiratory: breathing labored, audible secretions, symmetric  Gastrointestinal:   Musculoskeletal: no deformity  Skin: warm, dry  Neurologic: following commands at times  Psychiatric: calm  Other:       HISTORY:     Active Problems:    Fever (1/27/2019)      Lactic acidosis (11/26/2022)      Elevated troponin (11/26/2022)      AMS (altered mental status) (11/26/2022)    Past Medical History:   Diagnosis Date    Angina pectoris (HCC)     Atrial fibrillation (HCC)     BPH (benign prostatic hyperplasia)     Chronic obstructive pulmonary disease ([facility] [address]     COVID-19     Elevated PSA     Essential hypertension     GERD (gastroesophageal reflux disease)     GERD without esophagitis     Hyperlipidemia     Hypothyroidism     Long term (current) use of anticoagulants     Pacemaker     Rosacea 2016    Rosacea     SSS (sick sinus syndrome) ([facility] [address]       Past Surgical History:   Procedure Laterality Date    HX CORONARY STENT PLACEMENT      HX HEART CATHETERIZATION      HX PACEMAKER      NC REMVL PERM PM PLS GEN W/REPL PLSE GEN 2 LEAD SYS N/A 2/6/2020    REMOVE & REPLACE PPM GEN DUAL LEAD performed by Jas Puente MD at [address] CATH LAB      Family History   Problem Relation Age of Onset    Stroke Mother     Hypertension Mother     Kidney Disease Father       History reviewed, no pertinent family history.   Social History     Tobacco Use    Smoking status: Never    Smokeless tobacco: Never   Substance Use Topics    Alcohol use: Never     Alcohol/week: 0.0 standard drinks     No Known Allergies   Current Facility-Administered Medications   Medication Dose Route Frequency    [START ON 11/30/2022] Vancomycin random with AM labs   Other ONCE    Vancomycin- Pharmacy to Dose   Other Rx Dosing/Monitoring    vancomycin (VANCOCIN) 750 mg in 0.9% sodium chloride 250 mL ([id number])  750 mg IntraVENous Q24H    balsam peru-castor oiL (VENELEX) ointment   Topical Q12H    clindamycin (CLEOCIN) 600mg D5W 50mL IVPB (premix)  600 mg IntraVENous Q8H    ketotifen (ZADITOR) 0.025 % (0.035 %) ophthalmic solution 1 Drop  1 Drop Right Eye Q12H    nystatin (MYCOSTATIN) 100,000 unit/mL oral suspension 500,000 Units  500,000 Units Oral QID    cefepime (MAXIPIME) 1 g in 0.9% sodium chloride (MBP/ADV) 50 mL MBP  1 g IntraVENous Q12H    ondansetron (ZOFRAN) injection 4 mg  4 mg IntraVENous Q6H PRN    acetaminophen (TYLENOL) suppository 650 mg  650 mg Rectal Q4H PRN    sodium chloride (NS) flush 5-40 mL  5-40 mL IntraVENous Q8H    sodium chloride (NS) flush 5-40 mL  5-40 mL IntraVENous PRN    acetaminophen (TYLENOL) tablet 650 mg  650 mg Oral Q6H PRN    atorvastatin (LIPITOR) tablet 20 mg  20 mg Oral DAILY    [Held by provider] furosemide (LASIX) tablet 20 mg  20 mg Oral DAILY    levothyroxine (SYNTHROID) tablet 75 mcg  75 mcg Oral 6am    lisinopriL (PRINIVIL, ZESTRIL) tablet 20 mg  20 mg Oral DAILY    magnesium oxide (MAG-OX) tablet 400 mg  400 mg Oral DAILY    metoprolol succinate (TOPROL-XL) XL tablet 25 mg  25 mg Oral QPM    ipratropium (ATROVENT) 21 mcg (0.03 %) nasal spray 2 Spray  2 Spray Both Nostrils BID PRN    pantoprazole (PROTONIX) tablet 20 mg  20 mg Oral DAILY    tamsulosin (FLOMAX) capsule 0.4 mg  0.4 mg Oral QHS    ascorbic acid (vitamin C) (VITAMIN C) tablet 500 mg  500 mg Oral Once per day on Tue Thu Sat    ondansetron (ZOFRAN ODT) tablet 4 mg  4 mg Oral Q8H PRN    albuterol-ipratropium (DUO-NEB) 2.5 MG-0.5 MG/3 ML  3 mL Nebulization Q4H PRN    miconazole (MICOTIN) 2 % powder   Topical BID          LAB AND IMAGING FINDINGS:     Lab Results   Component Value Date/Time    WBC 13. 2 (H) 11/29/2022 02:52 AM    HGB 11.3 (L) 11/29/2022 02:52 AM    PLATELET 823 (L) 07/68/3991 02:52 AM     Lab Results   Component Value Date/Time    Sodium 143 11/29/2022 02:52 AM    Potassium 3.6 11/29/2022 02:52 AM    Chloride 107 11/29/2022 02:52 AM    CO2 27 11/29/2022 02:52 AM    BUN 30 (H) 11/29/2022 02:52 AM    Creatinine 1.51 (H) 11/29/2022 02:52 AM    Calcium 8.2 (L) 11/29/2022 02:52 AM    Magnesium 1.9 11/29/2022 02:52 AM    Phosphorus 3.4 11/29/2022 02:52 AM      Lab Results   Component Value Date/Time    Alk. phosphatase 90 11/29/2022 02:52 AM    Protein, total 5.6 (L) 11/29/2022 02:52 AM    Albumin 2.9 (L) 11/29/2022 02:52 AM    Globulin 2.7 11/29/2022 02:52 AM     Lab Results   Component Value Date/Time    INR 1.1 01/04/2021 02:42 AM    Prothrombin time 11.7 (H) 01/04/2021 02:42 AM    aPTT 100.2 (HH) 01/05/2021 04:44 AM      No results found for: IRON, FE, TIBC, IBCT, PSAT, FERR   No results found for: PH, PCO2, PO2  No components found for: GLPOC   No results found for: CPK, CKMB             Total time:  70 minutes  Counseling / coordination time, spent as noted above:  60 minutes  > 50% counseling / coordination?: yes    Prolonged service was provided for  []30 min   []75 min in face to face time in the presence of the patient, spent as noted above. Time Start:   Time End:   Note: this can only be billed with 19666 (initial) or 95049 (follow up). If multiple start / stop times, list each separately.

## 2022-11-29 NOTE — PROGRESS NOTES
Problem: Mobility Impaired (Adult and Pediatric)  Goal: *Acute Goals and Plan of Care (Insert Text)  Description: FUNCTIONAL STATUS PRIOR TO ADMISSION: Patient was modified independent using a rolling walker for functional mobility. HOME SUPPORT PRIOR TO ADMISSION: Patient resided in [facility]    Physical Therapy Goals  Initiated 11/29/2022  1. Patient will move from supine to sit and sit to supine , scoot up and down, and roll side to side in bed with minimal assistance/contact guard assist within 7 day(s). 2.  Patient will transfer from bed to chair and chair to bed with minimal assistance/contact guard assist using the least restrictive device within 7 day(s). 3.  Patient will perform sit to stand with minimal assistance/contact guard assist within 7 day(s). 4.  Patient will ambulate with minimal assistance/contact guard assist for 10 feet with the least restrictive device within 7 day(s). Outcome: Progressing Towards Goal     PHYSICAL THERAPY EVALUATION  Patient: Radhames Dixon (80 y.o. male)  Date: 11/29/2022  Primary Diagnosis: AMS (altered mental status) [R41.82]  Lactic acidosis [E87.20]  Elevated troponin [R77.8]  Fever [R50.9]       Precautions: fall       ASSESSMENT  Based on the objective data described below, the patient presents with AMS, lethargy, decreased attention, decreased sitting and standing tolerance, generalized weakness, decreased functional activity tolerance as compared to PLOF. This is a 8 year old male who presented to hospital with AMS and fever. Patient is received in bed on 2L supplemental O2 and resting with two family members at bedside. Patient able to follow all commands, is oriented to himself. Eyes closed while lying in bed but able to open with cues. Able to reach EOB with mod to max A x 2. Once sitting EOB able to sustain sitting balance for several minutes without support. Much more alert while sitting up.  Demonstrates ability to dynamically reach across body with BUE and kick with BLE while maintaining balance. Attempt to stand with mod to max A x 2 and bilateral HHA. Attempted to take lateral steps towards [facility] however patient had difficulty advancing LLE to the L. Assisted patient in returning to supine position and positioned him appropriately. Vitals remained stable throughout session, no s/s of OH or desaturation. Family deciding between hospice care vs rehab. At this time, he remains a candidate for rehab as he was able to fully tolerate session and follow all commands. Will trial 5x per week frequency in acute setting and continue to follow. 11/29/22 0921 11/29/22 0931 11/29/22 0941   Vital Signs   Temp  --   --  99.3 °F (37.4 °C)   Temp Source  --   --  Axillary   Pulse (Heart Rate) 72 75 73   Heart Rate Source  --   --  Monitor   /68 103/68 114/67   MAP (Calculated) 79 80 83   BP 1 Location Left upper arm Left upper arm Left upper arm   BP 1 Method Automatic Automatic Automatic   BP Patient Position At rest;Semi fowlers Sitting Semi fowlers   Resp Rate 25  --   --    O2 Sat (%) 99 % 99 % 98 %       Current Level of Function Impacting Discharge (mobility/balance): mod to max A x 2 for bed mobility and transfers. Able to sit EOB for several minutes before fatiguing     Functional Outcome Measure: The patient scored 15/100 on the Barthel outcome measure which is indicative of 85% impairment. Other factors to consider for discharge: high PLOF,      Patient will benefit from skilled therapy intervention to address the above noted impairments. PLAN :  Recommendations and Planned Interventions: bed mobility training, transfer training, gait training, therapeutic exercises, neuromuscular re-education, patient and family training/education, and therapeutic activities      Frequency/Duration: Patient will be followed by physical therapy:  will trial 5 times a week to address goals.     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     OBJECTIVE DATA SUMMARY:   HISTORY:    Past Medical History:   Diagnosis Date    Angina pectoris (HCC)     Atrial fibrillation (HCC)     BPH (benign prostatic hyperplasia)     Chronic obstructive pulmonary disease (HCC)     COVID-19     Elevated PSA     Essential hypertension     GERD (gastroesophageal reflux disease)     GERD without esophagitis     Hyperlipidemia     Hypothyroidism     Long term (current) use of anticoagulants     Pacemaker     Rosacea 2016    Rosacea     SSS (sick sinus syndrome) (HCC)      Past Surgical History:   Procedure Laterality Date    HX CORONARY STENT PLACEMENT      HX HEART CATHETERIZATION      HX PACEMAKER      [place] REMVL PERM PM PLS GEN W/REPL PLSE GEN 2 LEAD SYS N/A 2/6/2020    REMOVE & REPLACE PPM GEN DUAL LEAD performed by Pastor Tung MD at [facility] CATH LAB       Personal factors and/or comorbidities impacting plan of care: age, strong family support, recent decline    Home Situation  Home Environment: Assisted living  Living Alone: No  Support Systems: Child(nunu)  Patient Expects to be Discharged to[de-identified] Skilled nursing facility  Current DME Used/Available at Home: chu Pimetnel    EXAMINATION/PRESENTATION/DECISION MAKING:   Critical Behavior:  Neurologic State: Alert, Eyes open spontaneously  Orientation Level: Oriented to person, Disoriented to place, Disoriented to time, Disoriented to situation  Cognition: Follows commands, Decreased attention/concentration, Memory loss     Hearing: Auditory  Auditory Impairment: Hard of hearing, bilateral, Hearing aid(s)  Hearing Aids/Status:  At bedside    Range Of Motion:  AROM: Generally decreased, functional        PROM: Generally decreased, functional     Strength:    Strength: Generally decreased, functional        Tone & Sensation:   Tone: Normal     Coordination:  Coordination: Generally decreased, functional  Vision:      Functional Mobility:  Bed Mobility:  Rolling: Moderate assistance;Maximum assistance;Assist x2  Supine to Sit: Moderate assistance;Maximum assistance;Assist x2  Sit to Supine: Moderate assistance;Assist x2  Scooting: Maximum assistance;Assist x2    Transfers:  Sit to Stand: Moderate assistance;Maximum assistance;Assist x2  Stand to Sit: Maximum assistance;Assist x2     Balance:   Sitting: Intact; Without support  Standing: Impaired; With support  Standing - Static: Constant support;Fair;Poor  Standing - Dynamic : Not tested      Functional Measure:  Barthel Index:    Bathin  Bladder: 0  Bowels: 5  Groomin  Dressin  Feedin  Mobility: 0  Stairs: 0  Toilet Use: 0  Transfer (Bed to Chair and Back): 5  Total: 15/100       The Barthel ADL Index: Guidelines  1. The index should be used as a record of what a patient does, not as a record of what a patient could do. 2. The main aim is to establish degree of independence from any help, physical or verbal, however minor and for whatever reason. 3. The need for supervision renders the patient not independent. 4. A patient's performance should be established using the best available evidence. Asking the patient, friends/relatives and nurses are the usual sources, but direct observation and common sense are also important. However direct testing is not needed. 5. Usually the patient's performance over the preceding 24-48 hours is important, but occasionally longer periods will be relevant. 6. Middle categories imply that the patient supplies over 50 per cent of the effort. 7. Use of aids to be independent is allowed.     Score Interpretation (from [address] [place] [address])    Independent   60-79 Minimally independent   40-59 Partially dependent   20-39 Very dependent   <20 Totally dependent     -Miguel Elam., Barthel, D.W. (1965). Functional evaluation: the Barthel Index. [address] ([address] (1997). The Barthel activities of daily living index: self-reporting versus actual performance in the old (> or = 75 years). Journal of [address] [place] [address] 45(7), 14 [facility], JOHNNIE, Sultana Soni., [facility]. (1999). Measuring the change in disability after inpatient rehabilitation; comparison of the responsiveness of the Barthel Index and Functional [place] Measure. Journal of Neurology, Neurosurgery, and Psychiatry, 66(4), 733-172. STEVE Ibrahim, RADHA Moffett, & Eva Mathias MELI (2004) Assessment of post-stroke quality of life in cost-effectiveness studies: The usefulness of the Barthel Index and the EuroQoL-5D. Quality of Life Research, 15, 260-30           Physical Therapy Evaluation Charge Determination   History Examination Presentation Decision-Making   HIGH Complexity :3+ comorbidities / personal factors will impact the outcome/ POC  LOW Complexity : 1-2 Standardized tests and measures addressing body structure, function, activity limitation and / or participation in recreation  LOW Complexity : Stable, uncomplicated  LOW Complexity : FOTO score of       Based on the above components, the patient evaluation is determined to be of the following complexity level: LOW     Pain Rating:  No pain reported     Activity Tolerance:   Fair and requires rest breaks    After treatment patient left in no apparent distress:   Supine in bed, Call bell within reach, Bed / chair alarm activated, Caregiver / family present, and Side rails x 3    COMMUNICATION/EDUCATION:   The patients plan of care was discussed with: Occupational therapist, Registered nurse, and Case management.      Fall prevention education was provided and the patient/caregiver indicated understanding. and Patient/family have participated as able in goal setting and plan of care.     Thank you for this referral.  Anthony Andre, PT   Time Calculation: 27 mins

## 2022-11-29 NOTE — PROGRESS NOTES
Problem: Self Care Deficits Care Plan (Adult)  Goal: *Acute Goals and Plan of Care (Insert Text)  Description: FUNCTIONAL STATUS PRIOR TO ADMISSION: Patient was modified independent using a walker for functional mobility. HOME SUPPORT: The patient lived in [facility] but did not require assist.    Occupational Therapy Goals  Initiated 11/29/2022  1. Patient will perform grooming sitting EOB with minimal assistance within 7 day(s). 2.  Patient will perform anterior neck to thigh bathing in sitting with minimal assistance within 7 day(s). 3.  Patient will perform toilet transfers to/from [facility] with moderate assistance x2 within 7 day(s). 4.  Patient will perform all aspects of toileting with moderate assistance within 7 day(s). 5.  Patient will participate in upper extremity therapeutic exercise/activities with minimal assistance/contact guard assist for 5 minutes within 7 day(s). 6.  Patient will utilize energy conservation techniques during functional activities with verbal cues within 7 day(s). Outcome: Not Met    OCCUPATIONAL THERAPY EVALUATION  Patient: Sola Dutton (80 y.o. male)  Date: 11/29/2022  Primary Diagnosis: AMS (altered mental status) [R41.82]  Lactic acidosis [E87.20]  Elevated troponin [R77.8]  Fever [R50.9]       Precautions: falls, skin       ASSESSMENT  Based on the objective data described below, the patient presents with impaired balance, generalized weakness, decreased functional activity tolerance limited lower body access and impaired cognition (attention to task, sequencing, etc). Patient mod I and living in [place] at baseline. Patient admitted s/p AMS and fever. Patient is received in bed on 2L supplemental O2 and resting with two family members at bedside. Patient able to follow all commands, is oriented to himself. Eyes closed while lying in bed but able to open with cues. Patient able to wash face with mod A - required assist for thoroughness.  Able to reach EOB with mod to max A x 2. Once sitting EOB able to sustain sitting balance for several minutes without support. Much more alert while sitting up. Demonstrates ability to dynamically reach across body with BUE and kick with BLE while maintaining balance. Attempt to stand with mod to max A x 2 and bilateral HHA. Attempted to take lateral steps towards HOB. Assisted patient in returning to supine position and positioned him appropriately. Vitals remained stable throughout session, no s/s of OH or desaturation. Family deciding between hospice care vs rehab. At this time, he remains a candidate for rehab as he was able to fully tolerate session and follow all commands. Will trial 5x per week frequency in acute setting and continue to follow. Current Level of Function Impacting Discharge (ADLs/self-care): mod-total A for ADLs    Functional Outcome Measure: The patient scored Total: 15/100 on the Barthel Index outcome measure which is indicative of being 85% impaired in basic self-care. Other factors to consider for discharge: family considering hospice     Patient will benefit from skilled therapy intervention to address the above noted impairments. PLAN :  Recommendations and Planned Interventions: self care training, functional mobility training, therapeutic exercise, balance training, therapeutic activities, endurance activities, patient education, home safety training, and family training/education    Frequency/Duration: Patient will be followed by occupational therapy 5 times a week to address goals. Recommendation for discharge: (in order for the patient to meet his/her long term goals)  Therapy up to 5 days/week in SNF setting    This discharge recommendation:  Has been made in collaboration with the attending provider and/or case management    IF patient discharges home will need the following DME: bedside commode and hospital bed       SUBJECTIVE:   Patient stated Calvin Rainey.  re: when asked if he wanted to get to EOB      OBJECTIVE DATA SUMMARY:   HISTORY:   Past Medical History:   Diagnosis Date    Angina pectoris ([address]     Atrial fibrillation (HCC)     BPH (benign prostatic hyperplasia)     Chronic obstructive pulmonary disease (HCC)     COVID-19     Elevated PSA     Essential hypertension     GERD (gastroesophageal reflux disease)     GERD without esophagitis     Hyperlipidemia     Hypothyroidism     Long term (current) use of anticoagulants     Pacemaker     Rosacea 2016    Rosacea     SSS (sick sinus syndrome) (HCC)      Past Surgical History:   Procedure Laterality Date    HX CORONARY STENT PLACEMENT      HX HEART CATHETERIZATION      HX PACEMAKER      [place] REMVL PERM PM PLS GEN W/REPL PLSE GEN 2 LEAD SYS N/A 2/6/2020    REMOVE & REPLACE PPM GEN DUAL LEAD performed by Junior Gutierrez MD at [place] [address], [facility] CATH LAB       Expanded or extensive additional review of patient history:     Home Situation  Home Environment: Assisted living  Living Alone: No  Support Systems: Child(nunu)  Patient Expects to be Discharged to[de-identified] Skilled nursing facility  Current DME Used/Available at Home: Walker, rolling    Hand dominance: Right    EXAMINATION OF PERFORMANCE DEFICITS:  Cognitive/Behavioral Status:  Neurologic State: Lethargic (required cues to keep eyes open)  Orientation Level: Oriented to person;Disoriented to place; Disoriented to situation;Disoriented to time  Cognition: Decreased attention/concentration; Follows commands;Memory loss;Poor safety awareness  Perception: Cues to maintain midline in standing; Tactile;Verbal;Visual  Perseveration: No perseveration noted  Safety/Judgement: Decreased awareness of environment;Decreased awareness of need for assistance;Decreased awareness of need for safety;Decreased insight into deficits; Fall prevention    Skin: appears grossly intact    Edema: none noted in BUEs    Hearing: Auditory  Auditory Impairment: Hard of hearing, bilateral, Hearing aid(s)  Hearing Aids/Status:  At bedside    Vision/Perceptual:    Tracking: Unable to test secondary due to decreased visual attention      Range of Motion:  In BUEs  AROM: Generally decreased, functional  PROM: Generally decreased, functional    Strength: In BUEs  Strength: Generally decreased, functional    Coordination:  Coordination: Generally decreased, functional  Fine Motor Skills-Upper: Left Impaired;Right Impaired    Gross Motor Skills-Upper: Left Intact; Right Intact    Tone & Sensation:  In BUEs  Tone: Normal    Balance:  Sitting: Intact; Without support  Standing: Impaired; With support  Standing - Static: Constant support;Fair;Poor  Standing - Dynamic : Not tested    Functional Mobility and Transfers for ADLs:  Bed Mobility:  Rolling: Moderate assistance;Maximum assistance;Assist x2  Supine to Sit: Moderate assistance;Maximum assistance;Assist x2  Sit to Supine: Moderate assistance;Assist x2  Scooting: Maximum assistance;Assist x2    Transfers:  Sit to Stand: Moderate assistance;Maximum assistance;Assist x2  Stand to Sit: Maximum assistance;Assist x2    ADL Assessment:  Feeding: Moderate assistance    Oral Facial Hygiene/Grooming: Moderate assistance    Bathing: Maximum assistance    Upper Body Dressing: Maximum assistance    Lower Body Dressing: Maximum assistance    Toileting: Maximum assistance    ADL Intervention and task modifications:     Grooming  Washing Face: Moderate assistance  Cues: Physical assistance;Verbal cues provided    Lower Body Dressing Assistance  Socks: Total assistance (dependent)  Leg Crossed Method Used: No  Position Performed: Seated edge of bed  Cues: Don;Physical assistance;Verbal cues provided    Cognitive Retraining  Safety/Judgement: Decreased awareness of environment;Decreased awareness of need for assistance;Decreased awareness of need for safety;Decreased insight into deficits; Fall prevention    Functional Measure:    Barthel Index:  Bathin  Bladder: 0  Bowels: 5  Groomin  Dressin  Feeding: 5  Mobility: 0  Stairs: 0  Toilet Use: 0  Transfer (Bed to Chair and Back): 5  Total: 15/100      The Barthel ADL Index: Guidelines  1. The index should be used as a record of what a patient does, not as a record of what a patient could do. 2. The main aim is to establish degree of independence from any help, physical or verbal, however minor and for whatever reason. 3. The need for supervision renders the patient not independent. 4. A patient's performance should be established using the best available evidence. Asking the patient, friends/relatives and nurses are the usual sources, but direct observation and common sense are also important. However direct testing is not needed. 5. Usually the patient's performance over the preceding 24-48 hours is important, but occasionally longer periods will be relevant. 6. Middle categories imply that the patient supplies over 50 per cent of the effort. 7. Use of aids to be independent is allowed. Score Interpretation (from [facility])    Independent   60-79 Minimally independent   40-59 Partially dependent   20-39 Very dependent   <20 Totally dependent     -Miguel Elam., Barthel, D.W. (1965). Functional evaluation: the Barthel Index. [address] [facility] ([address] (). The Barthel activities of daily living index: self-reporting versus actual performance in the old (> or = 75 years). Journal of [address] [place] [address] 45(7), 14 [facility], [facility], Kaylie Cruz., Merg[facility] . (). Measuring the change in disability after inpatient rehabilitation; comparison of the responsiveness of the Barthel Index and Functional [place] Measure. Journal of Neurology, Neurosurgery, and Psychiatry, 66(4), 573-080. Anna Lowry, N.J.A, NASRA Moffett.MAY, & Ronda Dalal M.A. (2004) Assessment of post-stroke quality of life in cost-effectiveness studies: The usefulness of the Barthel Index and the EuroQoL-5D.  Quality of Life Research, 15, 128-78     Occupational Therapy Evaluation Charge Determination   History Examination Decision-Making   MEDIUM Complexity : Expanded review of history including physical, cognitive and psychosocial  history  MEDIUM Complexity : 3-5 performance deficits relating to physical, cognitive , or psychosocial skils that result in activity limitations and / or participation restrictions HIGH Complexity : Patient presents with comorbidities that affect occupational performance. Signifigant modification of tasks or assistance (eg, physical or verbal) with assessment (s) is necessary to enable patient to complete evaluation       Based on the above components, the patient evaluation is determined to be of the following complexity level: MEDIUM  Pain Rating:  Reporting no pain    Activity Tolerance:   Fair    After treatment patient left in no apparent distress:    Supine in bed, Call bell within reach, Caregiver / family present, Side rails x 3, and RN aware    COMMUNICATION/EDUCATION:   The patients plan of care was discussed with: Physical therapist, Registered nurse, and Case management. Home safety education was provided and the patient/caregiver indicated understanding. and Patient/family have participated as able in goal setting and plan of care. This patients plan of care is appropriate for delegation to [facility].     Thank you for this referral.  Reim Rudolph OT  Time Calculation: 21 mins

## 2022-11-29 NOTE — PROGRESS NOTES
Problem: Falls - Risk of  Goal: *Absence of Falls  Description: Document Shanda Counts Fall Risk and appropriate interventions in the flowsheet.   Outcome: Progressing Towards Goal  Note: Fall Risk Interventions:  Mobility Interventions: Assess mobility with egress test, Bed/chair exit alarm, Patient to call before getting OOB, Strengthening exercises (ROM-active/passive)    Mentation Interventions: Adequate sleep, hydration, pain control, Bed/chair exit alarm, Update white board, Toileting rounds, Reorient patient, More frequent rounding, Increase mobility, Room close to nurse's station    Medication Interventions: Bed/chair exit alarm, Patient to call before getting OOB, Teach patient to arise slowly    Elimination Interventions: Bed/chair exit alarm, Call light in reach, Patient to call for help with toileting needs, Toileting schedule/hourly rounds              Problem: Patient Education: Go to Patient Education Activity  Goal: Patient/Family Education  Outcome: Progressing Towards Goal

## 2022-11-29 NOTE — ROUTINE PROCESS
Pt incontinent of urine. Bladder scan shows 230cc of urine in bladder. [address] cath attempted. Not able to get past prostate. Discussed with family at bedside. Decided to hold of for now and reassess later.

## 2022-11-29 NOTE — PROGRESS NOTES
Dr Janusz Puente,  Also for pt. 20 mg iv lasix held today. Can we give him a dose of lasix. does have a history of CHF. thank you.

## 2022-11-29 NOTE — ROUTINE PROCESS
Dr Niya Dennison,    Mr Reymundo Tam is here for sepsis/? aspiration pneumonia. Pt has coarse lung sounds. saturation 93% on 2 L nc. Can we get a order for deep suction prn? also pt complaining of nausea. Can I have an order for zofran iv prn? and tylenol suppository PRN?  Thank you [facility]

## 2022-11-29 NOTE — PROGRESS NOTES
Orders received, chart reviewed and patient evaluated by physical therapy. Pending progression with skilled acute physical therapy, recommend: To be determined: SNF vs hospice per family decision    Recommend with nursing OOB to chair 3x/day with two assist and  gait belt . Thank you for completing as able in order to maintain patient strength, endurance and independence. Full evaluation to follow.       Thank you for your consideration,    Prabha Abel, PT, DPT

## 2022-11-29 NOTE — PROGRESS NOTES
Bedside and Verbal shift change report given to tram timmons (oncoming nurse) by Anastasia Feliciano (offgoing nurse). Report included the following information SBAR, Kardex, Intake/Output, Recent Results, Cardiac Rhythm v-paced, and Quality Measures.

## 2022-11-30 ENCOUNTER — APPOINTMENT (OUTPATIENT)
Dept: NON INVASIVE DIAGNOSTICS | Age: 87
DRG: 177 | End: 2022-11-30
Attending: NURSE PRACTITIONER
Payer: MEDICARE

## 2022-11-30 LAB
ANION GAP SERPL CALC-SCNC: 5 MMOL/L (ref 5–15)
BASOPHILS # BLD: 0 K/UL (ref 0–0.1)
BASOPHILS NFR BLD: 0 % (ref 0–1)
BUN SERPL-MCNC: 32 MG/DL (ref 6–20)
BUN/CREAT SERPL: 22 (ref 12–20)
CALCIUM SERPL-MCNC: 7.8 MG/DL (ref 8.5–10.1)
CHLORIDE SERPL-SCNC: 113 MMOL/L (ref 97–108)
CO2 SERPL-SCNC: 22 MMOL/L (ref 21–32)
COMMENT, HOLDF: NORMAL
CREAT SERPL-MCNC: 1.45 MG/DL (ref 0.7–1.3)
DIFFERENTIAL METHOD BLD: ABNORMAL
EOSINOPHIL # BLD: 0.1 K/UL (ref 0–0.4)
EOSINOPHIL NFR BLD: 1 % (ref 0–7)
ERYTHROCYTE [DISTWIDTH] IN BLOOD BY AUTOMATED COUNT: 15 % (ref 11.5–14.5)
GLUCOSE SERPL-MCNC: 73 MG/DL (ref 65–100)
HCT VFR BLD AUTO: 37.4 % (ref 36.6–50.3)
HGB BLD-MCNC: 11.3 G/DL (ref 12.1–17)
IMM GRANULOCYTES # BLD AUTO: 0.1 K/UL (ref 0–0.04)
IMM GRANULOCYTES NFR BLD AUTO: 1 % (ref 0–0.5)
LYMPHOCYTES # BLD: 0.8 K/UL (ref 0.8–3.5)
LYMPHOCYTES NFR BLD: 11 % (ref 12–49)
MCH RBC QN AUTO: 29.3 PG (ref 26–34)
MCHC RBC AUTO-ENTMCNC: 30.2 G/DL (ref 30–36.5)
MCV RBC AUTO: 96.9 FL (ref 80–99)
MONOCYTES # BLD: 0.6 K/UL (ref 0–1)
MONOCYTES NFR BLD: 8 % (ref 5–13)
NEUTS SEG # BLD: 6 K/UL (ref 1.8–8)
NEUTS SEG NFR BLD: 80 % (ref 32–75)
NRBC # BLD: 0 K/UL (ref 0–0.01)
NRBC BLD-RTO: 0 PER 100 WBC
PLATELET # BLD AUTO: 98 K/UL (ref 150–400)
PMV BLD AUTO: 11.2 FL (ref 8.9–12.9)
POTASSIUM SERPL-SCNC: 5 MMOL/L (ref 3.5–5.1)
RBC # BLD AUTO: 3.86 M/UL (ref 4.1–5.7)
SAMPLES BEING HELD,HOLD: NORMAL
SODIUM SERPL-SCNC: 140 MMOL/L (ref 136–145)
VANCOMYCIN SERPL-MCNC: 10.2 UG/ML
WBC # BLD AUTO: 7.6 K/UL (ref 4.1–11.1)

## 2022-11-30 PROCEDURE — 74011000250 HC RX REV CODE- 250: Performed by: FAMILY MEDICINE

## 2022-11-30 PROCEDURE — 65270000046 HC RM TELEMETRY

## 2022-11-30 PROCEDURE — 85025 COMPLETE CBC W/AUTO DIFF WBC: CPT

## 2022-11-30 PROCEDURE — 77010033678 HC OXYGEN DAILY

## 2022-11-30 PROCEDURE — 80202 ASSAY OF VANCOMYCIN: CPT

## 2022-11-30 PROCEDURE — 74011250637 HC RX REV CODE- 250/637: Performed by: PHYSICIAN ASSISTANT

## 2022-11-30 PROCEDURE — 92610 EVALUATE SWALLOWING FUNCTION: CPT

## 2022-11-30 PROCEDURE — 74011000258 HC RX REV CODE- 258: Performed by: NURSE PRACTITIONER

## 2022-11-30 PROCEDURE — 92612 ENDOSCOPY SWALLOW (FEES) VID: CPT

## 2022-11-30 PROCEDURE — 74011250636 HC RX REV CODE- 250/636: Performed by: PHYSICIAN ASSISTANT

## 2022-11-30 PROCEDURE — 80048 BASIC METABOLIC PNL TOTAL CA: CPT

## 2022-11-30 PROCEDURE — 97530 THERAPEUTIC ACTIVITIES: CPT

## 2022-11-30 PROCEDURE — 74011250637 HC RX REV CODE- 250/637: Performed by: NURSE PRACTITIONER

## 2022-11-30 PROCEDURE — 74011250636 HC RX REV CODE- 250/636: Performed by: NURSE PRACTITIONER

## 2022-11-30 PROCEDURE — 99233 SBSQ HOSP IP/OBS HIGH 50: CPT | Performed by: NURSE PRACTITIONER

## 2022-11-30 PROCEDURE — 94760 N-INVAS EAR/PLS OXIMETRY 1: CPT

## 2022-11-30 PROCEDURE — 36415 COLL VENOUS BLD VENIPUNCTURE: CPT

## 2022-11-30 RX ORDER — PANTOPRAZOLE SODIUM 40 MG/1
40 TABLET, DELAYED RELEASE ORAL DAILY
Status: DISCONTINUED | OUTPATIENT
Start: 2022-12-01 | End: 2022-12-05 | Stop reason: HOSPADM

## 2022-11-30 RX ADMIN — Medication 10 ML: at 22:24

## 2022-11-30 RX ADMIN — CASTOR OIL AND BALSAM, PERU: 788; 87 OINTMENT TOPICAL at 22:25

## 2022-11-30 RX ADMIN — ATORVASTATIN CALCIUM 20 MG: 20 TABLET, FILM COATED ORAL at 09:19

## 2022-11-30 RX ADMIN — CLINDAMYCIN IN 5 PERCENT DEXTROSE 600 MG: 12 INJECTION, SOLUTION INTRAVENOUS at 11:16

## 2022-11-30 RX ADMIN — MICONAZOLE NITRATE 2 % TOPICAL POWDER: at 17:59

## 2022-11-30 RX ADMIN — LISINOPRIL 20 MG: 20 TABLET ORAL at 09:17

## 2022-11-30 RX ADMIN — CASTOR OIL AND BALSAM, PERU: 788; 87 OINTMENT TOPICAL at 09:21

## 2022-11-30 RX ADMIN — CLINDAMYCIN IN 5 PERCENT DEXTROSE 600 MG: 12 INJECTION, SOLUTION INTRAVENOUS at 18:00

## 2022-11-30 RX ADMIN — Medication 400 MG: at 09:19

## 2022-11-30 RX ADMIN — PANTOPRAZOLE SODIUM 20 MG: 20 TABLET, DELAYED RELEASE ORAL at 09:17

## 2022-11-30 RX ADMIN — TAMSULOSIN HYDROCHLORIDE 0.4 MG: 0.4 CAPSULE ORAL at 22:25

## 2022-11-30 RX ADMIN — NYSTATIN 500000 UNITS: 100000 SUSPENSION ORAL at 18:05

## 2022-11-30 RX ADMIN — VANCOMYCIN HYDROCHLORIDE 750 MG: 750 INJECTION, POWDER, LYOPHILIZED, FOR SOLUTION INTRAVENOUS at 09:19

## 2022-11-30 RX ADMIN — MICONAZOLE NITRATE 2 % TOPICAL POWDER: at 09:21

## 2022-11-30 RX ADMIN — IPRATROPIUM BROMIDE AND ALBUTEROL SULFATE 3 ML: .5; 3 SOLUTION RESPIRATORY (INHALATION) at 07:26

## 2022-11-30 RX ADMIN — KETOTIFEN FUMARATE 1 DROP: 0.35 SOLUTION/ DROPS OPHTHALMIC at 09:22

## 2022-11-30 RX ADMIN — Medication 10 ML: at 15:42

## 2022-11-30 RX ADMIN — KETOTIFEN FUMARATE 1 DROP: 0.35 SOLUTION/ DROPS OPHTHALMIC at 22:25

## 2022-11-30 RX ADMIN — LEVOTHYROXINE SODIUM 75 MCG: 0.07 TABLET ORAL at 05:07

## 2022-11-30 RX ADMIN — Medication 10 ML: at 05:07

## 2022-11-30 RX ADMIN — NYSTATIN 500000 UNITS: 100000 SUSPENSION ORAL at 09:17

## 2022-11-30 RX ADMIN — NYSTATIN 500000 UNITS: 100000 SUSPENSION ORAL at 22:00

## 2022-11-30 RX ADMIN — CLINDAMYCIN IN 5 PERCENT DEXTROSE 600 MG: 12 INJECTION, SOLUTION INTRAVENOUS at 02:15

## 2022-11-30 RX ADMIN — NYSTATIN 500000 UNITS: 100000 SUSPENSION ORAL at 15:41

## 2022-11-30 RX ADMIN — CEFEPIME 1 G: 1 INJECTION, POWDER, FOR SOLUTION INTRAMUSCULAR; INTRAVENOUS at 11:16

## 2022-11-30 RX ADMIN — CEFEPIME 1 G: 1 INJECTION, POWDER, FOR SOLUTION INTRAMUSCULAR; INTRAVENOUS at 22:24

## 2022-11-30 RX ADMIN — Medication 10 ML: at 22:25

## 2022-11-30 RX ADMIN — METOPROLOL SUCCINATE 25 MG: 25 TABLET, EXTENDED RELEASE ORAL at 17:59

## 2022-11-30 NOTE — PROGRESS NOTES
SLP Contact Note  Problem: Dysphagia (Adult)  Goal: *Acute Goals and Plan of Care (Insert Text)  Description: Speech Therapy Goals  Initiated 11/30/22    1. Patient will tolerate soft/bite sized diet and thin liquids without adverse effects within 7 days. Outcome: Progressing Towards Goal    Speech Language Pathology  Flexible Endoscopic Evaluation of Swallowing-FEES  Patient: Taran Shetty (80 y.o. male)  Date: 11/30/2022  Primary Diagnosis: AMS (altered mental status) [R41.82]  Lactic acidosis [E87.20]  Elevated troponin [R77.8]  Fever [R50.9]       Precautions:        ASSESSMENT :  Based on the objective data described below, the patient presents with functional oral phase of swallow and moderate pharyngeal dysphagia likely further exacerbated by both presbyphagia and presbyesophagus. Pt with swallow initiation at the piriform sinuses with ice chips, thin liquids, and mildly-thick liquids, but this is considered a normal swallow variance and not a swallow delay. Intermittent premature spillage resulting in aspiration during the swallow. Pt is sensate to aspiration and is able to successfully clear airway of aspiration with thin liquids and ice chips. However, with mildly-thick liquids, pt not only with penetration/aspiration during the swallow, pt noted to have significant proximal escape (likely consistent with reflux) that was consistently aspirated, and continued to emerge from the esophagus several minutes after any mildly-thick trials. No significant weakness appreciated as pt with no significant residue with any consistency. Utilized a breath hold with thin liquids via cup and this improved pt airway clearance, although not with 100% accuracy.      Although pt with aspiration with liquid consistencies, it is readily apparent that thin liquids are safer than thickened liquids for this patient, given that pt is able to clear airway of aspirate with thin liquids, and that thickened liquids are more detrimental to aspirate than thin liquids per research (June et al., 2017). Given this, and given the other considerations mention in this SLP's previous note, recommend pt initiate thin liquids and soft/bite sized diet with breath hold when able and with cup sips. Meds in applesauce. Patient will cough. This is good as this is patient clearing airway of aspirated contents. Would also tend to favor unthickened water as pt's drink as pt enjoys water and unthickened water is the safest thing for the patient to aspirate. SLP will continue to follow closely. Do not give patient thickened liquids. Patient will benefit from skilled intervention to address the above impairments. Patient's rehabilitation potential is considered to be Fair       PLAN :  Recommendations and Planned Interventions:  --soft/bite sized diet/thin liquids  --cup sips only  --allow patient to self-feed   --do NOT thicken liquids  --excellent oral care  --pt will cough. This is okay. Please do not hold PO because of this cough. --medical management of reflux  --medications in applesauce only. Frequency/Duration: Patient will be followed by speech-language pathology 3 times a week to address goals. Discharge Recommendations: To Be Determined     SUBJECTIVE:   Patient stated, \"Did I do well?  after he completed breath hold.     OBJECTIVE:     Past Medical History:   Diagnosis Date    Angina pectoris ([address]     Atrial fibrillation (HCC)     BPH (benign prostatic hyperplasia)     Chronic obstructive pulmonary disease (HCC)     COVID-19     Elevated PSA     Essential hypertension     GERD (gastroesophageal reflux disease)     GERD without esophagitis     Hyperlipidemia     Hypothyroidism     Long term (current) use of anticoagulants     Pacemaker     Rosacea 2016    Rosacea     SSS (sick sinus syndrome) ([address]      Past Surgical History:   Procedure Laterality Date    HX CORONARY STENT PLACEMENT      HX HEART CATHETERIZATION      HX PACEMAKER      UT REMVL PERM PM PLS GEN W/REPL PLSE GEN 2 LEAD SYS N/A 2/6/2020    REMOVE & REPLACE PPM GEN DUAL LEAD performed by Ernesto Junior MD at [address], [facility]  CATH LAB     Prior Level of Function/Home Situation:   Home Situation  Home Environment: Assisted living  [address] Name: [facility]  One/Two Story Residence: One story  Living Alone: No  Support Systems: Child(nunu)  Patient Expects to be Discharged to[de-identified] Other:  Current DME Used/Available at Home: Walker, rolling  Diet prior to admission: regular diet/thick liquids  Current Diet:  NPO      Cognitive and Communication Status:  Neurologic State: Alert  Orientation Level: Oriented to person, Disoriented to place, Disoriented to situation, Disoriented to time  Cognition: Decreased attention/concentration, Decreased command following, Memory loss, Poor safety awareness  Perception: Cues to maintain midline in standing, Cues to maintain midline in sitting (posterior lean)  Perseveration: No perseveration noted  Safety/Judgement: Decreased awareness of environment, Decreased awareness of need for assistance, Decreased awareness of need for safety, Decreased insight into deficits, Fall prevention    History/indication for procedure: Reports of chronic aspiration  Lidocaine used: No  Nostril used: right  Scope Used: Ambu disposable scope  Feeding Tube Present in Nare: No  Adverse Reaction: No  Respiratory status: Nasal cannula        Part I:  Anatomy:       General Comments: WFL- excellent anatomy     Palate:   WFL   Base of tongue:   WFL   Valleculae:   WFL   Epiglottis:   WFL   Arytenoids:   WFL   False vocal folds:   WFL   Vocal folds:   WFL- possible vocal nodules (cannot be diagnosed without stroboscopy) Pyriform sinus:   WFL         Part II:  Laryngeal Function:    Adduction:   Full   Abduction:   Full   Arytenoid movement:   Symmetric Vocal quality:   WFL         Part III:  Secretions:    [place] Secretion Rating (0-7): 0     Location:  0 - Nil significant pooled secretions in pyriform fossae or laryngeal vestibule Amount:   0 - Nil significant pooled secretions in pyriform fossae (0-20%) Response*:  0 - Secretions in pyriform fossae or laryngeal vestibule effectively cleared   Comments (e.g., quality/texture/color):      *Normal airway responses in the pharynx or laryngeal vestibule may include spontaneous coughing, throat clearing, and/or swallowing        Part IV:  Swallow Trials:    Subjective comments regarding oral phase of swallow: WFL    Comments regarding esophageal phase of swallow: Proximal escape with all consistencies but especially with mildly-thick liquids. Dysphagia Severity Rating:   Oral phase: WFL  Pharyngeal phase: Moderate      NOMS:   The NOMS functional outcome measure was used to quantify this patient's level of swallowing impairment. Based on the NOMS, the patient was determined to be at level 3 for swallow function         NOMS Swallowing Levels:  Level 1 (CN): NPO  Level 2 (CM): NPO but takes consistency in therapy  Level 3 (CL): Takes less than 50% of nutrition p.o. and continues with nonoral feedings; and/or safe with mod cues; and/or max diet restriction  Level 4 (CK): Safe swallow but needs mod cues; and/or mod diet restriction; and/or still requires some nonoral feeding/supplements  Level 5 (CJ): Safe swallow with min diet restriction; and/or needs min cues  Level 6 (CI): Independent with p.o.; rare cues; usually self cues; may need to avoid some foods or needs extra time  Level 7 ([facility]): Independent for all p.o.  CARIE. (2003). National Outcomes Measurement System (NOMS): Adult Speech-Language Pathology User's Guide. Pain:  Pain Scale 1: Numeric (0 - 10)  Pain Intensity 1: 0       COMMUNICATION/EDUCATION:     The patient's plan of care including findings from FEES, recommendations, planned interventions, and recommended diet changes were discussed with: Registered nurse, NP.      Patient/family have participated as able in goal setting and plan of care.     Thank you for this referral.  Kaleb Tena, SLP

## 2022-11-30 NOTE — PROGRESS NOTES
0000: Bedside and Verbal shift change report given to Cesar Worrell RN (oncoming nurse) by Juan A Dyson RN (offgoing nurse). Report included the following information SBAR, Kardex, ED Summary, Procedure Summary, Intake/Output, MAR, Recent Results, Med Rec Status, Cardiac Rhythm V-Paced, Alarm Parameters , and Dual Neuro Assessment. 0600: Full CHG provided     0730: Bedside and Verbal shift change report given to Oliva Dorado RN (oncoming nurse) by Cesar Worrell RN (offgoing nurse). Report included the following information SBAR, Kardex, ED Summary, Procedure Summary, Intake/Output, MAR, Recent Results, Med Rec Status, Cardiac Rhythm V-paced, Alarm Parameters , and Dual Neuro Assessment.

## 2022-11-30 NOTE — PROGRESS NOTES
Transition of Care Plan  RUR- Med 18%  DISPOSITION: Return to [facility] with personal caregivers vs SNF - [facility] need further choices  F/U with PCP/Specialist    Transport: [facility]     Emergency contact: DaughterKaren [phone] and granddaughterManoj [phone]    CM barriers to discharge: Family deciding on LTC with/without hospice    CM received call from patient's granddaughter, Delicia Felix, requesting CM to re-check bed availability at [facility] and [facility]. CM spoke with [facility], no bed available. CM informed patient's granddaughter that [facility] has a waiting list for bed, likely nothing to open up soon. Per Palliative note, patient met GIP criteria yesterday however family requested more time to make a decision. CM will follow. 11:15pm: Patient not meeting GIP hospice today, hospice to continue to follow. Patient's granddaughter, Delicia Felix requested CM send referral to [facility] and [address] [facility] for SNF/short term rehab with possible transition to LTC. Referral sent in [employer]. Family conflicted with goals of care. May need 2 weeks iv abx. CM spoke with London Palacios, Resident Care Director with [facility] [phone]. She requested clinicals to be faxed to #[phone]. If patient is to return [facility], would require hospice; but they would accept him back.    3:06pm: CM called Anita with [facility] and [address] [facility] [phone]; they plan to review patient's information and return call. CM met with patient's daughterChuck, at bedside, goal seems clear at the moment to pursue SNF. Message left for patient's granddaughter Delicia Felix regarding further choices. 3:41pm: CM spoke with patient's granddaughter, Delicia Felix, regarding discharge plan - they prefer that patient return to [facility] with hired additional caregiver for daytime, along with hospital bed.  This is pending ID final recommendations - if patient can be bridged to oral abx, [facility] would be preferred dispo. If patient needs IV abx for extended amount of time, further discussions regarding goals of care - SNF vs Hospice, will be had at that time. MICHAEL spoke with Nadir Hamlin Resident Care Director, and she plans to reach out to patient's granddaughter to discuss this plan/along with  of [facility]. WILLIE Lentz.

## 2022-11-30 NOTE — PROGRESS NOTES
Bedside report given to Scott Loera RN by Nohemi Pal RN. Report included SBAR, ED Report, Labs, and Cardiac Rhythm Ventricular Paced. Patient in bed resting well. Vitals are stable.

## 2022-11-30 NOTE — PROGRESS NOTES
Problem: Pressure Injury - Risk of  Goal: *Prevention of pressure injury  Description: Document Thanh Scale and appropriate interventions in the flowsheet. Outcome: Progressing Towards Goal  Note: Pressure Injury Interventions:  Sensory Interventions: Assess changes in LOC, Float heels, Keep linens dry and wrinkle-free, Pressure redistribution bed/mattress (bed type)    Moisture Interventions: Absorbent underpads, Internal/External urinary devices    Activity Interventions: Increase time out of bed, Pressure redistribution bed/mattress(bed type), Assess need for specialty bed    Mobility Interventions: Float heels, HOB 30 degrees or less, Pressure redistribution bed/mattress (bed type)    Nutrition Interventions: Document food/fluid/supplement intake    Friction and Shear Interventions: Apply protective barrier, creams and emollients, HOB 30 degrees or less, Feet elevated on foot rest                Problem: Patient Education: Go to Patient Education Activity  Goal: Patient/Family Education  Outcome: Progressing Towards Goal     Problem: Patient Education: Go to Patient Education Activity  Goal: Patient/Family Education  Outcome: Progressing Towards Goal     Problem: Risk for Spread of Infection  Goal: Prevent transmission of infectious organism to others  Description: Prevent the transmission of infectious organisms to other patients, staff members, and visitors. Outcome: Progressing Towards Goal     Problem: Patient Education:  Go to Education Activity  Goal: Patient/Family Education  Outcome: Progressing Towards Goal     Problem: Falls - Risk of  Goal: *Absence of Falls  Description: Document Denniecristian Hughes Fall Risk and appropriate interventions in the flowsheet.   Outcome: Progressing Towards Goal  Note: Fall Risk Interventions:  Mobility Interventions: Assess mobility with egress test, Patient to call before getting OOB    Mentation Interventions: Adequate sleep, hydration, pain control, Evaluate medications/consider consulting pharmacy    Medication Interventions: Assess postural VS orthostatic hypotension, Bed/chair exit alarm, Evaluate medications/consider consulting pharmacy, Patient to call before getting OOB, Teach patient to arise slowly    Elimination Interventions: Bed/chair exit alarm, Call light in reach, Patient to call for help with toileting needs

## 2022-11-30 NOTE — PROGRESS NOTES
Problem: Self Care Deficits Care Plan (Adult)  Goal: *Acute Goals and Plan of Care (Insert Text)  Description: FUNCTIONAL STATUS PRIOR TO ADMISSION: Patient was modified independent using a walker for functional mobility. HOME SUPPORT: The patient lived in [facility] but did not require assist.    Occupational Therapy Goals  Initiated 11/29/2022  1. Patient will perform grooming sitting EOB with minimal assistance within 7 day(s). 2.  Patient will perform anterior neck to thigh bathing in sitting with minimal assistance within 7 day(s). 3.  Patient will perform toilet transfers to/from [facility] with moderate assistance x2 within 7 day(s). 4.  Patient will perform all aspects of toileting with moderate assistance within 7 day(s). 5.  Patient will participate in upper extremity therapeutic exercise/activities with minimal assistance/contact guard assist for 5 minutes within 7 day(s). 6.  Patient will utilize energy conservation techniques during functional activities with verbal cues within 7 day(s). Outcome: Progressing Towards Goal    OCCUPATIONAL THERAPY TREATMENT  Patient: Victory Goldmann (80 y.o. male)  Date: 11/30/2022  Diagnosis: AMS (altered mental status) [R41.82]  Lactic acidosis [E87.20]  Elevated troponin [R77.8]  Fever [R50.9] <principal problem not specified>      Precautions:    Chart, occupational therapy assessment, plan of care, and goals were reviewed. ASSESSMENT  Patient continues with skilled OT services and is progressing towards goals. Patient ADLs continue to be limited by impaired balance, generalized weakness, decreased functional activity tolerance, limited lower body access and mildly impaired cognition. Patient required max A x2 to come to sitting on EOB with increased need for assistance to maintain sitting balance sitting EOB (noted significant retropulsion). Patient required max A to adjust socks in prep for transfer. Patient max A x2 to stand and transfer to chair.  Patient left sitting in chair with call bell in reach, family bedside, RN aware, VSS. Will continue to follow, recommend SNF once cleared for D/C. Current Level of Function Impacting Discharge (ADLs): min-total A for ADLs    Other factors to consider for discharge: was mod I at baseline         PLAN :  Patient continues to benefit from skilled intervention to address the above impairments. Continue treatment per established plan of care to address goals. Recommend with staff: Recommend with nursing, ADLs with supervision/setup, OOB to chair 3x/day via 2 assist using gait belt and toileting via bedpan. Thank you for completing as able in order to maintain patient strength, endurance and independence. Recommend next OT session: BSC transfer    Recommendation for discharge: (in order for the patient to meet his/her long term goals)  Therapy up to 5 days/week in SNF setting    This discharge recommendation:  Has been made in collaboration with the attending provider and/or case management    IF patient discharges home will need the following DME: bedside commode, hospital bed, and mechanical lift       SUBJECTIVE:   Patient stated Dann Traore my hands!  re: when asked how he feels    OBJECTIVE DATA SUMMARY:   Cognitive/Behavioral Status:  Neurologic State: Alert  Orientation Level: Oriented to person;Disoriented to place; Disoriented to situation;Disoriented to time  Cognition: Decreased attention/concentration;Decreased command following;Memory loss;Poor safety awareness  Perception: Cues to maintain midline in standing;Cues to maintain midline in sitting (posterior lean)  Perseveration: No perseveration noted  Safety/Judgement: Decreased awareness of environment;Decreased awareness of need for assistance;Decreased awareness of need for safety;Decreased insight into deficits; Fall prevention    Functional Mobility and Transfers for ADLs:  Bed Mobility:  Rolling: Maximum assistance;Assist x2  Supine to Sit: Maximum assistance;Assist x2  Sit to Supine:  (left in chair)  Scooting: Maximum assistance;Assist x2    Transfers:  Sit to Stand: Moderate assistance;Maximum assistance;Assist x2  Bed to Chair: Maximum assistance;Assist x2    Balance:  Sitting: Impaired; Without support  Sitting - Static: Fair (occasional)  Sitting - Dynamic: Poor (constant support)  Standing: Impaired; With support  Standing - Static: Constant support;Fair;Poor  Standing - Dynamic : Constant support;Poor    ADL Intervention:    Lower Body Dressing Assistance  Socks: Total assistance (dependent)  Leg Crossed Method Used: No  Position Performed: Seated edge of bed  Cues: Don;Physical assistance    Cognitive Retraining  Safety/Judgement: Decreased awareness of environment;Decreased awareness of need for assistance;Decreased awareness of need for safety;Decreased insight into deficits; Fall prevention    Pain:  Reporting overall stiffness but not pain    Activity Tolerance:   Fair    After treatment patient left in no apparent distress:   Sitting in chair, Call bell within reach, Caregiver / family present, and RN aware    COMMUNICATION/COLLABORATION:   The patients plan of care was discussed with: Physical therapist, Registered nurse, and Case management.      Erik Singer OT  Time Calculation: 24 mins

## 2022-11-30 NOTE — PROGRESS NOTES
SLP Contact Note  Problem: Dysphagia (Adult)  Goal: *Acute Goals and Plan of Care (Insert Text)  Description: Speech Therapy Goals  Initiated 22    1. Patient will tolerate soft/bite sized diet and thin liquids without adverse effects within 7 days. Outcome: Progressing Towards Goal    SPEECH LANGUAGE PATHOLOGY BEDSIDE SWALLOW EVALUATION  Patient: Isra Stiles (Barry  [age] y.o. male)  Date: 2022  Primary Diagnosis: AMS (altered mental status) [R41.82]  Lactic acidosis [E87.20]  Elevated troponin [R77.8]  Fever [R50.9]       Precautions:        ASSESSMENT :    Consult received and appreciated. Bedside swallow evaluation completed. Pt has dubious bedside presentation with intermittent belching and coughing. He does not appear to have ever had objective imaging of the swallow. Very unclear swallow physiology at this time. Discussed with patient and patient's daughter Marisa Davison at length about the considerations below. Offered to complete objective imaging of the swallow to better inform treatment of the patient's swallow and will plan to complete a Flexible Endoscopic Evaluation of Swallow (FEES) at bedside to objectively assess swallow function to get more information. Several factors to consider about swallowing moving forward:    1) Patient is 8 years old which places patient at chronic risk for presbyphagia and presbyesophagus. Aspiration and penetration is considered a normal swallow variance in the geriatric population. Research discusses aspiration as a normal swallow variance in the geriatric population, meaning that perfectly healthy individuals in the geriatric age group will aspirate without it being necessarily indicative of a pharyngeal dysphagia. In fact, some research discusses how changes in skeletal muscle in the head and neck in the geriatric population are similar to those changes observed in limbs as one ages.  Additionally, there is no way to \"prevent\" aspiration, even among younger individuals. 2) Pt has a past medical history of reflux and likely with presbyesophagus, particularly for esophageal dysmotility which can be impacting pt's difficulty with food. 3) Research shows that PEG tube placement can be associated with substantial patient burdens including recurrent and new onset aspiration, tube-associated and aspiration-related infection, increased oral secretions that are difficult to manage, discomfort, tube malfunction, pressure wounds, and the use of physical and chemical restraints. 4) Given that pt with history of reflux, pt would be at high risk to have post-prandial aspiration of tube feeds if PEG tube placed. 5) In the geriatric population, research shows that there is no proven benefit to a PEG tube in weight gain or markers of nutrition (albumin, prealbumin) in patients with malnutrition due to impaired oral intake. 6) Patient has every right to choose to accept the risks of aspiration if he wishes to eat and drink. He does not have to be hospice for this or even comfort care (as patients who have COPD and smoke are still treated, for example). His advanced directives show that he does not wish to have long-term alternate means of nutrition. 7) At times it can be more detrimental to aspirate thickened liquids. Furthermore, thickened liquids place pt at elevated risk for dehydration and subsequent urinary tract infection. Further recommendations to follow FEES. Patient will benefit from skilled intervention to address the above impairments. Patients rehabilitation potential is considered to be Fair     PLAN :  Recommendations and Planned Interventions:  --FEES. See separate note for recommendations. Frequency/Duration: Patient will be followed by speech-language pathology 3 times a week to address goals. Discharge Recommendations: To Be Determined     SUBJECTIVE:   Patient stated, \"Well, can I have some water? .     OBJECTIVE:     Past Medical History:   Diagnosis Date    Angina pectoris (HCC)     Atrial fibrillation (HCC)     BPH (benign prostatic hyperplasia)     Chronic obstructive pulmonary disease (HCC)     COVID-19     Elevated PSA     Essential hypertension     GERD (gastroesophageal reflux disease)     GERD without esophagitis     Hyperlipidemia     Hypothyroidism     Long term (current) use of anticoagulants     Pacemaker     Rosacea 2016    Rosacea     SSS (sick sinus syndrome) ([facility] [address]      Past Surgical History:   Procedure Laterality Date    HX CORONARY STENT PLACEMENT      HX HEART CATHETERIZATION      HX PACEMAKER      NH REMVL PERM PM PLS GEN W/REPL PLSE GEN 2 LEAD SYS N/A 2/6/2020    REMOVE & REPLACE PPM GEN DUAL LEAD performed by Yarely Robert MD at [facility] [address] CATH LAB     Prior Level of Function/Home Situation:   Home Situation  [facility] [address] Name: Katelynn Puente  One/Two Story Residence: One story  Living Alone: No  Support Systems: Child(nunu)  Patient Expects to be Discharged to[de-identified] Other:  Current DME Used/Available at Home: Walker, rolling  Diet prior to admission: seemingly regular diet and thickened liquids  Current Diet:  thickened liquids  Cognitive and Communication Status:  Neurologic State: Alert  Orientation Level: Oriented to person, Disoriented to place, Disoriented to situation, Disoriented to time  Cognition: Decreased attention/concentration, Decreased command following, Memory loss, Poor safety awareness  Perception: Cues to maintain midline in standing, Cues to maintain midline in sitting (posterior lean)  Perseveration: No perseveration noted  Safety/Judgement: Decreased awareness of environment, Decreased awareness of need for assistance, Decreased awareness of need for safety, Decreased insight into deficits, Fall prevention  Oral Assessment:   WFL  P.O. Trials:   Seen with applesauce, venkat crackers, and unthickened water. Intermittent coughing and belching throughout.      NOMS: The NOMS functional outcome measure was used to quantify this patient's level of swallowing impairment. Based on the NOMS, the patient was determined to be at level 2 for swallow function       NOMS Swallowing Levels:  Level 1 (CN): NPO  Level 2 (CM): NPO but takes consistency in therapy  Level 3 (CL): Takes less than 50% of nutrition p.o. and continues with nonoral feedings; and/or safe with mod cues; and/or max diet restriction  Level 4 (CK): Safe swallow but needs mod cues; and/or mod diet restriction; and/or still requires some nonoral feeding/supplements  Level 5 (CJ): Safe swallow with min diet restriction; and/or needs min cues  Level 6 (CI): Independent with p.o.; rare cues; usually self cues; may need to avoid some foods or needs extra time  Level 7 ([address] [place] [address]): Independent for all p.o.  CARIE. (2003). National Outcomes Measurement System (NOMS): Adult Speech-Language Pathology User's Guide. Pain:  Pain Scale 1: Numeric (0 - 10)  Pain Intensity 1: 0       After treatment:   Call bell within reach and Nursing notified    COMMUNICATION/EDUCATION:     The patient's plan of care including recommendations, planned interventions, and recommended diet changes were discussed with: Registered nurse, NP. Patient/family have participated as able in goal setting and plan of care.     Thank you for this referral.  Deepa Brandt, SLP

## 2022-11-30 NOTE — HOSPICE
[facility]  [phone]    Patient Name: Tea Castro  YOB: 1922  Age: Barry Meka [age] y.o. [facility] RN Note:  Hospice consult noted. Chart reviewed. Plan of care discussed with patients nurse & care manager. In to meet with patient and granddaughter. Discussed Hospice philosophy, general plan of care, levels of care, services and on call procedures. Family information packet provided & reviewed with granddaughter. Patient more alert today per daughter. He  is verbal and denies any pain. Patient has aspiration pneumonia with severe dysphagia. Rhonci noted. He does not meet GIP criteria today. Zackary Grijalva states she is not ready for hospice yet but knows he could decline quickly and at that point she is open to the idea of hospice. She spoke with patient's PCP, Dr Rich Spaulding, who also told her that he expects he will decline and need hospice. She was focused on him finishing IV abx for pneumonia but stated she is awaiting culture results and that if he would need a PICC line and many weeks of IV abx she would not want to do that and would then want hospice involvement. Hospice will monitor him daily while here for any change that may warrant GIP admission and will be available to assist with setting up hospice admission at facility when family is ready. Thank you for the opportunity to be of service to this patient.     Elana Cespedes RN  Clinical Nurse Liaison   [facility]  (D)[phone] (O) [phone]

## 2022-11-30 NOTE — PROGRESS NOTES
Pharmacist Note - Vancomycin Dosing  Therapy day 3  Indication: CoNS bacteremia  Current regimen: 750mg Q24H    Recent Labs     11/30/22  0627 11/30/22  0227 11/29/22  0252 11/28/22  0118   WBC 7.6  --  13.2* 7.4   CREA  --  1.45* 1.51* 1.43*   BUN  --  32* 30* 26*       A random vancomycin level of 10.2 mcg/mL was obtained and from this level, the patient's AUC24 is calculated to be 462 with the current regimen. Goal target range AUC/FABIENNE 400-600      Plan: Continue current regimen. Pharmacy will continue to monitor this patient daily for changes in clinical status and renal function. *Random vancomycin levels are used to calculate AUC/FABIENNE, this level should not be interpreted as a trough. Vancomycin has been dosed using Bayesian kinetics software to target an AUC24:FABIENNE of 400-600, which provides adequate exposure for as assumed infection due to MRSA with an FABIENNE of 1 or less while reducing the risk of nephrotoxicity as seen with traditional trough based dosing goals.

## 2022-11-30 NOTE — PROGRESS NOTES
ID Progress Note  2022    Subjective:     Resting  [place]  Voices no complaints during visit  Review of Systems:            Symptom Y/N Comments   Symptom Y/N Comments   Fever/Chills       Chest Pain        Poor Appetite       Edema        Cough       Abdominal Pain        Sputum       Joint Pain        SOB/GARCIA       Pruritis/Rash        Nausea/vomit       Tolerating PT/OT        Diarrhea       Tolerating Diet        Constipation       Other           Could NOT obtain due to:       Objective:     Vitals: Visit Vitals  /79 (BP 1 Location: Left upper arm, BP Patient Position: At rest)   Pulse 68   Temp 99.2 °F (37.3 °C)   Resp 18   Ht 5' 8\" (1.727 m)   Wt 77.9 kg (171 lb 11.8 oz)   SpO2 100%   BMI 26.11 kg/m²          Tmax:  Temp (24hrs), Av.2 °F (37.3 °C), Min:98.8 °F (37.1 °C), Max:99.8 °F (37.7 °C)      PHYSICAL EXAM:  General: Chronically ill appearing. WD, WN. Resting. no acute distress    EENT:  No drainage from right eye, Dry mucous membrane, [place]  Resp:  Clear in apex with decreased breath sounds at bases. no wheezing or rales. No accessory muscle use  CV:  Regular  rhythm,  No edema  GI:  Soft, Non distended, Non tender. +Bowel sounds  Neurologic:  Alert, follows some commends  Psych:   Fair insight.  poor medical historian, Not anxious nor agitated  Skin:  Intertrigo,  No jaundice    Labs:   Lab Results   Component Value Date/Time    WBC 7.6 2022 06:27 AM    HGB 11.3 (L) 2022 06:27 AM    HCT 37.4 2022 06:27 AM    PLATELET 98 (L)  06:27 AM    MCV 96.9 2022 06:27 AM     Lab Results   Component Value Date/Time    Sodium 140 2022 02:27 AM    Potassium 5.0 2022 02:27 AM    Chloride 113 (H) 2022 02:27 AM    CO2 22 2022 02:27 AM    Anion gap 5 2022 02:27 AM    Glucose 73 2022 02:27 AM    BUN 32 (H) 2022 02:27 AM    Creatinine 1.45 (H) 2022 02:27 AM    BUN/Creatinine ratio 22 (H) 2022 02:27 AM    GFR est AA 46 (L) 09/24/2022 02:57 PM    GFR est non-AA 38 (L) 09/24/2022 02:57 PM    Calcium 7.8 (L) 11/30/2022 02:27 AM    Bilirubin, total 1.0 11/29/2022 02:52 AM    Alk. phosphatase 90 11/29/2022 02:52 AM    Protein, total 5.6 (L) 11/29/2022 02:52 AM    Albumin 2.9 (L) 11/29/2022 02:52 AM    Globulin 2.7 11/29/2022 02:52 AM    A-G Ratio 1.1 11/29/2022 02:52 AM    ALT (SGPT) 18 11/29/2022 02:52 AM         Cultures:   Results       Procedure Component Value Units Date/Time    CULTURE, MRSA [[id number]] Collected: 11/28/22 1148    Order Status: Completed Specimen: Nasal from Nares Updated: 11/29/22 1415     Special Requests: NO SPECIAL REQUESTS        Culture result: MRSA NOT PRESENT               Screening of patient nares for MRSA is for surveillance purposes and, if positive, to facilitate isolation considerations in high risk settings. It is not intended for automatic decolonization interventions per se as regimens are not sufficiently effective to warrant routine use. CULTURE, BLOOD, PAIRED [[id number]] Collected: 11/28/22 1105    Order Status: Completed Specimen: Blood Updated: 11/30/22 0714     Special Requests: NO SPECIAL REQUESTS        Culture result: NO GROWTH 2 DAYS       URINE CULTURE HOLD SAMPLE [[id number]] Collected: 11/26/22 2101    Order Status: Completed Specimen: Urine from Serum Updated: 11/26/22 2111     Urine culture hold       Urine on hold in Microbiology dept for 2 days. If unpreserved urine is submitted, it cannot be used for addtional testing after 24 hours, recollection will be required.           CULTURE, URINE [[id number]] Collected: 11/26/22 2101    Order Status: Completed Specimen: Cath Urine Updated: 11/27/22 1802     Special Requests: NO SPECIAL REQUESTS        Culture result: No growth (<1,000 CFU/ML)       COVID-19 RAPID TEST [[id number]] Collected: 11/26/22 1845    Order Status: Completed Specimen: Nasopharyngeal Updated: 11/26/22 1951     Specimen source Nasopharyngeal        COVID-19 rapid test Not detected        Comment: Rapid Abbott ID Now       Rapid NAAT:  The specimen is NEGATIVE for SARS-CoV-2, the novel coronavirus associated with COVID-19. Negative results should be treated as presumptive and, if inconsistent with clinical signs and symptoms or necessary for patient management, should be tested with an alternative molecular assay. Negative results do not preclude SARS-CoV-2 infection and should not be used as the sole basis for patient management decisions. This test has been authorized by the FDA under an Emergency Use Authorization (EUA) for use by authorized laboratories.    Fact sheet for Healthcare Providers:  http://www.nadeem-kenyatta.biz/  Fact sheet for Patients: http://www.silver-you.biz/       Methodology: Isothermal Nucleic Acid Amplification         BLOOD CULTURE ID PANEL [[id number]]  (Abnormal) Collected: 11/26/22 1845    Order Status: Completed Specimen: Blood Updated: 11/28/22 0625     Acc. no. from Micro Order [id number]     Enterococcus faecalis Not detected        Enterococcus faecium Not detected        Listeria monocytogenes Not detected        Staphylococcus Detected        Staphylococcus aureus Not detected        Staph epidermidis Not detected        Staph lugdunensis Not detected        Streptococcus Not detected        Streptococcus agalactiae (Group B) Not detected        Streptococcus pneumoniae Not detected        Streptococcus pyogenes (Group A) Not detected        Acinetobacter calcoaceticus-baumanii complex Not detected        Bacteroides fragilis Not detected        Enterobacterales species Not detected        Enterobacter cloacae complex Not detected        Escherichia coli Not detected        Klebsiella aerogenes Not detected        Klebsiella oxytoca Not detected        Klebsiella pneumoniae group Not detected        Proteus Not detected        Salmonella Not detected        Serratia marcescens Not detected Haemophilus influenzae Not detected        Neisseria meningitidis Not detected        Pseudomonas aeruginosa Not detected        Steno maltophilia Not detected        Candida albicans Not detected        Candida auris Not detected        Candida glabrata Not detected        Candida krusei Not detected        Candida parapsilosis Not detected        Candida tropicalis Not detected        Crypto neoformans/gattii Not detected        RESISTANT GENES:            Comment       False positive results may rarely occur. Correlate with clinical,epidemiologic, and other laboratory findings           Comment: Please see BCID Interpretation Guide in EPIC Links       CULTURE, BLOOD, PAIRED [[id number]]  (Abnormal) Collected: 11/26/22 1839    Order Status: Completed Specimen: Blood Updated: 11/29/22 1500     Special Requests: NO SPECIAL REQUESTS        Culture result:       STAPHYLOCOCCUS SPECIES, COAGULASE NEGATIVE MULTIPLE COLONY TYPES GROWING IN 2 OF 4 BOTTLES DRAWN SITE=( R AC)                  REMAINING BOTTLE(S) HAS/HAVE NO GROWTH SO FAR            (NOTE) GRAM POSITIVE COCCI IN CLUSTERS GROWING IN 1 OF 4 BOTTLES CALLED TO READ BACK BY Stella Osborn RN [facility] AT 2017 ON 11/27/22. Mana 1850             Impression:   Bacteremia  Acute respiratory failure secondary to aspiration PNA  - afebrile,  wbc 13.2->7.6    blood cx (11/26) 2/4 CoNS, (11/28) no growth so far    Procal 4.93    CXR (11/28)  basilar airspace disease. Allergic conjunctivitis  - zaditor 1 drop twice a day for 5 days     Oral candidasis  - nystatin swish and spit four times a day for 10 days  Plan:   Continue withIV cefepime + clindamycin to treat asp PNA, recommend to complete total 7 days. Continue with IV vancomycin; monitor renal function closely         Recommend TTE when amendable         2/4 CoNS bacteremia - requested the organism to be identified; if staph lugdunensis then we recommend to complete total 2 weeks of  IV abx therapy.  Otherwise, we will consider as contaminant sample. We will provide final abx recommendation after reviewed the final cx result         MRSA nare screen (-)   Adjust abx prn   Ketotifen 1 drop to right eye twice a day for 5 days   Fever work up if temp >= 100.4   Pt's granddaughter who is a NP expressed no invasive work up at this time.          Palliative team following             Above plan of care discussed and agreed with Dr. Jaqui Das d/w pt's granddaughter and pt's daughter who were at bedside    Marjorie Collado NP

## 2022-11-30 NOTE — PROGRESS NOTES
[address] [facility] Adult  Hospitalist Group                                                                                          Hospitalist Progress Note  Suzette Jarrett NP  Answering service: [phone] OR [phone] from in house phone        Date of Service:  2022  NAME:  Christopher Trevizo  :  1922  MRN:  [id number]      Admission Summary:   Christopher Trevizo is a Barry  [age] y.o. male with PMHx of A-Fib with SSS s/p cardiac pacemaker, BPH, COPD, CKD, HTN, GERD, HLD, and hypothyroidism who presented from SNF with low grade fever and confusion. BCx  with  Coag negative staph (likely contaminant, ID consulted). ID concerns for aspiration PNA (on Vanc, Cefepime, Clindamycin). Hospice and Palliative Care consult. Interval history / Subjective:   Patient seen this morning alongside granddaughter , Alexander Orona. Today the patient appears to have aspirated overnight last night. He has a high temperature today ( tmax 103.1) and chest XR consistent with aspiration pneumonia along with clinical story of ongoing coughing for several hours after attempting to eat last night. Patients respirations are elevated today (30's), patient is barely arousable and he has course/rhonchi throughout all lung fields. Discussed with patients grand daughter, Alexander Orona, who ultimately wants him to be made comfort care and hospice. Her mother is the medical power of , Anne Mcgrath, who after meeting with both palliative and hospice today state that she is not ready to make the decision. The patient has been accepted to inpatient hospice if/when family decides to transition him to comfort/hospice care. The patient is very much clinically appropriate for inpatient hospice due to advanced age in the setting of multiple acute/chronic medical problems.      Assessment & Plan:     Acute metabolic encephalopathy, etiology unknown  Concern for fungal rash in groin with possible scrotal cellulitis  Lactic Acidosis, resolved  High fever to 103.1 on 11/28  Aspiration Pneumonia   -- Notable imaging: CT Head 11/26: No acute intracranial process  --- Scrotal rash: Likely fungal in etiology as incontinent at baseline. Started on Ancef (D1 = 11/27) for concern for potential scrotal cellulitis or SSTI. Will continue for now. Continue Mycostatin powder. Wound care consult  -- Lactic acid: Initially elevated 2.37 --> 2.5 --> 1.96. No signs of EOD or hypoperfusion. Received NS 500cc bolus x 2. Hold off additional fluid resuscitation for now with hx of CHF  -- Notable labs: UA on 11/26: negative for nitrites/LE's, WBC's. Urine culture 11/26: negative. Influenza A/B and COVID negative. TSH 1.53, Free T4 1.4, Ammonia 20  -- Concern for aspiration: Per pt and family they work with SLP outpatient and chronically aspirating but family accepted the risks of aspiration as food was a major agueda in life. ID consulted for + BCx, but they had concerns for aspiration PNA and broadened to Cefepime + Vancomycin + Clindamycin. His CXR on 11/26 without concerns for PNA and repeat 11/28 awaiting radiology read but appears stable. MRSA IN swab pending. PCT 11/28. If aspirating and needing O2 may be aspiration pneumonitis and not from infection. - patient with likely witnessed aspiration event last night, chest XR compatible with new aspiration pneumonia, likely source of fever     Coag negative Staph in 2/4 in BCx from 11/26  -- Likely MSSA , will follow culture to completion  -- ID consulted  -- Surveillance cultures ordered 11/28 and pending  -- Follow BCx from 11/26: Coagulase negative Staph 1/4. Await sensitivities    Encounter for Hospice  -- Family interested in meeting with Palliative Care and Hospice. They note he is no longer enjoying previous joys (such as food) and that likely has < 6 months to live which I agree. Appreciate their consults and assistance    Nocturnal O2  -- Uses CPAP at home. Has hx of MARIAN. With current AMS do not think he can tolerate CPAP.  Can continue nocturnal O2 as needed     Elevated troponin   -- Troponin 96 --> 114 --> 99.  -- No chest pain or concern for ACS  -- Cardiology reviewed chart      Congestive heart failure, unspecified chronicity  HTN  - last 2 echocardiogram 9/14/2022 showed left ventricular ejection fraction 50% to 55%  -Placed on strict I's and O's and daily weights  -- Held Lasix 20mg daily 11/28 as dry on exam   -- Continue Lisinopril     CKD  -- Renal function at baseline. Continue to trend. Paroxysmal atrial fibrillation  SSS  -Status post cardiac pacemaker plantation  -Continue Tele  -- continue Metoprolol XL 25mg nightly      Acute COPD exacerbation  -order Duoneb nebulizer treatments every 4 hours as needed     Generalized weakness  -Placed on fall precautions     Hypothyroidism  -Continue levothyroxine     GERD  -- Continue hospital formulary PPI     BPH  -- Continue Flomax nightly     Essential Tremor  -- At baseline per family     Vitamin Deficiency  -- Continue home Ascorbic Acid and Magnesium     Code status: DNR/DNI. Durable DNR recompleted 11/27  Prophylaxis: SCDs. Care Plan discussed with: Pt, RN, family  Anticipated Disposition: TBD. Care Manager consulted. PT/OT consulted     Hospital Problems  Date Reviewed: 7/13/2022            Codes Class Noted POA    Lactic acidosis ICD-10-CM: E87.20  ICD-9-CM: 276.2  11/26/2022 Unknown        Elevated troponin ICD-10-CM: R77.8  ICD-9-CM: 790.6  11/26/2022 Unknown        AMS (altered mental status) ICD-10-CM: R41.82  ICD-9-CM: 780.97  11/26/2022 Unknown        Fever ICD-10-CM: R50.9  ICD-9-CM: 780.60  1/27/2019 Unknown           Review of Systems:   A comprehensive review of systems was negative except for that written in the HPI. Vital Signs:    Last 24hrs VS reviewed since prior progress note.  Most recent are:  Visit Vitals  /60 (BP 1 Location: Left upper arm, BP Patient Position: At rest)   Pulse 70   Temp 99.3 °F (37.4 °C)   Resp 28   Ht 5' 8\" (1.727 m)   Wt 78.3 kg (172 lb 9.6 oz)   SpO2 100%   BMI 26.24 kg/m²         Intake/Output Summary (Last 24 hours) at 11/29/2022 2319  Last data filed at 11/29/2022 0244  Gross per 24 hour   Intake 50 ml   Output --   Net 50 ml          Physical Examination:     I had a face to face encounter with this patient and independently examined them on 11/29/2022 as outlined below:          Constitutional:  No acute distress. Sleeping on arrival. Briefly awakens for exam , very lethargic today. no verbal response on exam   ENT:  Oral mucosa dry   Resp:  Upper airway crackling. No wheezing or respiratory distress or accessory muscle use. CV:  Paced rhythm on monitor, irregular rhythm    GI:  Soft, non distended, non tender. Musculoskeletal:  No edema, warm    Neurologic:  Moves all extremities to command. Oriented to name. Not oriented to year. : Minimal erythema in groin. Sebaceous cysts on scrotum without evidence of drainage.             Data Review:    Review and/or order of clinical lab test  Review and/or order of tests in the radiology section of CPT  Review and/or order of tests in the medicine section of CPT      Labs:     Recent Labs     11/29/22 0252 11/28/22 0118   WBC 13.2* 7.4   HGB 11.3* 11.3*   HCT 35.7* 35.2*   * 104*       Recent Labs     11/29/22 0252 11/28/22 0118 11/27/22  0430    141 141   K 3.6 3.8 4.3    108 106   CO2 27 25 29   BUN 30* 26* 28*   CREA 1.51* 1.43* 1.69*   GLU 94 101* 126*   CA 8.2* 8.5 8.5   MG 1.9 1.9 1.9   PHOS 3.4 3.8  --        Recent Labs     11/29/22 0252 11/28/22 0118 11/27/22  0430   ALT 18 19 18   AP 90 92 101   TBILI 1.0 0.7 0.9   TP 5.6* 5.8* 6.0*   ALB 2.9* 2.7* 3.0*   GLOB 2.7 3.1 3.0       Medications Reviewed:     Current Facility-Administered Medications   Medication Dose Route Frequency    [START ON 11/30/2022] Vancomycin random with AM labs   Other ONCE    Vancomycin- Pharmacy to Dose   Other Rx Dosing/Monitoring    vancomycin (VANCOCIN) 750 mg in 0.9% sodium chloride 250 mL ([id number])  750 mg IntraVENous Q24H    balsam peru-castor oiL (VENELEX) ointment   Topical Q12H    clindamycin (CLEOCIN) 600mg D5W 50mL IVPB (premix)  600 mg IntraVENous Q8H    ketotifen (ZADITOR) 0.025 % (0.035 %) ophthalmic solution 1 Drop  1 Drop Right Eye Q12H    nystatin (MYCOSTATIN) 100,000 unit/mL oral suspension 500,000 Units  500,000 Units Oral QID    cefepime (MAXIPIME) 1 g in 0.9% sodium chloride (MBP/ADV) 50 mL MBP  1 g IntraVENous Q12H    ondansetron (ZOFRAN) injection 4 mg  4 mg IntraVENous Q6H PRN    acetaminophen (TYLENOL) suppository 650 mg  650 mg Rectal Q4H PRN    sodium chloride (NS) flush 5-40 mL  5-40 mL IntraVENous Q8H    sodium chloride (NS) flush 5-40 mL  5-40 mL IntraVENous PRN    acetaminophen (TYLENOL) tablet 650 mg  650 mg Oral Q6H PRN    atorvastatin (LIPITOR) tablet 20 mg  20 mg Oral DAILY    [Held by provider] furosemide (LASIX) tablet 20 mg  20 mg Oral DAILY    levothyroxine (SYNTHROID) tablet 75 mcg  75 mcg Oral 6am    lisinopriL (PRINIVIL, ZESTRIL) tablet 20 mg  20 mg Oral DAILY    magnesium oxide (MAG-OX) tablet 400 mg  400 mg Oral DAILY    metoprolol succinate (TOPROL-XL) XL tablet 25 mg  25 mg Oral QPM    ipratropium (ATROVENT) 21 mcg (0.03 %) nasal spray 2 Spray  2 Spray Both Nostrils BID PRN    pantoprazole (PROTONIX) tablet 20 mg  20 mg Oral DAILY    tamsulosin (FLOMAX) capsule 0.4 mg  0.4 mg Oral QHS    ascorbic acid (vitamin C) (VITAMIN C) tablet 500 mg  500 mg Oral Once per day on Tue Thu Sat    ondansetron (ZOFRAN ODT) tablet 4 mg  4 mg Oral Q8H PRN    albuterol-ipratropium (DUO-NEB) 2.5 MG-0.5 MG/3 ML  3 mL Nebulization Q4H PRN    miconazole (MICOTIN) 2 % powder   Topical BID     ______________________________________________________________________  EXPECTED LENGTH OF STAY: 3d 7h  ACTUAL LENGTH OF STAY:          3                 Radha Watts NP

## 2022-11-30 NOTE — PROGRESS NOTES
Problem: Mobility Impaired (Adult and Pediatric)  Goal: *Acute Goals and Plan of Care (Insert Text)  Description: FUNCTIONAL STATUS PRIOR TO ADMISSION: Patient was modified independent using a rolling walker for functional mobility. HOME SUPPORT PRIOR TO ADMISSION: Patient resided in [facility]    Physical Therapy Goals  Initiated 11/29/2022  1. Patient will move from supine to sit and sit to supine , scoot up and down, and roll side to side in bed with minimal assistance/contact guard assist within 7 day(s). 2.  Patient will transfer from bed to chair and chair to bed with minimal assistance/contact guard assist using the least restrictive device within 7 day(s). 3.  Patient will perform sit to stand with minimal assistance/contact guard assist within 7 day(s). 4.  Patient will ambulate with minimal assistance/contact guard assist for 10 feet with the least restrictive device within 7 day(s). Outcome: Progressing Towards Goal     PHYSICAL THERAPY TREATMENT  Patient: Nikita Clayton (80 y.o. male)  Date: 11/30/2022  Diagnosis: AMS (altered mental status) [R41.82]  Lactic acidosis [E87.20]  Elevated troponin [R77.8]  Fever [R50.9] <principal problem not specified>      Precautions:    Chart, physical therapy assessment, plan of care and goals were reviewed. ASSESSMENT  Patient continues with skilled PT services and is progressing towards goals. Patient received in supine with family at bedside and with increased alertness. Eyes open with glasses on, received on 2L of supplemental O2. Patient able to follow all commands. Increased wheezing and SOB noted today however vitals are stable and WFL. Overall requiring max A x 2 to each EOB. Able to intermittently sit EOB without support however at times has some retrograde LOB. Requiring max A x 2 for sit to stand and then second trial with SPT. Patient with some retrograde lean and anxiety related to SPT however was ultimately able to reach chair.  Once in chair, vitals stable and patient comfortable. All needs met. Continues to be a candidate for rehab if family chooses that route due to good tolerance and ability to follow commands well. Will continue to follow. Current Level of Function Impacting Discharge (mobility/balance): max A x 2 for bed mobility and transfers    Other factors to consider for discharge: strong family support         PLAN :  Patient continues to benefit from skilled intervention to address the above impairments. Continue treatment per established plan of care. to address goals. Recommendation for discharge: (in order for the patient to meet his/her long term goals)  Therapy up to 5 days/week in SNF setting vs hospice care in LTC setting pending family decision    This discharge recommendation:  Has been made in collaboration with the attending provider and/or case management    IF patient discharges home will need the following DME: to be determined (TBD)       SUBJECTIVE:   Patient stated With my hands.  When asked how he feels today. Patient joking/laughing. OBJECTIVE DATA SUMMARY:   Critical Behavior:  Neurologic State: Lethargic, Alert  Orientation Level: Oriented to person, Disoriented to place, Disoriented to situation, Disoriented to time  Cognition: Decreased attention/concentration, Follows commands, Memory loss, Recognition of people/places  Safety/Judgement: Decreased awareness of environment, Decreased awareness of need for assistance, Decreased awareness of need for safety, Decreased insight into deficits, Fall prevention  Functional Mobility Training:  Bed Mobility:  Rolling: Maximum assistance;Assist x2  Supine to Sit: Maximum assistance;Assist x2  Sit to Supine:  (left in chair)  Scooting: Maximum assistance;Assist x2        Transfers:  Sit to Stand:  Moderate assistance;Maximum assistance;Assist x2  Stand to Sit: Maximum assistance;Assist x2  Stand Pivot Transfers: Maximum assistance;Assist x2     Bed to Chair: Maximum assistance;Assist x2           Balance:  Sitting: Impaired; Without support  Sitting - Static: Fair (occasional)  Sitting - Dynamic: Poor (constant support)  Standing: Impaired; With support  Standing - Static: Constant support;Fair;Poor  Standing - Dynamic : Constant support;Poor    Pain Rating:  No pain reported     Activity Tolerance:   Fair, requires rest breaks, and observed SOB with activity    After treatment patient left in no apparent distress:   Sitting in chair, Heels elevated for pressure relief, Call bell within reach, and Caregiver / family present    COMMUNICATION/COLLABORATION:   The patients plan of care was discussed with: Occupational therapist, Registered nurse, and Case management.      Tejas Vegas, PT   Time Calculation: 23 mins

## 2022-12-01 ENCOUNTER — APPOINTMENT (OUTPATIENT)
Dept: VASCULAR SURGERY | Age: 87
DRG: 177 | End: 2022-12-01
Attending: STUDENT IN AN ORGANIZED HEALTH CARE EDUCATION/TRAINING PROGRAM
Payer: MEDICARE

## 2022-12-01 ENCOUNTER — APPOINTMENT (OUTPATIENT)
Dept: NON INVASIVE DIAGNOSTICS | Age: 87
DRG: 177 | End: 2022-12-01
Attending: NURSE PRACTITIONER
Payer: MEDICARE

## 2022-12-01 LAB
ANION GAP SERPL CALC-SCNC: 5 MMOL/L (ref 5–15)
BASOPHILS # BLD: 0 K/UL (ref 0–0.1)
BASOPHILS NFR BLD: 0 % (ref 0–1)
BUN SERPL-MCNC: 31 MG/DL (ref 6–20)
BUN/CREAT SERPL: 23 (ref 12–20)
CALCIUM SERPL-MCNC: 7.8 MG/DL (ref 8.5–10.1)
CHLORIDE SERPL-SCNC: 109 MMOL/L (ref 97–108)
CO2 SERPL-SCNC: 27 MMOL/L (ref 21–32)
CREAT SERPL-MCNC: 1.33 MG/DL (ref 0.7–1.3)
DIFFERENTIAL METHOD BLD: ABNORMAL
EOSINOPHIL # BLD: 0.1 K/UL (ref 0–0.4)
EOSINOPHIL NFR BLD: 2 % (ref 0–7)
ERYTHROCYTE [DISTWIDTH] IN BLOOD BY AUTOMATED COUNT: 14.7 % (ref 11.5–14.5)
GLUCOSE SERPL-MCNC: 91 MG/DL (ref 65–100)
HCT VFR BLD AUTO: 35.1 % (ref 36.6–50.3)
HGB BLD-MCNC: 10.8 G/DL (ref 12.1–17)
IMM GRANULOCYTES # BLD AUTO: 0 K/UL (ref 0–0.04)
IMM GRANULOCYTES NFR BLD AUTO: 0 % (ref 0–0.5)
LYMPHOCYTES # BLD: 1 K/UL (ref 0.8–3.5)
LYMPHOCYTES NFR BLD: 17 % (ref 12–49)
MCH RBC QN AUTO: 30 PG (ref 26–34)
MCHC RBC AUTO-ENTMCNC: 30.8 G/DL (ref 30–36.5)
MCV RBC AUTO: 97.5 FL (ref 80–99)
MONOCYTES # BLD: 0.5 K/UL (ref 0–1)
MONOCYTES NFR BLD: 8 % (ref 5–13)
NEUTS SEG # BLD: 4.4 K/UL (ref 1.8–8)
NEUTS SEG NFR BLD: 73 % (ref 32–75)
NRBC # BLD: 0 K/UL (ref 0–0.01)
NRBC BLD-RTO: 0 PER 100 WBC
PLATELET # BLD AUTO: 97 K/UL (ref 150–400)
PMV BLD AUTO: 11.1 FL (ref 8.9–12.9)
POTASSIUM SERPL-SCNC: 3.8 MMOL/L (ref 3.5–5.1)
RBC # BLD AUTO: 3.6 M/UL (ref 4.1–5.7)
RBC MORPH BLD: ABNORMAL
RBC MORPH BLD: ABNORMAL
SODIUM SERPL-SCNC: 141 MMOL/L (ref 136–145)
WBC # BLD AUTO: 6 K/UL (ref 4.1–11.1)

## 2022-12-01 PROCEDURE — 36415 COLL VENOUS BLD VENIPUNCTURE: CPT

## 2022-12-01 PROCEDURE — 74011250637 HC RX REV CODE- 250/637: Performed by: NURSE PRACTITIONER

## 2022-12-01 PROCEDURE — 74011000258 HC RX REV CODE- 258: Performed by: NURSE PRACTITIONER

## 2022-12-01 PROCEDURE — 65270000046 HC RM TELEMETRY

## 2022-12-01 PROCEDURE — 92526 ORAL FUNCTION THERAPY: CPT

## 2022-12-01 PROCEDURE — 74011250637 HC RX REV CODE- 250/637: Performed by: STUDENT IN AN ORGANIZED HEALTH CARE EDUCATION/TRAINING PROGRAM

## 2022-12-01 PROCEDURE — 74011250636 HC RX REV CODE- 250/636: Performed by: PHYSICIAN ASSISTANT

## 2022-12-01 PROCEDURE — 85025 COMPLETE CBC W/AUTO DIFF WBC: CPT

## 2022-12-01 PROCEDURE — 74011250636 HC RX REV CODE- 250/636: Performed by: NURSE PRACTITIONER

## 2022-12-01 PROCEDURE — 74011250637 HC RX REV CODE- 250/637: Performed by: PHYSICIAN ASSISTANT

## 2022-12-01 PROCEDURE — 80048 BASIC METABOLIC PNL TOTAL CA: CPT

## 2022-12-01 PROCEDURE — 93971 EXTREMITY STUDY: CPT

## 2022-12-01 PROCEDURE — 74011000250 HC RX REV CODE- 250: Performed by: FAMILY MEDICINE

## 2022-12-01 PROCEDURE — 74011000250 HC RX REV CODE- 250: Performed by: NURSE PRACTITIONER

## 2022-12-01 RX ORDER — GUAIFENESIN 600 MG/1
600 TABLET, EXTENDED RELEASE ORAL EVERY 12 HOURS
Status: DISCONTINUED | OUTPATIENT
Start: 2022-12-01 | End: 2022-12-01

## 2022-12-01 RX ORDER — GUAIFENESIN 100 MG/5ML
100 SOLUTION ORAL
Status: DISCONTINUED | OUTPATIENT
Start: 2022-12-01 | End: 2022-12-01

## 2022-12-01 RX ORDER — BENZONATATE 100 MG/1
100 CAPSULE ORAL
Status: DISCONTINUED | OUTPATIENT
Start: 2022-12-01 | End: 2022-12-05 | Stop reason: HOSPADM

## 2022-12-01 RX ORDER — GUAIFENESIN/DEXTROMETHORPHAN 100-10MG/5
10 SYRUP ORAL
Status: DISCONTINUED | OUTPATIENT
Start: 2022-12-01 | End: 2022-12-05 | Stop reason: HOSPADM

## 2022-12-01 RX ADMIN — Medication 400 MG: at 09:34

## 2022-12-01 RX ADMIN — CEFEPIME 1 G: 1 INJECTION, POWDER, FOR SOLUTION INTRAMUSCULAR; INTRAVENOUS at 12:03

## 2022-12-01 RX ADMIN — ATORVASTATIN CALCIUM 20 MG: 20 TABLET, FILM COATED ORAL at 09:38

## 2022-12-01 RX ADMIN — PANTOPRAZOLE SODIUM 40 MG: 40 TABLET, DELAYED RELEASE ORAL at 09:33

## 2022-12-01 RX ADMIN — CASTOR OIL AND BALSAM, PERU: 788; 87 OINTMENT TOPICAL at 09:38

## 2022-12-01 RX ADMIN — MICONAZOLE NITRATE 2 % TOPICAL POWDER: at 09:39

## 2022-12-01 RX ADMIN — VANCOMYCIN HYDROCHLORIDE 750 MG: 750 INJECTION, POWDER, LYOPHILIZED, FOR SOLUTION INTRAVENOUS at 09:30

## 2022-12-01 RX ADMIN — GUAIFENESIN 600 MG: 600 TABLET, EXTENDED RELEASE ORAL at 13:40

## 2022-12-01 RX ADMIN — KETOTIFEN FUMARATE 1 DROP: 0.35 SOLUTION/ DROPS OPHTHALMIC at 22:01

## 2022-12-01 RX ADMIN — KETOTIFEN FUMARATE 1 DROP: 0.35 SOLUTION/ DROPS OPHTHALMIC at 11:22

## 2022-12-01 RX ADMIN — NYSTATIN 500000 UNITS: 100000 SUSPENSION ORAL at 13:40

## 2022-12-01 RX ADMIN — CLINDAMYCIN IN 5 PERCENT DEXTROSE 600 MG: 12 INJECTION, SOLUTION INTRAVENOUS at 11:13

## 2022-12-01 RX ADMIN — Medication 10 ML: at 07:36

## 2022-12-01 RX ADMIN — Medication 10 ML: at 13:40

## 2022-12-01 RX ADMIN — NYSTATIN 500000 UNITS: 100000 SUSPENSION ORAL at 09:38

## 2022-12-01 RX ADMIN — FUROSEMIDE 20 MG: 40 TABLET ORAL at 09:34

## 2022-12-01 RX ADMIN — Medication 10 ML: at 21:44

## 2022-12-01 RX ADMIN — OXYCODONE HYDROCHLORIDE AND ACETAMINOPHEN 500 MG: 500 TABLET ORAL at 09:35

## 2022-12-01 RX ADMIN — CLINDAMYCIN IN 5 PERCENT DEXTROSE 600 MG: 12 INJECTION, SOLUTION INTRAVENOUS at 04:21

## 2022-12-01 RX ADMIN — MICONAZOLE NITRATE 2 % TOPICAL POWDER: at 18:37

## 2022-12-01 RX ADMIN — CEFEPIME 1 G: 1 INJECTION, POWDER, FOR SOLUTION INTRAMUSCULAR; INTRAVENOUS at 23:10

## 2022-12-01 RX ADMIN — NYSTATIN 500000 UNITS: 100000 SUSPENSION ORAL at 21:43

## 2022-12-01 RX ADMIN — CLINDAMYCIN IN 5 PERCENT DEXTROSE 600 MG: 12 INJECTION, SOLUTION INTRAVENOUS at 18:33

## 2022-12-01 RX ADMIN — TAMSULOSIN HYDROCHLORIDE 0.4 MG: 0.4 CAPSULE ORAL at 21:43

## 2022-12-01 RX ADMIN — NYSTATIN 500000 UNITS: 100000 SUSPENSION ORAL at 18:30

## 2022-12-01 RX ADMIN — LISINOPRIL 20 MG: 20 TABLET ORAL at 09:33

## 2022-12-01 RX ADMIN — LEVOTHYROXINE SODIUM 75 MCG: 0.07 TABLET ORAL at 07:36

## 2022-12-01 RX ADMIN — METOPROLOL SUCCINATE 25 MG: 25 TABLET, EXTENDED RELEASE ORAL at 18:30

## 2022-12-01 NOTE — PROGRESS NOTES
[address] [facility] Adult  Hospitalist Group                                                                                          Hospitalist Progress Note  Sabas Way MD  Answering service: [phone] OR [phone] from in house phone        Date of Service:  2022  NAME:  Sindy Dumas  :  1922  MRN:  [id number]      Admission Summary:   Sindy Dumas is a 80 y.o. male with PMHx of A-Fib with SSS s/p cardiac pacemaker, BPH, COPD, CKD, HTN, GERD, HLD, and hypothyroidism who presented from SNF with low grade fever and confusion. BCx  with 2/4 Coag negative staph. ID concerns for aspiration PNA (on Vanc, Cefepime, Clindamycin). Hospice and Palliative Care consult. Admission has been complicated by the families inability to agree on goals of care for Mr. Brooksie Landau. Interval history / Subjective:     Patient seen examined at bedside earlier. Significant upper respiratory secretions, cough but otherwise does not voice complaints      Assessment & Plan:     Acute metabolic encephalopathy, etiology unknown  Concern for fungal rash in groin with possible scrotal cellulitis  Aspiration Pneumonia   LA, fever -improved   -- Notable imaging: CT Head : No acute intracranial process  --- Scrotal rash: Likely fungal in etiology as incontinent at baseline. Started on Ancef (D1 = ) for concern for potential scrotal cellulitis or SSTI. Sasha Mckeon Continue Mycostatin powder. Wound care consult  -- Lactic acid: Initially elevated 2.37 --> 2.5 --> 1.96. No signs of EOD or hypoperfusion. Received NS 500cc bolus x 2. Hold off additional fluid resuscitation for now with hx of CHF  -- Notable labs: UA on : negative for nitrites/LE's, WBC's. Urine culture : negative. Influenza A/B and COVID negative.  TSH 1.53, Free T4 1.4, Ammonia 20  -- Concern for aspiration: Per pt and family they work with SLP outpatient and chronically aspirating but family accepted the risks of aspiration as food was a major agueda in life. ID consulted for + BCx, but they had concerns for aspiration PNA and broadened to Cefepime + Vancomycin + Clindamycin. His CXR on 11/26 without concerns for PNA and repeat 11/28 awaiting radiology read but appears stable. - patient with likely witnessed aspiration event last night, chest XR compatible with new aspiration pneumonia, likely source of spike in  fever   - he does appear improved  however continues to have course lung sound throughout all lung fields on exam   -speech eval 12/1 - complete eval recommend thin liquid with cup soft/bite sized diet, discussed w/ family despite this still high risk to aspirate     Coag negative Staph in 2/4 in BCx from 11/26  -- Likely MSSA , will follow culture to completion  -- ID consulted, sensitivities blood cultures requested pending   -- Surveillance cultures ordered 11/28 ngtd   -- Follow BCx from 11/26: Coagulase negative Staph 1/4. Await sensitivities  -TTE pending     LUE pain/swelling  Ordered duplex LUE ro dvt     Encounter for Hospice  - Family interested in meeting with Palliative Care and Hospice 11/30  -spoke to family again 12/1 to clarify-at this time clear plan is to wait for blood culture speciation if truly positive and would need IV antibiotics per ID would agree to hospice if contaminant would like to have patient return back to residential with 24-hour caregivers. Spoke to Aydee and daughter and Keke Leonora patient's daughter    Nocturnal O2  -- Uses CPAP at home. Has hx of MARIAN. With current AMS do not think he can tolerate CPAP.  Can continue nocturnal O2 as needed     Elevated troponin   -- Troponin 96 --> 114 --> 99.  -- No chest pain or concern for ACS  -- Cardiology reviewed chart      Congestive heart failure, unspecified chronicity  HTN  - last 2 echocardiogram 9/14/2022 showed left ventricular ejection fraction 50% to 55%  -Placed on strict I's and O's and daily weights  -- restarted lasix 11/30  -- Continue Lisinopril     CKD  -- Renal function at baseline. Continue to trend. Paroxysmal atrial fibrillation  SSS  -Status post cardiac pacemaker plantation  -Continue Tele  -- continue Metoprolol XL 25mg nightly      Acute COPD exacerbation  -order Duoneb nebulizer treatments every 4 hours as needed     Generalized weakness  -Placed on fall precautions     Hypothyroidism  -Continue levothyroxine     GERD  -- Continue hospital formulary PPI     BPH  -- Continue Flomax nightly     Essential Tremor  -- At baseline per family     Vitamin Deficiency  -- Continue home Ascorbic Acid and Magnesium    [place] ill high risk for decompensation. CCT time 40 minutes    Plan discussed with both daughter and granddaughter Chuck at [place]. Code status: DNR/DNI. Durable DNR recompleted 11/27  Prophylaxis: SCDs. Care Plan discussed with: Pt, RN, family  Anticipated Disposition: TBD. Awaiting blood culture speciation      Hospital Problems  Date Reviewed: 7/13/2022            Codes Class Noted POA    Lactic acidosis ICD-10-CM: E87.20  ICD-9-CM: 276.2  11/26/2022 Unknown        Elevated troponin ICD-10-CM: R77.8  ICD-9-CM: 790.6  11/26/2022 Unknown        AMS (altered mental status) ICD-10-CM: R41.82  ICD-9-CM: 780.97  11/26/2022 Unknown        Fever ICD-10-CM: R50.9  ICD-9-CM: 780.60  1/27/2019 Unknown       Review of Systems:   A comprehensive review of systems was negative except for that written in the HPI. Vital Signs:    Last 24hrs VS reviewed since prior progress note. Most recent are:  Visit Vitals  BP (!) 143/78   Pulse 70   Temp 97.7 °F (36.5 °C)   Resp 28   Ht 5' 8\" (1.727 m)   Wt 78.5 kg (173 lb)   SpO2 100%   BMI 26.30 kg/m²       No intake or output data in the 24 hours ending 12/01/22 1346       Physical Examination:     I had a face to face encounter with this patient and independently examined them on 12/1/2022 as outlined below:          Constitutional:  No acute distress.   no verbal response on exam   ENT:  Oral mucosa dry   Resp: Course shira b/l. No wheezing or respiratory distress or accessory muscle use. CV:  Paced rhythm on monitor, irregular rhythm    GI:  Soft, non distended, non tender. Musculoskeletal:  No edema, warm    Neurologic:  Moves all extremities to command. Oriented to name. Not oriented to year. : Minimal erythema in groin. Sebaceous cysts on scrotum without evidence of drainage.             Data Review:    Review and/or order of clinical lab test  Review and/or order of tests in the radiology section of CPT  Review and/or order of tests in the medicine section of CPT      Labs:     Recent Labs     12/01/22 0635 11/30/22 0627   WBC 6.0 7.6   HGB 10.8* 11.3*   HCT 35.1* 37.4   PLT 97* 98*       Recent Labs     12/01/22 0635 11/30/22 0227 11/29/22 0252    140 143   K 3.8 5.0 3.6   * 113* 107   CO2 27 22 27   BUN 31* 32* 30*   CREA 1.33* 1.45* 1.51*   GLU 91 73 94   CA 7.8* 7.8* 8.2*   MG  --   --  1.9   PHOS  --   --  3.4       Recent Labs     11/29/22 0252   ALT 18   AP 90   TBILI 1.0   TP 5.6*   ALB 2.9*   GLOB 2.7       Medications Reviewed:     Current Facility-Administered Medications   Medication Dose Route Frequency    [START ON 12/2/2022] Vancomycin level 12/2 with AM labs   Other ONCE    benzonatate (TESSALON) capsule 100 mg  100 mg Oral TID PRN    guaiFENesin-dextromethorphan (ROBITUSSIN DM) 100-10 mg/5 mL syrup 10 mL  10 mL Oral Q6H PRN    guaiFENesin ER (MUCINEX) tablet 600 mg  600 mg Oral Q12H    pantoprazole (PROTONIX) tablet 40 mg  40 mg Oral DAILY    Vancomycin- Pharmacy to Dose   Other Rx Dosing/Monitoring    vancomycin (VANCOCIN) 750 mg in 0.9% sodium chloride 250 mL (Poad8Fce)  750 mg IntraVENous Q24H    balsam peru-castor oiL (VENELEX) ointment   Topical Q12H    clindamycin (CLEOCIN) 600mg D5W 50mL IVPB (premix)  600 mg IntraVENous Q8H    ketotifen (ZADITOR) 0.025 % (0.035 %) ophthalmic solution 1 Drop  1 Drop Right Eye Q12H    nystatin (MYCOSTATIN) 100,000 unit/mL oral suspension 500,000 Units  500,000 Units Oral QID    cefepime (MAXIPIME) 1 g in 0.9% sodium chloride (MBP/ADV) 50 mL MBP  1 g IntraVENous Q12H    ondansetron (ZOFRAN) injection 4 mg  4 mg IntraVENous Q6H PRN    acetaminophen (TYLENOL) suppository 650 mg  650 mg Rectal Q4H PRN    sodium chloride (NS) flush 5-40 mL  5-40 mL IntraVENous Q8H    sodium chloride (NS) flush 5-40 mL  5-40 mL IntraVENous PRN    acetaminophen (TYLENOL) tablet 650 mg  650 mg Oral Q6H PRN    atorvastatin (LIPITOR) tablet 20 mg  20 mg Oral DAILY    furosemide (LASIX) tablet 20 mg  20 mg Oral DAILY    levothyroxine (SYNTHROID) tablet 75 mcg  75 mcg Oral 6am    lisinopriL (PRINIVIL, ZESTRIL) tablet 20 mg  20 mg Oral DAILY    magnesium oxide (MAG-OX) tablet 400 mg  400 mg Oral DAILY    metoprolol succinate (TOPROL-XL) XL tablet 25 mg  25 mg Oral QPM    ipratropium (ATROVENT) 21 mcg (0.03 %) nasal spray 2 Spray  2 Spray Both Nostrils BID PRN    tamsulosin (FLOMAX) capsule 0.4 mg  0.4 mg Oral QHS    ascorbic acid (vitamin C) (VITAMIN C) tablet 500 mg  500 mg Oral Once per day on Tue Thu Sat    ondansetron (ZOFRAN ODT) tablet 4 mg  4 mg Oral Q8H PRN    albuterol-ipratropium (DUO-NEB) 2.5 MG-0.5 MG/3 ML  3 mL Nebulization Q4H PRN    miconazole (MICOTIN) 2 % powder   Topical BID     ______________________________________________________________________  EXPECTED LENGTH OF STAY: 4d 12h  ACTUAL LENGTH OF STAY:          5                 Michael France MD

## 2022-12-01 NOTE — PROGRESS NOTES
Problem: Dysphagia (Adult)  Goal: *Acute Goals and Plan of Care (Insert Text)  Description: Speech Therapy Goals  Initiated 22    1. Patient will tolerate soft/bite sized diet and thin liquids without adverse effects within 7 days. Outcome: Progressing Towards Goal       SPEECH LANGUAGE PATHOLOGY DYSPHAGIA TREATMENT  Patient: Isra Stiles (Barry  [age] y.o. male)  Date: 2022  Diagnosis: AMS (altered mental status) [R41.82]  Lactic acidosis [E87.20]  Elevated troponin [R77.8]  Fever [R50.9] <principal problem not specified>      Precautions:       ASSESSMENT:  Completed bolus hold trials with patient with thin liquids via cup. Pt successfully completed with no overt s/s of aspiration. Recommend pt continue thin liquids via cup, allowing pt to self-feed, and soft/bite sized diet. Cough is okay. Please refer to SLP recommendations in previous notes on . PLAN:  Recommendations and Planned Interventions:  --soft/bite sized diet/thin liquids  --cup sips only  --allow patient to self-feed   --do NOT thicken liquids  --excellent oral care  --pt will cough. This is okay. Please do not hold PO because of this cough. --medical management of reflux  --medications in applesauce only. Patient continues to benefit from skilled intervention to address the above impairments. Continue treatment per established plan of care. Discharge Recommendations: To Be Determined     SUBJECTIVE:   Patient stated, \"I have some things to suction, please.     OBJECTIVE:   Cognitive and Communication Status:  Neurologic State: Alert, Eyes open spontaneously  Orientation Level: Oriented to person, Oriented to place, Disoriented to situation, Disoriented to time  Cognition: Decreased attention/concentration  Perception: Cues to maintain midline in standing, Cues to maintain midline in sitting (posterior lean)  Perseveration: No perseveration noted  Safety/Judgement: Decreased awareness of environment, Decreased awareness of need for assistance, Decreased awareness of need for safety, Decreased insight into deficits, Fall prevention  Dysphagia Treatment:  Oral Assessment:   WFL    P.O. Trials:   Patient with cup sips of water and cued pt to utilize bolus hold. No overt difficulty appreciated on this date. Pain:  Pain Scale 1: Adult Nonverbal Pain Scale  Pain Intensity 1: 0       After treatment:   Patient left in no apparent distress sitting up in chair, Call bell within reach, and Nursing notified    COMMUNICATION/EDUCATION:       The patient's plan of care including recommendations, planned interventions, and recommended diet changes were discussed with: Registered nurse.      LAINEY Horner  Time Calculation: 20 mins

## 2022-12-01 NOTE — PROGRESS NOTES
[facility] Adult  Hospitalist Group                                                                                          Hospitalist Progress Note  Mihai Hubbard NP  Answering service: [phone] OR [phone] from in house phone        Date of Service:  2022  NAME:  Ellie Lau  :  1922  MRN:  [id number]      Admission Summary:   Ellie Lau is a 80 y.o. male with PMHx of A-Fib with SSS s/p cardiac pacemaker, BPH, COPD, CKD, HTN, GERD, HLD, and hypothyroidism who presented from SNF with low grade fever and confusion. BCx  with 2/4 Coag negative staph. ID concerns for aspiration PNA (on Vanc, Cefepime, Clindamycin). Hospice and Palliative Care consult. Admission has been complicated by the families inability to agree on goals of care for Mr. Alma Enriquez. Interval history / Subjective:     Events :     : Patient seen this morning alongside granddaughter , [facility] [place] [facility]. Today the patient appears to have aspirated overnight last night. He has a high temperature today ( tmax 103.1) and chest XR consistent with aspiration pneumonia along with clinical story of ongoing coughing for several hours after attempting to eat last night. Patients respirations are elevated today (30's), patient is barely arousable and he has course/rhonchi throughout all lung fields. Discussed with patients grand daughter, [facility] [place] [facility], who ultimately wants him to be made comfort care and hospice. Her mother is the medical power of , Audrey Caballero, who after meeting with both palliative and hospice today state that she is not ready to make the decision. The patient has been accepted to inpatient hospice if/when family decides to transition him to comfort/hospice care. The patient is clinically appropriate for hospice due to advanced age in the setting of multiple acute/chronic medical problems. : Patient's family continues to have significant disagreements regarding goals of care for Mr. Alma Enriquez. Today both his grand daughter Gabi Chang ( who NP in the ICU at [facility]) as well as daughter Chuck state that they are in favor of moving to SNF vs. Back to [facility] with daytime private duty aids. Mr. Ravindra Castro grand daughter, Wesley Fournier, would like him to move into hospice care. See separate note for further details of this conversation/meeting today. Speech did consult on patient today and state that he was aspirating less with thin liquids vs. Thickened liquids. Assessment & Plan:     Acute metabolic encephalopathy, etiology unknown  Concern for fungal rash in groin with possible scrotal cellulitis  Lactic Acidosis, resolved  High fever to 103.1 on 11/28  Aspiration Pneumonia   -- Notable imaging: CT Head 11/26: No acute intracranial process  --- Scrotal rash: Likely fungal in etiology as incontinent at baseline. Started on Ancef (D1 = 11/27) for concern for potential scrotal cellulitis or SSTI. Will continue for now. Continue Mycostatin powder. Wound care consult  -- Lactic acid: Initially elevated 2.37 --> 2.5 --> 1.96. No signs of EOD or hypoperfusion. Received NS 500cc bolus x 2. Hold off additional fluid resuscitation for now with hx of CHF  -- Notable labs: UA on 11/26: negative for nitrites/LE's, WBC's. Urine culture 11/26: negative. Influenza A/B and COVID negative. TSH 1.53, Free T4 1.4, Ammonia 20  -- Concern for aspiration: Per pt and family they work with SLP outpatient and chronically aspirating but family accepted the risks of aspiration as food was a major agueda in life. ID consulted for + BCx, but they had concerns for aspiration PNA and broadened to Cefepime + Vancomycin + Clindamycin. His CXR on 11/26 without concerns for PNA and repeat 11/28 awaiting radiology read but appears stable. MRSA IN swab pending. PCT 11/28. If aspirating and needing O2 may be aspiration pneumonitis and not from infection.    - patient with likely witnessed aspiration event last night, chest XR compatible with new aspiration pneumonia, likely source of spike in  fever   - he does appear improved today however continues to have course lung sound throughout all lung fields on exam     Coag negative Staph in 2/4 in BCx from 11/26  -- Likely MSSA , will follow culture to completion  -- ID consulted  -- Surveillance cultures ordered 11/28 and pending  -- Follow BCx from 11/26: Coagulase negative Staph 1/4. Await sensitivities    Encounter for Hospice  -- Family interested in meeting with Palliative Care and Hospice. They note he is no longer enjoying previous joys (such as food) and that likely has < 6 months to live which I agree. Appreciate their consults and assistance    Nocturnal O2  -- Uses CPAP at home. Has hx of MARIAN. With current AMS do not think he can tolerate CPAP. Can continue nocturnal O2 as needed     Elevated troponin   -- Troponin 96 --> 114 --> 99.  -- No chest pain or concern for ACS  -- Cardiology reviewed chart      Congestive heart failure, unspecified chronicity  HTN  - last 2 echocardiogram 9/14/2022 showed left ventricular ejection fraction 50% to 55%  -Placed on strict I's and O's and daily weights  -- restarted lasix 11/30  -- Continue Lisinopril     CKD  -- Renal function at baseline. Continue to trend. Paroxysmal atrial fibrillation  SSS  -Status post cardiac pacemaker plantation  -Continue Tele  -- continue Metoprolol XL 25mg nightly      Acute COPD exacerbation  -order Duoneb nebulizer treatments every 4 hours as needed     Generalized weakness  -Placed on fall precautions     Hypothyroidism  -Continue levothyroxine     GERD  -- Continue hospital formulary PPI     BPH  -- Continue Flomax nightly     Essential Tremor  -- At baseline per family     Vitamin Deficiency  -- Continue home Ascorbic Acid and Magnesium     Code status: DNR/DNI. Durable DNR recompleted 11/27  Prophylaxis: SCDs. Care Plan discussed with: Pt, RN, family  Anticipated Disposition: TBD. Care Manager consulted.  PT/OT consulted     LINDSAY KELLY Problems  Date Reviewed: 7/13/2022            Codes Class Noted POA    Lactic acidosis ICD-10-CM: E87.20  ICD-9-CM: 276.2  11/26/2022 Unknown        Elevated troponin ICD-10-CM: R77.8  ICD-9-CM: 790.6  11/26/2022 Unknown        AMS (altered mental status) ICD-10-CM: R41.82  ICD-9-CM: 780.97  11/26/2022 Unknown        Fever ICD-10-CM: R50.9  ICD-9-CM: 780.60  1/27/2019 Unknown         Review of Systems:   A comprehensive review of systems was negative except for that written in the HPI. Vital Signs:    Last 24hrs VS reviewed since prior progress note. Most recent are:  Visit Vitals  /69 (BP 1 Location: Left arm, BP Patient Position: At rest)   Pulse 72   Temp 97.8 °F (36.6 °C)   Resp 22   Ht 5' 8\" (1.727 m)   Wt 77.9 kg (171 lb 11.8 oz)   SpO2 100%   BMI 26.11 kg/m²         Intake/Output Summary (Last 24 hours) at 11/30/2022 2326  Last data filed at 11/30/2022 0400  Gross per 24 hour   Intake 500 ml   Output 200 ml   Net 300 ml          Physical Examination:     I had a face to face encounter with this patient and independently examined them on 11/30/2022 as outlined below:          Constitutional:  No acute distress. Sleeping on arrival. Briefly awakens for exam , very lethargic today. no verbal response on exam   ENT:  Oral mucosa dry   Resp:  Upper airway crackling. No wheezing or respiratory distress or accessory muscle use. CV:  Paced rhythm on monitor, irregular rhythm    GI:  Soft, non distended, non tender. Musculoskeletal:  No edema, warm    Neurologic:  Moves all extremities to command. Oriented to name. Not oriented to year. : Minimal erythema in groin. Sebaceous cysts on scrotum without evidence of drainage.             Data Review:    Review and/or order of clinical lab test  Review and/or order of tests in the radiology section of CPT  Review and/or order of tests in the medicine section of CPT      Labs:     Recent Labs     11/30/22  0627 11/29/22  0252   WBC 7.6 13.2*   HGB 11.3* 11.3*   HCT 37.4 35.7*   PLT 98* 101*       Recent Labs     11/30/22  0227 11/29/22  0252 11/28/22  0118    143 141   K 5.0 3.6 3.8   * 107 108   CO2 22 27 25   BUN 32* 30* 26*   CREA 1.45* 1.51* 1.43*   GLU 73 94 101*   CA 7.8* 8.2* 8.5   MG  --  1.9 1.9   PHOS  --  3.4 3.8       Recent Labs     11/29/22  0252 11/28/22  0118   ALT 18 19   AP 90 92   TBILI 1.0 0.7   TP 5.6* 5.8*   ALB 2.9* 2.7*   GLOB 2.7 3.1       Medications Reviewed:     Current Facility-Administered Medications   Medication Dose Route Frequency    [START ON 12/1/2022] pantoprazole (PROTONIX) tablet 40 mg  40 mg Oral DAILY    Vancomycin- Pharmacy to Dose   Other Rx Dosing/Monitoring    vancomycin (VANCOCIN) 750 mg in 0.9% sodium chloride 250 mL ([id number])  750 mg IntraVENous Q24H    balsam peru-castor oiL (VENELEX) ointment   Topical Q12H    clindamycin (CLEOCIN) 600mg D5W 50mL IVPB (premix)  600 mg IntraVENous Q8H    ketotifen (ZADITOR) 0.025 % (0.035 %) ophthalmic solution 1 Drop  1 Drop Right Eye Q12H    nystatin (MYCOSTATIN) 100,000 unit/mL oral suspension 500,000 Units  500,000 Units Oral QID    cefepime (MAXIPIME) 1 g in 0.9% sodium chloride (MBP/ADV) 50 mL MBP  1 g IntraVENous Q12H    ondansetron (ZOFRAN) injection 4 mg  4 mg IntraVENous Q6H PRN    acetaminophen (TYLENOL) suppository 650 mg  650 mg Rectal Q4H PRN    sodium chloride (NS) flush 5-40 mL  5-40 mL IntraVENous Q8H    sodium chloride (NS) flush 5-40 mL  5-40 mL IntraVENous PRN    acetaminophen (TYLENOL) tablet 650 mg  650 mg Oral Q6H PRN    atorvastatin (LIPITOR) tablet 20 mg  20 mg Oral DAILY    furosemide (LASIX) tablet 20 mg  20 mg Oral DAILY    levothyroxine (SYNTHROID) tablet 75 mcg  75 mcg Oral 6am    lisinopriL (PRINIVIL, ZESTRIL) tablet 20 mg  20 mg Oral DAILY    magnesium oxide (MAG-OX) tablet 400 mg  400 mg Oral DAILY    metoprolol succinate (TOPROL-XL) XL tablet 25 mg  25 mg Oral QPM    ipratropium (ATROVENT) 21 mcg (0.03 %) nasal spray 2 Spray  2 Spray Both Nostrils BID PRN    tamsulosin (FLOMAX) capsule 0.4 mg  0.4 mg Oral QHS    ascorbic acid (vitamin C) (VITAMIN C) tablet 500 mg  500 mg Oral Once per day on Tue Thu Sat    ondansetron (ZOFRAN ODT) tablet 4 mg  4 mg Oral Q8H PRN    albuterol-ipratropium (DUO-NEB) 2.5 MG-0.5 MG/3 ML  3 mL Nebulization Q4H PRN    miconazole (MICOTIN) 2 % powder   Topical BID     ______________________________________________________________________  EXPECTED LENGTH OF STAY: 4d 12h  ACTUAL LENGTH OF STAY:          4                 Peewee Barrera, NP

## 2022-12-01 NOTE — PROGRESS NOTES
Problem: Pressure Injury - Risk of  Goal: *Prevention of pressure injury  Description: Document Thanh Scale and appropriate interventions in the flowsheet. Outcome: Progressing Towards Goal  Note: Pressure Injury Interventions:  Sensory Interventions: Float heels, Minimize linen layers, Keep linens dry and wrinkle-free    Moisture Interventions: Minimize layers, Check for incontinence Q2 hours and as needed    Activity Interventions: PT/OT evaluation, Pressure redistribution bed/mattress(bed type)    Mobility Interventions: Turn and reposition approx.  every two hours(pillow and wedges)    Nutrition Interventions: Offer support with meals,snacks and hydration    Friction and Shear Interventions: Lift sheet, Minimize layers                Problem: Aspiration - Risk of  Goal: *Absence of aspiration  Outcome: Progressing Towards Goal     Problem: Patient Education: Go to Patient Education Activity  Goal: Patient/Family Education  Outcome: Progressing Towards Goal

## 2022-12-01 NOTE — PROGRESS NOTES
Transition of Care Plan  RUR- Med 18%  DISPOSITION: Return to [facility] with personal caregivers; with or without hospice pending blood cultures  F/U with PCP/Specialist    Transport: [employer] [facility]     Emergency contact: DaughterAl [phone] and granddaughter, Karine Carrera [phone]    MICHAEL barriers to discharge: None    Case discussed with Dr. Shama Wasserman - plan for patient to return to [facility] at discharge. This will be either with additional caregivers hired by family or with hospice services; all pending blood culture/need for IV abx. CM spoke with patient's granddaughter, Jovana Westbrook, who stated that she had discussed this plan with [place] [phone]. Preference for hospice provider is [facility]; patient's granddaughter has already reached out to [facility] for potential services. Message left for [place] [phone]. Domo Lamar M.S.JORDY.

## 2022-12-01 NOTE — PROGRESS NOTES
[address] [facility] Adult  Hospitalist Group                                                                                          Hospitalist Progress Note  Kirstin Gibson MD  Answering service: [phone] OR [phone] from in house phone        Date of Service:  2022  NAME:  Deonte Peterson  :  1922  MRN:  [id number]      Admission Summary:   Deonte Peterson is a Barry Meka [age] y.o. male with PMHx of A-Fib with SSS s/p cardiac pacemaker, BPH, COPD, CKD, HTN, GERD, HLD, and hypothyroidism who presented from SNF with low grade fever and confusion. BCx  with 2/4 Coag negative staph. ID concerns for aspiration PNA (on Vanc, Cefepime, Clindamycin). Hospice and Palliative Care consult. Admission has been complicated by the families inability to agree on goals of care for Mr. Wali Beltre. Interval history / Subjective:     Patient seen examined at bedside earlier. Significant upper respiratory secretions, cough but otherwise does not voice complaints      Assessment & Plan:     Acute metabolic encephalopathy, etiology unknown  Concern for fungal rash in groin with possible scrotal cellulitis  Aspiration Pneumonia   LA, fever -improved   -- Notable imaging: CT Head : No acute intracranial process  --- Scrotal rash: Likely fungal in etiology as incontinent at baseline. Started on Ancef (D1 = ) for concern for potential scrotal cellulitis or SSTI. Algis Broaden Continue Mycostatin powder. Wound care consult  -- Lactic acid: Initially elevated 2.37 --> 2.5 --> 1.96. No signs of EOD or hypoperfusion. Received NS 500cc bolus x 2. Hold off additional fluid resuscitation for now with hx of CHF  -- Notable labs: UA on : negative for nitrites/LE's, WBC's. Urine culture : negative. Influenza A/B and COVID negative.  TSH 1.53, Free T4 1.4, Ammonia 20  -- Concern for aspiration: Per pt and family they work with SLP outpatient and chronically aspirating but family accepted the risks of aspiration as food was a major agueda in life. ID consulted for + BCx, but they had concerns for aspiration PNA and broadened to Cefepime + Vancomycin + Clindamycin. His CXR on 11/26 without concerns for PNA and repeat 11/28 awaiting radiology read but appears stable. - patient with likely witnessed aspiration event last night, chest XR compatible with new aspiration pneumonia, likely source of spike in  fever   - he does appear improved  however continues to have course lung sound throughout all lung fields on exam     Coag negative Staph in 2/4 in BCx from 11/26  -- Likely MSSA , will follow culture to completion  -- ID consulted, sensitivities blood cultures requested pending   -- Surveillance cultures ordered 11/28 ngtd   -- Follow BCx from 11/26: Coagulase negative Staph 1/4. Await sensitivities  -TTE pending     Encounter for Hospice  - Family interested in meeting with Palliative Care and Hospice 11/30  -spoke to family again 12/1 to clarify-at this time clear plan is to wait for blood culture speciation if truly positive and would need IV antibiotics per ID would agree to hospice if contaminant would like to have patient return back to [facility] with 24-hour caregivers. Spoke to Aydee and daughter and Clearance Ek patient's daughter    Nocturnal O2  -- Uses CPAP at home. Has hx of MARIAN. With current AMS do not think he can tolerate CPAP. Can continue nocturnal O2 as needed     Elevated troponin   -- Troponin 96 --> 114 --> 99.  -- No chest pain or concern for ACS  -- Cardiology reviewed chart      Congestive heart failure, unspecified chronicity  HTN  - last 2 echocardiogram 9/14/2022 showed left ventricular ejection fraction 50% to 55%  -Placed on strict I's and O's and daily weights  -- restarted lasix 11/30  -- Continue Lisinopril     CKD  -- Renal function at baseline. Continue to trend.     Paroxysmal atrial fibrillation  SSS  -Status post cardiac pacemaker plantation  -Continue Tele  -- continue Metoprolol XL 25mg nightly      Acute COPD exacerbation  -order Duoneb nebulizer treatments every 4 hours as needed     Generalized weakness  -Placed on fall precautions     Hypothyroidism  -Continue levothyroxine     GERD  -- Continue hospital formulary PPI     BPH  -- Continue Flomax nightly     Essential Tremor  -- At baseline per family     Vitamin Deficiency  -- Continue home Ascorbic Acid and Magnesium    Rocco ill high risk for decompensation. CCT time 40 minutes    Plan discussed with both daughter and granddaughter Anne Mcgrath at [facility]. Code status: DNR/DNI. Durable DNR recompleted 11/27  Prophylaxis: SCDs. Care Plan discussed with: Pt, RN, family  Anticipated Disposition: TBD. Awaiting blood culture speciation      Hospital Problems  Date Reviewed: 7/13/2022            Codes Class Noted POA    Lactic acidosis ICD-10-CM: E87.20  ICD-9-CM: 276.2  11/26/2022 Unknown        Elevated troponin ICD-10-CM: R77.8  ICD-9-CM: 790.6  11/26/2022 Unknown        AMS (altered mental status) ICD-10-CM: R41.82  ICD-9-CM: 780.97  11/26/2022 Unknown        Fever ICD-10-CM: R50.9  ICD-9-CM: 780.60  1/27/2019 Unknown         Review of Systems:   A comprehensive review of systems was negative except for that written in the HPI. Vital Signs:    Last 24hrs VS reviewed since prior progress note. Most recent are:  Visit Vitals  BP (!) 143/78   Pulse 70   Temp 97.7 °F (36.5 °C)   Resp 28   Ht 5' 8\" (1.727 m)   Wt 78.5 kg (173 lb)   SpO2 100%   BMI 26.30 kg/m²       No intake or output data in the 24 hours ending 12/01/22 1307       Physical Examination:     I had a face to face encounter with this patient and independently examined them on 12/1/2022 as outlined below:          Constitutional:  No acute distress. no verbal response on exam   ENT:  Oral mucosa dry   Resp:  Course shira b/l. No wheezing or respiratory distress or accessory muscle use. CV:  Paced rhythm on monitor, irregular rhythm    GI:  Soft, non distended, non tender.      Musculoskeletal:  No edema, warm    Neurologic:  Moves all extremities to command. Oriented to name. Not oriented to year. : Minimal erythema in groin. Sebaceous cysts on scrotum without evidence of drainage.             Data Review:    Review and/or order of clinical lab test  Review and/or order of tests in the radiology section of CPT  Review and/or order of tests in the medicine section of CPT      Labs:     Recent Labs     12/01/22 0635 11/30/22 0627   WBC 6.0 7.6   HGB 10.8* 11.3*   HCT 35.1* 37.4   PLT 97* 98*       Recent Labs     12/01/22 0635 11/30/22 0227 11/29/22 0252    140 143   K 3.8 5.0 3.6   * 113* 107   CO2 27 22 27   BUN 31* 32* 30*   CREA 1.33* 1.45* 1.51*   GLU 91 73 94   CA 7.8* 7.8* 8.2*   MG  --   --  1.9   PHOS  --   --  3.4       Recent Labs     11/29/22 0252   ALT 18   AP 90   TBILI 1.0   TP 5.6*   ALB 2.9*   GLOB 2.7       Medications Reviewed:     Current Facility-Administered Medications   Medication Dose Route Frequency    [START ON 12/2/2022] Vancomycin level 12/2 with AM labs   Other ONCE    pantoprazole (PROTONIX) tablet 40 mg  40 mg Oral DAILY    Vancomycin- Pharmacy to Dose   Other Rx Dosing/Monitoring    vancomycin (VANCOCIN) 750 mg in 0.9% sodium chloride 250 mL ([id number])  750 mg IntraVENous Q24H    balsam peru-castor oiL (VENELEX) ointment   Topical Q12H    clindamycin (CLEOCIN) 600mg D5W 50mL IVPB (premix)  600 mg IntraVENous Q8H    ketotifen (ZADITOR) 0.025 % (0.035 %) ophthalmic solution 1 Drop  1 Drop Right Eye Q12H    nystatin (MYCOSTATIN) 100,000 unit/mL oral suspension 500,000 Units  500,000 Units Oral QID    cefepime (MAXIPIME) 1 g in 0.9% sodium chloride (MBP/ADV) 50 mL MBP  1 g IntraVENous Q12H    ondansetron (ZOFRAN) injection 4 mg  4 mg IntraVENous Q6H PRN    acetaminophen (TYLENOL) suppository 650 mg  650 mg Rectal Q4H PRN    sodium chloride (NS) flush 5-40 mL  5-40 mL IntraVENous Q8H    sodium chloride (NS) flush 5-40 mL  5-40 mL IntraVENous PRN    acetaminophen (TYLENOL) tablet 650 mg  650 mg Oral Q6H PRN    atorvastatin (LIPITOR) tablet 20 mg  20 mg Oral DAILY    furosemide (LASIX) tablet 20 mg  20 mg Oral DAILY    levothyroxine (SYNTHROID) tablet 75 mcg  75 mcg Oral 6am    lisinopriL (PRINIVIL, ZESTRIL) tablet 20 mg  20 mg Oral DAILY    magnesium oxide (MAG-OX) tablet 400 mg  400 mg Oral DAILY    metoprolol succinate (TOPROL-XL) XL tablet 25 mg  25 mg Oral QPM    ipratropium (ATROVENT) 21 mcg (0.03 %) nasal spray 2 Spray  2 Spray Both Nostrils BID PRN    tamsulosin (FLOMAX) capsule 0.4 mg  0.4 mg Oral QHS    ascorbic acid (vitamin C) (VITAMIN C) tablet 500 mg  500 mg Oral Once per day on Tue Thu Sat    ondansetron (ZOFRAN ODT) tablet 4 mg  4 mg Oral Q8H PRN    albuterol-ipratropium (DUO-NEB) 2.5 MG-0.5 MG/3 ML  3 mL Nebulization Q4H PRN    miconazole (MICOTIN) 2 % powder   Topical BID     ______________________________________________________________________  EXPECTED LENGTH OF STAY: 4d 12h  ACTUAL LENGTH OF STAY:          5                 Billy Rivera MD

## 2022-12-01 NOTE — ADVANCED PRACTICE NURSE
Details of meeting today with family:     Met with family to discuss plan of care and decision to make patient NPO. Bushra Began and Magan Lee all present for meeting Maximus Moment is mPOA). Chauncey Leslie, granddaughter,  expressed frustration that her family was not considering her opinions and fears that he would be suffering by continuing to eat for comfort. Upon the start of my conversation/meeting with family today, Marlene Salguero is very upset with this writer. She is angry that I placed an order to make him NPO this morning pending further [address] discussions that were taking place stating to me that [facility] is un-ethical as I should have spoken to her or her mother regarding this order to not allow her grandfather to eat\" ;  \"I feel sorry for your patients if this is how you treat them\"; \"This entire experience has been awful and hospice has been pushed on us repeatedly. \"  Many other inappropriate and verbally aggressive statements were said to this writer throughout this conversation. I offered full explanation as to why decision was made just a short time ago to make him NPO and that I was coming in to discuss this decision with both her as well as her mother; including but not limited to the fact that her sister, Chauncey Leslie, felt that he should be made NPO and admitted to hospice because she felt he was suffering tremendously during aspiration episodes. Per René, her sister is \"non medical and was very scared by the aspiration event\" and that her and her mother Magan Lee are trying to honor Mr. Oliveira Lab wishes to continue living and having medical interventions/treatments/comfort feedings. Per Gena's request, we attempt to include Mr. Kenia Delcid in the medical decision making. He is alert and oriented x 4 at this time however he very clearly is deferring any decisions to his daughter Magan Lee and grand daughter Marlene Salguero.  He states \"I know we are all eventually going to die, but I'm in no hurry; if I aspirate then I aspirate, I dont want to give up eating. \"     I continued to offer listening, medical opinions at their request and mediation to the family for approx. 60 minutes. At this time we discussed a speech evaluation as the patient has never officially had objective data to support his chronic aspiration diagnosis. We discussed that at this time they would like to move forward with possible SNF placement. All is contingent upon blood cultures and duration of IV antibiotic. At the close of this conversation, family is thankful an appreciative for time spent. Family has apologized for aforementioned statements.      Dileep Cr, JORGE LP   [facility]

## 2022-12-01 NOTE — PROGRESS NOTES
ID Progress Note  2022    Subjective:     Voices feeling ok  [place]  Review of Systems:            Symptom Y/N Comments   Symptom Y/N Comments   Fever/Chills n      Chest Pain  n      Poor Appetite       Edema        Cough       Abdominal Pain        Sputum       Joint Pain        SOB/GARCIA n      Pruritis/Rash        Nausea/vomit n      Tolerating PT/OT        Diarrhea       Tolerating Diet        Constipation       Other           Could NOT obtain due to:       Objective:     Vitals: Visit Vitals  BP (!) 143/78   Pulse 70   Temp 97.7 °F (36.5 °C)   Resp 28   Ht 5' 8\" (1.727 m)   Wt 78.5 kg (173 lb)   SpO2 100%   BMI 26.30 kg/m²          Tmax:  Temp (24hrs), Av °F (36.7 °C), Min:97.7 °F (36.5 °C), Max:98.4 °F (36.9 °C)      PHYSICAL EXAM:  General: Chronically ill appearing. WD, WN. Resting. no acute distress    EENT:  No drainage from right eye, Dry mucous membrane, [place]  Resp:  Clear in apex with decreased breath sounds at bases. no wheezing or rales. No accessory muscle use  CV:  Regular  rhythm,  No edema  GI:  Soft, Non distended, Non tender. +Bowel sounds  Neurologic:  Alert, follows some commends  Psych:   Fair insight.  poor medical historian, Not anxious nor agitated  Skin:  Intertrigo,  No jaundice    Labs:   Lab Results   Component Value Date/Time    WBC 6.0 2022 06:35 AM    HGB 10.8 (L) 2022 06:35 AM    HCT 35.1 (L) 2022 06:35 AM    PLATELET 97 (L)  06:35 AM    MCV 97.5 2022 06:35 AM     Lab Results   Component Value Date/Time    Sodium 141 2022 06:35 AM    Potassium 3.8 2022 06:35 AM    Chloride 109 (H) 2022 06:35 AM    CO2 27 2022 06:35 AM    Anion gap 5 2022 06:35 AM    Glucose 91 2022 06:35 AM    BUN 31 (H) 2022 06:35 AM    Creatinine 1.33 (H) 2022 06:35 AM    BUN/Creatinine ratio 23 (H) 2022 06:35 AM    GFR est AA 46 (L) 2022 02:57 PM    GFR est non-AA 38 (L) 2022 02:57 PM    Calcium 7.8 (L) 12/01/2022 06:35 AM    Bilirubin, total 1.0 11/29/2022 02:52 AM    Alk. phosphatase 90 11/29/2022 02:52 AM    Protein, total 5.6 (L) 11/29/2022 02:52 AM    Albumin 2.9 (L) 11/29/2022 02:52 AM    Globulin 2.7 11/29/2022 02:52 AM    A-G Ratio 1.1 11/29/2022 02:52 AM    ALT (SGPT) 18 11/29/2022 02:52 AM         Cultures:   Results       Procedure Component Value Units Date/Time    CULTURE, MRSA [[id number]] Collected: 11/28/22 1148    Order Status: Completed Specimen: Nasal from Nares Updated: 11/29/22 1415     Special Requests: NO SPECIAL REQUESTS        Culture result: MRSA NOT PRESENT               Screening of patient nares for MRSA is for surveillance purposes and, if positive, to facilitate isolation considerations in high risk settings. It is not intended for automatic decolonization interventions per se as regimens are not sufficiently effective to warrant routine use. CULTURE, BLOOD, PAIRED [[id number]] Collected: 11/28/22 1105    Order Status: Completed Specimen: Blood Updated: 11/30/22 0714     Special Requests: NO SPECIAL REQUESTS        Culture result: NO GROWTH 2 DAYS       URINE CULTURE HOLD SAMPLE [[id number]] Collected: 11/26/22 2101    Order Status: Completed Specimen: Urine from Serum Updated: 11/26/22 2111     Urine culture hold       Urine on hold in Microbiology dept for 2 days. If unpreserved urine is submitted, it cannot be used for addtional testing after 24 hours, recollection will be required.           CULTURE, URINE [[id number]] Collected: 11/26/22 2101    Order Status: Completed Specimen: Cath Urine Updated: 11/27/22 1802     Special Requests: NO SPECIAL REQUESTS        Culture result: No growth (<1,000 CFU/ML)       COVID-19 RAPID TEST [[id number]] Collected: 11/26/22 1845    Order Status: Completed Specimen: Nasopharyngeal Updated: 11/26/22 1951     Specimen source Nasopharyngeal        COVID-19 rapid test Not detected        Comment: Rapid Abbott ID Now       Rapid NAAT:  The specimen is NEGATIVE for SARS-CoV-2, the novel coronavirus associated with COVID-19. Negative results should be treated as presumptive and, if inconsistent with clinical signs and symptoms or necessary for patient management, should be tested with an alternative molecular assay. Negative results do not preclude SARS-CoV-2 infection and should not be used as the sole basis for patient management decisions. This test has been authorized by the FDA under an Emergency Use Authorization (EUA) for use by authorized laboratories.    Fact sheet for Healthcare Providers:  http://www.nadeem-kenyatta.catherine/  Fact sheet for Patients: http://www.nadeem-kenyatta.catherine/       Methodology: Isothermal Nucleic Acid Amplification         BLOOD CULTURE ID PANEL [[id number]]  (Abnormal) Collected: 11/26/22 1845    Order Status: Completed Specimen: Blood Updated: 11/28/22 0625     Acc. no. from Micro Order [id number]     Enterococcus faecalis Not detected        Enterococcus faecium Not detected        Listeria monocytogenes Not detected        Staphylococcus Detected        Staphylococcus aureus Not detected        Staph epidermidis Not detected        Staph lugdunensis Not detected        Streptococcus Not detected        Streptococcus agalactiae (Group B) Not detected        Streptococcus pneumoniae Not detected        Streptococcus pyogenes (Group A) Not detected        Acinetobacter calcoaceticus-baumanii complex Not detected        Bacteroides fragilis Not detected        Enterobacterales species Not detected        Enterobacter cloacae complex Not detected        Escherichia coli Not detected        Klebsiella aerogenes Not detected        Klebsiella oxytoca Not detected        Klebsiella pneumoniae group Not detected        Proteus Not detected        Salmonella Not detected        Serratia marcescens Not detected        Haemophilus influenzae Not detected        Neisseria meningitidis Not detected Pseudomonas aeruginosa Not detected        Steno maltophilia Not detected        Candida albicans Not detected        Candida auris Not detected        Candida glabrata Not detected        Candida krusei Not detected        Candida parapsilosis Not detected        Candida tropicalis Not detected        Crypto neoformans/gattii Not detected        RESISTANT GENES:            Comment       False positive results may rarely occur. Correlate with clinical,epidemiologic, and other laboratory findings           Comment: Please see BCID Interpretation Guide in EPIC Links       CULTURE, BLOOD, PAIRED [[id number]]  (Abnormal) Collected: 11/26/22 6451    Order Status: Completed Specimen: Blood Updated: 11/30/22 1324     Special Requests: NO SPECIAL REQUESTS        Culture result:       STAPHYLOCOCCUS SPECIES, COAGULASE NEGATIVE MULTIPLE COLONY TYPES GROWING IN 2 OF 4 BOTTLES DRAWN SITE=( R AC)                  REMAINING BOTTLE(S) HAS/HAVE NO GROWTH SO FAR            BRIAN BOSE HAS REQUESTED ID AND SENSITIVITIES ON 11/30/22. AOW      (NOTE) GRAM POSITIVE COCCI IN CLUSTERS GROWING IN 1 OF 4 BOTTLES CALLED TO READ BACK BY Catalina Ricci RN [facility] AT 2017 ON 11/27/22. Mana 1850             Impression:   Bacteremia  Acute respiratory failure secondary to aspiration PNA  - afebrile,  wbc 6.0    blood cx (11/26) 2/4 CoNS, (11/28) no growth so far    Procal 4.93    CXR (11/28)  basilar airspace disease. Allergic conjunctivitis  - completing 5 day course of zaditor eye drop as of 12/2     Oral candidasis  - nystatin swish and spit four times a day for 10 days  Plan:   Continue withIV cefepime + clindamycin to treat asp PNA, recommend to complete total 7 days, last dose 12/4         Continue with IV vancomycin; monitor renal function closely. Will continue          Recommend TTE when amendable         2/4 CoNS bacteremia - requested the organism to be identified; if staph lugdunensis then we recommend to complete total 2 weeks of  IV abx therapy. Otherwise, we will consider as contaminant sample. We will provide final abx recommendation after reviewed the final cx result         MRSA nare screen (-)   Adjust abx prn   Ketotifen 1 drop to right eye twice a day for 5 days   Fever work up if temp >= 100.4   Pt's granddaughter who is a NP expressed no invasive work up at this time.          Palliative team following             Above plan of care discussed and agreed with Dr. Rita Wood d/w pt's daughter     Thurmond Jeans, NP

## 2022-12-02 ENCOUNTER — APPOINTMENT (OUTPATIENT)
Dept: NON INVASIVE DIAGNOSTICS | Age: 87
DRG: 177 | End: 2022-12-02
Attending: NURSE PRACTITIONER
Payer: MEDICARE

## 2022-12-02 LAB
ANION GAP SERPL CALC-SCNC: 5 MMOL/L (ref 5–15)
BACTERIA SPEC CULT: ABNORMAL
BASOPHILS # BLD: 0 K/UL (ref 0–0.1)
BASOPHILS NFR BLD: 0 % (ref 0–1)
BUN SERPL-MCNC: 28 MG/DL (ref 6–20)
BUN/CREAT SERPL: 24 (ref 12–20)
CALCIUM SERPL-MCNC: 7.7 MG/DL (ref 8.5–10.1)
CHLORIDE SERPL-SCNC: 107 MMOL/L (ref 97–108)
CO2 SERPL-SCNC: 27 MMOL/L (ref 21–32)
CREAT SERPL-MCNC: 1.16 MG/DL (ref 0.7–1.3)
DIFFERENTIAL METHOD BLD: ABNORMAL
ECHO AV AREA PEAK VELOCITY: 3 CM2
ECHO AV AREA/BSA PEAK VELOCITY: 1.6 CM2/M2
ECHO AV PEAK GRADIENT: 3 MMHG
ECHO AV PEAK VELOCITY: 0.8 M/S
ECHO AV VELOCITY RATIO: 0.88
ECHO EST RA PRESSURE: 3 MMHG
ECHO LVOT AREA: 3.1 CM2
ECHO LVOT DIAM: 2 CM
ECHO LVOT PEAK GRADIENT: 2 MMHG
ECHO LVOT PEAK VELOCITY: 0.7 M/S
ECHO RIGHT VENTRICULAR SYSTOLIC PRESSURE (RVSP): 35 MMHG
ECHO TV REGURGITANT MAX VELOCITY: 2.85 M/S
ECHO TV REGURGITANT PEAK GRADIENT: 33 MMHG
EOSINOPHIL # BLD: 0.2 K/UL (ref 0–0.4)
EOSINOPHIL NFR BLD: 3 % (ref 0–7)
ERYTHROCYTE [DISTWIDTH] IN BLOOD BY AUTOMATED COUNT: 14.4 % (ref 11.5–14.5)
GLUCOSE SERPL-MCNC: 97 MG/DL (ref 65–100)
HCT VFR BLD AUTO: 35 % (ref 36.6–50.3)
HGB BLD-MCNC: 10.9 G/DL (ref 12.1–17)
IMM GRANULOCYTES # BLD AUTO: 0 K/UL (ref 0–0.04)
IMM GRANULOCYTES NFR BLD AUTO: 0 % (ref 0–0.5)
LYMPHOCYTES # BLD: 1.1 K/UL (ref 0.8–3.5)
LYMPHOCYTES NFR BLD: 18 % (ref 12–49)
MCH RBC QN AUTO: 29 PG (ref 26–34)
MCHC RBC AUTO-ENTMCNC: 31.1 G/DL (ref 30–36.5)
MCV RBC AUTO: 93.1 FL (ref 80–99)
MONOCYTES # BLD: 0.5 K/UL (ref 0–1)
MONOCYTES NFR BLD: 8 % (ref 5–13)
NEUTS SEG # BLD: 4.5 K/UL (ref 1.8–8)
NEUTS SEG NFR BLD: 71 % (ref 32–75)
NRBC # BLD: 0 K/UL (ref 0–0.01)
NRBC BLD-RTO: 0 PER 100 WBC
PLATELET # BLD AUTO: 98 K/UL (ref 150–400)
PMV BLD AUTO: 11 FL (ref 8.9–12.9)
POTASSIUM SERPL-SCNC: 3.8 MMOL/L (ref 3.5–5.1)
RBC # BLD AUTO: 3.76 M/UL (ref 4.1–5.7)
RBC MORPH BLD: ABNORMAL
SERVICE CMNT-IMP: ABNORMAL
SODIUM SERPL-SCNC: 139 MMOL/L (ref 136–145)
VANCOMYCIN SERPL-MCNC: 11.1 UG/ML
WBC # BLD AUTO: 6.3 K/UL (ref 4.1–11.1)

## 2022-12-02 PROCEDURE — 74011250636 HC RX REV CODE- 250/636: Performed by: NURSE PRACTITIONER

## 2022-12-02 PROCEDURE — 74011250637 HC RX REV CODE- 250/637: Performed by: NURSE PRACTITIONER

## 2022-12-02 PROCEDURE — 74011000250 HC RX REV CODE- 250: Performed by: FAMILY MEDICINE

## 2022-12-02 PROCEDURE — 80202 ASSAY OF VANCOMYCIN: CPT

## 2022-12-02 PROCEDURE — 93306 TTE W/DOPPLER COMPLETE: CPT | Performed by: INTERNAL MEDICINE

## 2022-12-02 PROCEDURE — 85025 COMPLETE CBC W/AUTO DIFF WBC: CPT

## 2022-12-02 PROCEDURE — 36415 COLL VENOUS BLD VENIPUNCTURE: CPT

## 2022-12-02 PROCEDURE — 93306 TTE W/DOPPLER COMPLETE: CPT

## 2022-12-02 PROCEDURE — 80048 BASIC METABOLIC PNL TOTAL CA: CPT

## 2022-12-02 PROCEDURE — 74011250637 HC RX REV CODE- 250/637: Performed by: PHYSICIAN ASSISTANT

## 2022-12-02 PROCEDURE — 65270000029 HC RM PRIVATE

## 2022-12-02 RX ORDER — BALSAM PERU/CASTOR OIL
OINTMENT (GRAM) TOPICAL EVERY 12 HOURS
Qty: 5 G | Refills: 0 | Status: SHIPPED | OUTPATIENT
Start: 2022-12-02

## 2022-12-02 RX ORDER — BENZONATATE 100 MG/1
100 CAPSULE ORAL
Qty: 21 CAPSULE | Refills: 0 | Status: SHIPPED | OUTPATIENT
Start: 2022-12-02 | End: 2022-12-09

## 2022-12-02 RX ORDER — NYSTATIN 100000 [USP'U]/ML
500000 SUSPENSION ORAL 4 TIMES DAILY
Qty: 125 ML | Refills: 0 | Status: SHIPPED | OUTPATIENT
Start: 2022-12-02 | End: 2022-12-09

## 2022-12-02 RX ORDER — KETOTIFEN FUMARATE 0.35 MG/ML
1 SOLUTION/ DROPS OPHTHALMIC EVERY 12 HOURS
Qty: 5 ML | Refills: 0 | Status: SHIPPED | OUTPATIENT
Start: 2022-12-02 | End: 2022-12-03

## 2022-12-02 RX ORDER — AMOXICILLIN AND CLAVULANATE POTASSIUM 400; 57 MG/5ML; MG/5ML
875 POWDER, FOR SUSPENSION ORAL EVERY 12 HOURS
Qty: 54.5 ML | Refills: 0 | Status: SHIPPED | OUTPATIENT
Start: 2022-12-02 | End: 2022-12-03

## 2022-12-02 RX ORDER — AMOXICILLIN AND CLAVULANATE POTASSIUM 875; 125 MG/1; MG/1
1 TABLET, FILM COATED ORAL 2 TIMES DAILY WITH MEALS
Status: DISCONTINUED | OUTPATIENT
Start: 2022-12-02 | End: 2022-12-02

## 2022-12-02 RX ORDER — MICONAZOLE NITRATE 2 %
POWDER (GRAM) TOPICAL 2 TIMES DAILY
Qty: 43 G | Refills: 0 | Status: SHIPPED | OUTPATIENT
Start: 2022-12-02 | End: 2022-12-16

## 2022-12-02 RX ORDER — AMOXICILLIN AND CLAVULANATE POTASSIUM 400; 57 MG/5ML; MG/5ML
875 POWDER, FOR SUSPENSION ORAL EVERY 12 HOURS
Status: COMPLETED | OUTPATIENT
Start: 2022-12-02 | End: 2022-12-04

## 2022-12-02 RX ORDER — GUAIFENESIN/DEXTROMETHORPHAN 100-10MG/5
10 SYRUP ORAL
Qty: 100 ML | Refills: 0 | Status: SHIPPED | OUTPATIENT
Start: 2022-12-02 | End: 2022-12-09

## 2022-12-02 RX ADMIN — NYSTATIN 500000 UNITS: 100000 SUSPENSION ORAL at 18:51

## 2022-12-02 RX ADMIN — PANTOPRAZOLE SODIUM 40 MG: 40 TABLET, DELAYED RELEASE ORAL at 09:04

## 2022-12-02 RX ADMIN — ATORVASTATIN CALCIUM 20 MG: 20 TABLET, FILM COATED ORAL at 09:04

## 2022-12-02 RX ADMIN — KETOTIFEN FUMARATE 1 DROP: 0.35 SOLUTION/ DROPS OPHTHALMIC at 22:42

## 2022-12-02 RX ADMIN — Medication 10 ML: at 22:40

## 2022-12-02 RX ADMIN — CASTOR OIL AND BALSAM, PERU: 788; 87 OINTMENT TOPICAL at 22:41

## 2022-12-02 RX ADMIN — KETOTIFEN FUMARATE 1 DROP: 0.35 SOLUTION/ DROPS OPHTHALMIC at 09:05

## 2022-12-02 RX ADMIN — LEVOTHYROXINE SODIUM 75 MCG: 0.07 TABLET ORAL at 06:40

## 2022-12-02 RX ADMIN — Medication 10 ML: at 13:46

## 2022-12-02 RX ADMIN — AMOXICILLIN AND CLAVULANATE POTASSIUM 875 MG: 400; 57 POWDER, FOR SUSPENSION ORAL at 22:35

## 2022-12-02 RX ADMIN — Medication 400 MG: at 09:03

## 2022-12-02 RX ADMIN — CASTOR OIL AND BALSAM, PERU: 788; 87 OINTMENT TOPICAL at 09:05

## 2022-12-02 RX ADMIN — CLINDAMYCIN IN 5 PERCENT DEXTROSE 600 MG: 12 INJECTION, SOLUTION INTRAVENOUS at 03:31

## 2022-12-02 RX ADMIN — MICONAZOLE NITRATE 2 % TOPICAL POWDER: at 09:05

## 2022-12-02 RX ADMIN — NYSTATIN 500000 UNITS: 100000 SUSPENSION ORAL at 09:04

## 2022-12-02 RX ADMIN — LISINOPRIL 20 MG: 20 TABLET ORAL at 09:04

## 2022-12-02 RX ADMIN — METOPROLOL SUCCINATE 25 MG: 25 TABLET, EXTENDED RELEASE ORAL at 18:51

## 2022-12-02 RX ADMIN — FUROSEMIDE 20 MG: 40 TABLET ORAL at 09:03

## 2022-12-02 RX ADMIN — Medication 10 ML: at 06:41

## 2022-12-02 RX ADMIN — MICONAZOLE NITRATE 2 % TOPICAL POWDER: at 18:53

## 2022-12-02 RX ADMIN — AMOXICILLIN AND CLAVULANATE POTASSIUM 875 MG: 400; 57 POWDER, FOR SUSPENSION ORAL at 10:29

## 2022-12-02 RX ADMIN — NYSTATIN 500000 UNITS: 100000 SUSPENSION ORAL at 13:46

## 2022-12-02 RX ADMIN — TAMSULOSIN HYDROCHLORIDE 0.4 MG: 0.4 CAPSULE ORAL at 22:35

## 2022-12-02 NOTE — PROGRESS NOTES
Transition of Care Plan  [facility]- Med 18%  DISPOSITION: Return to [facility] with [address]   F/U with PCP/Specialist    Transport: [facility]     Emergency contact: DaughterOswald Crimes [phone] and granddaughterMarbin [phone]    CM barriers to discharge: None    CM spoke with [facility], [facility] 5 [phone], they are able to accept patient back when medically stable. CM received call from [facility] with [address] #[phone], they are able to accept patient when stable for discharge. They have spoke with patient's granddaughterJaxon in depth yesterday regarding services. Referral sent in [facility] to [facility] with hospice order. CM spoke with ID, patient will be discharged on PO abx. Patient medically stable for DC. Message left for patient's granddaughterJaxon regarding decision between hospice vs no hospice. 10:41am: Dr. Phillip Do spoke with patient's daughterChuck, they have chosen to move [facility] [address] and [facility] hospice. CM called [address] [facility], [facility] and they are able to accept patient today if [facility] is able to deliver equipment. CM called [address], they plan to see if they can deliver equipment today. 11:11am: CM attempted to reach [address]. 11:36am: CM attempt x2 to reach [address]. Will need to confirm that equipment can be delivered today in order to move forward with DC. CM provided hand off to additional unit 6S unit CM for additional assistance with DC today. MAY Stinson.S.JORDY.

## 2022-12-02 NOTE — PROGRESS NOTES
Problem: Pressure Injury - Risk of  Goal: *Prevention of pressure injury  Description: Document Thanh Scale and appropriate interventions in the flowsheet. Outcome: Progressing Towards Goal  Note: Pressure Injury Interventions:  Sensory Interventions: Assess changes in LOC, Keep linens dry and wrinkle-free, Minimize linen layers, Monitor skin under medical devices    Moisture Interventions: Limit adult briefs, Internal/External urinary devices, Check for incontinence Q2 hours and as needed    Activity Interventions: Pressure redistribution bed/mattress(bed type)    Mobility Interventions: Float heels    Nutrition Interventions: Offer support with meals,snacks and hydration    Friction and Shear Interventions: Minimize layers, Lift sheet                Problem: Patient Education: Go to Patient Education Activity  Goal: Patient/Family Education  Outcome: Progressing Towards Goal     Problem: Falls - Risk of  Goal: *Absence of Falls  Description: Document Jaqui Fall Risk and appropriate interventions in the flowsheet.   Outcome: Progressing Towards Goal  Note: Fall Risk Interventions:  Mobility Interventions: Patient to call before getting OOB, Communicate number of staff needed for ambulation/transfer, Assess mobility with egress test    Mentation Interventions: Reorient patient, Room close to nurse's station    Medication Interventions: Bed/chair exit alarm    Elimination Interventions: Call light in reach              Problem: Patient Education: Go to Patient Education Activity  Goal: Patient/Family Education  Outcome: Progressing Towards Goal     Problem: Sepsis: Day 6  Goal: *Oxygen saturation within defined limits  Outcome: Progressing Towards Goal  Goal: *Vital signs within defined limits  Outcome: Progressing Towards Goal  Goal: *Tolerating diet  Outcome: Progressing Towards Goal  Goal: *Demonstrates progressive activity  Outcome: Progressing Towards Goal  Goal: Influenza immunization  Outcome: Progressing Towards Goal  Goal: *Pneumococcal immunization  Outcome: Progressing Towards Goal  Goal: Activity/Safety  Outcome: Progressing Towards Goal  Goal: Diagnostic Test/Procedures  Outcome: Progressing Towards Goal  Goal: Nutrition/Diet  Outcome: Progressing Towards Goal  Goal: Discharge Planning  Outcome: Progressing Towards Goal  Goal: Medications  Outcome: Progressing Towards Goal  Goal: Respiratory  Outcome: Progressing Towards Goal  Goal: Treatments/Interventions/Procedures  Outcome: Progressing Towards Goal  Goal: Psychosocial  Outcome: Progressing Towards Goal

## 2022-12-02 NOTE — PROGRESS NOTES
[facility]  Hospitalist Group                                                                                          Hospitalist Progress Note  Florencia Fontanez MD  Answering service: [phone] OR [phone] from in house phone        Date of Service:  2022  NAME:  Kiara Coleman  :  1922  MRN:  [id number]      Admission Summary:   Kiara Coleman is a Barry Meka [age] y.o. male with PMHx of A-Fib with SSS s/p cardiac pacemaker, BPH, COPD, CKD, HTN, GERD, HLD, and hypothyroidism who presented from SNF with low grade fever and confusion. BCx  with 2/4 Coag negative staph. ID concerns for aspiration PNA (on Vanc, Cefepime, Clindamycin). Hospice and Palliative Care consult. Admission has been complicated by the families inability to agree on goals of care for Mr. Pedro Marin. Interval history / Subjective:     Patient seen examined at bedside earlier. [facility] (daughter) present at bedside. Blood culture finalized likely contaminant appreciate ID eval. Wean off 02 stable on RA      Assessment & Plan:     Acute metabolic encephalopathy, etiology unknown  Concern for fungal rash in groin with possible scrotal cellulitis  Aspiration Pneumonia   LA, fever -improved   -- Notable imaging: CT Head : No acute intracranial process  --- Scrotal rash: Likely fungal in etiology as incontinent at baseline. Started on Ancef (D1 = ) for concern for potential scrotal cellulitis or SSTI. [place] Crumble Continue Mycostatin powder. Wound care consult  -- Lactic acid: Initially elevated 2.37 --> 2.5 --> 1.96. No signs of EOD or hypoperfusion. Received NS 500cc bolus x 2. Hold off additional fluid resuscitation for now with hx of CHF  -- Notable labs: UA on : negative for nitrites/LE's, WBC's. Urine culture : negative. Influenza A/B and COVID negative.  TSH 1.53, Free T4 1.4, Ammonia 20  -- Concern for aspiration: Per pt and family they work with SLP outpatient and chronically aspirating but family accepted the risks of aspiration as food was a major agueda in life. ID consulted for + BCx, but they had concerns for aspiration PNA and broadened to Cefepime + Vancomycin + Clindamycin. His CXR on 11/26 without concerns for PNA and repeat 11/28 awaiting radiology read but appears stable. - patient with likely witnessed aspiration event last night, chest XR compatible with new aspiration pneumonia, likely source of spike in  fever   - he does appear improved  however continues to have course lung sound throughout all lung fields on exam  -speech eval 12/1 - complete eval recommend thin liquid with cup soft/bite sized diet, discussed w/ family despite this still high risk to aspirate   12/2- spoke to ID DC Vanco/clinda/cefepime continue with liquid Augmentin p.o. to complete 12/4 7 days total    Coag negative Staph capitis in 2/4 in BCx from 11/26 -contaminant  -- ID consulted, recs as discussed above  -- Surveillance cultures ordered 11/28 ngtd   -TTE pending     LUE pain/swelling  Duplex neg for dvt, symptoms resolved     Encounter for Hospice  - Family interested in meeting with Palliative Care and Hospice 11/30  -spoke to vu Frye 12/2 confirmed plan is to return to [facility] with hospice with [facility]     Nocturnal O2  -- Uses CPAP at home. Has hx of MARIAN. With current AMS do not think he can tolerate CPAP. Can continue nocturnal O2 as needed     Elevated troponin   -- Troponin 96 --> 114 --> 99.  -- No chest pain or concern for ACS  -- Cardiology reviewed chart      Congestive heart failure, unspecified chronicity  HTN  - last 2 echocardiogram 9/14/2022 showed left ventricular ejection fraction 50% to 55%  -Placed on strict I's and O's and daily weights  -- restarted lasix 11/30  -- Continue Lisinopril     CKD  -- Renal function at baseline. Continue to trend.     Paroxysmal atrial fibrillation  SSS  -Status post cardiac pacemaker plantation  -Continue Tele  -- continue Metoprolol XL 25mg nightly      Acute COPD exacerbation  -order Duoneb nebulizer treatments every 4 hours as needed     Generalized weakness  -Placed on fall precautions     Hypothyroidism  -Continue levothyroxine     GERD  -- Continue hospital formulary PPI     BPH  -- Continue Flomax nightly     Essential Tremor  -- At baseline per family     Vitamin Deficiency  -- Continue home Ascorbic Acid and Magnesium    Medically ready for discharge. Case management confirmed cannot set up hospice at the [facility] until Monday    Plan discussed with both daughter  Lex Jackson left voicemail to call back w/ Colt Mar. Code status: DNR/DNI. Durable DNR recompleted 11/27  Prophylaxis: SCDs. Care Plan discussed with: Pt, RN, family  Anticipated Disposition: [facility] with hospice      Hospital Problems  Date Reviewed: 7/13/2022            Codes Class Noted POA    Lactic acidosis ICD-10-CM: E87.20  ICD-9-CM: 276.2  11/26/2022 Unknown        Elevated troponin ICD-10-CM: R77.8  ICD-9-CM: 790.6  11/26/2022 Unknown        AMS (altered mental status) ICD-10-CM: R41.82  ICD-9-CM: 780.97  11/26/2022 Unknown        Fever ICD-10-CM: R50.9  ICD-9-CM: 780.60  1/27/2019 Unknown       Review of Systems:   A comprehensive review of systems was negative except for that written in the HPI. Vital Signs:    Last 24hrs VS reviewed since prior progress note. Most recent are:  Visit Vitals  BP (!) 141/80 (BP 1 Location: Left upper arm)   Pulse 70   Temp 97.4 °F (36.3 °C)   Resp 20   Ht 5' 8\" (1.727 m)   Wt 78.8 kg (173 lb 12.8 oz)   SpO2 98%   BMI 26.43 kg/m²         Intake/Output Summary (Last 24 hours) at 12/2/2022 1331  Last data filed at 12/1/2022 2000  Gross per 24 hour   Intake 900 ml   Output --   Net 900 ml          Physical Examination:     I had a face to face encounter with this patient and independently examined them on 12/2/2022 as outlined below:          Constitutional:  No acute distress. Lethargic but easily arousable    ENT:  Oral mucosa dry   Resp:  Course ronchi b/l.  No wheezing or respiratory distress or accessory muscle use. CV:  Paced rhythm on monitor, irregular rhythm    GI:  Soft, non distended, non tender. Musculoskeletal:  No edema, warm    Neurologic:  Moves all extremities to command. Oriented to name. Not oriented to year. : Minimal erythema in groin. Sebaceous cysts on scrotum without evidence of drainage. Data Review:    Review and/or order of clinical lab test  Review and/or order of tests in the radiology section of CPT  Review and/or order of tests in the medicine section of CPT      Labs:     Recent Labs     12/02/22 0435 12/01/22 0635   WBC 6.3 6.0   HGB 10.9* 10.8*   HCT 35.0* 35.1*   PLT 98* 97*       Recent Labs     12/02/22 0435 12/01/22 0635 11/30/22  0227    141 140   K 3.8 3.8 5.0    109* 113*   CO2 27 27 22   BUN 28* 31* 32*   CREA 1.16 1.33* 1.45*   GLU 97 91 73   CA 7.7* 7.8* 7.8*       No results for input(s): ALT, AP, TBIL, TBILI, TP, ALB, GLOB, GGT, AML, LPSE in the last 72 hours.     No lab exists for component: SGOT, GPT, AMYP, HLPSE    Medications Reviewed:     Current Facility-Administered Medications   Medication Dose Route Frequency    amoxicillin-clavulanate (AUGMENTIN) 400-57 mg/5 mL oral suspension 875 mg  875 mg Oral Q12H    benzonatate (TESSALON) capsule 100 mg  100 mg Oral TID PRN    guaiFENesin-dextromethorphan (ROBITUSSIN DM) 100-10 mg/5 mL syrup 10 mL  10 mL Oral Q6H PRN    pantoprazole (PROTONIX) tablet 40 mg  40 mg Oral DAILY    balsam peru-castor oiL (VENELEX) ointment   Topical Q12H    ketotifen (ZADITOR) 0.025 % (0.035 %) ophthalmic solution 1 Drop  1 Drop Right Eye Q12H    nystatin (MYCOSTATIN) 100,000 unit/mL oral suspension 500,000 Units  500,000 Units Oral QID    ondansetron (ZOFRAN) injection 4 mg  4 mg IntraVENous Q6H PRN    acetaminophen (TYLENOL) suppository 650 mg  650 mg Rectal Q4H PRN    sodium chloride (NS) flush 5-40 mL  5-40 mL IntraVENous Q8H    sodium chloride (NS) flush 5-40 mL  5-40 mL IntraVENous PRN    acetaminophen (TYLENOL) tablet 650 mg  650 mg Oral Q6H PRN    atorvastatin (LIPITOR) tablet 20 mg  20 mg Oral DAILY    furosemide (LASIX) tablet 20 mg  20 mg Oral DAILY    levothyroxine (SYNTHROID) tablet 75 mcg  75 mcg Oral 6am    lisinopriL (PRINIVIL, ZESTRIL) tablet 20 mg  20 mg Oral DAILY    magnesium oxide (MAG-OX) tablet 400 mg  400 mg Oral DAILY    metoprolol succinate (TOPROL-XL) XL tablet 25 mg  25 mg Oral QPM    ipratropium (ATROVENT) 21 mcg (0.03 %) nasal spray 2 Spray  2 Spray Both Nostrils BID PRN    tamsulosin (FLOMAX) capsule 0.4 mg  0.4 mg Oral QHS    ascorbic acid (vitamin C) (VITAMIN C) tablet 500 mg  500 mg Oral Once per day on Tue Thu Sat    ondansetron (ZOFRAN ODT) tablet 4 mg  4 mg Oral Q8H PRN    albuterol-ipratropium (DUO-NEB) 2.5 MG-0.5 MG/3 ML  3 mL Nebulization Q4H PRN    miconazole (MICOTIN) 2 % powder   Topical BID     ______________________________________________________________________  EXPECTED LENGTH OF STAY: 4d 12h  ACTUAL LENGTH OF STAY:          6                 Saravanan Andres MD

## 2022-12-02 NOTE — PROGRESS NOTES
Physical Therapy Note  12/02/2022    Chart reviewed. Note plans for discharge to [facility] with hospice. Will sign off at this time.     Thank you,  Elton Babinski, PT, DPT

## 2022-12-02 NOTE — PROGRESS NOTES
Transition of Care Plan  RUR- Med 18%  DISPOSITION: Return to [facility] with [facility]   F/U with PCP/Specialist    Transport: [facility]      Emergency contact: DaughterPraveen [phone] and granddaughter, Rita Ricardo [phone]     CM barriers to discharge: hospice equipment    [phone]: Spoke to [facility]. Unable to deliver equipment (hospital bed, home O2, and bedside table) until Monday. States that family will need to clear out room in order for equipment to be delivered. Spoke to [facility] who states that it is family's responsibility to clear out area for bed. States that they will work with family to help them. Likely discharge Monday. MD notified.      Kirstie Baldwin RN, CRM

## 2022-12-02 NOTE — PROGRESS NOTES
Bedside shift change report given to Sahil (oncoming nurse) by Lu Greer (offgoing nurse). Report included the following information SBAR, MAR, Cardiac Rhythm (V-paced), and Dual Neuro Assessment.

## 2022-12-02 NOTE — PROGRESS NOTES
ID Progress Note  2022    Subjective:     Appears more sleepy this morning  [place]  Voices feeling ok when awake    Review of Systems:            Symptom Y/N Comments   Symptom Y/N Comments   Fever/Chills       Chest Pain        Poor Appetite       Edema        Cough       Abdominal Pain        Sputum       Joint Pain        SOB/GARCIA       Pruritis/Rash        Nausea/vomit       Tolerating PT/OT        Diarrhea       Tolerating Diet        Constipation       Other           Could NOT obtain due to:       Objective:     Vitals: Visit Vitals  BP (!) 149/86   Pulse 70   Temp 98.2 °F (36.8 °C)   Resp 26   Ht 5' 8\" (1.727 m)   Wt 78.8 kg (173 lb 12.8 oz)   SpO2 98%   BMI 26.43 kg/m²          Tmax:  Temp (24hrs), Av.1 °F (36.7 °C), Min:97.5 °F (36.4 °C), Max:98.5 °F (36.9 °C)      PHYSICAL EXAM:  General: Chronically ill appearing. WD, WN. Resting. no acute distress    EENT:  No drainage from right eye, Dry mucous membrane, [place]  Resp:  Clear in apex with decreased breath sounds at bases. no wheezing or rales. No accessory muscle use  CV:  Regular  rhythm,  No edema  GI:  Soft, Non distended, Non tender. +Bowel sounds  Neurologic:  Alert, follows some commends  Psych:   Fair insight.  poor medical historian, Not anxious nor agitated  Skin:  Intertrigo,  No jaundice    Labs:   Lab Results   Component Value Date/Time    WBC 6.3 2022 04:35 AM    HGB 10.9 (L) 2022 04:35 AM    HCT 35.0 (L) 2022 04:35 AM    PLATELET 98 (L)  04:35 AM    MCV 93.1 2022 04:35 AM     Lab Results   Component Value Date/Time    Sodium 139 2022 04:35 AM    Potassium 3.8 2022 04:35 AM    Chloride 107 2022 04:35 AM    CO2 27 2022 04:35 AM    Anion gap 5 2022 04:35 AM    Glucose 97 2022 04:35 AM    BUN 28 (H) 2022 04:35 AM    Creatinine 1.16 2022 04:35 AM    BUN/Creatinine ratio 24 (H) 2022 04:35 AM    GFR est AA 46 (L) 2022 02:57 PM    GFR est non-AA 38 (L) 09/24/2022 02:57 PM    Calcium 7.7 (L) 12/02/2022 04:35 AM    Bilirubin, total 1.0 11/29/2022 02:52 AM    Alk. phosphatase 90 11/29/2022 02:52 AM    Protein, total 5.6 (L) 11/29/2022 02:52 AM    Albumin 2.9 (L) 11/29/2022 02:52 AM    Globulin 2.7 11/29/2022 02:52 AM    A-G Ratio 1.1 11/29/2022 02:52 AM    ALT (SGPT) 18 11/29/2022 02:52 AM         Cultures:   Results       Procedure Component Value Units Date/Time    CULTURE, MRSA [[id number]] Collected: 11/28/22 1148    Order Status: Completed Specimen: Nasal from Nares Updated: 11/29/22 1415     Special Requests: NO SPECIAL REQUESTS        Culture result: MRSA NOT PRESENT               Screening of patient nares for MRSA is for surveillance purposes and, if positive, to facilitate isolation considerations in high risk settings. It is not intended for automatic decolonization interventions per se as regimens are not sufficiently effective to warrant routine use. CULTURE, BLOOD, PAIRED [[id number]] Collected: 11/28/22 1105    Order Status: Completed Specimen: Blood Updated: 12/02/22 0138     Special Requests: NO SPECIAL REQUESTS        Culture result: NO GROWTH 4 DAYS       URINE CULTURE HOLD SAMPLE [[id number]] Collected: 11/26/22 2101    Order Status: Completed Specimen: Urine from Serum Updated: 11/26/22 2111     Urine culture hold       Urine on hold in Microbiology dept for 2 days. If unpreserved urine is submitted, it cannot be used for addtional testing after 24 hours, recollection will be required.           CULTURE, URINE [[id number]] Collected: 11/26/22 2101    Order Status: Completed Specimen: Cath Urine Updated: 11/27/22 1802     Special Requests: NO SPECIAL REQUESTS        Culture result: No growth (<1,000 CFU/ML)       COVID-19 RAPID TEST [[id number]] Collected: 11/26/22 1845    Order Status: Completed Specimen: Nasopharyngeal Updated: 11/26/22 1951     Specimen source Nasopharyngeal        COVID-19 rapid test Not detected        Comment: Rapid Abbott ID Now       Rapid NAAT:  The specimen is NEGATIVE for SARS-CoV-2, the novel coronavirus associated with COVID-19. Negative results should be treated as presumptive and, if inconsistent with clinical signs and symptoms or necessary for patient management, should be tested with an alternative molecular assay. Negative results do not preclude SARS-CoV-2 infection and should not be used as the sole basis for patient management decisions. This test has been authorized by the FDA under an Emergency Use Authorization (EUA) for use by authorized laboratories.    Fact sheet for Healthcare Providers:  http://www.nadeem-kenyatta.birachelle/  Fact sheet for Patients: http://www.nadeem-kenyatta.biz/       Methodology: Isothermal Nucleic Acid Amplification         BLOOD CULTURE ID PANEL [[id number]]  (Abnormal) Collected: 11/26/22 1845    Order Status: Completed Specimen: Blood Updated: 11/28/22 0625     Acc. no. from Micro Order [id number]     Enterococcus faecalis Not detected        Enterococcus faecium Not detected        Listeria monocytogenes Not detected        Staphylococcus Detected        Staphylococcus aureus Not detected        Staph epidermidis Not detected        Staph lugdunensis Not detected        Streptococcus Not detected        Streptococcus agalactiae (Group B) Not detected        Streptococcus pneumoniae Not detected        Streptococcus pyogenes (Group A) Not detected        Acinetobacter calcoaceticus-baumanii complex Not detected        Bacteroides fragilis Not detected        Enterobacterales species Not detected        Enterobacter cloacae complex Not detected        Escherichia coli Not detected        Klebsiella aerogenes Not detected        Klebsiella oxytoca Not detected        Klebsiella pneumoniae group Not detected        Proteus Not detected        Salmonella Not detected        Serratia marcescens Not detected        Haemophilus influenzae Not detected        Neisseria meningitidis Not detected        Pseudomonas aeruginosa Not detected        Steno maltophilia Not detected        Candida albicans Not detected        Candida auris Not detected        Candida glabrata Not detected        Candida krusei Not detected        Candida parapsilosis Not detected        Candida tropicalis Not detected        Crypto neoformans/gattii Not detected        RESISTANT GENES:            Comment       False positive results may rarely occur. Correlate with clinical,epidemiologic, and other laboratory findings           Comment: Please see BCID Interpretation Guide in EPIC Links       CULTURE, BLOOD, PAIRED [[id number]]  (Abnormal)  (Susceptibility) Collected: 11/26/22 1839    Order Status: Completed Specimen: Blood Updated: 12/02/22 0814     Special Requests: NO SPECIAL REQUESTS        Culture result:       STAPHYLOCOCCUS CAPITIS GROWING IN 2 OF 4 BOTTLES DRAWN SITE = R AC                  REMAINING BOTTLE(S) HAS/HAVE NO GROWTH IN 5 DAYS            KO, NP HAS REQUESTED ID AND SENSITIVITIES ON 11/30/22. AOW      (NOTE) GRAM POSITIVE COCCI IN CLUSTERS GROWING IN 1 OF 4 BOTTLES CALLED TO READ BACK BY Mikie Prince RN [facility] AT 2017 ON 11/27/22. [facility]    Susceptibility        Staphylococcus capitis      FABIENNE      Ciprofloxacin ($) Intermediate      Clindamycin ($) Susceptible      Daptomycin ($$$$$) Susceptible      Erythromycin ($$$$) Susceptible      Gentamicin ($) Susceptible      Inducible Clindamycin Susceptible      Levofloxacin ($) Intermediate      Oxacillin Susceptible      Rifampin ($$$$) Susceptible  [1]       Tetracycline Resistant      Trimeth/Sulfa Susceptible      Vancomycin ($) Susceptible                   [1]  Rifampin is not to be used for mono-therapy.                             Impression:   Bacteremia  Acute respiratory failure secondary to aspiration PNA  - afebrile,  wbc 6.0    blood cx (11/26) staph capitis, (11/28) no growth so far    Procal 4.93    CXR (11/28)  basilar airspace disease. Allergic conjunctivitis  - completing 5 day course of zaditor eye drop as of 12/2     Oral candidasis  - nystatin swish and spit four times a day for 10 days  Plan:   Received IV cefepime + clindamycin which change to PO Augmentin today. Recommend to complete total 7 days, last dose 12/4         Received IV vancomycin which has been d/c'd on 12/2.          2/4 Staph capitis bacteremia - consider as contaminant          MRSA nare screen (-)   Ketotifen 1 drop to right eye twice a day for 5 days   Fever work up if temp >= 100.4   Pt's granddaughter who is a NP expressed no invasive work up at this time. Palliative team following         Left a message for Pt's granddaughter (Stacy Murphy) to call me to discuss medical therapy plan             Above plan of care discussed and agreed with Dr. Ebenezer Jean      ID team signing off. Please contact us with any questions.      Hyunsun Claude Beaver, NP

## 2022-12-02 NOTE — PROGRESS NOTES
1940: Bedside and Verbal shift change report given to [address] [place] [address] (oncoming nurse) by Katelyn Liz (offgoing nurse). Report included the following information SBAR, Kardex, ED Summary, Intake/Output, MAR, Recent Results, Cardiac Rhythm Vpaced, and Alarm Parameters . Shift summary: Pt hemodynamically stable over night. 0740: Bedside and Verbal shift change report given to Katelyn Liz (oncoming nurse) by Enedelia Schmitt RN (offgoing nurse). Report included the following information SBAR, Kardex, ED Summary, MAR, Recent Results, Cardiac Rhythm Vpaced, and Dual Neuro Assessment.

## 2022-12-02 NOTE — PROGRESS NOTES
PALLIATIVE MEDICINE                Appreciate the opportunity to participate in Mr. Quinteros Poster care. Care goals have been identified. Will sign off at this time. Family has my contact information if needed. Thank you,         Kadie Moya.  [address] MSN, FNP-BC, [facility]

## 2022-12-03 LAB
BACTERIA SPEC CULT: NORMAL
SERVICE CMNT-IMP: NORMAL

## 2022-12-03 PROCEDURE — 74011250637 HC RX REV CODE- 250/637: Performed by: NURSE PRACTITIONER

## 2022-12-03 PROCEDURE — 65270000032 HC RM SEMIPRIVATE

## 2022-12-03 PROCEDURE — 74011250637 HC RX REV CODE- 250/637: Performed by: STUDENT IN AN ORGANIZED HEALTH CARE EDUCATION/TRAINING PROGRAM

## 2022-12-03 PROCEDURE — 74011250637 HC RX REV CODE- 250/637: Performed by: PHYSICIAN ASSISTANT

## 2022-12-03 PROCEDURE — 74011000250 HC RX REV CODE- 250: Performed by: FAMILY MEDICINE

## 2022-12-03 PROCEDURE — 74011250636 HC RX REV CODE- 250/636: Performed by: HOSPITALIST

## 2022-12-03 RX ORDER — SENNOSIDES 8.6 MG/1
1 TABLET ORAL
Status: DISCONTINUED | OUTPATIENT
Start: 2022-12-03 | End: 2022-12-05 | Stop reason: HOSPADM

## 2022-12-03 RX ADMIN — AMOXICILLIN AND CLAVULANATE POTASSIUM 875 MG: 400; 57 POWDER, FOR SUSPENSION ORAL at 10:18

## 2022-12-03 RX ADMIN — SENNOSIDES 8.6 MG: 8.6 TABLET, FILM COATED ORAL at 18:26

## 2022-12-03 RX ADMIN — OXYCODONE HYDROCHLORIDE AND ACETAMINOPHEN 500 MG: 500 TABLET ORAL at 10:16

## 2022-12-03 RX ADMIN — Medication 400 MG: at 10:16

## 2022-12-03 RX ADMIN — ACETAMINOPHEN 650 MG: 325 TABLET ORAL at 10:15

## 2022-12-03 RX ADMIN — LEVOTHYROXINE SODIUM 75 MCG: 0.07 TABLET ORAL at 06:32

## 2022-12-03 RX ADMIN — PANTOPRAZOLE SODIUM 40 MG: 40 TABLET, DELAYED RELEASE ORAL at 10:16

## 2022-12-03 RX ADMIN — NYSTATIN 500000 UNITS: 100000 SUSPENSION ORAL at 17:14

## 2022-12-03 RX ADMIN — Medication 10 ML: at 14:46

## 2022-12-03 RX ADMIN — CASTOR OIL AND BALSAM, PERU: 788; 87 OINTMENT TOPICAL at 10:18

## 2022-12-03 RX ADMIN — ATORVASTATIN CALCIUM 20 MG: 20 TABLET, FILM COATED ORAL at 10:16

## 2022-12-03 RX ADMIN — MICONAZOLE NITRATE 2 % TOPICAL POWDER: at 17:22

## 2022-12-03 RX ADMIN — NYSTATIN 500000 UNITS: 100000 SUSPENSION ORAL at 12:36

## 2022-12-03 RX ADMIN — Medication 10 ML: at 06:40

## 2022-12-03 RX ADMIN — LISINOPRIL 20 MG: 20 TABLET ORAL at 10:16

## 2022-12-03 RX ADMIN — TAMSULOSIN HYDROCHLORIDE 0.4 MG: 0.4 CAPSULE ORAL at 22:37

## 2022-12-03 RX ADMIN — ONDANSETRON 4 MG: 2 INJECTION INTRAMUSCULAR; INTRAVENOUS at 17:13

## 2022-12-03 RX ADMIN — AMOXICILLIN AND CLAVULANATE POTASSIUM 875 MG: 400; 57 POWDER, FOR SUSPENSION ORAL at 23:03

## 2022-12-03 RX ADMIN — NYSTATIN 500000 UNITS: 100000 SUSPENSION ORAL at 22:37

## 2022-12-03 RX ADMIN — METOPROLOL SUCCINATE 25 MG: 25 TABLET, EXTENDED RELEASE ORAL at 17:14

## 2022-12-03 RX ADMIN — Medication 10 ML: at 22:39

## 2022-12-03 RX ADMIN — GUAIFENESIN AND DEXTROMETHORPHAN 10 ML: 100; 10 SYRUP ORAL at 12:36

## 2022-12-03 RX ADMIN — BENZONATATE 100 MG: 100 CAPSULE ORAL at 10:17

## 2022-12-03 RX ADMIN — FUROSEMIDE 20 MG: 40 TABLET ORAL at 10:16

## 2022-12-03 RX ADMIN — MICONAZOLE NITRATE 2 % TOPICAL POWDER: at 10:18

## 2022-12-03 RX ADMIN — NYSTATIN 500000 UNITS: 100000 SUSPENSION ORAL at 10:18

## 2022-12-03 NOTE — PROGRESS NOTES
Bedside shift change report given to Varun Pond (oncoming nurse) by Daija De La O (offgoing nurse). Report included the following information SBAR, Cardiac Rhythm (V-paced), and Dual Neuro Assessment.

## 2022-12-03 NOTE — PROGRESS NOTES
[facility]                                                                                          Hospitalist Progress Note  Billy Rivera MD  Answering service: [phone] OR [phone] from in house phone        Date of Service:  12/3/2022  NAME:  Ellie Lau  :  1922  MRN:  [id number]      Admission Summary:   Ellie Lau is a Barry  [age] y.o. male with PMHx of A-Fib with SSS s/p cardiac pacemaker, BPH, COPD, CKD, HTN, GERD, HLD, and hypothyroidism who presented from SNF with low grade fever and confusion. BCx  with 2/4 Coag negative staph. ID concerns for aspiration PNA (on Vanc, Cefepime, Clindamycin). Hospice and Palliative Care consult. Admission has been complicated by the families inability to agree on goals of care for Mr. Alma Enriquez. Interval history / Subjective:     Patient seen examined at bedside earlier. No acute complaints, was coughing a bit this am      Assessment & Plan:     Acute metabolic encephalopathy, etiology unknown  Concern for fungal rash in groin with possible scrotal cellulitis  Aspiration Pneumonia   LA, fever -improved   -- Notable imaging: CT Head : No acute intracranial process  --- Scrotal rash: Likely fungal in etiology as incontinent at baseline. Started on Ancef (D1 = ) for concern for potential scrotal cellulitis or SSTI. Marcellus Siddiqi Continue Mycostatin powder. Wound care consult  -- Lactic acid: Initially elevated 2.37 --> 2.5 --> 1.96. No signs of EOD or hypoperfusion. Received NS 500cc bolus x 2. Hold off additional fluid resuscitation for now with hx of CHF  -- Notable labs: UA on : negative for nitrites/LE's, WBC's. Urine culture : negative. Influenza A/B and COVID negative. TSH 1.53, Free T4 1.4, Ammonia 20  -- Concern for aspiration: Per pt and family they work with SLP outpatient and chronically aspirating but family accepted the risks of aspiration as food was a major agueda in life.  ID consulted for + BCx, but they had concerns for aspiration PNA and broadened to Cefepime + Vancomycin + Clindamycin. His CXR on 11/26 without concerns for PNA and repeat 11/28 awaiting radiology read but appears stable. - patient with likely witnessed aspiration event last night, chest XR compatible with new aspiration pneumonia, likely source of spike in  fever   - he does appear improved  however continues to have course lung sound throughout all lung fields on exam  -speech eval 12/1 - complete eval recommend thin liquid with cup soft/bite sized diet, discussed w/ family despite this still high risk to aspirate   12/2- per ID DC Vanco/clinda/cefepime discontinued cont w/ liquid Augmentin p.o. to complete 12/4 7 days total    Coag negative Staph capitis in 2/4 in BCx from 11/26 -contaminant  -- ID consulted, recs as discussed above  -- Surveillance cultures ordered 11/28 ngtd   -TTE neg    LUE pain/swelling  Duplex neg for dvt, symptoms resolved     Encounter for Hospice  - Family interested in meeting with Palliative Care and Hospice 11/30  -spoke to daughter Marisa Davison 12/2 confirmed plan is to return to [facility] with hospice with [facility]     Nocturnal O2  -- Uses CPAP at home. Has hx of MARIAN. With current AMS do not think he can tolerate CPAP. Can continue nocturnal O2 as needed     Elevated troponin   -- Troponin 96 --> 114 --> 99.  -- No chest pain or concern for ACS  -- Cardiology reviewed chart      Congestive heart failure, unspecified chronicity  HTN  - last 2 echocardiogram 9/14/2022 showed left ventricular ejection fraction 50% to 55%  -Placed on strict I's and O's and daily weights  -- restarted lasix 11/30  -- Continue Lisinopril     CKD  -- Renal function at baseline. Continue to trend.     Paroxysmal atrial fibrillation  SSS  -Status post cardiac pacemaker plantation  -Continue Tele  -- continue Metoprolol XL 25mg nightly      Acute COPD exacerbation  -order Duoneb nebulizer treatments every 4 hours as needed Generalized weakness  -Placed on fall precautions     Hypothyroidism  -Continue levothyroxine     GERD  -- Continue hospital formulary PPI     BPH  -- Continue Flomax nightly     Essential Tremor  -- At baseline per family     Vitamin Deficiency  -- Continue home Ascorbic Acid and Magnesium    Medically ready for discharge. Case management confirmed cannot set up hospice at the [facility] until Monday    Plan discussed with both daughter  Chuck left voicemail to call back w/ [employer]. Code status: DNR/DNI. Durable DNR recompleted 11/27  Prophylaxis: SCDs. Care Plan discussed with: Pt, RN, family  Anticipated Disposition: [facility] with hospice on Monday      Hospital Problems  Date Reviewed: 7/13/2022            Codes Class Noted POA    Lactic acidosis ICD-10-CM: E87.20  ICD-9-CM: 276.2  11/26/2022 Unknown        Elevated troponin ICD-10-CM: R77.8  ICD-9-CM: 790.6  11/26/2022 Unknown        AMS (altered mental status) ICD-10-CM: R41.82  ICD-9-CM: 780.97  11/26/2022 Unknown        Fever ICD-10-CM: R50.9  ICD-9-CM: 780.60  1/27/2019 Unknown       Review of Systems:   A comprehensive review of systems was negative except for that written in the HPI. Vital Signs:    Last 24hrs VS reviewed since prior progress note. Most recent are:  Visit Vitals  /74 (BP 1 Location: Left upper arm, BP Patient Position: At rest)   Pulse 71   Temp 98 °F (36.7 °C)   Resp 27   Ht 5' 8\" (1.727 m)   Wt 77.9 kg (171 lb 12.8 oz)   SpO2 95%   BMI 26.12 kg/m²       No intake or output data in the 24 hours ending 12/03/22 1516       Physical Examination:     I had a face to face encounter with this patient and independently examined them on 12/3/2022 as outlined below:          Constitutional:  No acute distress. Lethargic but easily arousable    ENT:  Oral mucosa dry   Resp:  Course ronchi b/l. No wheezing or respiratory distress or accessory muscle use.     CV:  Paced rhythm on monitor, irregular rhythm    GI:  Soft, non distended, non tender. Musculoskeletal:  No edema, warm    Neurologic:  Moves all extremities to command. Oriented to name. Not oriented to year. : Minimal erythema in groin. Sebaceous cysts on scrotum without evidence of drainage. Data Review:    Review and/or order of clinical lab test  Review and/or order of tests in the radiology section of CPT  Review and/or order of tests in the medicine section of CPT      Labs:     Recent Labs     12/02/22  0435 12/01/22  0635   WBC 6.3 6.0   HGB 10.9* 10.8*   HCT 35.0* 35.1*   PLT 98* 97*       Recent Labs     12/02/22  0435 12/01/22  0635    141   K 3.8 3.8    109*   CO2 27 27   BUN 28* 31*   CREA 1.16 1.33*   GLU 97 91   CA 7.7* 7.8*       No results for input(s): ALT, AP, TBIL, TBILI, TP, ALB, GLOB, GGT, AML, LPSE in the last 72 hours.     No lab exists for component: SGOT, GPT, AMYP, HLPSE    Medications Reviewed:     Current Facility-Administered Medications   Medication Dose Route Frequency    amoxicillin-clavulanate (AUGMENTIN) 400-57 mg/5 mL oral suspension 875 mg  875 mg Oral Q12H    benzonatate (TESSALON) capsule 100 mg  100 mg Oral TID PRN    guaiFENesin-dextromethorphan (ROBITUSSIN DM) 100-10 mg/5 mL syrup 10 mL  10 mL Oral Q6H PRN    pantoprazole (PROTONIX) tablet 40 mg  40 mg Oral DAILY    balsam peru-castor oiL (VENELEX) ointment   Topical Q12H    nystatin (MYCOSTATIN) 100,000 unit/mL oral suspension 500,000 Units  500,000 Units Oral QID    ondansetron (ZOFRAN) injection 4 mg  4 mg IntraVENous Q6H PRN    acetaminophen (TYLENOL) suppository 650 mg  650 mg Rectal Q4H PRN    sodium chloride (NS) flush 5-40 mL  5-40 mL IntraVENous Q8H    sodium chloride (NS) flush 5-40 mL  5-40 mL IntraVENous PRN    acetaminophen (TYLENOL) tablet 650 mg  650 mg Oral Q6H PRN    atorvastatin (LIPITOR) tablet 20 mg  20 mg Oral DAILY    furosemide (LASIX) tablet 20 mg  20 mg Oral DAILY    levothyroxine (SYNTHROID) tablet 75 mcg  75 mcg Oral 6am    lisinopriL (PRINIVIL, ZESTRIL) tablet 20 mg  20 mg Oral DAILY    magnesium oxide (MAG-OX) tablet 400 mg  400 mg Oral DAILY    metoprolol succinate (TOPROL-XL) XL tablet 25 mg  25 mg Oral QPM    ipratropium (ATROVENT) 21 mcg (0.03 %) nasal spray 2 Spray  2 Spray Both Nostrils BID PRN    tamsulosin (FLOMAX) capsule 0.4 mg  0.4 mg Oral QHS    ascorbic acid (vitamin C) (VITAMIN C) tablet 500 mg  500 mg Oral Once per day on Tue Thu Sat    ondansetron (ZOFRAN ODT) tablet 4 mg  4 mg Oral Q8H PRN    albuterol-ipratropium (DUO-NEB) 2.5 MG-0.5 MG/3 ML  3 mL Nebulization Q4H PRN    miconazole (MICOTIN) 2 % powder   Topical BID     ______________________________________________________________________  EXPECTED LENGTH OF STAY: 4d 12h  ACTUAL LENGTH OF STAY:          7                 Darylene Keys, MD

## 2022-12-03 NOTE — PROGRESS NOTES
Bedside and Verbal shift change report given to HARISH Alvarez (oncoming nurse) by Bella Sales (offgoing nurse). Report included the following information SBAR, Kardex, Intake/Output, MAR, Recent Results, Cardiac Rhythm V Paced, and Dual Neuro Assessment.

## 2022-12-03 NOTE — PROGRESS NOTES
Problem: Pressure Injury - Risk of  Goal: *Prevention of pressure injury  Description: Document Thanh Scale and appropriate interventions in the flowsheet. Outcome: Progressing Towards Goal  Note: Pressure Injury Interventions:  Sensory Interventions: Assess changes in LOC, Assess need for specialty bed, Avoid rigorous massage over bony prominences, Check visual cues for pain, Float heels, Keep linens dry and wrinkle-free, Maintain/enhance activity level, Minimize linen layers, Monitor skin under medical devices, Sit a 90-degree angle/use footstool if needed, Turn and reposition approx. every two hours (pillows and wedges if needed), Use 30-degree side-lying position    Moisture Interventions: Absorbent underpads, Apply protective barrier, creams and emollients, Assess need for specialty bed, Check for incontinence Q2 hours and as needed, Maintain skin hydration (lotion/cream), Minimize layers, Moisture barrier, Offer toileting Q_hr    Activity Interventions: Assess need for specialty bed, Increase time out of bed    Mobility Interventions: Assess need for specialty bed, Float heels, HOB 30 degrees or less, Turn and reposition approx.  every two hours(pillow and wedges)    Nutrition Interventions: Document food/fluid/supplement intake, Discuss nutritional consult with provider, Offer support with meals,snacks and hydration    Friction and Shear Interventions: Apply protective barrier, creams and emollients, Feet elevated on foot rest, Foam dressings/transparent film/skin sealants, HOB 30 degrees or less, Lift sheet, Lift team/patient mobility team, Minimize layers, Sit at 90-degree angle, Transferring/repositioning devices                Problem: Patient Education: Go to Patient Education Activity  Goal: Patient/Family Education  Outcome: Progressing Towards Goal     Problem: Aspiration - Risk of  Goal: *Absence of aspiration  Outcome: Progressing Towards Goal     Problem: Patient Education: Go to Patient Education Activity  Goal: Patient/Family Education  Outcome: Progressing Towards Goal     Problem: Falls - Risk of  Goal: *Absence of Falls  Description: Document Vincent Acharya Fall Risk and appropriate interventions in the flowsheet.   Outcome: Progressing Towards Goal  Note: Fall Risk Interventions:  Mobility Interventions: Communicate number of staff needed for ambulation/transfer, Patient to call before getting OOB, Strengthening exercises (ROM-active/passive), Utilize walker, cane, or other assistive device, Utilize gait belt for transfers/ambulation    Mentation Interventions: Adequate sleep, hydration, pain control, Door open when patient unattended, Evaluate medications/consider consulting pharmacy, Eyeglasses and hearing aids, Family/sitter at bedside, Gait belt with transfers/ambulation, Increase mobility, More frequent rounding, Reorient patient, Room close to nurse's station, Toileting rounds, Update white board    Medication Interventions: Assess postural VS orthostatic hypotension, Evaluate medications/consider consulting pharmacy, Patient to call before getting OOB, Teach patient to arise slowly, Utilize gait belt for transfers/ambulation    Elimination Interventions: Call light in reach, Patient to call for help with toileting needs, Stay With Me (per policy), Toilet paper/wipes in reach, Toileting schedule/hourly rounds              Problem: Patient Education: Go to Patient Education Activity  Goal: Patient/Family Education  Outcome: Progressing Towards Goal     Problem: Pain  Goal: *Control of Pain  Outcome: Progressing Towards Goal  Goal: *PALLIATIVE CARE:  Alleviation of Pain  Outcome: Progressing Towards Goal     Problem: Patient Education: Go to Patient Education Activity  Goal: Patient/Family Education  Outcome: Progressing Towards Goal     Problem: Patient Education: Go to Patient Education Activity  Goal: Patient/Family Education  Outcome: Progressing Towards Goal     Problem: Sepsis: Day 6  Goal: *Oxygen saturation within defined limits  Outcome: Progressing Towards Goal  Goal: *Vital signs within defined limits  Outcome: Progressing Towards Goal  Goal: *Tolerating diet  Outcome: Progressing Towards Goal  Goal: *Demonstrates progressive activity  Outcome: Progressing Towards Goal  Goal: Influenza immunization  Outcome: Progressing Towards Goal  Goal: *Pneumococcal immunization  Outcome: Progressing Towards Goal  Goal: Activity/Safety  Outcome: Progressing Towards Goal  Goal: Diagnostic Test/Procedures  Outcome: Progressing Towards Goal  Goal: Nutrition/Diet  Outcome: Progressing Towards Goal  Goal: Discharge Planning  Outcome: Progressing Towards Goal  Goal: Medications  Outcome: Progressing Towards Goal  Goal: Respiratory  Outcome: Progressing Towards Goal  Goal: Treatments/Interventions/Procedures  Outcome: Progressing Towards Goal  Goal: Psychosocial  Outcome: Progressing Towards Goal     Problem: Sepsis: Discharge Outcomes  Goal: *Vital signs within defined limits  Outcome: Progressing Towards Goal  Goal: *Tolerating diet  Outcome: Progressing Towards Goal  Goal: *Verbalizes understanding and describes prescribed diet  Outcome: Progressing Towards Goal  Goal: *Demonstrates progressive activity  Outcome: Progressing Towards Goal  Goal: *Describes follow-up/return visits to physicians  Outcome: Progressing Towards Goal  Goal: *Verbalizes name, dosage, time, side effects, and number of days to continue medications  Outcome: Progressing Towards Goal  Goal: *Influenza immunization (Oct-Mar only)  Outcome: Progressing Towards Goal  Goal: *Pneumococcal immunization  Outcome: Progressing Towards Goal  Goal: *Lungs clear or at baseline  Outcome: Progressing Towards Goal  Goal: *Oxygen saturation returns to baseline or 90% or better on room air  Outcome: Progressing Towards Goal  Goal: *Glycemic control  Outcome: Progressing Towards Goal  Goal: *Absence of deep venous thrombosis signs and symptoms(Stroke Metric)  Outcome: Progressing Towards Goal  Goal: *Describes available resources and support systems  Outcome: Progressing Towards Goal  Goal: *Optimal pain control at patient's stated goal  Outcome: Progressing Towards Goal     Problem: Patient Education: Go to Patient Education Activity  Goal: Patient/Family Education  Outcome: Progressing Towards Goal     Problem: Heart Failure: Day 5  Goal: Activity/Safety  Outcome: Progressing Towards Goal  Goal: Diagnostic Test/Procedures  Outcome: Progressing Towards Goal  Goal: Nutrition/Diet  Outcome: Progressing Towards Goal  Goal: Discharge Planning  Outcome: Progressing Towards Goal  Goal: Medications  Outcome: Progressing Towards Goal  Goal: Respiratory  Outcome: Progressing Towards Goal  Goal: Treatments/Interventions/Procedures  Outcome: Progressing Towards Goal  Goal: Psychosocial  Outcome: Progressing Towards Goal     Problem: Heart Failure: Discharge Outcomes  Goal: *Demonstrates ability to perform prescribed activity without shortness of breath or discomfort  Outcome: Progressing Towards Goal  Goal: *Left ventricular function assessment completed prior to or during stay, or planned for post-discharge  Outcome: Progressing Towards Goal  Goal: *ACEI prescribed if LVEF less than 40% and no contraindications or ARB prescribed  Outcome: Progressing Towards Goal  Goal: *Verbalizes understanding and describes prescribed diet  Outcome: Progressing Towards Goal  Goal: *Verbalizes understanding/describes prescribed medications  Outcome: Progressing Towards Goal  Goal: *Describes available resources and support systems  Description: (eg: Home Health, Palliative Care, Advanced Medical Directive)  Outcome: Progressing Towards Goal  Goal: *Describes smoking cessation resources  Outcome: Progressing Towards Goal  Goal: *Understands and describes signs and symptoms to report to providers(Stroke Metric)  Outcome: Progressing Towards Goal  Goal: *Describes/verbalizes understanding of follow-up/return appt  Description: (eg: to physicians, diabetes treatment coordinator, and other resources  Outcome: Progressing Towards Goal  Goal: *Describes importance of continuing daily weights and changes to report to physician  Outcome: Progressing Towards Goal

## 2022-12-04 PROCEDURE — 74011250637 HC RX REV CODE- 250/637: Performed by: NURSE PRACTITIONER

## 2022-12-04 PROCEDURE — 74011250637 HC RX REV CODE- 250/637: Performed by: FAMILY MEDICINE

## 2022-12-04 PROCEDURE — 74011250637 HC RX REV CODE- 250/637: Performed by: PHYSICIAN ASSISTANT

## 2022-12-04 PROCEDURE — 77010033678 HC OXYGEN DAILY

## 2022-12-04 PROCEDURE — 65270000032 HC RM SEMIPRIVATE

## 2022-12-04 PROCEDURE — 74011000250 HC RX REV CODE- 250: Performed by: FAMILY MEDICINE

## 2022-12-04 RX ADMIN — CASTOR OIL AND BALSAM, PERU: 788; 87 OINTMENT TOPICAL at 09:04

## 2022-12-04 RX ADMIN — NYSTATIN 500000 UNITS: 100000 SUSPENSION ORAL at 14:51

## 2022-12-04 RX ADMIN — FUROSEMIDE 20 MG: 40 TABLET ORAL at 09:03

## 2022-12-04 RX ADMIN — Medication 10 ML: at 14:50

## 2022-12-04 RX ADMIN — AMOXICILLIN AND CLAVULANATE POTASSIUM 875 MG: 400; 57 POWDER, FOR SUSPENSION ORAL at 09:04

## 2022-12-04 RX ADMIN — TAMSULOSIN HYDROCHLORIDE 0.4 MG: 0.4 CAPSULE ORAL at 22:54

## 2022-12-04 RX ADMIN — Medication 400 MG: at 09:03

## 2022-12-04 RX ADMIN — METOPROLOL SUCCINATE 25 MG: 25 TABLET, EXTENDED RELEASE ORAL at 17:26

## 2022-12-04 RX ADMIN — Medication 10 ML: at 06:15

## 2022-12-04 RX ADMIN — NYSTATIN 500000 UNITS: 100000 SUSPENSION ORAL at 22:55

## 2022-12-04 RX ADMIN — NYSTATIN 500000 UNITS: 100000 SUSPENSION ORAL at 17:26

## 2022-12-04 RX ADMIN — LEVOTHYROXINE SODIUM 75 MCG: 0.07 TABLET ORAL at 06:15

## 2022-12-04 RX ADMIN — Medication 10 ML: at 22:55

## 2022-12-04 RX ADMIN — MICONAZOLE NITRATE 2 % TOPICAL POWDER: at 17:52

## 2022-12-04 RX ADMIN — PANTOPRAZOLE SODIUM 40 MG: 40 TABLET, DELAYED RELEASE ORAL at 09:03

## 2022-12-04 RX ADMIN — AMOXICILLIN AND CLAVULANATE POTASSIUM 875 MG: 400; 57 POWDER, FOR SUSPENSION ORAL at 22:54

## 2022-12-04 RX ADMIN — CASTOR OIL AND BALSAM, PERU: 788; 87 OINTMENT TOPICAL at 22:55

## 2022-12-04 RX ADMIN — NYSTATIN 500000 UNITS: 100000 SUSPENSION ORAL at 09:03

## 2022-12-04 RX ADMIN — LISINOPRIL 20 MG: 20 TABLET ORAL at 09:03

## 2022-12-04 RX ADMIN — ATORVASTATIN CALCIUM 20 MG: 20 TABLET, FILM COATED ORAL at 09:03

## 2022-12-04 RX ADMIN — MICONAZOLE NITRATE 2 % TOPICAL POWDER: at 11:09

## 2022-12-04 NOTE — PROGRESS NOTES
LAITH:    RUR 16%    Disposition: Return to [facility] with [facility].     Transportation: S    Follow up: PCP/Specialist    Primary contacts: [address] [place] [address] Nola Argueta(Granddaughter) [phone]    Gwen Peng RN/CRM  [phone]

## 2022-12-04 NOTE — PROGRESS NOTES
[facility] [facility] Adult  Hospitalist Group                                                                                          Hospitalist Progress Note  David Shelton MD  Answering service: [phone] OR [phone] from in house phone        Date of Service:  2022  NAME:  Raine Ramires  :  1922  MRN:  [id number]      Admission Summary:   Raine Ramires is a Barry  [age] y.o. male with PMHx of A-Fib with SSS s/p cardiac pacemaker, BPH, COPD, CKD, HTN, GERD, HLD, and hypothyroidism who presented from SNF with low grade fever and confusion. BCx  with 2/4 Coag negative staph. ID concerns for aspiration PNA (on Vanc, Cefepime, Clindamycin). Hospice and Palliative Care consult. Admission has been complicated by the families inability to agree on goals of care for Mr. Rosalba Daniels. Interval history / Subjective:     Patient seen examined at bedside earlier. No acute complaints, resting      Assessment & Plan:     Acute metabolic encephalopathy, etiology unknown  Concern for fungal rash in groin with possible scrotal cellulitis  Aspiration Pneumonia   LA, fever -improved   -- Notable imaging: CT Head : No acute intracranial process  --- Scrotal rash: Likely fungal in etiology as incontinent at baseline. Started on Ancef (D1 = ) for concern for potential scrotal cellulitis or SSTI. Jose Guerrero Continue Mycostatin powder. Wound care consult  -- Lactic acid: Initially elevated 2.37 --> 2.5 --> 1.96. No signs of EOD or hypoperfusion. Received NS 500cc bolus x 2. Hold off additional fluid resuscitation for now with hx of CHF  -- Notable labs: UA on : negative for nitrites/LE's, WBC's. Urine culture : negative. Influenza A/B and COVID negative. TSH 1.53, Free T4 1.4, Ammonia 20  -- Concern for aspiration: Per pt and family they work with SLP outpatient and chronically aspirating but family accepted the risks of aspiration as food was a major agueda in life.  ID consulted for + BCx, but they had concerns for aspiration PNA and broadened to Cefepime + Vancomycin + Clindamycin. His CXR on 11/26 without concerns for PNA and repeat 11/28 awaiting radiology read but appears stable. - patient with likely witnessed aspiration event last night, chest XR compatible with new aspiration pneumonia, likely source of spike in  fever   - he does appear improved  however continues to have course lung sound throughout all lung fields on exam  -speech eval 12/1 - complete eval recommend thin liquid with cup soft/bite sized diet, discussed w/ family despite this still high risk to aspirate   12/2- per ID DC Vanco/clinda/cefepime discontinued cont w/ liquid Augmentin p.o. to complete 12/4 7 days total    Coag negative Staph capitis in 2/4 in BCx from 11/26 -contaminant  -- ID consulted, recs as discussed above  -- Surveillance cultures ordered 11/28 ngtd   -TTE neg    LUE pain/swelling  Duplex neg for dvt, symptoms resolved     Encounter for Hospice  - Family interested in meeting with Palliative Care and Hospice 11/30  -spoke to daughter Chuck 12/2 confirmed plan is to return to [facility] with hospice with [facility]     Nocturnal O2  -- Uses CPAP at home. Has hx of MARIAN. With current AMS do not think he can tolerate CPAP. Can continue nocturnal O2 as needed     Elevated troponin   -- Troponin 96 --> 114 --> 99.  -- No chest pain or concern for ACS  -- Cardiology reviewed chart      Congestive heart failure, unspecified chronicity  HTN  - last 2 echocardiogram 9/14/2022 showed left ventricular ejection fraction 50% to 55%  -Placed on strict I's and O's and daily weights  -- restarted lasix 11/30  -- Continue Lisinopril     CKD  -- Renal function at baseline. Continue to trend.     Paroxysmal atrial fibrillation  SSS  -Status post cardiac pacemaker plantation  -Continue Tele  -- continue Metoprolol XL 25mg nightly      Acute COPD exacerbation  -order Duoneb nebulizer treatments every 4 hours as needed     Generalized weakness  -Placed on fall precautions     Hypothyroidism  -Continue levothyroxine     GERD  -- Continue hospital formulary PPI     BPH  -- Continue Flomax nightly     Essential Tremor  -- At baseline per family     Vitamin Deficiency  -- Continue home Ascorbic Acid and Magnesium    Medically ready for discharge. Case management confirmed cannot set up hospice at the [facility] until Monday    Plan discussed with both daughter  Luiz Lao left voicemail to call back w/ Yaritza English. Code status: DNR/DNI. Durable DNR recompleted 11/27  Prophylaxis: SCDs. Care Plan discussed with: Pt, RN, family  Anticipated Disposition: [facility] with hospice on Monday      Hospital Problems  Date Reviewed: 7/13/2022            Codes Class Noted POA    Lactic acidosis ICD-10-CM: E87.20  ICD-9-CM: 276.2  11/26/2022 Unknown        Elevated troponin ICD-10-CM: R77.8  ICD-9-CM: 790.6  11/26/2022 Unknown        AMS (altered mental status) ICD-10-CM: R41.82  ICD-9-CM: 780.97  11/26/2022 Unknown        Fever ICD-10-CM: R50.9  ICD-9-CM: 780.60  1/27/2019 Unknown       Review of Systems:   A comprehensive review of systems was negative except for that written in the HPI. Vital Signs:    Last 24hrs VS reviewed since prior progress note. Most recent are:  Visit Vitals  BP (!) 147/83 (BP 1 Location: Left arm, BP Patient Position: At rest)   Pulse 68   Temp 97.7 °F (36.5 °C)   Resp 18   Ht 5' 8\" (1.727 m)   Wt 77.9 kg (171 lb 12.8 oz)   SpO2 97%   BMI 26.12 kg/m²       No intake or output data in the 24 hours ending 12/04/22 1212       Physical Examination:     I had a face to face encounter with this patient and independently examined them on 12/4/2022 as outlined below:          Constitutional:  No acute distress. Lethargic but easily arousable    ENT:  Oral mucosa dry   Resp:  Course ronchi b/l. No wheezing or respiratory distress or accessory muscle use. CV:  Paced rhythm on monitor, irregular rhythm    GI:  Soft, non distended, non tender. Musculoskeletal:  No edema, warm    Neurologic:  Moves all extremities to command. Oriented to name. Not oriented to year. : Minimal erythema in groin. Sebaceous cysts on scrotum without evidence of drainage. Data Review:    Review and/or order of clinical lab test  Review and/or order of tests in the radiology section of CPT  Review and/or order of tests in the medicine section of CPT      Labs:     Recent Labs     12/02/22 0435   WBC 6.3   HGB 10.9*   HCT 35.0*   PLT 98*       Recent Labs     12/02/22 0435      K 3.8      CO2 27   BUN 28*   CREA 1.16   GLU 97   CA 7.7*       No results for input(s): ALT, AP, TBIL, TBILI, TP, ALB, GLOB, GGT, AML, LPSE in the last 72 hours.     No lab exists for component: SGOT, GPT, AMYP, HLPSE    Medications Reviewed:     Current Facility-Administered Medications   Medication Dose Route Frequency    senna (SENOKOT) tablet 8.6 mg  1 Tablet Oral DAILY PRN    amoxicillin-clavulanate (AUGMENTIN) 400-57 mg/5 mL oral suspension 875 mg  875 mg Oral Q12H    benzonatate (TESSALON) capsule 100 mg  100 mg Oral TID PRN    guaiFENesin-dextromethorphan (ROBITUSSIN DM) 100-10 mg/5 mL syrup 10 mL  10 mL Oral Q6H PRN    pantoprazole (PROTONIX) tablet 40 mg  40 mg Oral DAILY    balsam peru-castor oiL (VENELEX) ointment   Topical Q12H    nystatin (MYCOSTATIN) 100,000 unit/mL oral suspension 500,000 Units  500,000 Units Oral QID    ondansetron (ZOFRAN) injection 4 mg  4 mg IntraVENous Q6H PRN    acetaminophen (TYLENOL) suppository 650 mg  650 mg Rectal Q4H PRN    sodium chloride (NS) flush 5-40 mL  5-40 mL IntraVENous Q8H    sodium chloride (NS) flush 5-40 mL  5-40 mL IntraVENous PRN    acetaminophen (TYLENOL) tablet 650 mg  650 mg Oral Q6H PRN    atorvastatin (LIPITOR) tablet 20 mg  20 mg Oral DAILY    furosemide (LASIX) tablet 20 mg  20 mg Oral DAILY    levothyroxine (SYNTHROID) tablet 75 mcg  75 mcg Oral 6am    lisinopriL (PRINIVIL, ZESTRIL) tablet 20 mg  20 mg Oral DAILY    magnesium oxide (MAG-OX) tablet 400 mg  400 mg Oral DAILY    metoprolol succinate (TOPROL-XL) XL tablet 25 mg  25 mg Oral QPM    ipratropium (ATROVENT) 21 mcg (0.03 %) nasal spray 2 Spray  2 Spray Both Nostrils BID PRN    tamsulosin (FLOMAX) capsule 0.4 mg  0.4 mg Oral QHS    ascorbic acid (vitamin C) (VITAMIN C) tablet 500 mg  500 mg Oral Once per day on Tue Thu Sat    ondansetron (ZOFRAN ODT) tablet 4 mg  4 mg Oral Q8H PRN    albuterol-ipratropium (DUO-NEB) 2.5 MG-0.5 MG/3 ML  3 mL Nebulization Q4H PRN    miconazole (MICOTIN) 2 % powder   Topical BID     ______________________________________________________________________  EXPECTED LENGTH OF STAY: 4d 12h  ACTUAL LENGTH OF STAY:          8                 Rogelio Michaels MD

## 2022-12-05 VITALS
RESPIRATION RATE: 19 BRPM | BODY MASS INDEX: 24.48 KG/M2 | HEIGHT: 68 IN | SYSTOLIC BLOOD PRESSURE: 132 MMHG | WEIGHT: 161.5 LBS | OXYGEN SATURATION: 99 % | DIASTOLIC BLOOD PRESSURE: 81 MMHG | HEART RATE: 69 BPM | TEMPERATURE: 97.5 F

## 2022-12-05 PROCEDURE — 74011250637 HC RX REV CODE- 250/637: Performed by: NURSE PRACTITIONER

## 2022-12-05 PROCEDURE — 94760 N-INVAS EAR/PLS OXIMETRY 1: CPT

## 2022-12-05 PROCEDURE — 74011250637 HC RX REV CODE- 250/637: Performed by: PHYSICIAN ASSISTANT

## 2022-12-05 PROCEDURE — 74011000250 HC RX REV CODE- 250: Performed by: FAMILY MEDICINE

## 2022-12-05 RX ADMIN — CASTOR OIL AND BALSAM, PERU: 788; 87 OINTMENT TOPICAL at 08:57

## 2022-12-05 RX ADMIN — PANTOPRAZOLE SODIUM 40 MG: 40 TABLET, DELAYED RELEASE ORAL at 08:57

## 2022-12-05 RX ADMIN — LISINOPRIL 20 MG: 20 TABLET ORAL at 08:57

## 2022-12-05 RX ADMIN — Medication 400 MG: at 08:56

## 2022-12-05 RX ADMIN — MICONAZOLE NITRATE 2 % TOPICAL POWDER: at 08:57

## 2022-12-05 RX ADMIN — Medication 10 ML: at 05:43

## 2022-12-05 RX ADMIN — NYSTATIN 500000 UNITS: 100000 SUSPENSION ORAL at 08:56

## 2022-12-05 RX ADMIN — LEVOTHYROXINE SODIUM 75 MCG: 0.07 TABLET ORAL at 05:43

## 2022-12-05 RX ADMIN — ATORVASTATIN CALCIUM 20 MG: 20 TABLET, FILM COATED ORAL at 08:57

## 2022-12-05 RX ADMIN — FUROSEMIDE 20 MG: 40 TABLET ORAL at 08:57

## 2022-12-05 NOTE — PROGRESS NOTES
Pt is laying in bed with even and unlabored respirations on 2 L of oxygen via nasal cannula. No complaints of pain at this time. No distress noted. Milvia Encarnacion was given to RN from [facility]. All questions were answered at this time. IV was removed with little to no bleeding noted. AVS was given to transport and SBARR was given to transport. Belongings were collected and returned to the pt via pt belonging bags. Safety precautions are in place. Bed in low position and locked. Call bell within reach. Problem: Pressure Injury - Risk of  Goal: *Prevention of pressure injury  Description: Document Thanh Scale and appropriate interventions in the flowsheet.   Outcome: Resolved/Met  Note: Pressure Injury Interventions:  Sensory Interventions: Assess changes in LOC, Assess need for specialty bed, Avoid rigorous massage over bony prominences    Moisture Interventions: Absorbent underpads, Apply protective barrier, creams and emollients, Check for incontinence Q2 hours and as needed, Contain wound drainage, Internal/External urinary devices    Activity Interventions: Increase time out of bed, Pressure redistribution bed/mattress(bed type), Assess need for specialty bed    Mobility Interventions: Assess need for specialty bed, Float heels, HOB 30 degrees or less, Pressure redistribution bed/mattress (bed type)    Nutrition Interventions: Document food/fluid/supplement intake, Offer support with meals,snacks and hydration    Friction and Shear Interventions: Apply protective barrier, creams and emollients, Feet elevated on foot rest, Foam dressings/transparent film/skin sealants, HOB 30 degrees or less                Problem: Patient Education: Go to Patient Education Activity  Goal: Patient/Family Education  Outcome: Resolved/Met     Problem: Aspiration - Risk of  Goal: *Absence of aspiration  Outcome: Resolved/Met     Problem: Patient Education: Go to Patient Education Activity  Goal: Patient/Family Education  Outcome: Resolved/Met     Problem: Falls - Risk of  Goal: *Absence of Falls  Description: Document Kellie Mora Fall Risk and appropriate interventions in the flowsheet.   Outcome: Resolved/Met  Note: Fall Risk Interventions:  Mobility Interventions: Communicate number of staff needed for ambulation/transfer    Mentation Interventions: Door open when patient unattended, Evaluate medications/consider consulting pharmacy    Medication Interventions: Evaluate medications/consider consulting pharmacy, Patient to call before getting OOB    Elimination Interventions: Call light in reach, Patient to call for help with toileting needs              Problem: Patient Education: Go to Patient Education Activity  Goal: Patient/Family Education  Outcome: Resolved/Met     Problem: Patient Education: Go to Patient Education Activity  Goal: Patient/Family Education  Outcome: Resolved/Met     Problem: Patient Education: Go to Patient Education Activity  Goal: Patient/Family Education  Outcome: Resolved/Met     Problem: Pain  Goal: *Control of Pain  Outcome: Resolved/Met  Goal: *PALLIATIVE CARE:  Alleviation of Pain  Outcome: Resolved/Met     Problem: Patient Education: Go to Patient Education Activity  Goal: Patient/Family Education  Outcome: Resolved/Met     Problem: Patient Education: Go to Patient Education Activity  Goal: Patient/Family Education  Outcome: Resolved/Met     Problem: Sepsis: Day 6  Goal: Off Pathway (Use only if patient is Off Pathway)  Outcome: Resolved/Met  Goal: *Oxygen saturation within defined limits  Outcome: Resolved/Met  Goal: *Vital signs within defined limits  Outcome: Resolved/Met  Goal: *Tolerating diet  Outcome: Resolved/Met  Goal: *Demonstrates progressive activity  Outcome: Resolved/Met  Goal: Influenza immunization  Outcome: Resolved/Met  Goal: *Pneumococcal immunization  Outcome: Resolved/Met  Goal: Activity/Safety  Outcome: Resolved/Met  Goal: Diagnostic Test/Procedures  Outcome: Resolved/Met  Goal: Nutrition/Diet  Outcome: Resolved/Met  Goal: Discharge Planning  Outcome: Resolved/Met  Goal: Medications  Outcome: Resolved/Met  Goal: Respiratory  Outcome: Resolved/Met  Goal: Treatments/Interventions/Procedures  Outcome: Resolved/Met  Goal: Psychosocial  Outcome: Resolved/Met     Problem: Sepsis: Discharge Outcomes  Goal: *Vital signs within defined limits  Outcome: Resolved/Met  Goal: *Tolerating diet  Outcome: Resolved/Met  Goal: *Verbalizes understanding and describes prescribed diet  Outcome: Resolved/Met  Goal: *Demonstrates progressive activity  Outcome: Resolved/Met  Goal: *Describes follow-up/return visits to physicians  Outcome: Resolved/Met  Goal: *Verbalizes name, dosage, time, side effects, and number of days to continue medications  Outcome: Resolved/Met  Goal: *Influenza immunization (Oct-Mar only)  Outcome: Resolved/Met  Goal: *Pneumococcal immunization  Outcome: Resolved/Met  Goal: *Lungs clear or at baseline  Outcome: Resolved/Met  Goal: *Oxygen saturation returns to baseline or 90% or better on room air  Outcome: Resolved/Met  Goal: *Glycemic control  Outcome: Resolved/Met  Goal: *Absence of deep venous thrombosis signs and symptoms(Stroke Metric)  Outcome: Resolved/Met  Goal: *Describes available resources and support systems  Outcome: Resolved/Met  Goal: *Optimal pain control at patient's stated goal  Outcome: Resolved/Met     Problem: Patient Education: Go to Patient Education Activity  Goal: Patient/Family Education  Outcome: Resolved/Met     Problem: Heart Failure: Day 5  Goal: Off Pathway (Use only if patient is Off Pathway)  Outcome: Resolved/Met  Goal: Activity/Safety  Outcome: Resolved/Met  Goal: Diagnostic Test/Procedures  Outcome: Resolved/Met  Goal: Nutrition/Diet  Outcome: Resolved/Met  Goal: Discharge Planning  Outcome: Resolved/Met  Goal: Medications  Outcome: Resolved/Met  Goal: Respiratory  Outcome: Resolved/Met  Goal: Treatments/Interventions/Procedures  Outcome: Resolved/Met  Goal: Psychosocial  Outcome: Resolved/Met     Problem: Heart Failure: Discharge Outcomes  Goal: *Demonstrates ability to perform prescribed activity without shortness of breath or discomfort  Outcome: Resolved/Met  Goal: *Left ventricular function assessment completed prior to or during stay, or planned for post-discharge  Outcome: Resolved/Met  Goal: *ACEI prescribed if LVEF less than 40% and no contraindications or ARB prescribed  Outcome: Resolved/Met  Goal: *Verbalizes understanding and describes prescribed diet  Outcome: Resolved/Met  Goal: *Verbalizes understanding/describes prescribed medications  Outcome: Resolved/Met  Goal: *Describes available resources and support systems  Description: (eg: Home Health, Palliative Care, Advanced Medical Directive)  Outcome: Resolved/Met  Goal: *Describes smoking cessation resources  Outcome: Resolved/Met  Goal: *Understands and describes signs and symptoms to report to providers(Stroke Metric)  Outcome: Resolved/Met  Goal: *Describes/verbalizes understanding of follow-up/return appt  Description: (eg: to physicians, diabetes treatment coordinator, and other resources  Outcome: Resolved/Met  Goal: *Describes importance of continuing daily weights and changes to report to physician  Outcome: Resolved/Met

## 2022-12-05 NOTE — DISCHARGE SUMMARY
Discharge Summary       PATIENT ID: José Luis Ravi  MRN: [id number]   YOB: 1922    DATE OF ADMISSION: 2022  6:13 PM    DATE OF DISCHARGE: 22    PRIMARY CARE PROVIDER: Al Patino MD     ATTENDING PHYSICIAN: Navdeep Peterson MD   DISCHARGING PROVIDER: Navdeep Peterson MD    To contact this individual call [phone] and ask the  to page. If unavailable ask to be transferred the Adult Hospitalist Department. CONSULTATIONS: IP CONSULT TO HOSPITALIST  ED CONSULT TO SENIOR SERVICES CASE MANAGEMENT  IP CONSULT TO INFECTIOUS DISEASES  IP CONSULT TO PALLIATIVE CARE - PROVIDER    PROCEDURES/SURGERIES: * No surgery found *    ADMITTING DIAGNOSES & HOSPITAL COURSE:   HPI: \"Orville Guevara is a Barry Meka [age] y.o. male with PMHx of A-Fib with SSS s/p cardiac pacemaker, BPH, COPD, CKD, HTN, GERD, HLD, and hypothyroidism who presented from SNF with low grade fever and confusion. BCx  with 2/4 Coag negative staph. ID concerns for aspiration PNA (on Vanc, Cefepime, Clindamycin). Hospice and Palliative Care consult. Admission has been complicated by the families inability to agree on goals of care for Mr. Erika Guevara. \"        DISCHARGE DIAGNOSES / PLAN:      Acute metabolic encephalopathy, etiology unknown  Concern for fungal rash in groin with possible scrotal cellulitis  Aspiration Pneumonia   LA, fever -improved   -- Notable imaging: CT Head : No acute intracranial process  --- Scrotal rash: Likely fungal in etiology as incontinent at baseline. Started on Ancef (D1 = ) for concern for potential scrotal cellulitis or SSTI. [employer]in [employer] Continue Mycostatin powder. Wound care consult  -- Lactic acid: Initially elevated 2.37 --> 2.5 --> 1.96. No signs of EOD or hypoperfusion. Received NS 500cc bolus x 2. Hold off additional fluid resuscitation for now with hx of CHF  -- Notable labs: UA on : negative for nitrites/LE's, WBC's. Urine culture : negative.  Influenza A/B and COVID negative. TSH 1.53, Free T4 1.4, Ammonia 20  -- Concern for aspiration: Per pt and family they work with SLP outpatient and chronically aspirating but family accepted the risks of aspiration as food was a major agueda in life. ID consulted for + BCx, but they had concerns for aspiration PNA and broadened to Cefepime + Vancomycin + Clindamycin. His CXR on 11/26 without concerns for PNA and repeat 11/28 awaiting radiology read but appears stable. - patient with likely witnessed aspiration event last night, chest XR compatible with new aspiration pneumonia, likely source of spike in  fever   - he does appear improved  however continues to have course lung sound throughout all lung fields on exam  -speech eval 12/1 - complete eval recommend thin liquid with cup soft/bite sized diet, discussed w/ family despite this still high risk to aspirate   12/2- per ID DC Vanco/clinda/cefepime discontinued cont w/ liquid Augmentin p.o. to completed 12/4 7 days total     Coag negative Staph capitis in 2/4 in BCx from 11/26 -contaminant  -- ID consulted, recs as discussed above  -- Surveillance cultures ordered 11/28 ngtd   -TTE neg     LUE pain/swelling  Duplex neg for dvt, symptoms resolved      Encounter for Hospice  - Family interested in meeting with Palliative Care and Hospice 11/30  -spoke to daughter Chuck 12/2 confirmed plan is to return to [facility] with hospice with [facility]      Nocturnal O2  -- Uses CPAP at home. Has hx of MARIAN. With current AMS do not think he can tolerate CPAP. Can continue nocturnal O2 as needed     Elevated troponin   -- Troponin 96 --> 114 --> 99.  -- No chest pain or concern for ACS  -- Cardiology reviewed chart      Congestive heart failure, unspecified chronicity  HTN  - last 2 echocardiogram 9/14/2022 showed left ventricular ejection fraction 50% to 55%  -Placed on strict I's and O's and daily weights  -- restarted lasix 11/30  -- Continue Lisinopril     CKD  -- Renal function at baseline. Continue to trend. Paroxysmal atrial fibrillation  SSS  -Status post cardiac pacemaker plantation  -Continue Tele  -- continue Metoprolol XL 25mg nightly      Acute COPD exacerbation  -order Duoneb nebulizer treatments every 4 hours as needed     Generalized weakness  -Placed on fall precautions     Hypothyroidism  -Continue levothyroxine     GERD  -- Continue hospital formulary PPI     BPH  -- Continue Flomax nightly     Essential Tremor  -- At baseline per family     Vitamin Deficiency  -- Continue home Ascorbic Acid and Magnesium     Medically ready for discharge. Case management confirmed  hospice at the [facility]      Code status: DNR/DNI. Durable DNR recompleted 11/27  Prophylaxis: SCDs. Care Plan discussed with: Pt, RN, family  Anticipated Disposition: [facility] with hospice today        FOLLOW UP APPOINTMENTS:    Follow-up Information       Follow up With Specialties Details Why Contact Info    Brittany De Dios MD Family Medicine   [address] [place] [id number] [phone]               ADDITIONAL CARE RECOMMENDATIONS: comfort measures     DIET: Resume previous diet    ACTIVITY: Activity as tolerated        DISCHARGE MEDICATIONS:  Current Discharge Medication List        START taking these medications    Details   balsam peru-castor oiL (VENELEX) ointment Apply  to affected area every twelve (12) hours. Qty: 5 g, Refills: 0  Start date: 12/2/2022      benzonatate (TESSALON) 100 mg capsule Take 1 Capsule by mouth three (3) times daily as needed for Cough for up to 7 days. Qty: 21 Capsule, Refills: 0  Start date: 12/2/2022, End date: 12/9/2022      guaiFENesin-dextromethorphan (ROBITUSSIN DM) 100-10 mg/5 mL syrup Take 10 mL by mouth every six (6) hours as needed for Cough or Congestion for up to 7 days. Qty: 100 mL, Refills: 0  Start date: 12/2/2022, End date: 12/9/2022      miconazole (MICOTIN) 2 % topical powder Apply  to affected area two (2) times a day for 14 days.   Qty: 43 g, Refills: 0  Start date: 12/2/2022, End date: 12/16/2022      nystatin (MYCOSTATIN) 100,000 unit/mL suspension Take 5 mL by mouth four (4) times daily for 25 doses. swish and spit  Qty: 125 mL, Refills: 0  Start date: 12/2/2022, End date: 12/9/2022           CONTINUE these medications which have NOT CHANGED    Details   furosemide (LASIX) 20 mg tablet Take 20 mg by mouth daily. levothyroxine (SYNTHROID) 75 mcg tablet Take 75 mcg by mouth Daily (before breakfast). therapeutic multivitamin (THERAGRAN) tablet Take 1 Tablet by mouth daily. vit C/E/Zn/coppr/lutein/zeaxan (PRESERVISION AREDS-2 PO) Take 1 Capsule by mouth daily. triamcinolone (ARISTOCORT) 0.5 % topical cream Apply  to affected area two (2) times a day. use thin layer- apply to affected area for skin irritation      magnesium oxide (MAG-OX) 400 mg tablet Take 400 mg by mouth daily. nitroglycerin (NITROLINGUAL) 400 mcg/spray spray 1 Spray by SubLINGual route every five (5) minutes as needed for Chest Pain. ondansetron hcl (ZOFRAN) 4 mg tablet Take 4 mg by mouth every eight (8) hours as needed for Nausea or Vomiting. ascorbic acid, vitamin C, (VITAMIN C) 500 mg tablet Take 500 mg by mouth every Tuesday Thursday, Saturday. lisinopriL (PRINIVIL, ZESTRIL) 20 mg tablet Take 1 Tab by mouth daily. Qty: 90 Tab, Refills: 1      atorvastatin (LIPITOR) 20 mg tablet Take 1 Tab by mouth daily. Qty: 90 Tab, Refills: 1      metoprolol succinate (TOPROL-XL) 25 mg XL tablet Take 1 Tab by mouth every evening. Qty: 30 Tab, Refills: 0      ipratropium (ATROVENT) 0.03 % nasal spray 2 Sprays by Both Nostrils route two (2) times daily as needed for Rhinitis. tamsulosin (FLOMAX) 0.4 mg capsule Take 0.4 mg by mouth nightly. omeprazole (PRILOSEC) 20 mg capsule Take 20 mg by mouth daily.            STOP taking these medications       ketotifen (ZADITOR) 0.025 % (0.035 %) ophthalmic solution Comments:   Reason for Stopping: NOTIFY YOUR PHYSICIAN FOR ANY OF THE FOLLOWING:   Fever over 101 degrees for 24 hours. Chest pain, shortness of breath, fever, chills, nausea, vomiting, diarrhea, change in mentation, falling, weakness, bleeding. Severe pain or pain not relieved by medications. Or, any other signs or symptoms that you may have questions about. DISPOSITION:    Home With:   OT  PT  HH  RN       Long term SNF/Inpatient Rehab    Independent/assisted living   x Hospice    Other:       PATIENT CONDITION AT DISCHARGE:     Functional status   x Poor     Deconditioned     Independent      Cognition     Lucid     Forgetful    x Dementia      Catheters/lines (plus indication)    [place]     PICC     PEG    x None      Code status     Full code    x DNR      PHYSICAL EXAMINATION AT DISCHARGE:  I had a face to face encounter with this patient and independently examined them on 12/5/2022 as outlined below:                                                   Constitutional:  No acute distress. Lethargic but easily arousable    ENT:  Oral mucosa dry   Resp:  Course ronchi b/l. No wheezing or respiratory distress or accessory muscle use. CV:  Paced rhythm on monitor, irregular rhythm    GI:  Soft, non distended, non tender. Musculoskeletal:  No edema, warm    Neurologic:  Moves all extremities to command. Oriented to name. Not oriented to year. : Minimal erythema in groin. Sebaceous cysts on scrotum without evidence of drainage.                                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  ICD-9-CM: 429.83  3/27/2021 - Present Coronary artery disease involving native coronary artery of native heart with angina pectoris ([facility] ICD-10-CM: I25.119  ICD-9-CM: 414.01, 413.9  3/27/2021 - Present        Acute pancreatitis ICD-10-CM: K85.90  ICD-9-CM: 577.0  2/9/2020 - Present        Elevated lipase ICD-10-CM: R74.8  ICD-9-CM: 790.5  2/9/2020 - Present        NSTEMI (non-ST elevated myocardial infarction) [facility]) ICD-10-CM: I21.4  ICD-9-CM: 410.70  7/17/2019 - Present        Chest pain ICD-10-CM: R07.9  ICD-9-CM: 786.50  7/3/2019 - Present        Sepsis ([facility] ICD-10-CM: A41.9  ICD-9-CM: 038.9, 995.91  1/27/2019 - Present        Chronic obstructive pulmonary disease ([place] [address].) ICD-10-CM: J44.9  ICD-9-CM: 496  1/27/2019 - Present        Hyperlipidemia ICD-10-CM: E78.5  ICD-9-CM: 272.4  1/27/2019 - Present        Essential hypertension ICD-10-CM: I10  ICD-9-CM: 401.9  1/27/2019 - Present        Pacemaker ICD-10-CM: Z95.0  ICD-9-CM: V45.01  1/27/2019 - Present        Fever ICD-10-CM: R50.9  ICD-9-CM: 780.60  1/27/2019 - Present        Leukocytosis ICD-10-CM: D72.829  ICD-9-CM: 288.60  1/27/2019 - Present        CKD (chronic kidney disease) stage 3, GFR 30-59 ml/min ([facility]) ICD-10-CM: N18.30  ICD-9-CM: 585.3  1/27/2019 - Present        Cough ICD-10-CM: R05.9  ICD-9-CM: 786.2  1/27/2019 - Present        Dyspnea ICD-10-CM: R06.00  ICD-9-CM: 786.09  1/27/2019 - Present        Atrial fibrillation ([facility] ICD-10-CM: I48.91  ICD-9-CM: 427.31  2/17/2016 - Present        Encounter to establish care ICD-10-CM: Z76.89  ICD-9-CM: V65.8  8/5/2015 - Present           Greater than 30 minutes were spent with the patient on counseling and coordination of care    Signed:   Tiff Coles MD  12/5/2022  10:33 AM

## 2022-12-05 NOTE — PROGRESS NOTES
LAITH:    RUR 15%    Disposition: Return to [facility] with [facility]. CM spoke to [facility] with [facility] [phone]. DME will be delivered at noon today.   She requested transport time of 1pm.     Transportation: [facility] scheduled for 1pm.     Follow up: PCP/Specialist     Primary contacts: Mirella Ross(Daughter)[phone]  Antoni Argueta(Granddaughter) [phone]     Ollie Purcell, HARISH/CRM

## 2022-12-08 ENCOUNTER — TELEPHONE (OUTPATIENT)
Dept: CARDIOLOGY CLINIC | Age: 87
End: 2022-12-08

## 2022-12-08 NOTE — TELEPHONE ENCOUNTER
Pt is on hospice, and daughter would like to know what to do with his pacemaker. Daughter rq a message be left on her landline.        Clearance Ek  [phone]

## 2022-12-08 NOTE — TELEPHONE ENCOUNTER
Attempted to call Mirella back X2. Just wanted more clarification on what does she mean by what to do with the pacemaker? Mr. Reymundo Tam has a pacemaker not and ICD, therefore, there is nothing we really need to turn off within his device. The pacemaker does not actually beat for the heart, but rather delivers energy to stimulate the heart muscle to beat- not contract. Therefore, once someone with a pacemaker stops breathing, or their body can no longer get oxygen and the heart muscle will die and stop beating, even with a pacemaker.

## 2022-12-13 ENCOUNTER — TELEPHONE (OUTPATIENT)
Dept: CARDIOLOGY CLINIC | Age: 87
End: 2022-12-13

## 2022-12-26 PROBLEM — R77.8 ELEVATED TROPONIN: Status: RESOLVED | Noted: 2022-11-26 | Resolved: 2022-12-26

## 2024-10-17 NOTE — PROGRESS NOTES
Pt. /57 and HR 62. RN notifies Dr. Behzad Dao. MD states to hold Lopressor and Imdur. Will continue to monitor. Pt.'s daughter asks if a urine legionella pneumophila can be ordered. RN notifies Dr. Behzad Dao. MD states to place order. Will continue to monitor. Negative

## (undated) DEVICE — SUTURE STRATAFIX SPRL MCRYL + SZ 3-0 L24IN ABSRB UD PS-2 SXMP1B108

## (undated) DEVICE — 3M™ TEGADERM™ TRANSPARENT FILM DRESSING FRAME STYLE, 1626W, 4 IN X 4-3/4 IN (10 CM X 12 CM), 50/CT 4CT/CASE: Brand: 3M™ TEGADERM™

## (undated) DEVICE — Device: Brand: PADPRO

## (undated) DEVICE — PACK PROCEDURE SURG HRT CATH

## (undated) DEVICE — PINNACLE INTRODUCER SHEATH: Brand: PINNACLE

## (undated) DEVICE — ANGIOGRAPHIC CATHETER: Brand: EXPO™

## (undated) DEVICE — DRAPE PRB US TRNSDCR 6X96IN --

## (undated) DEVICE — PACEMAKER PACK: Brand: MEDLINE INDUSTRIES, INC.

## (undated) DEVICE — CABLE PACE ALGTR CLP SAF 12FT --

## (undated) DEVICE — ZIP 8I SURGICAL SKIN CLOSURE DEVICE: Brand: ZIP 8I SURGICAL SKIN CLOSURE DEVICE

## (undated) DEVICE — SUTURE STRATAFIX SPRL PDS + SZ 2-0 L9IN ABSRB VLT CT-1 SXPP1B456

## (undated) DEVICE — REM POLYHESIVE ADULT PATIENT RETURN ELECTRODE: Brand: VALLEYLAB

## (undated) DEVICE — KIT MFLD ISOLATN NACL CNTRST PRT TBNG SPIK W/ PRSS TRNSDUC

## (undated) DEVICE — WASTE KIT - ST MARY: Brand: MEDLINE INDUSTRIES, INC.

## (undated) DEVICE — KIT MED IMAG CNTRST AGNT W/ IOPAMIDOL REUSE

## (undated) DEVICE — BULB SYRINGE, IRRIGATION WITH PROTECTIVE CAP, 60 CC, INDIVIDUALLY WRAPPED: Brand: DOVER

## (undated) DEVICE — DRESSING FOAM 4X6 DISP POSTOP MEPILEX BORD AG

## (undated) DEVICE — 3M™ IOBAN™ 2 ANTIMICROBIAL INCISE DRAPE 6640EZ: Brand: IOBAN™ 2

## (undated) DEVICE — PLASMABLADE PS200-040 4.0: Brand: PLASMABLADE™

## (undated) DEVICE — ANGIOGRAPHY KIT

## (undated) DEVICE — KIT HND CTRL 3 W STPCOCK ROT END 54IN PREM HI PRSS TBNG AT

## (undated) DEVICE — KENDALL RADIOLUCENT FOAM MONITORING ELECTRODE RECTANGULAR SHAPE: Brand: KENDALL